# Patient Record
Sex: MALE | Race: WHITE | NOT HISPANIC OR LATINO | Employment: OTHER | URBAN - METROPOLITAN AREA
[De-identification: names, ages, dates, MRNs, and addresses within clinical notes are randomized per-mention and may not be internally consistent; named-entity substitution may affect disease eponyms.]

---

## 2020-02-17 ENCOUNTER — HOSPITAL ENCOUNTER (OUTPATIENT)
Dept: LAB | Facility: HOSPITAL | Age: 65
Discharge: HOME | End: 2020-02-17
Attending: INTERNAL MEDICINE

## 2020-02-17 LAB
25(OH)D3 SERPL-MCNC: 43.9 NG/ML (ref 30–100)
ALBUMIN SERPL-MCNC: 4.4 G/DL (ref 3.5–5)
ALBUMIN/GLOB SERPL: 1.4 {RATIO} (ref 1.4–2.6)
ALP SERPL-CCNC: 69 U/L (ref 56–155)
ALT SERPL-CCNC: 42 U/L (ref 10–40)
ANION GAP SERPL CALC-SCNC: 23 MMOL/L (ref 8–19)
AST SERPL-CCNC: 24 U/L (ref 15–50)
BASOPHILS # BLD AUTO: 0.05 10*3/UL (ref 0–0.2)
BASOPHILS NFR BLD AUTO: 0.8 % (ref 0–3)
BILIRUB SERPL-MCNC: 0.43 MG/DL (ref 0.2–1.3)
BNP SERPL-MCNC: 40 PG/ML (ref 0–900)
BUN SERPL-MCNC: 20 MG/DL (ref 5–25)
BUN/CREAT SERPL: 18 {RATIO} (ref 6–20)
CALCIUM SERPL-MCNC: 9.4 MG/DL (ref 8.7–10.4)
CHLORIDE SERPL-SCNC: 101 MMOL/L (ref 99–111)
CONV ABS IMM GRAN: 0.02 10*3/UL (ref 0–0.2)
CONV CO2: 22 MMOL/L (ref 22–32)
CONV IMMATURE GRAN: 0.3 % (ref 0–1.8)
CONV TOTAL PROTEIN: 7.5 G/DL (ref 6.3–8.2)
CREAT UR-MCNC: 1.11 MG/DL (ref 0.7–1.2)
DEPRECATED RDW RBC AUTO: 45.5 FL (ref 35.1–43.9)
EOSINOPHIL # BLD AUTO: 0.22 10*3/UL (ref 0–0.7)
EOSINOPHIL # BLD AUTO: 3.5 % (ref 0–7)
ERYTHROCYTE [DISTWIDTH] IN BLOOD BY AUTOMATED COUNT: 12.9 % (ref 11.6–14.4)
FOLATE SERPL-MCNC: 15.7 NG/ML (ref 4.8–20)
GFR SERPLBLD BASED ON 1.73 SQ M-ARVRAT: >60 ML/MIN/{1.73_M2}
GLOBULIN UR ELPH-MCNC: 3.1 G/DL (ref 2–3.5)
GLUCOSE SERPL-MCNC: 102 MG/DL (ref 70–99)
HCT VFR BLD AUTO: 48.3 % (ref 42–52)
HGB BLD-MCNC: 15.8 G/DL (ref 14–18)
LYMPHOCYTES # BLD AUTO: 1.33 10*3/UL (ref 1–5)
LYMPHOCYTES NFR BLD AUTO: 21.1 % (ref 20–45)
MCH RBC QN AUTO: 31.4 PG (ref 27–31)
MCHC RBC AUTO-ENTMCNC: 32.7 G/DL (ref 33–37)
MCV RBC AUTO: 96 FL (ref 80–96)
MONOCYTES # BLD AUTO: 0.64 10*3/UL (ref 0.2–1.2)
MONOCYTES NFR BLD AUTO: 10.1 % (ref 3–10)
NEUTROPHILS # BLD AUTO: 4.05 10*3/UL (ref 2–8)
NEUTROPHILS NFR BLD AUTO: 64.2 % (ref 30–85)
NRBC CBCN: 0 % (ref 0–0.7)
OSMOLALITY SERPL CALC.SUM OF ELEC: 297 MOSM/KG (ref 273–304)
PLATELET # BLD AUTO: 159 10*3/UL (ref 130–400)
PMV BLD AUTO: 10.7 FL (ref 9.4–12.4)
POTASSIUM SERPL-SCNC: 4.2 MMOL/L (ref 3.5–5.3)
PSA SERPL-MCNC: 0.78 NG/ML (ref 0–4)
RBC # BLD AUTO: 5.03 10*6/UL (ref 4.7–6.1)
SODIUM SERPL-SCNC: 142 MMOL/L (ref 135–147)
TSH SERPL-ACNC: 1.85 M[IU]/L (ref 0.27–4.2)
VIT B12 SERPL-MCNC: >2000 PG/ML (ref 211–911)
WBC # BLD AUTO: 6.31 10*3/UL (ref 4.8–10.8)

## 2020-02-21 ENCOUNTER — HOSPITAL ENCOUNTER (OUTPATIENT)
Dept: CARDIOLOGY | Facility: HOSPITAL | Age: 65
Discharge: HOME | End: 2020-02-21
Attending: INTERNAL MEDICINE

## 2020-11-02 ENCOUNTER — HOSPITAL ENCOUNTER (OUTPATIENT)
Dept: LAB | Facility: HOSPITAL | Age: 65
Discharge: HOME | End: 2020-11-02
Attending: INTERNAL MEDICINE

## 2020-11-02 LAB
ALBUMIN SERPL-MCNC: 4.3 G/DL (ref 3.5–5)
ALBUMIN/GLOB SERPL: 1.5 {RATIO} (ref 1.4–2.6)
ALP SERPL-CCNC: 75 U/L (ref 56–155)
ALT SERPL-CCNC: 36 U/L (ref 10–40)
ANION GAP SERPL CALC-SCNC: 17 MMOL/L (ref 8–19)
AST SERPL-CCNC: 21 U/L (ref 15–50)
BASOPHILS # BLD AUTO: 0.03 10*3/UL (ref 0–0.2)
BASOPHILS NFR BLD AUTO: 0.7 % (ref 0–3)
BILIRUB SERPL-MCNC: 0.51 MG/DL (ref 0.2–1.3)
BUN SERPL-MCNC: 21 MG/DL (ref 5–25)
BUN/CREAT SERPL: 19 {RATIO} (ref 6–20)
CALCIUM SERPL-MCNC: 9.3 MG/DL (ref 8.7–10.4)
CHLORIDE SERPL-SCNC: 103 MMOL/L (ref 99–111)
CHOLEST SERPL-MCNC: 159 MG/DL (ref 107–200)
CHOLEST/HDLC SERPL: 2.9 {RATIO} (ref 3–6)
CONV ABS IMM GRAN: 0.01 10*3/UL (ref 0–0.2)
CONV CO2: 25 MMOL/L (ref 22–32)
CONV IMMATURE GRAN: 0.2 % (ref 0–1.8)
CONV TOTAL PROTEIN: 7.2 G/DL (ref 6.3–8.2)
CREAT UR-MCNC: 1.1 MG/DL (ref 0.7–1.2)
DEPRECATED RDW RBC AUTO: 46.4 FL (ref 35.1–43.9)
EOSINOPHIL # BLD AUTO: 0.19 10*3/UL (ref 0–0.7)
EOSINOPHIL # BLD AUTO: 4.3 % (ref 0–7)
ERYTHROCYTE [DISTWIDTH] IN BLOOD BY AUTOMATED COUNT: 13 % (ref 11.6–14.4)
GFR SERPLBLD BASED ON 1.73 SQ M-ARVRAT: >60 ML/MIN/{1.73_M2}
GLOBULIN UR ELPH-MCNC: 2.9 G/DL (ref 2–3.5)
GLUCOSE SERPL-MCNC: 97 MG/DL (ref 70–99)
HCT VFR BLD AUTO: 49.6 % (ref 42–52)
HDLC SERPL-MCNC: 55 MG/DL (ref 40–60)
HGB BLD-MCNC: 15.8 G/DL (ref 14–18)
IRON SERPL-MCNC: 95 UG/DL (ref 70–180)
LDLC SERPL CALC-MCNC: 91 MG/DL (ref 70–100)
LYMPHOCYTES # BLD AUTO: 0.94 10*3/UL (ref 1–5)
LYMPHOCYTES NFR BLD AUTO: 21.3 % (ref 20–45)
MCH RBC QN AUTO: 30.6 PG (ref 27–31)
MCHC RBC AUTO-ENTMCNC: 31.9 G/DL (ref 33–37)
MCV RBC AUTO: 95.9 FL (ref 80–96)
MONOCYTES # BLD AUTO: 0.42 10*3/UL (ref 0.2–1.2)
MONOCYTES NFR BLD AUTO: 9.5 % (ref 3–10)
NEUTROPHILS # BLD AUTO: 2.83 10*3/UL (ref 2–8)
NEUTROPHILS NFR BLD AUTO: 64 % (ref 30–85)
NRBC CBCN: 0 % (ref 0–0.7)
OSMOLALITY SERPL CALC.SUM OF ELEC: 293 MOSM/KG (ref 273–304)
PLATELET # BLD AUTO: 150 10*3/UL (ref 130–400)
PMV BLD AUTO: 10.3 FL (ref 9.4–12.4)
POTASSIUM SERPL-SCNC: 4.7 MMOL/L (ref 3.5–5.3)
RBC # BLD AUTO: 5.17 10*6/UL (ref 4.7–6.1)
SODIUM SERPL-SCNC: 140 MMOL/L (ref 135–147)
TRIGL SERPL-MCNC: 66 MG/DL (ref 40–150)
VLDLC SERPL-MCNC: 13 MG/DL (ref 5–37)
WBC # BLD AUTO: 4.42 10*3/UL (ref 4.8–10.8)

## 2020-11-03 LAB
CONV HEPATITIS B SURFACE AG W CONFIRMATION RE: NEGATIVE
HCV AB SER DONR QL: <0.1 S/CO RATIO (ref 0–0.9)
SMOOTH MUSCLE F-ACTIN AB IGG: 10 UNITS (ref 0–19)

## 2021-03-31 ENCOUNTER — HOSPITAL ENCOUNTER (OUTPATIENT)
Dept: SLEEP MEDICINE | Facility: HOSPITAL | Age: 66
Discharge: HOME | End: 2021-03-31
Attending: INTERNAL MEDICINE

## 2021-03-31 ENCOUNTER — OUTSIDE FACILITY SERVICE (OUTPATIENT)
Dept: SLEEP MEDICINE | Facility: HOSPITAL | Age: 66
End: 2021-03-31

## 2021-03-31 PROCEDURE — 99204 OFFICE O/P NEW MOD 45 MIN: CPT | Performed by: INTERNAL MEDICINE

## 2021-04-20 ENCOUNTER — HOSPITAL ENCOUNTER (OUTPATIENT)
Dept: PREADMISSION TESTING | Facility: HOSPITAL | Age: 66
Discharge: HOME | End: 2021-04-20
Attending: INTERNAL MEDICINE

## 2021-04-20 LAB — SARS-COV-2 RNA SPEC QL NAA+PROBE: NOT DETECTED

## 2021-04-27 ENCOUNTER — HOSPITAL ENCOUNTER (OUTPATIENT)
Dept: SLEEP MEDICINE | Facility: HOSPITAL | Age: 66
Discharge: HOME | End: 2021-04-27
Attending: INTERNAL MEDICINE

## 2021-04-28 ENCOUNTER — OUTSIDE FACILITY SERVICE (OUTPATIENT)
Dept: SLEEP MEDICINE | Facility: HOSPITAL | Age: 66
End: 2021-04-28

## 2021-04-28 PROCEDURE — 95810 POLYSOM 6/> YRS 4/> PARAM: CPT | Performed by: INTERNAL MEDICINE

## 2021-05-26 ENCOUNTER — HOSPITAL ENCOUNTER (OUTPATIENT)
Dept: LAB | Facility: HOSPITAL | Age: 66
Discharge: HOME | End: 2021-05-26
Attending: INTERNAL MEDICINE

## 2021-05-26 LAB
ALBUMIN SERPL-MCNC: 4.3 G/DL (ref 3.5–5)
ALBUMIN/GLOB SERPL: 1.3 {RATIO} (ref 1.4–2.6)
ALP SERPL-CCNC: 69 U/L (ref 56–155)
ALT SERPL-CCNC: 41 U/L (ref 10–40)
AMPHETAMINES UR QL SCN: NEGATIVE
ANION GAP SERPL CALC-SCNC: 16 MMOL/L (ref 8–19)
AST SERPL-CCNC: 25 U/L (ref 15–50)
BARBITURATES UR QL SCN: NEGATIVE
BASOPHILS # BLD AUTO: 0.04 10*3/UL (ref 0–0.2)
BASOPHILS NFR BLD AUTO: 0.8 % (ref 0–3)
BENZODIAZ UR QL SCN: POSITIVE
BILIRUB SERPL-MCNC: 0.56 MG/DL (ref 0.2–1.3)
BUN SERPL-MCNC: 20 MG/DL (ref 5–25)
BUN/CREAT SERPL: 17 {RATIO} (ref 6–20)
CALCIUM SERPL-MCNC: 9.5 MG/DL (ref 8.7–10.4)
CHLORIDE SERPL-SCNC: 102 MMOL/L (ref 99–111)
CHOLEST SERPL-MCNC: 176 MG/DL (ref 107–200)
CHOLEST/HDLC SERPL: 3.5 {RATIO} (ref 3–6)
CONV ABS IMM GRAN: 0.01 10*3/UL (ref 0–0.2)
CONV CO2: 25 MMOL/L (ref 22–32)
CONV COCAINE, UR: NEGATIVE
CONV IMMATURE GRAN: 0.2 % (ref 0–1.8)
CONV TOTAL PROTEIN: 7.7 G/DL (ref 6.3–8.2)
CREAT UR-MCNC: 1.2 MG/DL (ref 0.7–1.2)
DEPRECATED RDW RBC AUTO: 45.1 FL (ref 35.1–43.9)
EOSINOPHIL # BLD AUTO: 0.28 10*3/UL (ref 0–0.7)
EOSINOPHIL # BLD AUTO: 5.8 % (ref 0–7)
ERYTHROCYTE [DISTWIDTH] IN BLOOD BY AUTOMATED COUNT: 12.8 % (ref 11.6–14.4)
EST. AVERAGE GLUCOSE BLD GHB EST-MCNC: 111 MG/DL
GFR SERPLBLD BASED ON 1.73 SQ M-ARVRAT: >60 ML/MIN/{1.73_M2}
GLOBULIN UR ELPH-MCNC: 3.4 G/DL (ref 2–3.5)
GLUCOSE SERPL-MCNC: 96 MG/DL (ref 70–99)
HBA1C MFR BLD: 5.5 % (ref 3.5–5.7)
HCT VFR BLD AUTO: 47.6 % (ref 42–52)
HDLC SERPL-MCNC: 51 MG/DL (ref 40–60)
HGB BLD-MCNC: 15.5 G/DL (ref 14–18)
LDLC SERPL CALC-MCNC: 106 MG/DL (ref 70–100)
LYMPHOCYTES # BLD AUTO: 1.07 10*3/UL (ref 1–5)
LYMPHOCYTES NFR BLD AUTO: 22.1 % (ref 20–45)
MCH RBC QN AUTO: 31 PG (ref 27–31)
MCHC RBC AUTO-ENTMCNC: 32.6 G/DL (ref 33–37)
MCV RBC AUTO: 95.2 FL (ref 80–96)
METHADONE UR QL SCN: NEGATIVE
MONOCYTES # BLD AUTO: 0.52 10*3/UL (ref 0.2–1.2)
MONOCYTES NFR BLD AUTO: 10.7 % (ref 3–10)
NEUTROPHILS # BLD AUTO: 2.93 10*3/UL (ref 2–8)
NEUTROPHILS NFR BLD AUTO: 60.4 % (ref 30–85)
NRBC CBCN: 0 % (ref 0–0.7)
OPIATES TESTED UR SCN: NEGATIVE
OSMOLALITY SERPL CALC.SUM OF ELEC: 290 MOSM/KG (ref 273–304)
OXYCODONE UR QL SCN: NEGATIVE
PCP UR QL: NEGATIVE
PLATELET # BLD AUTO: 141 10*3/UL (ref 130–400)
PMV BLD AUTO: 10.2 FL (ref 9.4–12.4)
POTASSIUM SERPL-SCNC: 4.3 MMOL/L (ref 3.5–5.3)
PSA SERPL-MCNC: 1.38 NG/ML (ref 0–4)
RBC # BLD AUTO: 5 10*6/UL (ref 4.7–6.1)
SODIUM SERPL-SCNC: 139 MMOL/L (ref 135–147)
THC SERPLBLD CFM-MCNC: NEGATIVE NG/ML
TRIGL SERPL-MCNC: 94 MG/DL (ref 40–150)
VLDLC SERPL-MCNC: 19 MG/DL (ref 5–37)
WBC # BLD AUTO: 4.85 10*3/UL (ref 4.8–10.8)

## 2021-05-27 ENCOUNTER — HOSPITAL ENCOUNTER (OUTPATIENT)
Dept: SLEEP MEDICINE | Facility: HOSPITAL | Age: 66
Discharge: HOME | End: 2021-05-27
Attending: INTERNAL MEDICINE

## 2021-06-02 ENCOUNTER — OUTSIDE FACILITY SERVICE (OUTPATIENT)
Dept: SLEEP MEDICINE | Facility: HOSPITAL | Age: 66
End: 2021-06-02

## 2021-06-02 PROCEDURE — 95811 POLYSOM 6/>YRS CPAP 4/> PARM: CPT | Performed by: INTERNAL MEDICINE

## 2021-06-30 ENCOUNTER — OFFICE VISIT (OUTPATIENT)
Dept: OTOLARYNGOLOGY | Facility: CLINIC | Age: 66
End: 2021-06-30

## 2021-06-30 VITALS — HEIGHT: 70 IN | BODY MASS INDEX: 36.76 KG/M2 | WEIGHT: 256.8 LBS | TEMPERATURE: 98.2 F

## 2021-06-30 DIAGNOSIS — R13.10 DYSPHAGIA, UNSPECIFIED TYPE: ICD-10-CM

## 2021-06-30 DIAGNOSIS — K21.9 GASTROESOPHAGEAL REFLUX DISEASE, UNSPECIFIED WHETHER ESOPHAGITIS PRESENT: ICD-10-CM

## 2021-06-30 DIAGNOSIS — R49.0 HOARSENESS: Primary | ICD-10-CM

## 2021-06-30 DIAGNOSIS — R09.81 NASAL CONGESTION: ICD-10-CM

## 2021-06-30 DIAGNOSIS — H61.21 IMPACTED CERUMEN OF RIGHT EAR: ICD-10-CM

## 2021-06-30 PROCEDURE — 31575 DIAGNOSTIC LARYNGOSCOPY: CPT | Performed by: NURSE PRACTITIONER

## 2021-06-30 PROCEDURE — 99204 OFFICE O/P NEW MOD 45 MIN: CPT | Performed by: NURSE PRACTITIONER

## 2021-06-30 PROCEDURE — 69210 REMOVE IMPACTED EAR WAX UNI: CPT | Performed by: NURSE PRACTITIONER

## 2021-06-30 RX ORDER — TEMAZEPAM 30 MG/1
40 CAPSULE ORAL NIGHTLY
COMMUNITY
End: 2021-12-27

## 2021-06-30 RX ORDER — GABAPENTIN 100 MG/1
200 CAPSULE ORAL NIGHTLY
COMMUNITY
End: 2021-11-22

## 2021-06-30 RX ORDER — OMEPRAZOLE 20 MG/1
20 CAPSULE, DELAYED RELEASE ORAL DAILY
COMMUNITY
End: 2021-11-22

## 2021-06-30 RX ORDER — LANOLIN ALCOHOL/MO/W.PET/CERES
2500 CREAM (GRAM) TOPICAL DAILY
COMMUNITY
End: 2022-02-21 | Stop reason: SDUPTHER

## 2021-06-30 RX ORDER — CITALOPRAM 40 MG/1
40 TABLET ORAL DAILY
COMMUNITY
End: 2022-05-24

## 2021-06-30 RX ORDER — CELECOXIB 200 MG/1
200 CAPSULE ORAL DAILY
COMMUNITY
End: 2022-02-21 | Stop reason: SDUPTHER

## 2021-06-30 NOTE — PROGRESS NOTES
Patient Name: Hector Coronel   Visit Date: 06/30/2021   Patient ID: [id number]  Provider: AMANDA Monroy    Sex: male  Location: [facility]   YOB: 1955  Location Address: [address] [place] [address], [address] [place] [address],?[place]?[id number]    Primary Care Provider Jagdish Jimenez MD  Location Phone: [phone]    Referring Provider: Jagdish Jimenez, *        Chief Complaint  Hoarse and Cough    Subjective   Hector Coronel is a 66 y.o. male who presents to [facility] today as a consult from Jagdish Jimenez, * for evaluation of dysphagia and globus sensation.  The patient states for the past year he has had intermittent issues with feeling like there is something stuck in his throat.  He occasionally will have difficulty swallowing foods as well.  He states that his mouth is often dry and he feels like his symptoms are worse after he eats dinner.  He states a longstanding history with GERD and ulcers.  He takes omeprazole 20 mg every morning and has done this for many years.  He states he has had chronic issues with dry mouth and a feeling of dryness in his nose.  He does have obstructive sleep apnea and uses a CPAP machine nightly.  He also uses Flonase each morning.  He is a former smoker, quitting around 30 years ago.  He denies any weight loss.  He does have occasional hoarseness that is intermittent.      Current Outpatient Medications on File Prior to Visit   Medication Sig   • celecoxib (CeleBREX) 200 MG capsule Take 200 mg by mouth Daily.   • citalopram (CeleXA) 40 MG tablet Take 40 mg by mouth Daily.   • gabapentin (NEURONTIN) 100 MG capsule Take 200 mg by mouth Every Night.   • omeprazole (priLOSEC) 20 MG capsule Take 20 mg by mouth Daily.   • temazepam (RESTORIL) 30 MG capsule Take 40 mg by mouth Every Night.   • vitamin B-12 (CYANOCOBALAMIN) 1000 MCG tablet Take 2,500 mcg by mouth Daily.     No current facility-administered  "medications on file prior to visit.        Social History     Tobacco Use   • Smoking status: Former Smoker     Packs/day: 2.50     Types: Cigarettes   • Smokeless tobacco: Never Used   • Tobacco comment: quit 30 years ago   Vaping Use   • Vaping Use: Never used   Substance Use Topics   • Alcohol use: Not on file   • Drug use: Not on file       Objective     Vital Signs:   Temp 98.2 °F (36.8 °C) (Temporal)   Ht 176.5 cm (69.5\")   Wt 116 kg (256 lb 12.8 oz)   BMI 37.38 kg/m²       Physical Exam  Constitutional:       General: He is not in acute distress.     Appearance: Normal appearance. He is not ill-appearing.   HENT:      Head: Normocephalic and atraumatic.      Jaw: There is normal jaw occlusion. No tenderness or pain on movement.      Salivary Glands: Right salivary gland is not diffusely enlarged or tender. Left salivary gland is not diffusely enlarged or tender.      Right Ear: Tympanic membrane, ear canal and external ear normal. There is impacted cerumen.      Left Ear: Tympanic membrane, ear canal and external ear normal.      Nose: Nose normal. No septal deviation.      Right Sinus: No maxillary sinus tenderness or frontal sinus tenderness.      Left Sinus: No maxillary sinus tenderness or frontal sinus tenderness.      Mouth/Throat:      Lips: Pink. No lesions.      Mouth: Mucous membranes are moist. No oral lesions.      Dentition: Dental caries present.      Tongue: No lesions.      Palate: No mass and lesions.      Pharynx: Oropharynx is clear. Uvula midline. Posterior oropharyngeal erythema present.      Tonsils: No tonsillar exudate.      Comments: Multiple dental caries and missing teeth  Eyes:      Extraocular Movements: Extraocular movements intact.      Conjunctiva/sclera: Conjunctivae normal.      Pupils: Pupils are equal, round, and reactive to light.   Neck:      Thyroid: No thyroid mass, thyromegaly or thyroid tenderness.      Trachea: Trachea normal.   Pulmonary:      Effort: Pulmonary " effort is normal. No respiratory distress.   Musculoskeletal:         General: Normal range of motion.      Cervical back: Normal range of motion and neck supple. No tenderness.   Lymphadenopathy:      Cervical: No cervical adenopathy.   Skin:     General: Skin is warm and dry.   Neurological:      General: No focal deficit present.      Mental Status: He is alert and oriented to person, place, and time.      Cranial Nerves: No cranial nerve deficit.      Motor: No weakness.      Gait: Gait normal.   Psychiatric:         Mood and Affect: Mood normal.         Behavior: Behavior normal.         Thought Content: Thought content normal.         Judgment: Judgment normal.         Laryngoscopy    Date/Time: 6/30/2021 2:20 PM  Performed by: Sully Ye APRN  Authorized by: Sully Ye APRN   Local anesthesia used: yes    Anesthesia:  Local anesthesia used: yes  Local Anesthetic: lidocaine spray    Sedation:  Patient sedated: no    Patient tolerance: patient tolerated the procedure well with no immediate complications  Comments: I performed a flexible laryngoscopy.  Patient's leftt naris was sprayed with topical anesthetic and decongestant spray.  After ample time was given for the medication to take effect I passed the flexible laryngoscope through the left naris.  The nose is free of any lesions, polyps or purulence.  The base of tongue, vallecula, epiglottis, arytenoid cartilages and piriform sinuses are all normal in appearance.    Bilateral vocal folds abducted and abducted normally.  There is some erythema noted to the right vocal fold.  The subglottis was widely patent.  I see no lesions or masses.  The patient tolerated the procedure well.               Ear Cerumen Removal    Date/Time: 6/30/2021 2:25 PM  Performed by: Sully Ye APRN  Authorized by: Sully Ye APRN     Anesthesia:  Local Anesthetic: none  Location details: right ear  Patient tolerance: patient tolerated the procedure well with no  immediate complications  Comments: Right-sided cerumen impaction removed on the microscope using alligator forceps.  Procedure type: instrumentation   Sedation:  Patient sedated: no             Result Review :              Assessment and Plan    Diagnoses and all orders for this visit:    1. Hoarseness (Primary)    2. Dysphagia, unspecified type    3. Impacted cerumen of right ear    4. Gastroesophageal reflux disease, unspecified whether esophagitis present    5. Nasal congestion    Other orders  -     Laryngoscopy  -     Ear Cerumen Removal    I will have the patient start using his fluticasone sprays twice daily and start using a nasal sinus rinse each night before using his CPAP machine.  Additionally I am going to have him try to take his omeprazole before his dinner meal.  We have discussed reflux precautions.  I will plan to see him back if no improvement in his symptoms.       Follow Up   Return if symptoms worsen or fail to improve.  Patient was given instructions and counseling regarding his condition or for health maintenance advice. Please see specific information pulled into the AVS if appropriate.      AMANDA Monroy

## 2021-06-30 NOTE — PATIENT INSTRUCTIONS
Neilmed Saline Nasal Rinse  [email or web address]        Step 1  Please wash your hands. Fill the clean bottle with the designated volume of either distilled, micro-filtered (through 0.2 micron filter), commercially bottled or previously boiled and cooled down water. Always rinse your nasal passages with NeilMed® Sinus Rinse™ packets only. Our packets contain a mixture of USP grade sodium chloride and sodium bicarbonate. These ingredients are of the purest quality available to make the dry powder mixture. Rinsing your nasal passages with only plain water without our mixture will result in a severe burning sensation as the plain water is not physiologic for your nasal lining, even if it is appropriate for drinking. Additionally, for your safety, do not use tap or faucet water for dissolving the mixture unless it has been previously boiled for five minutes or more as boiling sterilizes the water. Other choices are distilled, micro-filtered (through 0.2 micron), commercially bottled or, as mentioned earlier, previously boiled water at lukewarm or body temperature. You can store boiled water in a clean container for seven days or more if refrigerated. Do not use non-chlorinated or non-ultra (0.2 micron) filtered well water unless it is boiled and then cooled to lukewarm or body temperature.  *You may warm the water in a microwave in increments of 5 to 10 seconds to avoid overheating the water, damaging the device or scalding your nasal passage.   Step 2  Cut the Sinus Rinse™ mixture packet at the corner and pour its contents into the bottle. Tighten the cap and tube on the bottle securely. Place one finger over the tip of the cap and shake the bottle gently to dissolve the mixture.   Step 3  Standing in front of a sink, bend forward to your comfort level and tilt your head down. Keeping your mouth open, without holding your breath, place the cap snugly against your nasal passage. SQUEEZE  BOTTLE GENTLY until the solution starts draining from the OPPOSITE nasal passage. Some may drain from your mouth. For a proper rinse, keep squeezing the bottle GENTLY until at least 1/4 to 1/2 (60 mL to 120 mL or 2 to 4 fl oz) of the bottle is used. Do not swallow the solution.   Step 4  Blow your nose very gently, without pinching nose completely to avoid pressure on eardrums. If tolerable, sniff in gently any residual solution remaining in the nasal passage once or twice, because this may clean out the posterior nasopharyngeal area, which is the area at the back of your nasal passage. At times, some solution will reach the back of your throat, so please spit it out. To help drain any residual solution, blow your nose gently while tilting your head forward and to the opposite side of the nasal passage you just rinsed.  Step 5  Now repeat steps 3 and 4 for your other nasal passage. Most users fi nd that rinsing twice a day is beneficial, similar to brushing your teeth. Many doctors recommend rinsing 3-4 times daily or for special circumstances, even rinsing up to 6 times a day is safe. Please follow your physician’s advice.

## 2021-07-14 ENCOUNTER — TRANSCRIBE ORDERS (OUTPATIENT)
Dept: LAB | Facility: HOSPITAL | Age: 66
End: 2021-07-14

## 2021-07-14 ENCOUNTER — LAB (OUTPATIENT)
Dept: LAB | Facility: HOSPITAL | Age: 66
End: 2021-07-14

## 2021-07-14 DIAGNOSIS — Z79.899 ENCOUNTER FOR LONG-TERM (CURRENT) USE OF OTHER MEDICATIONS: Primary | ICD-10-CM

## 2021-07-14 DIAGNOSIS — Z79.899 ENCOUNTER FOR LONG-TERM (CURRENT) USE OF OTHER MEDICATIONS: ICD-10-CM

## 2021-07-14 LAB
AMPHET+METHAMPHET UR QL: NEGATIVE
BARBITURATES UR QL SCN: NEGATIVE
BENZODIAZ UR QL SCN: POSITIVE
CANNABINOIDS SERPL QL: NEGATIVE
COCAINE UR QL: NEGATIVE
METHADONE UR QL SCN: NEGATIVE
OPIATES UR QL: NEGATIVE
OXYCODONE UR QL SCN: NEGATIVE

## 2021-07-14 PROCEDURE — 80307 DRUG TEST PRSMV CHEM ANLYZR: CPT

## 2021-07-16 RX ORDER — DOXAZOSIN MESYLATE 4 MG/1
TABLET ORAL
Qty: 90 TABLET | Refills: 0 | Status: SHIPPED | OUTPATIENT
Start: 2021-07-16 | End: 2021-10-11

## 2021-07-21 ENCOUNTER — OFFICE VISIT (OUTPATIENT)
Dept: SLEEP MEDICINE | Facility: HOSPITAL | Age: 66
End: 2021-07-21

## 2021-07-21 VITALS
OXYGEN SATURATION: 96 % | BODY MASS INDEX: 36.22 KG/M2 | HEIGHT: 70 IN | DIASTOLIC BLOOD PRESSURE: 77 MMHG | WEIGHT: 253 LBS | HEART RATE: 76 BPM | SYSTOLIC BLOOD PRESSURE: 149 MMHG

## 2021-07-21 DIAGNOSIS — E66.9 CLASS 2 OBESITY: ICD-10-CM

## 2021-07-21 DIAGNOSIS — G47.33 OSA TREATED WITH BIPAP: Primary | ICD-10-CM

## 2021-07-21 PROBLEM — E66.812 CLASS 2 OBESITY: Status: ACTIVE | Noted: 2021-07-21

## 2021-07-21 PROCEDURE — 99213 OFFICE O/P EST LOW 20 MIN: CPT | Performed by: INTERNAL MEDICINE

## 2021-07-21 PROCEDURE — G0463 HOSPITAL OUTPT CLINIC VISIT: HCPCS

## 2021-07-21 NOTE — PROGRESS NOTES
"  [place] [address], [facility]  [place]: [address] [place] [address]  Phone: [phone]  Fax; [phone]      SLEEP CLINIC FOLLOW UP PROGRESS NOTE.    Hector Coronel  1955  66 y.o.  male      PCP: Jagdish Jimenez MD      Date of visit: 7/21/2021    Chief Complaint   Patient presents with   • Sleep Apnea   • Obesity       HPI:  This is a 66 y.o. years old patient who has a history of obstructive sleep apnea is here for  the initial compliance follow-up.  Sleep apnea is severe in severity with a AHI of 98/hr. Patient is using positive airway pressure therapy with auto BiPAP and the symptoms of snoring, non-restorative sleep and daytime excessive sleepiness have improved significantly on the therapy. Normally goes to bed at 10 PM and wakes up at 6 AM.  The patient wakes up 2 time(s) during the night and has no problem going back to sleep.  Feels refreshed after waking up.  Patient also denies headaches and nasal congestion.       Mediactions and allergies are reviewed by me and documented in the encounter.     SOCIAL ( habits pertaining to sleep medicine)  History of smoking:No   History of alcohol use: 3 per week  Caffeine use: 2     REVIEW OF SYSTEMS:   [place] Sleepiness Scale :Total score: 7   Nsaal congestion:Yes   Dry mouth/nose:Yes   Post nasal drip; No   Acid reflux/Heartburn:No   Abd bloating:Yes   Morining headache:No   Anxiety:Yes   Depression:Yes    PHYSICAL EXAMINATION:  CONSTITUTIONAL:  Vitals:    07/21/21 1300   BP: 149/77   Pulse: 76   SpO2: 96%   Weight: 115 kg (253 lb)   Height: 177.8 cm (70\")    Body mass index is 36.3 kg/m².   NOSE: nasal passages are clear, no nasal polyps, septum in the midline.  THROAT: throat is clear, oral airway Mallampati class 3  RESP SYSTEM: Breath sounds are normal, no wheezes or crackles  CARDIOVASULAR: Heart rate is regular without murmur. No edema      Data reviewed:  The Smart card downloaded on 7/21/2021 has been reviewed independently " by me for compliance and discussed the data with the patient.   Compliance; 97%  More than 4 hr use, 90%  Average use of the device 7 hours per night  Residual AHI: 9 /hr (goal < 5.0 /hr)  Mask type: Full face  DME: [facility]      ASSESSMENT AND PLAN:  · Obstructive sleep apnea ( G 47.33).  The symptoms of sleep apnea have improved with the device and the treatment.  Patient's compliance with the device is excellent for treatment of sleep apnea.  I have independently reviewed the smart card down load and discussed with the patient the download data and encouarged the patient to continue to use the device.The residual AHI is acceptable. The device is benefiting the patient and the device is medically necessary.  Without proper control of sleep apnea and good compliance there is a increased risk for hypertension, diabetes mellitus and nonrestorative sleep with hypersomnia which can increase risk for motor vehicle accidents.  Untreated sleep apnea is also a risk factor for development of atrial fibrillation, pulmonary hypertension and stroke. The patient is also instructed to get the supplies from the DME [employer] [facility] [employer] and and change them on a regular basis.  A prescription for supplies has been sent to the DME [facility].  I have also discussed the good sleep hygiene habits and adequate amount of sleep needed for good health.  · Obesity, class 2 with BMI is Body mass index is 36.3 kg/m².. I have discuss the relationship between the weight and sleep apnea. The benefit of weight loss in reducing severity of sleep apnea was discussed. Discussed diet and exercise with the patient to archive ideal BMI.  · Return in about 1 year (around 7/21/2022) for Annual visit with smartcard download. . Patient's questions were answered.        Miguel Ángel Medrano MD  Sleep Medicine.  Medical Director, [facility]  7/21/2021 ,

## 2021-08-23 RX ORDER — BUPROPION HYDROCHLORIDE 150 MG/1
TABLET ORAL
Qty: 90 TABLET | Refills: 1 | Status: SHIPPED | OUTPATIENT
Start: 2021-08-23 | End: 2022-02-21

## 2021-09-22 ENCOUNTER — OFFICE VISIT (OUTPATIENT)
Dept: ORTHOPEDIC SURGERY | Facility: CLINIC | Age: 66
End: 2021-09-22

## 2021-09-22 VITALS — BODY MASS INDEX: 37.77 KG/M2 | WEIGHT: 255 LBS | OXYGEN SATURATION: 96 % | HEIGHT: 69 IN | HEART RATE: 77 BPM

## 2021-09-22 DIAGNOSIS — M25.562 LEFT KNEE PAIN, UNSPECIFIED CHRONICITY: ICD-10-CM

## 2021-09-22 DIAGNOSIS — M25.561 PAIN IN BOTH KNEES, UNSPECIFIED CHRONICITY: ICD-10-CM

## 2021-09-22 DIAGNOSIS — M17.0 BILATERAL PRIMARY OSTEOARTHRITIS OF KNEE: Primary | ICD-10-CM

## 2021-09-22 DIAGNOSIS — M25.562 PAIN IN BOTH KNEES, UNSPECIFIED CHRONICITY: ICD-10-CM

## 2021-09-22 PROCEDURE — 99203 OFFICE O/P NEW LOW 30 MIN: CPT | Performed by: ORTHOPAEDIC SURGERY

## 2021-09-22 PROCEDURE — 20610 DRAIN/INJ JOINT/BURSA W/O US: CPT | Performed by: ORTHOPAEDIC SURGERY

## 2021-09-22 RX ORDER — LIDOCAINE HYDROCHLORIDE 10 MG/ML
9 INJECTION, SOLUTION INFILTRATION; PERINEURAL
Status: COMPLETED | OUTPATIENT
Start: 2021-09-22 | End: 2021-09-22

## 2021-09-22 RX ORDER — METHYLPREDNISOLONE ACETATE 80 MG/ML
80 INJECTION, SUSPENSION INTRA-ARTICULAR; INTRALESIONAL; INTRAMUSCULAR; SOFT TISSUE
Status: COMPLETED | OUTPATIENT
Start: 2021-09-22 | End: 2021-09-22

## 2021-09-22 RX ADMIN — LIDOCAINE HYDROCHLORIDE 9 ML: 10 INJECTION, SOLUTION INFILTRATION; PERINEURAL at 11:06

## 2021-09-22 RX ADMIN — METHYLPREDNISOLONE ACETATE 80 MG: 80 INJECTION, SUSPENSION INTRA-ARTICULAR; INTRALESIONAL; INTRAMUSCULAR; SOFT TISSUE at 11:06

## 2021-09-22 NOTE — PROGRESS NOTES
"Chief Complaint  Initial Evaluation of the Left Knee     Subjective      Hector Coronel presents to [facility] for an evaluation of left knee. Patient has bilateral knee pain but his left is worse than the right. He states his right knee isn't bothering him at this time. He states left knee consistently bothers him. He denies any injury or trauma to bilateral knees. He hasn't had any formal treatment for bilateral knees.     No Known Allergies     Social History     Socioeconomic History   • Marital status:      Spouse name: Not on file   • Number of children: Not on file   • Years of education: Not on file   • Highest education level: Not on file   Tobacco Use   • Smoking status: Former Smoker     Packs/day: 2.50     Types: Cigarettes   • Smokeless tobacco: Never Used   • Tobacco comment: quit 30 years ago   Vaping Use   • Vaping Use: Never used        Review of Systems     Objective   Vital Signs:   Pulse 77   Ht 175.3 cm (69\")   Wt 116 kg (255 lb)   SpO2 96%   BMI 37.66 kg/m²       Physical Exam  Constitutional:       Appearance: Normal appearance. Patient is well-developed and normal weight.   HENT:      Head: Normocephalic.      Right Ear: Hearing and external ear normal.      Left Ear: Hearing and external ear normal.      Nose: Nose normal.   Eyes:      Conjunctiva/sclera: Conjunctivae normal.   Cardiovascular:      Rate and Rhythm: Normal rate.   Pulmonary:      Effort: Pulmonary effort is normal.      Breath sounds: No wheezing or rales.   Abdominal:      Palpations: Abdomen is soft.      Tenderness: There is no abdominal tenderness.   Musculoskeletal:      Cervical back: Normal range of motion.   Skin:     Findings: No rash.   Neurological:      Mental Status: Patient  is alert and oriented to person, place, and time.   Psychiatric:         Mood and Affect: Mood and affect normal.         Judgment: Judgment normal.       Ortho Exam      BILATERAL KNEES: Full " extension. Flexion to 120 degrees. Skin intact. Stable to varus/valgus stress. Negative Lachman. Good strength to hamstrings, quadriceps, dorsiflexors and plantar flexors. Sensation grossly intact. Neurovascular intact. Tender medial and lateral joint line. Full weight bearing. Non-antalgic gait. Dorsal Pedal Pulse 2+, posterior tibialis pulse 2+.     Large Joint Arthrocentesis: R knee  Date/Time: 9/22/2021 11:06 AM  Consent given by: patient  Site marked: site marked  Timeout: Immediately prior to procedure a time out was called to verify the correct patient, procedure, equipment, support staff and site/side marked as required   Supporting Documentation  Indications: pain   Procedure Details  Location: knee - R knee  Preparation: Patient was prepped and draped in the usual sterile fashion  Needle size: 22 G  Medications administered: 9 mL lidocaine 1 %; 80 mg methylPREDNISolone acetate 80 MG/ML  Patient tolerance: patient tolerated the procedure well with no immediate complications    Large Joint Arthrocentesis: L knee  Date/Time: 9/22/2021 11:06 AM  Consent given by: patient  Site marked: site marked  Timeout: Immediately prior to procedure a time out was called to verify the correct patient, procedure, equipment, support staff and site/side marked as required   Supporting Documentation  Indications: pain   Procedure Details  Location: knee - L knee  Preparation: Patient was prepped and draped in the usual sterile fashion  Needle size: 22 G  Medications administered: 9 mL lidocaine 1 %; 80 mg methylPREDNISolone acetate 80 MG/ML  Patient tolerance: patient tolerated the procedure well with no immediate complications              Imaging Results (Most Recent)     Procedure Component Value Units Date/Time    XR Knee 3 View Bilateral [[id number]] Resulted: 09/22/21 1019     Updated: 09/22/21 1024           Result Review :       X-Ray Report:  Bilateral knee(s) X-Ray  Indication: Evaluation of bilateral knee pain   AP,  Lateral and Standing view(s)  Findings: Advancing changes of bilateral knees, no fracture or dislocation.   Prior studies available for comparison: no       Assessment and Plan     DX: Bilateral knee osteoarthritis     Patient given bilateral knee steroid injections and tolerated this well. He is a candidate for a total knee replacement. He wishes to continue conservative management.     Call or return if worsening symptoms.    Follow Up     PRN.       Patient was given instructions and counseling regarding his condition or for health maintenance advice. Please see specific information pulled into the AVS if appropriate.     Scribed for Jose Talavera MD by Yudy Blair.  09/22/21   10:37 EDT      I have personally performed the services described in this document as scribed by the above individual and it is both accurate and complete. Jose Talavera MD 09/22/21

## 2021-10-11 RX ORDER — DOXAZOSIN MESYLATE 4 MG/1
TABLET ORAL
Qty: 90 TABLET | Refills: 0 | Status: SHIPPED | OUTPATIENT
Start: 2021-10-11 | End: 2022-01-24

## 2021-11-11 ENCOUNTER — LAB (OUTPATIENT)
Dept: LAB | Facility: HOSPITAL | Age: 66
End: 2021-11-11

## 2021-11-11 ENCOUNTER — OFFICE VISIT (OUTPATIENT)
Dept: INTERNAL MEDICINE | Facility: CLINIC | Age: 66
End: 2021-11-11

## 2021-11-11 ENCOUNTER — TRANSCRIBE ORDERS (OUTPATIENT)
Dept: INTERNAL MEDICINE | Facility: CLINIC | Age: 66
End: 2021-11-11

## 2021-11-11 VITALS
HEART RATE: 76 BPM | TEMPERATURE: 98 F | BODY MASS INDEX: 37.59 KG/M2 | HEIGHT: 69 IN | OXYGEN SATURATION: 92 % | WEIGHT: 253.8 LBS | SYSTOLIC BLOOD PRESSURE: 142 MMHG | DIASTOLIC BLOOD PRESSURE: 81 MMHG

## 2021-11-11 DIAGNOSIS — R73.01 IMPAIRED FASTING GLUCOSE: ICD-10-CM

## 2021-11-11 DIAGNOSIS — G60.9 PERIPHERAL NEUROPATHY, IDIOPATHIC: ICD-10-CM

## 2021-11-11 DIAGNOSIS — Z12.5 SCREENING PSA (PROSTATE SPECIFIC ANTIGEN): ICD-10-CM

## 2021-11-11 DIAGNOSIS — R74.8 ELEVATED LIVER ENZYMES: ICD-10-CM

## 2021-11-11 DIAGNOSIS — M25.50 PAIN IN JOINT, MULTIPLE SITES: ICD-10-CM

## 2021-11-11 DIAGNOSIS — E78.00 HYPERCHOLESTEROLEMIA: Primary | ICD-10-CM

## 2021-11-11 DIAGNOSIS — G47.33 OSA TREATED WITH BIPAP: ICD-10-CM

## 2021-11-11 DIAGNOSIS — N40.0 BENIGN PROSTATIC HYPERPLASIA, UNSPECIFIED WHETHER LOWER URINARY TRACT SYMPTOMS PRESENT: ICD-10-CM

## 2021-11-11 DIAGNOSIS — E66.9 CLASS 2 OBESITY: ICD-10-CM

## 2021-11-11 DIAGNOSIS — F33.1 MAJOR DEPRESSIVE DISORDER, RECURRENT, MODERATE (HCC): ICD-10-CM

## 2021-11-11 DIAGNOSIS — E78.00 HYPERCHOLESTEROLEMIA: ICD-10-CM

## 2021-11-11 DIAGNOSIS — M17.0 PRIMARY OSTEOARTHRITIS OF BOTH KNEES: ICD-10-CM

## 2021-11-11 DIAGNOSIS — H61.23 CERUMEN DEBRIS ON TYMPANIC MEMBRANE OF BOTH EARS: Primary | ICD-10-CM

## 2021-11-11 LAB
ALBUMIN SERPL-MCNC: 4.7 G/DL (ref 3.5–5.2)
ALBUMIN/GLOB SERPL: 1.6 G/DL
ALP SERPL-CCNC: 71 U/L (ref 39–117)
ALT SERPL W P-5'-P-CCNC: 92 U/L (ref 1–41)
ANION GAP SERPL CALCULATED.3IONS-SCNC: 7.7 MMOL/L (ref 5–15)
AST SERPL-CCNC: 37 U/L (ref 1–40)
BASOPHILS # BLD AUTO: 0.04 10*3/MM3 (ref 0–0.2)
BASOPHILS NFR BLD AUTO: 0.8 % (ref 0–1.5)
BILIRUB SERPL-MCNC: 0.5 MG/DL (ref 0–1.2)
BUN SERPL-MCNC: 17 MG/DL (ref 8–23)
BUN/CREAT SERPL: 15 (ref 7–25)
CALCIUM SPEC-SCNC: 9.6 MG/DL (ref 8.6–10.5)
CHLORIDE SERPL-SCNC: 101 MMOL/L (ref 98–107)
CO2 SERPL-SCNC: 29.3 MMOL/L (ref 22–29)
CREAT SERPL-MCNC: 1.13 MG/DL (ref 0.76–1.27)
DEPRECATED RDW RBC AUTO: 44 FL (ref 37–54)
EOSINOPHIL # BLD AUTO: 0.27 10*3/MM3 (ref 0–0.4)
EOSINOPHIL NFR BLD AUTO: 5.5 % (ref 0.3–6.2)
ERYTHROCYTE [DISTWIDTH] IN BLOOD BY AUTOMATED COUNT: 12.6 % (ref 12.3–15.4)
ERYTHROCYTE [SEDIMENTATION RATE] IN BLOOD: 13 MM/HR (ref 0–20)
GFR SERPL CREATININE-BSD FRML MDRD: 65 ML/MIN/1.73
GLOBULIN UR ELPH-MCNC: 2.9 GM/DL
GLUCOSE SERPL-MCNC: 91 MG/DL (ref 65–99)
HCT VFR BLD AUTO: 50.2 % (ref 37.5–51)
HGB BLD-MCNC: 16.5 G/DL (ref 13–17.7)
IMM GRANULOCYTES # BLD AUTO: 0.01 10*3/MM3 (ref 0–0.05)
IMM GRANULOCYTES NFR BLD AUTO: 0.2 % (ref 0–0.5)
LYMPHOCYTES # BLD AUTO: 1.04 10*3/MM3 (ref 0.7–3.1)
LYMPHOCYTES NFR BLD AUTO: 21.2 % (ref 19.6–45.3)
MCH RBC QN AUTO: 31.3 PG (ref 26.6–33)
MCHC RBC AUTO-ENTMCNC: 32.9 G/DL (ref 31.5–35.7)
MCV RBC AUTO: 95.1 FL (ref 79–97)
MONOCYTES # BLD AUTO: 0.4 10*3/MM3 (ref 0.1–0.9)
MONOCYTES NFR BLD AUTO: 8.2 % (ref 5–12)
NEUTROPHILS NFR BLD AUTO: 3.14 10*3/MM3 (ref 1.7–7)
NEUTROPHILS NFR BLD AUTO: 64.1 % (ref 42.7–76)
NRBC BLD AUTO-RTO: 0 /100 WBC (ref 0–0.2)
PLATELET # BLD AUTO: 141 10*3/MM3 (ref 140–450)
PMV BLD AUTO: 10.9 FL (ref 6–12)
POTASSIUM SERPL-SCNC: 4.4 MMOL/L (ref 3.5–5.2)
PROT SERPL-MCNC: 7.6 G/DL (ref 6–8.5)
RBC # BLD AUTO: 5.28 10*6/MM3 (ref 4.14–5.8)
SODIUM SERPL-SCNC: 138 MMOL/L (ref 136–145)
WBC # BLD AUTO: 4.9 10*3/MM3 (ref 3.4–10.8)

## 2021-11-11 PROCEDURE — 85025 COMPLETE CBC W/AUTO DIFF WBC: CPT

## 2021-11-11 PROCEDURE — 80053 COMPREHEN METABOLIC PANEL: CPT

## 2021-11-11 PROCEDURE — 36415 COLL VENOUS BLD VENIPUNCTURE: CPT

## 2021-11-11 PROCEDURE — 85652 RBC SED RATE AUTOMATED: CPT

## 2021-11-11 PROCEDURE — 99214 OFFICE O/P EST MOD 30 MIN: CPT | Performed by: INTERNAL MEDICINE

## 2021-11-11 NOTE — PROGRESS NOTES
"Chief Complaint/ HPI:   No chief complaint on file. Patient is here to follow-up with obstructive sleep apnea, panic attacks anxiety and depression in the past, pain in his legs and arthritis issues, says he got injections in the both knees improved some but severe arthritis continues he is got a new CPAP machine,      Objective   Vital Signs  Vitals:    11/11/21 1530   BP: 142/81   Pulse: 76   Temp: 98 °F (36.7 °C)   SpO2: 92%   Weight: 115 kg (253 lb 12.8 oz)   Height: 175.3 cm (69.02\")      Body mass index is 37.46 kg/m².  Review of Systems   Musculoskeletal: Positive for arthralgias and myalgias.   Psychiatric/Behavioral: The patient is nervous/anxious.       Physical Exam  Constitutional:       General: He is not in acute distress.     Appearance: Normal appearance.   HENT:      Head: Normocephalic.      Mouth/Throat:      Mouth: Mucous membranes are moist.   Eyes:      Conjunctiva/sclera: Conjunctivae normal.      Pupils: Pupils are equal, round, and reactive to light.   Cardiovascular:      Rate and Rhythm: Normal rate and regular rhythm.      Pulses: Normal pulses.      Heart sounds: Normal heart sounds.   Pulmonary:      Effort: Pulmonary effort is normal.      Breath sounds: Normal breath sounds.   Abdominal:      General: Bowel sounds are normal. There is distension (OBESE , SOFT, partially reducible left lower mid ventral hernia,).      Palpations: Abdomen is soft.   Musculoskeletal:         General: No swelling. Normal range of motion.      Cervical back: Neck supple.      Right lower leg: Edema present.      Left lower leg: Edema present.   Skin:     General: Skin is warm and dry.      Coloration: Skin is not jaundiced.   Neurological:      General: No focal deficit present.      Mental Status: He is alert and oriented to person, place, and time. Mental status is at baseline.   Psychiatric:         Mood and Affect: Mood normal.         Behavior: Behavior normal.         Thought Content: Thought content " normal.         Judgment: Judgment normal.     Arthritic changes of both hands fingers,  1-2+ PT pulses bilateral, trace pretibial edema bilateral,  Result Review :   Lab Results   Component Value Date    BNP 40 02/17/2020     CMP    CMP 5/26/21   Glucose 96   BUN 20   Creatinine 1.20   Sodium 139   Potassium 4.3   Chloride 102   Calcium 9.5   Albumin 4.3   Total Bilirubin 0.56   Alkaline Phosphatase 69   AST (SGOT) 25   ALT (SGPT) 41 (A)   (A) Abnormal value            CBC w/diff    CBC w/Diff 5/26/21   WBC 4.85   RBC 5.00   Hemoglobin 15.5   Hematocrit 47.6   MCV 95.2   MCH 31.0   MCHC 32.6 (A)   RDW 12.8   Platelets 141   Neutrophil Rel % 60.4   Lymphocyte Rel % 22.1   Monocyte Rel % 10.7 (A)   Eosinophil Rel % 5.8   Basophil Rel % 0.8   (A) Abnormal value             Lipid Panel    Lipid Panel 5/26/21   Total Cholesterol 176   Triglycerides 94   HDL Cholesterol 51   VLDL Cholesterol 19   LDL Cholesterol  106 (A)   (A) Abnormal value       Comments are available for some flowsheets but are not being displayed.            Lab Results   Component Value Date    TSH 1.850 02/17/2020      No results found for: FREET4   A1C Last 3 Results    HGBA1C Last 3 Results 5/26/21   Hemoglobin A1C 5.5      Comments are available for some flowsheets but are not being displayed.            PSA    PSA 5/26/21   PSA 1.38                          ICD-10-CM ICD-9-CM   1. Cerumen debris on tympanic membrane of both ears  H61.23 380.4   2. Class 2 obesity  E66.9 278.00   3. NOBLE treated with BiPAP  G47.33 327.23   4. Primary osteoarthritis of both knees  M17.0 715.16   5. Major depressive disorder, recurrent, moderate (HCC)  F33.1 296.32   6. Peripheral neuropathy, idiopathic  G60.9 356.9   7. Elevated liver enzymes  R74.8 790.5   8. Benign prostatic hyperplasia, unspecified whether lower urinary tract symptoms present  N40.0 600.00       Assessment and Plan    Diagnoses and all orders for this visit:    1. Cerumen debris on tympanic  membrane of both ears (Primary)    2. Class 2 obesity    3. ONBLE treated with BiPAP    4. Primary osteoarthritis of both knees    5. Major depressive disorder, recurrent, moderate (HCC)    6. Peripheral neuropathy, idiopathic    7. Elevated liver enzymes    8. Benign prostatic hyperplasia, unspecified whether lower urinary tract symptoms present    Shortness of breath hoarseness, did see Dr. MCCABE AND [facility] FRANCISCO [facility]IG[facility]?  -- ENT, did direct laryngoscopy July 2021, recommended Flonase nasal spray,    Neuropathic pain lower legs with arthritis,, continues on gabapentin 100 mg 3 times daily    Osteoarthritis of the knees multiple joints, recent cortisone injections November 2021 by Dr. Jauregui, probably needs total knee replacements at some point in the future    Anxiety depression history of panic attacks improved, with correcting sleep apnea and sleep issues, continues temazepam 30 mg nightly, and citalopram 10 mg daily,, also continues on bupropion  daily,    Obstructive sleep apnea, recent news CPAP machine October 2021 improved,    History of elevated ALT, previous work-up with hepatitis work-up panels etc. all - November 2020    Previous cholecystectomy    Heart murmur faint, echo 2016 mild mitral regurgitation normal ejection fraction echo February 2020 no changes    Previous TIA in 2003    Rosacea    BPH continues on doxazosin 4 mg nightly    Last colonoscopy / EGD---April 2014      Follow Up   Return in about 6 months (around 5/11/2022).  Patient was given instructions and counseling regarding his condition or for health maintenance advice. Please see specific information pulled into the AVS if appropriate.

## 2021-11-20 DIAGNOSIS — G62.9 PERIPHERAL POLYNEUROPATHY: Primary | ICD-10-CM

## 2021-11-22 RX ORDER — OMEPRAZOLE 20 MG/1
CAPSULE, DELAYED RELEASE ORAL
Qty: 90 CAPSULE | Refills: 1 | Status: SHIPPED | OUTPATIENT
Start: 2021-11-22 | End: 2022-02-21 | Stop reason: SDUPTHER

## 2021-11-22 RX ORDER — GABAPENTIN 100 MG/1
CAPSULE ORAL
Qty: 90 CAPSULE | Refills: 1 | Status: SHIPPED | OUTPATIENT
Start: 2021-11-22 | End: 2022-01-24

## 2021-12-20 RX ORDER — CITALOPRAM 10 MG/1
TABLET ORAL
Qty: 30 TABLET | Refills: 5 | Status: SHIPPED | OUTPATIENT
Start: 2021-12-20 | End: 2022-09-02 | Stop reason: DRUGHIGH

## 2021-12-26 DIAGNOSIS — F51.01 PRIMARY INSOMNIA: Primary | ICD-10-CM

## 2021-12-27 ENCOUNTER — OFFICE VISIT (OUTPATIENT)
Dept: ORTHOPEDIC SURGERY | Facility: CLINIC | Age: 66
End: 2021-12-27

## 2021-12-27 VITALS — BODY MASS INDEX: 37.03 KG/M2 | OXYGEN SATURATION: 95 % | HEIGHT: 69 IN | WEIGHT: 250 LBS | HEART RATE: 88 BPM

## 2021-12-27 DIAGNOSIS — M17.0 BILATERAL PRIMARY OSTEOARTHRITIS OF KNEE: Primary | ICD-10-CM

## 2021-12-27 PROCEDURE — 20610 DRAIN/INJ JOINT/BURSA W/O US: CPT | Performed by: ORTHOPAEDIC SURGERY

## 2021-12-27 RX ORDER — TEMAZEPAM 30 MG/1
CAPSULE ORAL
Qty: 30 CAPSULE | Refills: 5 | Status: SHIPPED | OUTPATIENT
Start: 2021-12-27 | End: 2022-07-29

## 2021-12-27 RX ADMIN — BUPIVACAINE HYDROCHLORIDE 5 ML: 2.5 INJECTION, SOLUTION INFILTRATION; PERINEURAL at 10:59

## 2021-12-27 RX ADMIN — TRIAMCINOLONE ACETONIDE 40 MG: 40 INJECTION, SUSPENSION INTRA-ARTICULAR; INTRAMUSCULAR at 10:59

## 2021-12-27 RX ADMIN — TRIAMCINOLONE ACETONIDE 40 MG: 40 INJECTION, SUSPENSION INTRA-ARTICULAR; INTRAMUSCULAR at 10:58

## 2021-12-27 RX ADMIN — BUPIVACAINE HYDROCHLORIDE 5 ML: 2.5 INJECTION, SOLUTION INFILTRATION; PERINEURAL at 10:58

## 2021-12-27 NOTE — PROGRESS NOTES
"Chief Complaint  Pain of the Right Knee and Pain of the Left Knee     Subjective      Hector Coronel presents to [facility] for an evaluation of bilateral knees. Patient has a history of bilateral knee osteoarthritis that is being treated conservatively at this time. Patient states that his left knee is worse than the right today. He hasn’t had any injury to his knees recently. He reports increasing pain in bilateral knees for the last 1-2 weeks.     No Known Allergies     Social History     Socioeconomic History   • Marital status:    Tobacco Use   • Smoking status: Former Smoker     Packs/day: 2.50     Types: Cigarettes   • Smokeless tobacco: Never Used   • Tobacco comment: quit 30 years ago   Vaping Use   • Vaping Use: Never used        Review of Systems     Objective   Vital Signs:   Pulse 88   Ht 175.3 cm (69\")   Wt 113 kg (250 lb)   SpO2 95%   BMI 36.92 kg/m²       Physical Exam  Constitutional:       Appearance: Normal appearance. Patient is well-developed and normal weight.   HENT:      Head: Normocephalic.      Right Ear: Hearing and external ear normal.      Left Ear: Hearing and external ear normal.      Nose: Nose normal.   Eyes:      Conjunctiva/sclera: Conjunctivae normal.   Cardiovascular:      Rate and Rhythm: Normal rate.   Pulmonary:      Effort: Pulmonary effort is normal.      Breath sounds: No wheezing or rales.   Abdominal:      Palpations: Abdomen is soft.      Tenderness: There is no abdominal tenderness.   Musculoskeletal:      Cervical back: Normal range of motion.   Skin:     Findings: No rash.   Neurological:      Mental Status: Patient  is alert and oriented to person, place, and time.   Psychiatric:         Mood and Affect: Mood and affect normal.         Judgment: Judgment normal.       Ortho Exam      BILATERAL KNEES; Sensation grossly intact. Neurovascular intact. Tender medial and lateral joint line. Full weight bearing. Non-antalgic gait. " Dorsal Pedal Pulse 2+, posterior tibialis pulse 2+. Full extension. Flexion to 120 degrees. Skin intact. Stable to varus/valgus stress. Negative Lachman. Good strength to hamstrings, quadriceps, dorsiflexors and plantar flexors.       Large Joint Arthrocentesis: R knee  Date/Time: 12/27/2021 10:58 AM  Consent given by: patient  Site marked: site marked  Timeout: Immediately prior to procedure a time out was called to verify the correct patient, procedure, equipment, support staff and site/side marked as required   Supporting Documentation  Indications: pain   Procedure Details  Location: knee - R knee  Preparation: Patient was prepped and draped in the usual sterile fashion  Needle size: 22 G  Medications administered: 5 mL bupivacaine 0.25 %; 40 mg triamcinolone acetonide 40 MG/ML  Patient tolerance: patient tolerated the procedure well with no immediate complications    Large Joint Arthrocentesis: L knee  Date/Time: 12/27/2021 10:59 AM  Consent given by: patient  Site marked: site marked  Timeout: Immediately prior to procedure a time out was called to verify the correct patient, procedure, equipment, support staff and site/side marked as required   Supporting Documentation  Indications: pain   Procedure Details  Location: knee - L knee  Preparation: Patient was prepped and draped in the usual sterile fashion  Needle size: 22 G  Medications administered: 5 mL bupivacaine 0.25 %; 40 mg triamcinolone acetonide 40 MG/ML  Patient tolerance: patient tolerated the procedure well with no immediate complications              Imaging Results (Most Recent)     None           Result Review :         No results found.           Assessment and Plan     DX: Bilateral knee osteoarthritis     Discussed treatment plans with the patient. Patient given bilateral knee steroid injections, he tolerated this well.     Call or return if worsening symptoms.    Follow Up     PRN.       Patient was given instructions and counseling  regarding his condition or for health maintenance advice. Please see specific information pulled into the AVS if appropriate.     Scribed for Jose Talavera MD by Yudy Blair.  12/27/21   10:53 EST    I have personally performed the services described in this document as scribed by the above individual and it is both accurate and complete. Jose Talavera MD 12/29/21

## 2021-12-29 RX ORDER — BUPIVACAINE HYDROCHLORIDE 2.5 MG/ML
5 INJECTION, SOLUTION INFILTRATION; PERINEURAL
Status: COMPLETED | OUTPATIENT
Start: 2021-12-27 | End: 2021-12-27

## 2021-12-29 RX ORDER — TRIAMCINOLONE ACETONIDE 40 MG/ML
40 INJECTION, SUSPENSION INTRA-ARTICULAR; INTRAMUSCULAR
Status: COMPLETED | OUTPATIENT
Start: 2021-12-27 | End: 2021-12-27

## 2022-01-24 DIAGNOSIS — G62.9 PERIPHERAL POLYNEUROPATHY: ICD-10-CM

## 2022-01-24 RX ORDER — GABAPENTIN 100 MG/1
CAPSULE ORAL
Qty: 90 CAPSULE | Refills: 1 | Status: SHIPPED | OUTPATIENT
Start: 2022-01-24 | End: 2022-02-21 | Stop reason: SDUPTHER

## 2022-01-24 RX ORDER — DOXAZOSIN MESYLATE 4 MG/1
TABLET ORAL
Qty: 90 TABLET | Refills: 0 | Status: SHIPPED | OUTPATIENT
Start: 2022-01-24 | End: 2022-02-21 | Stop reason: SDUPTHER

## 2022-02-15 DIAGNOSIS — F41.9 ANXIETY: Primary | ICD-10-CM

## 2022-02-15 RX ORDER — ALPRAZOLAM 1 MG/1
TABLET ORAL
Qty: 60 TABLET | Refills: 3 | Status: SHIPPED | OUTPATIENT
Start: 2022-02-15 | End: 2022-05-24

## 2022-02-21 DIAGNOSIS — F41.9 ANXIETY: ICD-10-CM

## 2022-02-21 DIAGNOSIS — F51.01 PRIMARY INSOMNIA: ICD-10-CM

## 2022-02-21 DIAGNOSIS — G62.9 PERIPHERAL POLYNEUROPATHY: ICD-10-CM

## 2022-02-21 RX ORDER — ALPRAZOLAM 1 MG/1
1 TABLET ORAL 2 TIMES DAILY PRN
Qty: 60 TABLET | Refills: 3 | Status: CANCELLED | OUTPATIENT
Start: 2022-02-21

## 2022-02-21 RX ORDER — OMEPRAZOLE 20 MG/1
20 CAPSULE, DELAYED RELEASE ORAL DAILY
Qty: 90 CAPSULE | Refills: 3 | Status: SHIPPED | OUTPATIENT
Start: 2022-02-21

## 2022-02-21 RX ORDER — DOXAZOSIN MESYLATE 4 MG/1
4 TABLET ORAL DAILY
Qty: 90 TABLET | Refills: 3 | Status: SHIPPED | OUTPATIENT
Start: 2022-02-21 | End: 2022-10-21

## 2022-02-21 RX ORDER — GABAPENTIN 100 MG/1
100 CAPSULE ORAL 3 TIMES DAILY
Qty: 270 CAPSULE | Refills: 3 | Status: SHIPPED | OUTPATIENT
Start: 2022-02-21 | End: 2022-04-11

## 2022-02-21 RX ORDER — CELECOXIB 200 MG/1
200 CAPSULE ORAL DAILY
Qty: 90 CAPSULE | Refills: 3 | Status: SHIPPED | OUTPATIENT
Start: 2022-02-21 | End: 2022-05-24

## 2022-02-21 RX ORDER — BUPROPION HYDROCHLORIDE 150 MG/1
TABLET ORAL
Qty: 90 TABLET | Refills: 3 | Status: SHIPPED | OUTPATIENT
Start: 2022-02-21 | End: 2022-02-21 | Stop reason: SDUPTHER

## 2022-02-21 RX ORDER — LANOLIN ALCOHOL/MO/W.PET/CERES
2500 CREAM (GRAM) TOPICAL DAILY
Qty: 225 TABLET | Refills: 3 | Status: SHIPPED | OUTPATIENT
Start: 2022-02-21

## 2022-02-21 RX ORDER — BUPROPION HYDROCHLORIDE 150 MG/1
150 TABLET ORAL DAILY
Qty: 90 TABLET | Refills: 3 | Status: SHIPPED | OUTPATIENT
Start: 2022-02-21

## 2022-02-21 RX ORDER — TEMAZEPAM 30 MG/1
30 CAPSULE ORAL
Qty: 30 CAPSULE | Refills: 5 | Status: CANCELLED | OUTPATIENT
Start: 2022-02-21

## 2022-02-21 NOTE — TELEPHONE ENCOUNTER
Caller: Hector Coronel    Relationship: Self    Best call back number: [phone]    Requested Prescriptions:   Requested Prescriptions     Pending Prescriptions Disp Refills   • vitamin B-12 (CYANOCOBALAMIN) 1000 MCG tablet       Sig: Take 2.5 tablets by mouth Daily.   • temazepam (RESTORIL) 30 MG capsule 30 capsule 5     Sig: Take 1 capsule by mouth every night at bedtime.   • omeprazole (priLOSEC) 20 MG capsule 90 capsule 1     Sig: Take 1 capsule by mouth Daily.   • gabapentin (NEURONTIN) 100 MG capsule 90 capsule 1     Sig: Take 1 capsule by mouth 3 (Three) Times a Day.   • doxazosin (CARDURA) 4 MG tablet 90 tablet 0     Sig: Take 1 tablet by mouth Daily.   • diclofenac (VOLTAREN) 50 MG EC tablet 60 tablet 5     Sig: Take 1 tablet by mouth 2 (Two) Times a Day.   • celecoxib (CeleBREX) 200 MG capsule       Sig: Take 1 capsule by mouth Daily.   • buPROPion XL (WELLBUTRIN XL) 150 MG 24 hr tablet 90 tablet 3     Sig: Take 1 tablet by mouth Daily.   • ALPRAZolam (XANAX) 1 MG tablet 60 tablet 3     Sig: Take 1 tablet by mouth 2 (Two) Times a Day As Needed.    citalopram (CeleXA) 40 MG tablet    Pharmacy where request should be sent: [facility] - [place], [place] - [address] AT PORTAL TO REGISTERED [facility] - [phone]  - [phone] FX     Additional details provided by patient: PATIENT STATED HE IS SWITCHING PRESCRIPTIONS OVER TO [facility] AND WOULD LIKE A CALLBACK WHEN ALL PRESCRIPTIONS HAVE BEEN SWITCHED OVER

## 2022-03-28 ENCOUNTER — PREP FOR SURGERY (OUTPATIENT)
Dept: OTHER | Facility: HOSPITAL | Age: 67
End: 2022-03-28

## 2022-03-28 ENCOUNTER — OFFICE VISIT (OUTPATIENT)
Dept: ORTHOPEDIC SURGERY | Facility: CLINIC | Age: 67
End: 2022-03-28

## 2022-03-28 VITALS — BODY MASS INDEX: 37.03 KG/M2 | HEIGHT: 69 IN | WEIGHT: 250 LBS

## 2022-03-28 DIAGNOSIS — M25.551 RIGHT HIP PAIN: ICD-10-CM

## 2022-03-28 DIAGNOSIS — M16.11 PRIMARY OSTEOARTHRITIS OF RIGHT HIP: Primary | ICD-10-CM

## 2022-03-28 DIAGNOSIS — M70.61 TROCHANTERIC BURSITIS OF RIGHT HIP: ICD-10-CM

## 2022-03-28 DIAGNOSIS — M17.0 BILATERAL PRIMARY OSTEOARTHRITIS OF KNEE: ICD-10-CM

## 2022-03-28 PROCEDURE — 99214 OFFICE O/P EST MOD 30 MIN: CPT | Performed by: ORTHOPAEDIC SURGERY

## 2022-03-28 RX ORDER — CEFAZOLIN SODIUM 2 G/100ML
2 INJECTION, SOLUTION INTRAVENOUS ONCE
Status: CANCELLED | OUTPATIENT
Start: 2022-03-28 | End: 2022-03-28

## 2022-03-28 RX ORDER — POVIDONE-IODINE 10 MG/ML
SOLUTION TOPICAL ONCE
Status: CANCELLED | OUTPATIENT
Start: 2022-03-28 | End: 2022-03-28

## 2022-03-28 RX ORDER — TRANEXAMIC ACID 10 MG/ML
1000 INJECTION, SOLUTION INTRAVENOUS ONCE
Status: CANCELLED | OUTPATIENT
Start: 2022-03-28 | End: 2022-03-28

## 2022-03-28 RX ORDER — CEFAZOLIN SODIUM IN 0.9 % NACL 3 G/100 ML
3 INTRAVENOUS SOLUTION, PIGGYBACK (ML) INTRAVENOUS ONCE
Status: CANCELLED | OUTPATIENT
Start: 2022-03-28 | End: 2022-03-28

## 2022-03-28 NOTE — PROGRESS NOTES
"Chief Complaint  Follow-up of the Right Knee, Follow-up of the Left Knee, and Initial Evaluation of the Right Hip     Subjective      Hector Coronel presents to [facility] for an evaluation of the right hip and follow-up of bilateral knees. Patient has bilateral knee osteoarthritis that he has been treating conservatively. He was last seen on 12/27/21, he had bilateral knee steroid injections. He states injection has given him some relief.     Patient states lateral hip pain. He denies any groin pain today. He states increasing pain in the last month. No formal treatment for the hip has been given.     No Known Allergies     Social History     Socioeconomic History   • Marital status:    Tobacco Use   • Smoking status: Former Smoker     Packs/day: 2.50     Types: Cigarettes   • Smokeless tobacco: Never Used   • Tobacco comment: quit 30 years ago   Vaping Use   • Vaping Use: Never used        Review of Systems     Objective   Vital Signs:   Ht 175.3 cm (69\")   Wt 113 kg (250 lb)   BMI 36.92 kg/m²       Physical Exam  Constitutional:       Appearance: Normal appearance. Patient is well-developed and normal weight.   HENT:      Head: Normocephalic.      Right Ear: Hearing and external ear normal.      Left Ear: Hearing and external ear normal.      Nose: Nose normal.   Eyes:      Conjunctiva/sclera: Conjunctivae normal.   Cardiovascular:      Rate and Rhythm: Normal rate.   Pulmonary:      Effort: Pulmonary effort is normal.      Breath sounds: No wheezing or rales.   Abdominal:      Palpations: Abdomen is soft.      Tenderness: There is no abdominal tenderness.   Musculoskeletal:      Cervical back: Normal range of motion.   Skin:     Findings: No rash.   Neurological:      Mental Status: Patient  is alert and oriented to person, place, and time.   Psychiatric:         Mood and Affect: Mood and affect normal.         Judgment: Judgment normal.       Ortho Exam      BILATERAL " KNEES: Tender medial and lateral joint lines. Calf supple, non-tender, no signs of DVT. Dorsal Pedal Pulse 2+, posterior tibialis pulse 2+. Good strength to hamstrings, quadriceps, dorsiflexors and plantar flexors. Stable to varus/valgus stress. Stable anterior and posterior drawer.     RIGHT HIP: Good tone of hip flexors, hip extensors, hip adductor, hip abductors. Sensation grossly intact. Neurovascular intact.  Radial pulse 2+, ulnar pulse 2+. Tender greater trochanter.Groin pain with hip motion. Internal rotation to 5 degrees. 120 degrees of flexion.       Procedures        Imaging Results (Most Recent)     Procedure Component Value Units Date/Time    XR Hip With or Without Pelvis 2 - 3 View Right [[id number]] Resulted: 03/28/22 1003     Updated: 03/28/22 1008           Result Review :     X-Ray Report:  Right hip(s) X-Ray  Indication: Evaluation of right hip pain   AP and Lateral view(s)  Findings: There are degenerative changes of the right hip. No fracture or dislocation. Cyst of the femoral head.   Prior studies available for comparison: no     Assessment and Plan     DX: Bilateral knee osteoarthritis   Right hip osteoarthritis  Right hip trochanteric bursitis     Discussed treatment plans and diagnosis with the patient. Patient is a candidate for a hip and knee replacement. Patient inquires about cardiology clearance. He states that he doesn't have a cardiologist but he recently had a heart murmur. He also uses a sleep pap machines. Patient wishes to proceed with a right total hip replacement.       Patient will save knee injections for when he has a flare up.      Discussed surgery., Risks/benefits discussed with patient including, but not limited to: infection, bleeding, neurovascular damage, malunion, nonunion, aesthetic deformity, need for further surgery, and death., Discussed with patient the implant type being used during surgery and patient understands and desires to proceed. and Surgery pamphlet  given.    Follow Up     Post-operatively.       Patient was given instructions and counseling regarding his condition or for health maintenance advice. Please see specific information pulled into the AVS if appropriate.     Scribed for Jose Talavera MD by Yudy Blair.  03/28/22   10:14 EDT    I have personally performed the services described in this document as scribed by the above individual and it is both accurate and complete. Jose Talavera MD 03/28/22

## 2022-04-10 DIAGNOSIS — G62.9 PERIPHERAL POLYNEUROPATHY: ICD-10-CM

## 2022-04-11 RX ORDER — FLUTICASONE PROPIONATE 50 MCG
SPRAY, SUSPENSION (ML) NASAL
Qty: 48 ML | Refills: 1 | Status: SHIPPED | OUTPATIENT
Start: 2022-04-11 | End: 2022-10-31

## 2022-04-12 RX ORDER — GABAPENTIN 100 MG/1
CAPSULE ORAL
Qty: 90 CAPSULE | Refills: 1 | Status: SHIPPED | OUTPATIENT
Start: 2022-04-12 | End: 2022-06-20

## 2022-05-12 ENCOUNTER — OFFICE VISIT (OUTPATIENT)
Dept: INTERNAL MEDICINE | Facility: CLINIC | Age: 67
End: 2022-05-12

## 2022-05-12 ENCOUNTER — TELEPHONE (OUTPATIENT)
Dept: INTERNAL MEDICINE | Facility: CLINIC | Age: 67
End: 2022-05-12

## 2022-05-12 VITALS
OXYGEN SATURATION: 95 % | HEART RATE: 91 BPM | HEIGHT: 69 IN | BODY MASS INDEX: 37.33 KG/M2 | DIASTOLIC BLOOD PRESSURE: 81 MMHG | TEMPERATURE: 97.3 F | SYSTOLIC BLOOD PRESSURE: 135 MMHG | WEIGHT: 252 LBS

## 2022-05-12 DIAGNOSIS — G60.9 PERIPHERAL NEUROPATHY, IDIOPATHIC: ICD-10-CM

## 2022-05-12 DIAGNOSIS — Z12.5 SCREENING PSA (PROSTATE SPECIFIC ANTIGEN): Primary | ICD-10-CM

## 2022-05-12 DIAGNOSIS — G47.33 OSA TREATED WITH BIPAP: ICD-10-CM

## 2022-05-12 DIAGNOSIS — M17.0 PRIMARY OSTEOARTHRITIS OF BOTH KNEES: ICD-10-CM

## 2022-05-12 DIAGNOSIS — E66.9 CLASS 2 OBESITY: ICD-10-CM

## 2022-05-12 DIAGNOSIS — G62.9 PERIPHERAL POLYNEUROPATHY: ICD-10-CM

## 2022-05-12 DIAGNOSIS — R74.8 ELEVATED LIVER ENZYMES: ICD-10-CM

## 2022-05-12 DIAGNOSIS — F33.1 MAJOR DEPRESSIVE DISORDER, RECURRENT, MODERATE: ICD-10-CM

## 2022-05-12 DIAGNOSIS — F51.01 PRIMARY INSOMNIA: ICD-10-CM

## 2022-05-12 DIAGNOSIS — F41.9 ANXIETY: ICD-10-CM

## 2022-05-12 DIAGNOSIS — M16.11 PRIMARY OSTEOARTHRITIS OF RIGHT HIP: ICD-10-CM

## 2022-05-12 DIAGNOSIS — N40.0 BENIGN PROSTATIC HYPERPLASIA, UNSPECIFIED WHETHER LOWER URINARY TRACT SYMPTOMS PRESENT: ICD-10-CM

## 2022-05-12 PROCEDURE — 99214 OFFICE O/P EST MOD 30 MIN: CPT | Performed by: INTERNAL MEDICINE

## 2022-05-12 NOTE — PROGRESS NOTES
"Chief Complaint/ HPI: Here to follow-up with upcoming hip surgery, right hip surgery Dr. Jauregui May 31, 2022, still short of breath, still coughing lots of mucus, some wheezing, swelling in his left lower leg more than his right on a chronic basis, still using CPAP with severe's obstructive sleep apnea,    Needs medical evaluation prior to upcoming surgery,    Occasional breakouts of sweat diaphoresis, blood sugar has been checked at 1 point and was found to be below 90,    , Gets short of breath still wheezing some on and off,  Objective   Vital Signs  Vitals:    05/12/22 1438   BP: 135/81   Pulse: 91   Temp: 97.3 °F (36.3 °C)   SpO2: 95%   Weight: 114 kg (252 lb)   Height: 175.3 cm (69.02\")      Body mass index is 37.2 kg/m².  Review of Systems   Constitutional: Negative.    HENT: Negative.    Eyes: Negative.    Respiratory: Negative.    Cardiovascular: Negative.    Gastrointestinal: Negative.    Endocrine: Negative.    Genitourinary: Negative.    Musculoskeletal: Positive for arthralgias, gait problem and joint swelling.   Allergic/Immunologic: Negative.    Hematological: Negative.    Psychiatric/Behavioral: Negative.  Positive for depressed mood.      Physical Exam  Constitutional:       General: He is not in acute distress.     Appearance: Normal appearance. He is obese.   HENT:      Head: Normocephalic.      Mouth/Throat:      Mouth: Mucous membranes are moist.   Eyes:      Conjunctiva/sclera: Conjunctivae normal.      Pupils: Pupils are equal, round, and reactive to light.   Cardiovascular:      Rate and Rhythm: Normal rate and regular rhythm.      Pulses: Normal pulses.      Heart sounds: Normal heart sounds.   Pulmonary:      Effort: Pulmonary effort is normal.      Breath sounds: Normal breath sounds.   Abdominal:      General: Bowel sounds are normal.      Palpations: Abdomen is soft.   Musculoskeletal:         General: Deformity present. No swelling. Normal range of motion.      Cervical back: Neck supple. "      Left lower leg: Edema present.   Skin:     General: Skin is warm and dry.      Coloration: Skin is not jaundiced.   Neurological:      General: No focal deficit present.      Mental Status: He is alert and oriented to person, place, and time. Mental status is at baseline.   Psychiatric:         Mood and Affect: Mood normal.         Behavior: Behavior normal.         Thought Content: Thought content normal.         Judgment: Judgment normal.        Result Review :   Lab Results   Component Value Date    BNP 40 02/17/2020     CMP    CMP 5/26/21 11/11/21   Glucose 96 91   BUN 20 17   Creatinine 1.20 1.13   eGFR Non African Am  65   Sodium 139 138   Potassium 4.3 4.4   Chloride 102 101   Calcium 9.5 9.6   Albumin 4.3 4.70   Total Bilirubin 0.56 0.5   Alkaline Phosphatase 69 71   AST (SGOT) 25 37   ALT (SGPT) 41 (A) 92 (A)   (A) Abnormal value            CBC w/diff    CBC w/Diff 5/26/21 11/11/21   WBC 4.85 4.90   RBC 5.00 5.28   Hemoglobin 15.5 16.5   Hematocrit 47.6 50.2   MCV 95.2 95.1   MCH 31.0 31.3   MCHC 32.6 (A) 32.9   RDW 12.8 12.6   Platelets 141 141   Neutrophil Rel % 60.4 64.1   Immature Granulocyte Rel %  0.2   Lymphocyte Rel % 22.1 21.2   Monocyte Rel % 10.7 (A) 8.2   Eosinophil Rel % 5.8 5.5   Basophil Rel % 0.8 0.8   (A) Abnormal value             Lipid Panel    Lipid Panel 5/26/21   Total Cholesterol 176   Triglycerides 94   HDL Cholesterol 51   VLDL Cholesterol 19   LDL Cholesterol  106 (A)   (A) Abnormal value       Comments are available for some flowsheets but are not being displayed.            Lab Results   Component Value Date    TSH 1.850 02/17/2020      No results found for: FREET4   A1C Last 3 Results    HGBA1C Last 3 Results 5/26/21   Hemoglobin A1C 5.5      Comments are available for some flowsheets but are not being displayed.            PSA    PSA 5/26/21   PSA 1.38                          Visit Diagnoses:    ICD-10-CM ICD-9-CM   1. Screening PSA (prostate specific antigen)  Z12.5  V76.44   2. Primary osteoarthritis of right hip  M16.11 715.15   3. Benign prostatic hyperplasia, unspecified whether lower urinary tract symptoms present  N40.0 600.00   4. Elevated liver enzymes  R74.8 790.5   5. Primary osteoarthritis of both knees  M17.0 715.16   6. Class 2 obesity  E66.9 278.00   7. Peripheral neuropathy, idiopathic  G60.9 356.9   8. NOBLE treated with BiPAP  G47.33 327.23   9. Peripheral polyneuropathy  G62.9 356.9   10. Anxiety  F41.9 300.00   11. Major depressive disorder, recurrent, moderate (HCC)  F33.1 296.32   12. Primary insomnia  F51.01 307.42       Assessment and Plan   Diagnoses and all orders for this visit:    1. Screening PSA (prostate specific antigen) (Primary)  -     Comprehensive Metabolic Panel; Future  -     CBC & Differential; Future  -     Lipid Panel; Future  -     Magnesium; Future  -     PSA Screen; Future  -     CBC & Differential; Future  -     Comprehensive Metabolic Panel; Future  -     CT Chest Without Contrast Diagnostic; Future  -     Ambulatory Referral to Cardiology    2. Primary osteoarthritis of right hip  -     Comprehensive Metabolic Panel; Future  -     CBC & Differential; Future  -     Lipid Panel; Future  -     Magnesium; Future  -     PSA Screen; Future  -     CBC & Differential; Future  -     Comprehensive Metabolic Panel; Future  -     CT Chest Without Contrast Diagnostic; Future  -     Ambulatory Referral to Cardiology    3. Benign prostatic hyperplasia, unspecified whether lower urinary tract symptoms present  -     Comprehensive Metabolic Panel; Future  -     CBC & Differential; Future  -     Lipid Panel; Future  -     Magnesium; Future  -     PSA Screen; Future  -     CBC & Differential; Future  -     Comprehensive Metabolic Panel; Future  -     CT Chest Without Contrast Diagnostic; Future  -     Ambulatory Referral to Cardiology    4. Elevated liver enzymes  -     Comprehensive Metabolic Panel; Future  -     CBC & Differential; Future  -     Lipid  Panel; Future  -     Magnesium; Future  -     PSA Screen; Future  -     CBC & Differential; Future  -     Comprehensive Metabolic Panel; Future  -     CT Chest Without Contrast Diagnostic; Future  -     Ambulatory Referral to Cardiology    5. Primary osteoarthritis of both knees  -     Comprehensive Metabolic Panel; Future  -     CBC & Differential; Future  -     Lipid Panel; Future  -     Magnesium; Future  -     PSA Screen; Future  -     CBC & Differential; Future  -     Comprehensive Metabolic Panel; Future  -     CT Chest Without Contrast Diagnostic; Future  -     Ambulatory Referral to Cardiology    6. Class 2 obesity  -     Comprehensive Metabolic Panel; Future  -     CBC & Differential; Future  -     Lipid Panel; Future  -     Magnesium; Future  -     PSA Screen; Future  -     CBC & Differential; Future  -     Comprehensive Metabolic Panel; Future  -     CT Chest Without Contrast Diagnostic; Future  -     Ambulatory Referral to Cardiology    7. Peripheral neuropathy, idiopathic  -     Comprehensive Metabolic Panel; Future  -     CBC & Differential; Future  -     Lipid Panel; Future  -     Magnesium; Future  -     PSA Screen; Future  -     CBC & Differential; Future  -     Comprehensive Metabolic Panel; Future  -     CT Chest Without Contrast Diagnostic; Future  -     Ambulatory Referral to Cardiology    8. NOBLE treated with BiPAP  -     Comprehensive Metabolic Panel; Future  -     CBC & Differential; Future  -     Lipid Panel; Future  -     Magnesium; Future  -     PSA Screen; Future  -     CBC & Differential; Future  -     Comprehensive Metabolic Panel; Future  -     CT Chest Without Contrast Diagnostic; Future  -     Ambulatory Referral to Cardiology    9. Peripheral polyneuropathy  -     Comprehensive Metabolic Panel; Future  -     CBC & Differential; Future  -     Lipid Panel; Future  -     Magnesium; Future  -     PSA Screen; Future  -     CBC & Differential; Future  -     Comprehensive Metabolic Panel;  Future  -     CT Chest Without Contrast Diagnostic; Future  -     Ambulatory Referral to Cardiology    10. Anxiety  -     Comprehensive Metabolic Panel; Future  -     CBC & Differential; Future  -     Lipid Panel; Future  -     Magnesium; Future  -     PSA Screen; Future  -     CBC & Differential; Future  -     Comprehensive Metabolic Panel; Future  -     CT Chest Without Contrast Diagnostic; Future  -     Ambulatory Referral to Cardiology    11. Major depressive disorder, recurrent, moderate (HCC)  -     Comprehensive Metabolic Panel; Future  -     CBC & Differential; Future  -     Lipid Panel; Future  -     Magnesium; Future  -     PSA Screen; Future  -     CBC & Differential; Future  -     Comprehensive Metabolic Panel; Future  -     CT Chest Without Contrast Diagnostic; Future  -     Ambulatory Referral to Cardiology    12. Primary insomnia  -     Comprehensive Metabolic Panel; Future  -     CBC & Differential; Future  -     Lipid Panel; Future  -     Magnesium; Future  -     PSA Screen; Future  -     CBC & Differential; Future  -     Comprehensive Metabolic Panel; Future  -     CT Chest Without Contrast Diagnostic; Future  -     Ambulatory Referral to Cardiology      Shortness of breath hoarseness, did see Dr. MCCABE -- ENT, did direct laryngoscopy July 2021, recommended Flonase nasal spray,--- we will do PFTs consider referral to pulmonology for second opinion consider empiric Symbicort inhalers, most likely restrictive lung disease, consider CT scan chest without contrast, May 2022,    Patient with multiple risk factors for cardiac, lung issues consider cardiac evaluation prior to surgery given his current clinical state, discussed May 2022,    Neuropathic pain lower legs with arthritis,, continues on gabapentin 100 mg 3 times daily    Osteoarthritis of the knees multiple joints, recent cortisone injections November 2021 by Dr. Jauregui, continues on diclofenac, for upcoming right hip replacement May 31, 2022 by  Dr. Jauregui patient is stable for surgery but  may need respiratory evaluation,    Anxiety depression history of panic attacks improved, with correcting sleep apnea and sleep issues, continues temazepam 30 mg nightly, and citalopram 10 mg daily,, also continues on bupropion  daily,    Obstructive sleep apnea, recent news CPAP machine October 2021 improved,    History of elevated ALT, previous work-up with hepatitis work-up panels etc. all - November 2020    Previous cholecystectomy    Heart murmur faint, echo 2016 mild mitral regurgitation normal ejection fraction echo February 2020 no changes    Previous TIA in 2003    Rosacea    BPH continues on doxazosin 4 mg nightly    Last colonoscopy / EGD---April 2014              Follow Up   Return in about 4 months (around 9/12/2022).  Patient was given instructions and counseling regarding his condition or for health maintenance advice. Please see specific information pulled into the AVS if appropriate.

## 2022-05-12 NOTE — TELEPHONE ENCOUNTER
Hub staff attempted to follow warm transfer process and was unsuccessful     Caller: SHAILA HOANG    Relationship to patient: Emergency Contact    Best call back number: [phone]    Patient is needing: THE PATIENT WOULD LIKE A CALL BACK FROM THE REFERRAL SPECIALIST TO DISCUSS CT SCAN PCP ORDERED

## 2022-05-16 DIAGNOSIS — Z96.641 AFTERCARE FOLLOWING RIGHT HIP JOINT REPLACEMENT SURGERY: Primary | ICD-10-CM

## 2022-05-16 DIAGNOSIS — Z47.1 AFTERCARE FOLLOWING RIGHT HIP JOINT REPLACEMENT SURGERY: Primary | ICD-10-CM

## 2022-05-16 DIAGNOSIS — M16.11 PRIMARY OSTEOARTHRITIS OF RIGHT HIP: Primary | ICD-10-CM

## 2022-05-23 ENCOUNTER — APPOINTMENT (OUTPATIENT)
Dept: CT IMAGING | Facility: HOSPITAL | Age: 67
End: 2022-05-23

## 2022-05-24 ENCOUNTER — TELEPHONE (OUTPATIENT)
Dept: INTERNAL MEDICINE | Facility: CLINIC | Age: 67
End: 2022-05-24

## 2022-05-24 ENCOUNTER — OFFICE VISIT (OUTPATIENT)
Dept: CARDIOLOGY | Facility: CLINIC | Age: 67
End: 2022-05-24

## 2022-05-24 ENCOUNTER — APPOINTMENT (OUTPATIENT)
Dept: CT IMAGING | Facility: HOSPITAL | Age: 67
End: 2022-05-24

## 2022-05-24 ENCOUNTER — PRE-ADMISSION TESTING (OUTPATIENT)
Dept: PREADMISSION TESTING | Facility: HOSPITAL | Age: 67
End: 2022-05-24

## 2022-05-24 VITALS
OXYGEN SATURATION: 97 % | WEIGHT: 256 LBS | HEART RATE: 81 BPM | DIASTOLIC BLOOD PRESSURE: 64 MMHG | SYSTOLIC BLOOD PRESSURE: 126 MMHG | HEIGHT: 69 IN | BODY MASS INDEX: 37.92 KG/M2

## 2022-05-24 DIAGNOSIS — R06.09 EXERTIONAL DYSPNEA: Primary | ICD-10-CM

## 2022-05-24 DIAGNOSIS — R06.02 SHORTNESS OF BREATH: ICD-10-CM

## 2022-05-24 DIAGNOSIS — E66.01 SEVERE OBESITY (BMI 35.0-39.9) WITH COMORBIDITY: ICD-10-CM

## 2022-05-24 DIAGNOSIS — R05.9 COUGH: Primary | ICD-10-CM

## 2022-05-24 DIAGNOSIS — G47.33 OSA TREATED WITH BIPAP: ICD-10-CM

## 2022-05-24 DIAGNOSIS — M16.11 PRIMARY OSTEOARTHRITIS OF RIGHT HIP: ICD-10-CM

## 2022-05-24 LAB
ALBUMIN SERPL-MCNC: 4.4 G/DL (ref 3.5–5.2)
ALBUMIN/GLOB SERPL: 1.5 G/DL
ALP SERPL-CCNC: 89 U/L (ref 39–117)
ALT SERPL W P-5'-P-CCNC: 65 U/L (ref 1–41)
ANION GAP SERPL CALCULATED.3IONS-SCNC: 10.1 MMOL/L (ref 5–15)
AST SERPL-CCNC: 28 U/L (ref 1–40)
BASOPHILS # BLD AUTO: 0.04 10*3/MM3 (ref 0–0.2)
BASOPHILS NFR BLD AUTO: 0.8 % (ref 0–1.5)
BILIRUB SERPL-MCNC: 0.6 MG/DL (ref 0–1.2)
BUN SERPL-MCNC: 16 MG/DL (ref 8–23)
BUN/CREAT SERPL: 14.7 (ref 7–25)
CALCIUM SPEC-SCNC: 9.5 MG/DL (ref 8.6–10.5)
CHLORIDE SERPL-SCNC: 102 MMOL/L (ref 98–107)
CO2 SERPL-SCNC: 25.9 MMOL/L (ref 22–29)
CREAT SERPL-MCNC: 1.09 MG/DL (ref 0.76–1.27)
DEPRECATED RDW RBC AUTO: 45.5 FL (ref 37–54)
EGFRCR SERPLBLD CKD-EPI 2021: 73.5 ML/MIN/1.73
EOSINOPHIL # BLD AUTO: 0.34 10*3/MM3 (ref 0–0.4)
EOSINOPHIL NFR BLD AUTO: 6.7 % (ref 0.3–6.2)
ERYTHROCYTE [DISTWIDTH] IN BLOOD BY AUTOMATED COUNT: 12.9 % (ref 12.3–15.4)
GLOBULIN UR ELPH-MCNC: 2.9 GM/DL
GLUCOSE SERPL-MCNC: 105 MG/DL (ref 65–99)
HBA1C MFR BLD: 5.5 % (ref 4.8–5.6)
HCT VFR BLD AUTO: 47.1 % (ref 37.5–51)
HGB BLD-MCNC: 15.3 G/DL (ref 13–17.7)
IMM GRANULOCYTES # BLD AUTO: 0.01 10*3/MM3 (ref 0–0.05)
IMM GRANULOCYTES NFR BLD AUTO: 0.2 % (ref 0–0.5)
INR PPP: 1.02 (ref 0.86–1.15)
LYMPHOCYTES # BLD AUTO: 0.98 10*3/MM3 (ref 0.7–3.1)
LYMPHOCYTES NFR BLD AUTO: 19.4 % (ref 19.6–45.3)
MCH RBC QN AUTO: 30.8 PG (ref 26.6–33)
MCHC RBC AUTO-ENTMCNC: 32.5 G/DL (ref 31.5–35.7)
MCV RBC AUTO: 95 FL (ref 79–97)
MONOCYTES # BLD AUTO: 0.43 10*3/MM3 (ref 0.1–0.9)
MONOCYTES NFR BLD AUTO: 8.5 % (ref 5–12)
NEUTROPHILS NFR BLD AUTO: 3.26 10*3/MM3 (ref 1.7–7)
NEUTROPHILS NFR BLD AUTO: 64.4 % (ref 42.7–76)
NRBC BLD AUTO-RTO: 0 /100 WBC (ref 0–0.2)
PLATELET # BLD AUTO: 140 10*3/MM3 (ref 140–450)
PMV BLD AUTO: 10 FL (ref 6–12)
POTASSIUM SERPL-SCNC: 4.5 MMOL/L (ref 3.5–5.2)
PROT SERPL-MCNC: 7.3 G/DL (ref 6–8.5)
PROTHROMBIN TIME: 13.6 SECONDS (ref 11.8–14.9)
RBC # BLD AUTO: 4.96 10*6/MM3 (ref 4.14–5.8)
SODIUM SERPL-SCNC: 138 MMOL/L (ref 136–145)
WBC NRBC COR # BLD: 5.06 10*3/MM3 (ref 3.4–10.8)

## 2022-05-24 PROCEDURE — 85025 COMPLETE CBC W/AUTO DIFF WBC: CPT

## 2022-05-24 PROCEDURE — 85610 PROTHROMBIN TIME: CPT

## 2022-05-24 PROCEDURE — 80053 COMPREHEN METABOLIC PANEL: CPT

## 2022-05-24 PROCEDURE — 83036 HEMOGLOBIN GLYCOSYLATED A1C: CPT

## 2022-05-24 PROCEDURE — 36415 COLL VENOUS BLD VENIPUNCTURE: CPT

## 2022-05-24 PROCEDURE — 99203 OFFICE O/P NEW LOW 30 MIN: CPT | Performed by: INTERNAL MEDICINE

## 2022-05-24 PROCEDURE — 93005 ELECTROCARDIOGRAM TRACING: CPT

## 2022-05-24 RX ORDER — SODIUM PHOSPHATE,MONO-DIBASIC 19G-7G/118
1 ENEMA (ML) RECTAL DAILY
COMMUNITY

## 2022-05-24 ASSESSMENT — HOOS JR
HOOS JR SCORE: 39.902
HOOS JR SCORE: 16

## 2022-05-24 NOTE — TELEPHONE ENCOUNTER
I am going to send an order for a chest x-ray to be done today, call scheduling and tell him that he is having a chest x-ray today and keep him on the schedule for tomorrow for the CT scan, tell patient to get the chest x-ray ASAP today, and we will see what insurance is going to  hopefully tomorrow on the CAT scan

## 2022-05-24 NOTE — PROGRESS NOTES
Chief Complaint  Medical Clearance (For hip replacement.), Leg Swelling, Shortness of Breath, and Establish Care    Subjective            Hector Coronel presents to [facility]  History of Present Illness  Mr. Coronel is a new patient who was referred by his PCP (Dr. Jf Jimenez) for preoperative cardiovascular evaluation prior to planned hip replacement surgery next week.  Mr. Coronel reports he has been having worsening shortness of breath with very mild physical exertion for several months and states he is now having limiting dyspnea with even minimal activity such as walking 50 feet.  No episodes of chest pain/pressure, palpitations, orthopnea, PND, lightheadedness, or syncope reported.  He is significantly overweight with a BMI of 37.8 today and does admit to very limited exercise in recent years.  His PMH is significant for obstructive sleep apnea, on chronic BiPAP therapy.  He also reports a history of borderline type II diabetes mellitus and a family history of premature coronary artery disease.  He previously smoked for several years, but quit smoking 25 years ago.  No history of hyperlipidemia reported.  He has not had any cardiac ischemic evaluation for at least 10-15 years.  Dr. Jimenez has ordered a CT scan of the chest at the hospital for workup of his shortness of breath, but it has not been performed yet.  His BP was normal at 126/64 in the office today.  The patient's recent ECG from late March 2022 showed sinus rhythm with first-degree AV block, early R/S transition in the precordial leads, and borderline low voltage in the extremity leads.       Past Medical History:   Diagnosis Date   • Arthritis    • Cough     CHRONIC COUGH REPORTS HAS GOTTEN WORSE THIS PAST YEAR   • Diabetes mellitus (HCC)     BORDERLINE   • History of transfusion     AS A CHILD STATES HE DOESNT THINK ANY ISSUE WITH IT   • Hoarseness    • Murmur, cardiac     SOA WITH MINIMAL EXERTION AND HAVING SWELLING  IN LOWER EXT TO SEE CARDIOLOGIST 5/24/22 AT 1330. DENIES CP.   • Osteoarthritis     OG HIPS, BILATERL KNEES, L SHOULDER   • PONV (postoperative nausea and vomiting)    • Sleep apnea     USES BIPAP   • TIA (transient ischemic attack)     NO RESIDUAL       Past Surgical History:   • CHOLECYSTECTOMY   • HERNIA REPAIR   • INNER EAR SURGERY    BOTH SIDES       Social History     Tobacco Use   • Smoking status: Former Smoker     Packs/day: 2.50     Types: Cigarettes   • Smokeless tobacco: Never Used   • Tobacco comment: quit 30 years ago   Vaping Use   • Vaping Use: Never used   Substance Use Topics   • Alcohol use: Yes     Comment: 3 SHOTS OF B[place] EACH TIME  ABOUT 3-4 TIMES A WEEK   • Drug use: Never       Family History   Problem Relation Age of Onset   • Diabetes Father    • Stroke Paternal Uncle    • Heart disease Paternal Uncle         Current Outpatient Medications on File Prior to Visit   Medication Sig   • buPROPion XL (WELLBUTRIN XL) 150 MG 24 hr tablet Take 1 tablet by mouth Daily.   • Cholecalciferol (VITAMIN D3 PO) Take 1 capsule by mouth Daily.   • diclofenac (VOLTAREN) 50 MG EC tablet Take 1 tablet by mouth 2 (Two) Times a Day.   • doxazosin (CARDURA) 4 MG tablet Take 1 tablet by mouth Daily.   • fluticasone (FLONASE) 50 MCG/ACT nasal spray SPRAY TWO SPRAYS IN EACH NOSTRIL ONCE DAILY   • gabapentin (NEURONTIN) 100 MG capsule TAKE ONE CAPSULE BY MOUTH THREE TIMES A DAY   • glucosamine-chondroitin 500-400 MG capsule capsule Take 1 capsule by mouth Daily.   • omeprazole (priLOSEC) 20 MG capsule Take 1 capsule by mouth Daily.   • temazepam (RESTORIL) 30 MG capsule TAKE ONE CAPSULE BY MOUTH EVERY NIGHT AT BEDTIME   • Turmeric (QC TUMERIC COMPLEX PO) Take 1 capsule by mouth Daily.   • vitamin B-12 (CYANOCOBALAMIN) 1000 MCG tablet Take 2.5 tablets by mouth Daily.   • citalopram (CeleXA) 10 MG tablet TAKE ONE TABLET BY MOUTH EVERY NIGHT AT BEDTIME (Patient taking differently: Take 40 mg by mouth Daily.)   •  "[DISCONTINUED] ALPRAZolam (XANAX) 1 MG tablet TAKE ONE TABLET BY MOUTH TWICE A DAY AS NEEDED   • [DISCONTINUED] celecoxib (CeleBREX) 200 MG capsule Take 1 capsule by mouth Daily.   • [DISCONTINUED] citalopram (CeleXA) 40 MG tablet Take 40 mg by mouth Daily.     No current facility-administered medications on file prior to visit.       Allergies   Allergen Reactions   • Metal Rash     BELT HAZEL BREAK HIM OUT        Review of Systems   Constitutional: Positive for fatigue. Negative for chills, fever and unexpected weight gain.   HENT: Negative for hearing loss, nosebleeds and sore throat.    Eyes: Negative for pain and visual disturbance.   Respiratory: Positive for shortness of breath. Negative for cough and wheezing.    Cardiovascular: Negative for chest pain, palpitations and leg swelling.   Gastrointestinal: Negative for abdominal pain, blood in stool and vomiting.   Endocrine: Negative for cold intolerance and heat intolerance.   Genitourinary: Negative for dysuria and hematuria.   Musculoskeletal: Positive for arthralgias. Negative for joint swelling and myalgias.   Skin: Negative for color change, pallor and rash.   Neurological: Negative for syncope, weakness, light-headedness and headache.   Hematological: Negative for adenopathy. Does not bruise/bleed easily.        Objective     /64 (BP Location: Right arm, Patient Position: Sitting)   Pulse 81   Ht 175.3 cm (69\")   Wt 116 kg (256 lb)   SpO2 97%   BMI 37.80 kg/m²       Physical Exam  Constitutional:       General: He is not in acute distress.     Appearance: Normal appearance.   HENT:      Head: Atraumatic.      Mouth/Throat:      Mouth: Mucous membranes are moist.      Pharynx: Oropharynx is clear. No oropharyngeal exudate.   Eyes:      General: No scleral icterus.     Conjunctiva/sclera: Conjunctivae normal.   Neck:      Vascular: No carotid bruit or JVD.   Cardiovascular:      Rate and Rhythm: Normal rate and regular rhythm.  No " extrasystoles are present.     Chest Wall: PMI is not displaced.      Pulses:           Radial pulses are 2+ on the right side and 2+ on the left side.      Heart sounds: S1 normal and S2 normal. No murmur heard.    No friction rub. No gallop. No S3 sounds.      Comments: Trace bilateral lower extremity edema.  Pulmonary:      Effort: Pulmonary effort is normal. No tachypnea or respiratory distress.      Breath sounds: No decreased breath sounds, wheezing, rhonchi or rales.   Chest:      Chest wall: No tenderness.   Abdominal:      General: Bowel sounds are normal. There is no distension.      Palpations: Abdomen is soft. There is no mass.      Tenderness: There is no abdominal tenderness.      Comments: Obese.    Musculoskeletal:         General: No swelling, tenderness or deformity.      Cervical back: Neck supple. No tenderness.   Skin:     General: Skin is warm and dry.      Coloration: Skin is not jaundiced.      Findings: No erythema or rash.      Nails: There is no clubbing.   Neurological:      General: No focal deficit present.      Mental Status: He is alert and oriented to person, place, and time.      Motor: No weakness.   Psychiatric:         Mood and Affect: Mood normal.         Behavior: Behavior normal.       Result Review :     The following data was reviewed by: Eduardo Buck MD on 05/24/2022:    CMP    CMP 11/11/21 5/24/22   Glucose 91 105 (A)   BUN 17 16   Creatinine 1.13 1.09   eGFR Non African Am 65    Sodium 138 138   Potassium 4.4 4.5   Chloride 101 102   Calcium 9.6 9.5   Albumin 4.70 4.40   Total Bilirubin 0.5 0.6   Alkaline Phosphatase 71 89   AST (SGOT) 37 28   ALT (SGPT) 92 (A) 65 (A)   (A) Abnormal value            CBC    CBC 11/11/21 5/24/22 5/31/22   WBC 4.90 5.06 5.52   RBC 5.28 4.96 4.87   Hemoglobin 16.5 15.3 15.4   Hematocrit 50.2 47.1 46.8   MCV 95.1 95.0 96.1   MCH 31.3 30.8 31.6   MCHC 32.9 32.5 32.9   RDW 12.6 12.9 13.2   Platelets 141 140 142                Assessment  and Plan      Diagnoses and all orders for this visit:    1. Exertional dyspnea (Primary)  -     Adult Transthoracic Echo Complete w/ Color, Spectral and Contrast if necessary per protocol; Future  -     Full Pulmonary Function Test With Bronchodilator; Future  -     Stress Test With Myocardial Perfusion (2 Day); Future    2. NOBLE treated with BiPAP  -     Adult Transthoracic Echo Complete w/ Color, Spectral and Contrast if necessary per protocol; Future    3. Severe obesity (BMI 35.0-39.9) with comorbidity (HCC)    -Progressive exertional dyspnea:  Will proceed with a Lexiscan SPECT study (he cannot exercise on a treadmill due to severe dyspnea) prior to his planned hip replacement surgery, given his numerous cardiovascular risk factors and progressive shortness of breath with even minimal activity over the last few months.  We will also check an echocardiogram and PFTs for further work-up of his worsening exertional dyspnea.    -NOBLE:  Strict compliance with BiPAP therapy was recommended, both at night and with all naps.     -Obesity:  BMI = 37.7 today.  A 50 lb. weight loss was recommended over 9-12 months, and we reviewed some dietary and exercise strategies to assist with achieving this goal.        Follow Up     No follow-ups on file.    Patient was given instructions and counseling regarding his condition or for health maintenance advice. Please see specific information pulled into the AVS if appropriate.     Hector Coronel  reports that he has quit smoking. His smoking use included cigarettes. He smoked 2.50 packs per day. He has never used smokeless tobacco.. I have educated him on the risk of diseases from using tobacco products such as cancer, COPD and heart disease.  Continued tobacco cessation was strongly advised.

## 2022-05-24 NOTE — TELEPHONE ENCOUNTER
Denisse from scheduling hub called and said the pt is scheduled for a ct of the chest tomorrow however they are needing a previous chest xray results which he has not had done. So they are needing a peer to peer done or for the ct to be cancelled and have the pt do a chest xray instead.   Denisse: [phone]

## 2022-05-24 NOTE — TELEPHONE ENCOUNTER
Okay to order pulmonary function testing but his CAT scan will have to be rescheduled because they will not be able to do this before the CT scan tomorrow

## 2022-05-25 ENCOUNTER — TELEPHONE (OUTPATIENT)
Dept: ORTHOPEDIC SURGERY | Facility: CLINIC | Age: 67
End: 2022-05-25

## 2022-05-25 ENCOUNTER — HOSPITAL ENCOUNTER (OUTPATIENT)
Dept: NUCLEAR MEDICINE | Facility: HOSPITAL | Age: 67
Discharge: HOME | End: 2022-05-25

## 2022-05-25 ENCOUNTER — TELEPHONE (OUTPATIENT)
Dept: CARDIOLOGY | Facility: CLINIC | Age: 67
End: 2022-05-25

## 2022-05-25 ENCOUNTER — HOSPITAL ENCOUNTER (OUTPATIENT)
Dept: GENERAL RADIOLOGY | Facility: HOSPITAL | Age: 67
Discharge: HOME | End: 2022-05-25
Admitting: INTERNAL MEDICINE

## 2022-05-25 ENCOUNTER — APPOINTMENT (OUTPATIENT)
Dept: CT IMAGING | Facility: HOSPITAL | Age: 67
End: 2022-05-25

## 2022-05-25 DIAGNOSIS — R06.02 SHORTNESS OF BREATH: ICD-10-CM

## 2022-05-25 DIAGNOSIS — R05.9 COUGH: ICD-10-CM

## 2022-05-25 DIAGNOSIS — R06.09 EXERTIONAL DYSPNEA: ICD-10-CM

## 2022-05-25 DIAGNOSIS — S80.811A ABRASION OF SKIN OF RIGHT LOWER LEG: Primary | ICD-10-CM

## 2022-05-25 PROCEDURE — 78452 HT MUSCLE IMAGE SPECT MULT: CPT

## 2022-05-25 PROCEDURE — A9502 TC99M TETROFOSMIN: HCPCS | Performed by: INTERNAL MEDICINE

## 2022-05-25 PROCEDURE — 78452 HT MUSCLE IMAGE SPECT MULT: CPT | Performed by: INTERNAL MEDICINE

## 2022-05-25 PROCEDURE — 25010000002 REGADENOSON 0.4 MG/5ML SOLUTION: Performed by: INTERNAL MEDICINE

## 2022-05-25 PROCEDURE — 93018 CV STRESS TEST I&R ONLY: CPT | Performed by: INTERNAL MEDICINE

## 2022-05-25 PROCEDURE — 0 TECHNETIUM TETROFOSMIN KIT: Performed by: INTERNAL MEDICINE

## 2022-05-25 PROCEDURE — 71046 X-RAY EXAM CHEST 2 VIEWS: CPT

## 2022-05-25 PROCEDURE — 93017 CV STRESS TEST TRACING ONLY: CPT

## 2022-05-25 RX ORDER — CEPHALEXIN 500 MG/1
500 CAPSULE ORAL EVERY 6 HOURS
Qty: 40 CAPSULE | Refills: 0 | Status: SHIPPED | OUTPATIENT
Start: 2022-05-25 | End: 2022-05-26 | Stop reason: SDUPTHER

## 2022-05-25 RX ADMIN — REGADENOSON 0.4 MG: 0.08 INJECTION, SOLUTION INTRAVENOUS at 12:07

## 2022-05-25 RX ADMIN — TETROFOSMIN 1 DOSE: 1.38 INJECTION, POWDER, LYOPHILIZED, FOR SOLUTION INTRAVENOUS at 12:07

## 2022-05-25 NOTE — TELEPHONE ENCOUNTER
RECEIVED A CALL AND PICTURES OF PATIENTS RIGHT LEG FROM THE PATIENT'S WIFE.  DR BEEN REVIEWED AND ADVISED TO SEND IN KEFLEX 500MG  1 PO QID UNTIL SURGERY DATE.  PATIENT'S WIFE ADVISED TO KEEP AREAS CLEAN AND DRY.  VOICED UNDERSTANDING

## 2022-05-25 NOTE — TELEPHONE ENCOUNTER
Procedure: (R) Total Hip Replacement    Med Directive: ASA    PMH: Exertional Dyspnea; NOBLE    Last Seen: 05/25/2022    SPECT scheduled 05/25/2022 & 05/26/2022

## 2022-05-26 ENCOUNTER — TELEPHONE (OUTPATIENT)
Dept: INTERNAL MEDICINE | Facility: CLINIC | Age: 67
End: 2022-05-26

## 2022-05-26 ENCOUNTER — HOSPITAL ENCOUNTER (OUTPATIENT)
Dept: NUCLEAR MEDICINE | Facility: HOSPITAL | Age: 67
Discharge: HOME | End: 2022-05-26

## 2022-05-26 DIAGNOSIS — S80.811A ABRASION OF SKIN OF RIGHT LOWER LEG: Primary | ICD-10-CM

## 2022-05-26 LAB
BH CV IMMEDIATE POST TECH DATA BLOOD PRESSURE: NORMAL MMHG
BH CV IMMEDIATE POST TECH DATA HEART RATE: 81 BPM
BH CV IMMEDIATE POST TECH DATA OXYGEN SATS: 98 %
BH CV REST NUCLEAR ISOTOPE DOSE: 31.7 MCI
BH CV SIX MINUTE RECOVERY TECH DATA BLOOD PRESSURE: NORMAL
BH CV SIX MINUTE RECOVERY TECH DATA HEART RATE: 72 BPM
BH CV SIX MINUTE RECOVERY TECH DATA OXYGEN SATURATION: 97 %
BH CV STRESS BP STAGE 1: NORMAL
BH CV STRESS COMMENTS STAGE 1: NORMAL
BH CV STRESS DOSE REGADENOSON STAGE 1: 0.4
BH CV STRESS DURATION MIN STAGE 1: 0
BH CV STRESS DURATION SEC STAGE 1: 10
BH CV STRESS HR STAGE 1: 87
BH CV STRESS NUCLEAR ISOTOPE DOSE: 34.2 MCI
BH CV STRESS O2 STAGE 1: 98
BH CV STRESS PROTOCOL 1: NORMAL
BH CV STRESS RECOVERY BP: NORMAL MMHG
BH CV STRESS RECOVERY HR: 72 BPM
BH CV STRESS RECOVERY O2: 97 %
BH CV STRESS STAGE 1: 1
BH CV THREE MINUTE POST TECH DATA BLOOD PRESSURE: NORMAL MMHG
BH CV THREE MINUTE POST TECH DATA HEART RATE: 73 BPM
BH CV THREE MINUTE POST TECH DATA OXYGEN SATURATION: 97 %
LV EF NUC BP: 55 %
MAXIMAL PREDICTED HEART RATE: 151 BPM
PERCENT MAX PREDICTED HR: 57.62 %
STRESS BASELINE BP: NORMAL MMHG
STRESS BASELINE HR: 66 BPM
STRESS O2 SAT REST: 95 %
STRESS PERCENT HR: 68 %
STRESS POST O2 SAT PEAK: 98 %
STRESS POST PEAK BP: NORMAL MMHG
STRESS POST PEAK HR: 87 BPM
STRESS TARGET HR: 128 BPM

## 2022-05-26 PROCEDURE — 0 TECHNETIUM TETROFOSMIN KIT: Performed by: INTERNAL MEDICINE

## 2022-05-26 PROCEDURE — A9502 TC99M TETROFOSMIN: HCPCS | Performed by: INTERNAL MEDICINE

## 2022-05-26 RX ORDER — CEPHALEXIN 500 MG/1
500 CAPSULE ORAL EVERY 6 HOURS
Qty: 40 CAPSULE | Refills: 0 | Status: SHIPPED | OUTPATIENT
Start: 2022-05-26 | End: 2022-07-29

## 2022-05-26 RX ADMIN — TETROFOSMIN 1 DOSE: 1.38 INJECTION, POWDER, LYOPHILIZED, FOR SOLUTION INTRAVENOUS at 11:13

## 2022-05-26 NOTE — TELEPHONE ENCOUNTER
Patient had a nuclear stress test done today and it showed he had a blockage. He is scheduled for a cardiac cath on Tuesday. She would like to know if he should have this done here or in [place]? Please advise.

## 2022-05-26 NOTE — TELEPHONE ENCOUNTER
Please call the wife or patient and let them know I would feel comfortable having it done here by one of our interventional list they are very good and I think they do a good job but it is up to the patient and the family and their comfort levels

## 2022-05-26 NOTE — TELEPHONE ENCOUNTER
S/W patient and made aware of results and Dr. Buck's recommendations. Explained heart cath procedure and advised Dr. Buck will discuss in more detail the morning of the procedure. Instructed to be NPO after midnight and someone to drive him home.     Please enter orders for 5/31/2022. Patient is COVID vaccinated.

## 2022-05-26 NOTE — TELEPHONE ENCOUNTER
Mr. Coronel' SPECT study was concerning for an area of anterior wall ischemia.  Accordingly, I cannot clear him for hip replacement surgery without further evaluation.  I know he is scheduled for hip replacement surgery next week, so I am willing to proceed with a [facility] on Tuesday, May 31st to obtain definitive coronary evaluation and hopefully get him cleared in time for his scheduled surgical procedure.  Just let me know if he wishes to proceed with a cath and, if so, what day he prefers to do it.

## 2022-05-27 ENCOUNTER — PREP FOR SURGERY (OUTPATIENT)
Dept: OTHER | Facility: HOSPITAL | Age: 67
End: 2022-05-27

## 2022-05-27 DIAGNOSIS — R94.39 ABNORMAL NUCLEAR STRESS TEST: Primary | ICD-10-CM

## 2022-05-27 RX ORDER — SODIUM CHLORIDE 0.9 % (FLUSH) 0.9 %
10 SYRINGE (ML) INJECTION EVERY 12 HOURS SCHEDULED
Status: CANCELLED | OUTPATIENT
Start: 2022-05-27

## 2022-05-27 RX ORDER — SODIUM CHLORIDE 0.9 % (FLUSH) 0.9 %
10 SYRINGE (ML) INJECTION AS NEEDED
Status: CANCELLED | OUTPATIENT
Start: 2022-05-27

## 2022-05-31 ENCOUNTER — HOSPITAL ENCOUNTER (OUTPATIENT)
Facility: HOSPITAL | Age: 67
Discharge: HOME | End: 2022-05-31
Attending: INTERNAL MEDICINE | Admitting: INTERNAL MEDICINE

## 2022-05-31 VITALS
HEIGHT: 69 IN | OXYGEN SATURATION: 97 % | TEMPERATURE: 98.5 F | WEIGHT: 255 LBS | HEART RATE: 75 BPM | RESPIRATION RATE: 16 BRPM | BODY MASS INDEX: 37.77 KG/M2 | DIASTOLIC BLOOD PRESSURE: 79 MMHG | SYSTOLIC BLOOD PRESSURE: 111 MMHG

## 2022-05-31 DIAGNOSIS — R94.39 ABNORMAL NUCLEAR STRESS TEST: ICD-10-CM

## 2022-05-31 PROBLEM — Z95.5 S/P CORONARY ARTERY STENT PLACEMENT: Status: ACTIVE | Noted: 2022-05-31

## 2022-05-31 LAB
ACT BLD: 294 SECONDS (ref 89–137)
ACT BLD: 318 SECONDS (ref 89–137)
ANION GAP SERPL CALCULATED.3IONS-SCNC: 9.1 MMOL/L (ref 5–15)
BASOPHILS # BLD AUTO: 0.06 10*3/MM3 (ref 0–0.2)
BASOPHILS NFR BLD AUTO: 1.1 % (ref 0–1.5)
BUN SERPL-MCNC: 22 MG/DL (ref 8–23)
BUN/CREAT SERPL: 18.2 (ref 7–25)
CALCIUM SPEC-SCNC: 9.8 MG/DL (ref 8.6–10.5)
CHLORIDE SERPL-SCNC: 101 MMOL/L (ref 98–107)
CO2 SERPL-SCNC: 27.9 MMOL/L (ref 22–29)
CREAT SERPL-MCNC: 1.21 MG/DL (ref 0.76–1.27)
DEPRECATED RDW RBC AUTO: 46.7 FL (ref 37–54)
EGFRCR SERPLBLD CKD-EPI 2021: 64.8 ML/MIN/1.73
EOSINOPHIL # BLD AUTO: 0.27 10*3/MM3 (ref 0–0.4)
EOSINOPHIL NFR BLD AUTO: 4.9 % (ref 0.3–6.2)
ERYTHROCYTE [DISTWIDTH] IN BLOOD BY AUTOMATED COUNT: 13.2 % (ref 12.3–15.4)
GLUCOSE SERPL-MCNC: 109 MG/DL (ref 65–99)
HCT VFR BLD AUTO: 46.8 % (ref 37.5–51)
HGB BLD-MCNC: 15.4 G/DL (ref 13–17.7)
IMM GRANULOCYTES # BLD AUTO: 0.02 10*3/MM3 (ref 0–0.05)
IMM GRANULOCYTES NFR BLD AUTO: 0.4 % (ref 0–0.5)
INR PPP: 1.02 (ref 0.86–1.15)
LYMPHOCYTES # BLD AUTO: 1.13 10*3/MM3 (ref 0.7–3.1)
LYMPHOCYTES NFR BLD AUTO: 20.5 % (ref 19.6–45.3)
MCH RBC QN AUTO: 31.6 PG (ref 26.6–33)
MCHC RBC AUTO-ENTMCNC: 32.9 G/DL (ref 31.5–35.7)
MCV RBC AUTO: 96.1 FL (ref 79–97)
MONOCYTES # BLD AUTO: 0.49 10*3/MM3 (ref 0.1–0.9)
MONOCYTES NFR BLD AUTO: 8.9 % (ref 5–12)
NEUTROPHILS NFR BLD AUTO: 3.55 10*3/MM3 (ref 1.7–7)
NEUTROPHILS NFR BLD AUTO: 64.2 % (ref 42.7–76)
NRBC BLD AUTO-RTO: 0 /100 WBC (ref 0–0.2)
PLATELET # BLD AUTO: 142 10*3/MM3 (ref 140–450)
PMV BLD AUTO: 10 FL (ref 6–12)
POTASSIUM SERPL-SCNC: 4.3 MMOL/L (ref 3.5–5.2)
PROTHROMBIN TIME: 13.5 SECONDS (ref 11.8–14.9)
QT INTERVAL: 359 MS
QT INTERVAL: 365 MS
QT INTERVAL: 373 MS
RBC # BLD AUTO: 4.87 10*6/MM3 (ref 4.14–5.8)
SODIUM SERPL-SCNC: 138 MMOL/L (ref 136–145)
WBC NRBC COR # BLD: 5.52 10*3/MM3 (ref 3.4–10.8)

## 2022-05-31 PROCEDURE — C1769 GUIDE WIRE: HCPCS | Performed by: INTERNAL MEDICINE

## 2022-05-31 PROCEDURE — C1725 CATH, TRANSLUMIN NON-LASER: HCPCS | Performed by: INTERNAL MEDICINE

## 2022-05-31 PROCEDURE — 93458 L HRT ARTERY/VENTRICLE ANGIO: CPT | Performed by: INTERNAL MEDICINE

## 2022-05-31 PROCEDURE — C1874 STENT, COATED/COV W/DEL SYS: HCPCS | Performed by: INTERNAL MEDICINE

## 2022-05-31 PROCEDURE — 25010000002 HEPARIN (PORCINE) PER 1000 UNITS: Performed by: INTERNAL MEDICINE

## 2022-05-31 PROCEDURE — 0 MEPERIDINE PER 100 MG: Performed by: INTERNAL MEDICINE

## 2022-05-31 PROCEDURE — 25010000002 MIDAZOLAM PER 1 MG: Performed by: INTERNAL MEDICINE

## 2022-05-31 PROCEDURE — 99153 MOD SED SAME PHYS/QHP EA: CPT | Performed by: INTERNAL MEDICINE

## 2022-05-31 PROCEDURE — 25010000002 LORAZEPAM PER 2 MG: Performed by: INTERNAL MEDICINE

## 2022-05-31 PROCEDURE — 92928 PRQ TCAT PLMT NTRAC ST 1 LES: CPT | Performed by: INTERNAL MEDICINE

## 2022-05-31 PROCEDURE — A9270 NON-COVERED ITEM OR SERVICE: HCPCS | Performed by: INTERNAL MEDICINE

## 2022-05-31 PROCEDURE — 80048 BASIC METABOLIC PNL TOTAL CA: CPT | Performed by: INTERNAL MEDICINE

## 2022-05-31 PROCEDURE — 99152 MOD SED SAME PHYS/QHP 5/>YRS: CPT | Performed by: INTERNAL MEDICINE

## 2022-05-31 PROCEDURE — C1894 INTRO/SHEATH, NON-LASER: HCPCS | Performed by: INTERNAL MEDICINE

## 2022-05-31 PROCEDURE — 85347 COAGULATION TIME ACTIVATED: CPT

## 2022-05-31 PROCEDURE — 93005 ELECTROCARDIOGRAM TRACING: CPT | Performed by: INTERNAL MEDICINE

## 2022-05-31 PROCEDURE — 85610 PROTHROMBIN TIME: CPT | Performed by: INTERNAL MEDICINE

## 2022-05-31 PROCEDURE — 25010000002 DIPHENHYDRAMINE PER 50 MG: Performed by: INTERNAL MEDICINE

## 2022-05-31 PROCEDURE — 25010000002 FUROSEMIDE PER 20 MG: Performed by: INTERNAL MEDICINE

## 2022-05-31 PROCEDURE — 63710000001 TICAGRELOR 90 MG TABLET: Performed by: INTERNAL MEDICINE

## 2022-05-31 PROCEDURE — 25010000002 FENTANYL CITRATE (PF) 100 MCG/2ML SOLUTION: Performed by: INTERNAL MEDICINE

## 2022-05-31 PROCEDURE — 85025 COMPLETE CBC W/AUTO DIFF WBC: CPT | Performed by: INTERNAL MEDICINE

## 2022-05-31 PROCEDURE — C1887 CATHETER, GUIDING: HCPCS | Performed by: INTERNAL MEDICINE

## 2022-05-31 PROCEDURE — C9600 PERC DRUG-EL COR STENT SING: HCPCS | Performed by: INTERNAL MEDICINE

## 2022-05-31 PROCEDURE — 0 IOPAMIDOL PER 1 ML: Performed by: INTERNAL MEDICINE

## 2022-05-31 DEVICE — XIENCE SKYPOINT™ EVEROLIMUS ELUTING CORONARY STENT SYSTEM 2.25 MM X 12 MM / RAPID-EXCHANGE
Type: IMPLANTABLE DEVICE | Status: FUNCTIONAL
Brand: XIENCE SKYPOINT™

## 2022-05-31 RX ORDER — MORPHINE SULFATE 2 MG/ML
1 INJECTION, SOLUTION INTRAMUSCULAR; INTRAVENOUS EVERY 4 HOURS PRN
Status: DISCONTINUED | OUTPATIENT
Start: 2022-05-31 | End: 2022-05-31 | Stop reason: HOSPADM

## 2022-05-31 RX ORDER — ONDANSETRON 4 MG/1
4 TABLET, FILM COATED ORAL EVERY 6 HOURS PRN
Status: DISCONTINUED | OUTPATIENT
Start: 2022-05-31 | End: 2022-05-31 | Stop reason: HOSPADM

## 2022-05-31 RX ORDER — HEPARIN SODIUM 1000 [USP'U]/ML
INJECTION, SOLUTION INTRAVENOUS; SUBCUTANEOUS AS NEEDED
Status: DISCONTINUED | OUTPATIENT
Start: 2022-05-31 | End: 2022-05-31 | Stop reason: HOSPADM

## 2022-05-31 RX ORDER — ACETAMINOPHEN 325 MG/1
650 TABLET ORAL EVERY 4 HOURS PRN
Status: DISCONTINUED | OUTPATIENT
Start: 2022-05-31 | End: 2022-05-31 | Stop reason: HOSPADM

## 2022-05-31 RX ORDER — VERAPAMIL HYDROCHLORIDE 2.5 MG/ML
INJECTION, SOLUTION INTRAVENOUS AS NEEDED
Status: DISCONTINUED | OUTPATIENT
Start: 2022-05-31 | End: 2022-05-31 | Stop reason: HOSPADM

## 2022-05-31 RX ORDER — LIDOCAINE HYDROCHLORIDE 20 MG/ML
INJECTION, SOLUTION INFILTRATION; PERINEURAL AS NEEDED
Status: DISCONTINUED | OUTPATIENT
Start: 2022-05-31 | End: 2022-05-31 | Stop reason: HOSPADM

## 2022-05-31 RX ORDER — NALOXONE HCL 0.4 MG/ML
0.4 VIAL (ML) INJECTION
Status: DISCONTINUED | OUTPATIENT
Start: 2022-05-31 | End: 2022-05-31 | Stop reason: HOSPADM

## 2022-05-31 RX ORDER — ASPIRIN 81 MG/1
81 TABLET ORAL DAILY
Status: DISCONTINUED | OUTPATIENT
Start: 2022-06-01 | End: 2022-05-31 | Stop reason: HOSPADM

## 2022-05-31 RX ORDER — ATORVASTATIN CALCIUM 40 MG/1
40 TABLET, FILM COATED ORAL DAILY
Qty: 90 TABLET | Refills: 3 | Status: SHIPPED | OUTPATIENT
Start: 2022-05-31

## 2022-05-31 RX ORDER — FUROSEMIDE 10 MG/ML
INJECTION INTRAMUSCULAR; INTRAVENOUS AS NEEDED
Status: DISCONTINUED | OUTPATIENT
Start: 2022-05-31 | End: 2022-05-31 | Stop reason: HOSPADM

## 2022-05-31 RX ORDER — FENTANYL CITRATE 50 UG/ML
INJECTION, SOLUTION INTRAMUSCULAR; INTRAVENOUS AS NEEDED
Status: DISCONTINUED | OUTPATIENT
Start: 2022-05-31 | End: 2022-05-31 | Stop reason: HOSPADM

## 2022-05-31 RX ORDER — SODIUM CHLORIDE 9 MG/ML
100 INJECTION, SOLUTION INTRAVENOUS CONTINUOUS
Status: ACTIVE | OUTPATIENT
Start: 2022-05-31 | End: 2022-05-31

## 2022-05-31 RX ORDER — SODIUM CHLORIDE 0.9 % (FLUSH) 0.9 %
10 SYRINGE (ML) INJECTION EVERY 12 HOURS SCHEDULED
Status: DISCONTINUED | OUTPATIENT
Start: 2022-05-31 | End: 2022-05-31 | Stop reason: HOSPADM

## 2022-05-31 RX ORDER — ASPIRIN 81 MG/1
81 TABLET ORAL DAILY
Qty: 90 TABLET | Refills: 3 | Status: SHIPPED | OUTPATIENT
Start: 2022-05-31 | End: 2022-11-22 | Stop reason: HOSPADM

## 2022-05-31 RX ORDER — SODIUM CHLORIDE 9 MG/ML
INJECTION, SOLUTION INTRAVENOUS CONTINUOUS PRN
Status: COMPLETED | OUTPATIENT
Start: 2022-05-31 | End: 2022-05-31

## 2022-05-31 RX ORDER — MEPERIDINE HYDROCHLORIDE 25 MG/ML
INJECTION INTRAMUSCULAR; INTRAVENOUS; SUBCUTANEOUS AS NEEDED
Status: DISCONTINUED | OUTPATIENT
Start: 2022-05-31 | End: 2022-05-31 | Stop reason: HOSPADM

## 2022-05-31 RX ORDER — LORAZEPAM 2 MG/ML
INJECTION INTRAMUSCULAR AS NEEDED
Status: DISCONTINUED | OUTPATIENT
Start: 2022-05-31 | End: 2022-05-31 | Stop reason: HOSPADM

## 2022-05-31 RX ORDER — ONDANSETRON 2 MG/ML
4 INJECTION INTRAMUSCULAR; INTRAVENOUS EVERY 6 HOURS PRN
Status: DISCONTINUED | OUTPATIENT
Start: 2022-05-31 | End: 2022-05-31 | Stop reason: HOSPADM

## 2022-05-31 RX ORDER — DIPHENHYDRAMINE HYDROCHLORIDE 50 MG/ML
INJECTION INTRAMUSCULAR; INTRAVENOUS AS NEEDED
Status: DISCONTINUED | OUTPATIENT
Start: 2022-05-31 | End: 2022-05-31 | Stop reason: HOSPADM

## 2022-05-31 RX ORDER — MIDAZOLAM HYDROCHLORIDE 1 MG/ML
INJECTION INTRAMUSCULAR; INTRAVENOUS AS NEEDED
Status: DISCONTINUED | OUTPATIENT
Start: 2022-05-31 | End: 2022-05-31 | Stop reason: HOSPADM

## 2022-05-31 RX ORDER — SODIUM CHLORIDE 0.9 % (FLUSH) 0.9 %
10 SYRINGE (ML) INJECTION AS NEEDED
Status: DISCONTINUED | OUTPATIENT
Start: 2022-05-31 | End: 2022-05-31 | Stop reason: HOSPADM

## 2022-06-01 ENCOUNTER — TELEPHONE (OUTPATIENT)
Dept: INTERNAL MEDICINE | Facility: CLINIC | Age: 67
End: 2022-06-01

## 2022-06-01 ENCOUNTER — TELEPHONE (OUTPATIENT)
Dept: CARDIOLOGY | Facility: CLINIC | Age: 67
End: 2022-06-01

## 2022-06-01 NOTE — TELEPHONE ENCOUNTER
Received VM from patient's wife.    Returned call. Patient's wife stated that patient was instructed to stop Voltaren and wanted to know what other medication he could try for arthritis. Advised to contact PCP to discuss other treatments for arthritis.

## 2022-06-01 NOTE — TELEPHONE ENCOUNTER
Caller: SHAILA HOANG    Relationship to patient: Emergency Contact    Best call back number: [phone] OKAY TO LEAVE A VOICEMAIL    Patient is needing: PATIENT'S WIFE CALLED IN AND SAID THAT PATIENT HAD A STENT PLACED YESTERDAY AND DR. VIGIL TOOK PATIENT OFF VOLTERAN AND PUT HIM ON BRILINTA. PATIENT WAS ADVISED TO CONTACT DR. TORRES TO SEE WHAT ELSE HE COULD TAKE FOR ARTHRITIS BESIDES VOLTERAN. PLEASE CALL AND ADVISE.      EBONY SERRANO[address], [place] - [address] [facility] [address] [address] - [phone]  - [phone]   [phone]

## 2022-06-01 NOTE — TELEPHONE ENCOUNTER
Let patient, wife know the only thing safe to take at this moment is extra strength or arthritis strength Tylenol 3 times per day, we can discuss chronic pain management if necessary but he would need to come in for an appointment in the next couple weeks to discuss that

## 2022-06-07 ENCOUNTER — OFFICE VISIT (OUTPATIENT)
Dept: CARDIOLOGY | Facility: CLINIC | Age: 67
End: 2022-06-07

## 2022-06-07 VITALS
HEIGHT: 69 IN | BODY MASS INDEX: 37.18 KG/M2 | SYSTOLIC BLOOD PRESSURE: 131 MMHG | WEIGHT: 251 LBS | DIASTOLIC BLOOD PRESSURE: 82 MMHG | HEART RATE: 73 BPM

## 2022-06-07 DIAGNOSIS — E78.5 HYPERLIPIDEMIA LDL GOAL <70: ICD-10-CM

## 2022-06-07 DIAGNOSIS — I25.10 CORONARY ARTERY DISEASE INVOLVING NATIVE CORONARY ARTERY OF NATIVE HEART WITHOUT ANGINA PECTORIS: Primary | ICD-10-CM

## 2022-06-07 DIAGNOSIS — Z95.5 S/P CORONARY ARTERY STENT PLACEMENT: ICD-10-CM

## 2022-06-07 DIAGNOSIS — E66.01 SEVERE OBESITY (BMI 35.0-39.9) WITH COMORBIDITY: ICD-10-CM

## 2022-06-07 DIAGNOSIS — I51.9 DIASTOLIC DYSFUNCTION, LEFT VENTRICLE: ICD-10-CM

## 2022-06-07 PROCEDURE — 99214 OFFICE O/P EST MOD 30 MIN: CPT | Performed by: INTERNAL MEDICINE

## 2022-06-17 ENCOUNTER — HOSPITAL ENCOUNTER (OUTPATIENT)
Dept: CARDIOLOGY | Facility: HOSPITAL | Age: 67
Discharge: HOME | End: 2022-06-17
Admitting: INTERNAL MEDICINE

## 2022-06-17 DIAGNOSIS — G47.33 OSA TREATED WITH BIPAP: ICD-10-CM

## 2022-06-17 DIAGNOSIS — R06.09 EXERTIONAL DYSPNEA: ICD-10-CM

## 2022-06-17 PROCEDURE — 93306 TTE W/DOPPLER COMPLETE: CPT

## 2022-06-17 PROCEDURE — 93306 TTE W/DOPPLER COMPLETE: CPT | Performed by: INTERNAL MEDICINE

## 2022-06-20 DIAGNOSIS — G62.9 PERIPHERAL POLYNEUROPATHY: ICD-10-CM

## 2022-06-20 RX ORDER — GABAPENTIN 100 MG/1
CAPSULE ORAL
Qty: 90 CAPSULE | Refills: 0 | Status: SHIPPED | OUTPATIENT
Start: 2022-06-20 | End: 2022-08-01 | Stop reason: SDUPTHER

## 2022-06-24 LAB
BH CV ECHO MEAS - AO ROOT DIAM: 2.6 CM
BH CV ECHO MEAS - EF(MOD-BP): 65 %
BH CV ECHO MEAS - IVSD: 1.2 CM
BH CV ECHO MEAS - LA DIMENSION: 3.9 CM
BH CV ECHO MEAS - LAT PEAK E' VEL: 10.3 CM/SEC
BH CV ECHO MEAS - LVIDD: 4.8 CM
BH CV ECHO MEAS - LVIDS: 2.9 CM
BH CV ECHO MEAS - LVPWD: 1.3 CM
BH CV ECHO MEAS - MED PEAK E' VEL: 7.5 CM/SEC
BH CV ECHO MEAS - MV A MAX VEL: 91.7 CM/SEC
BH CV ECHO MEAS - MV DEC TIME: 185 MSEC
BH CV ECHO MEAS - MV E MAX VEL: 80.6 CM/SEC
BH CV ECHO MEAS - MV E/A: 0.9
BH CV ECHO MEAS - RVDD: 2.9 CM
BH CV ECHO MEASUREMENTS AVERAGE E/E' RATIO: 9.06
IVRT: 63 MSEC
LEFT ATRIUM VOLUME INDEX: 24.5 ML/M2
MAXIMAL PREDICTED HEART RATE: 151 BPM
STRESS TARGET HR: 128 BPM

## 2022-06-27 ENCOUNTER — TRANSCRIBE ORDERS (OUTPATIENT)
Dept: ADMINISTRATIVE | Facility: HOSPITAL | Age: 67
End: 2022-06-27

## 2022-06-27 DIAGNOSIS — R13.12 OROPHARYNGEAL DYSPHAGIA: Primary | ICD-10-CM

## 2022-07-29 ENCOUNTER — OFFICE VISIT (OUTPATIENT)
Dept: UROLOGY | Facility: CLINIC | Age: 67
End: 2022-07-29

## 2022-07-29 VITALS
DIASTOLIC BLOOD PRESSURE: 82 MMHG | HEIGHT: 69 IN | SYSTOLIC BLOOD PRESSURE: 153 MMHG | BODY MASS INDEX: 36.88 KG/M2 | HEART RATE: 79 BPM | WEIGHT: 249 LBS | TEMPERATURE: 98.7 F

## 2022-07-29 DIAGNOSIS — N48.6 PEYRONIE'S DISEASE: ICD-10-CM

## 2022-07-29 DIAGNOSIS — N40.0 BENIGN PROSTATIC HYPERPLASIA, UNSPECIFIED WHETHER LOWER URINARY TRACT SYMPTOMS PRESENT: Primary | ICD-10-CM

## 2022-07-29 LAB
BILIRUB BLD-MCNC: NEGATIVE MG/DL
CLARITY, POC: CLEAR
COLOR UR: YELLOW
EXPIRATION DATE: ABNORMAL
GLUCOSE UR STRIP-MCNC: NEGATIVE MG/DL
KETONES UR QL: NEGATIVE
LEUKOCYTE EST, POC: NEGATIVE
Lab: ABNORMAL
NITRITE UR-MCNC: NEGATIVE MG/ML
PH UR: 7.5 [PH] (ref 5–8)
PROT UR STRIP-MCNC: NEGATIVE MG/DL
RBC # UR STRIP: NEGATIVE /UL
SP GR UR: 1.02 (ref 1–1.03)
UROBILINOGEN UR QL: ABNORMAL

## 2022-07-29 PROCEDURE — 81003 URINALYSIS AUTO W/O SCOPE: CPT | Performed by: UROLOGY

## 2022-07-29 PROCEDURE — 99203 OFFICE O/P NEW LOW 30 MIN: CPT | Performed by: UROLOGY

## 2022-07-29 RX ORDER — PREDNISONE 50 MG/1
TABLET ORAL
COMMUNITY
Start: 2022-07-25 | End: 2022-09-02

## 2022-07-29 RX ORDER — ZOLPIDEM TARTRATE 10 MG/1
10 TABLET ORAL NIGHTLY PRN
COMMUNITY
Start: 2022-07-10

## 2022-07-29 RX ORDER — FINASTERIDE 5 MG/1
5 TABLET, FILM COATED ORAL DAILY
COMMUNITY
Start: 2022-07-21

## 2022-07-29 NOTE — PROGRESS NOTES
"Chief Complaint  Benign Prostatic Hypertrophy and Difficulty Urinating    Patient is on doxazosin and finasteride    Subjective          Hector Coronel presents to [facility]  History of Present Illness    Patient has been having a lot of difficulty with urination with small stream and urgency incontinence.  He has nocturia only 1 time.  No dysuria or gross hematuria.  No history of prostate cancer in the family.  Patient has been on doxazosin for long time but recently has been started on finasteride.  Patient also has having problem with erection and has Peyronie's disease.  Overall AUA score is 19/35.  Patient is on Brilinta    Objective No acute distress  Vital Signs:   /82   Pulse 79   Temp 98.7 °F (37.1 °C)   Ht 175.3 cm (69\")   Wt 113 kg (249 lb)   BMI 36.77 kg/m²     Allergies   Allergen Reactions   • Metal Rash     BELT HAZEL BREAK HIM OUT       Past medical history:  has a past medical history of Arthritis, Cough, Diabetes mellitus (HCC), History of transfusion, Hoarseness, Murmur, cardiac, Osteoarthritis, PONV (postoperative nausea and vomiting), Sleep apnea, and TIA (transient ischemic attack).   Past surgical history:  has a past surgical history that includes Hernia repair; Cholecystectomy; Inner ear surgery; Cardiac catheterization (Right, 05/31/2022); and Vasectomy.  Personal history: family history includes Diabetes in his father; Heart disease in his paternal uncle; Stroke in his paternal uncle.  Social history:  reports that he has quit smoking. His smoking use included cigarettes. He smoked 2.50 packs per day. He has never used smokeless tobacco. He reports current alcohol use. He reports that he does not use drugs.    Review of Systems    Please see past medical and surgical history rest of the system is negative    Physical Exam  Constitutional:       General: He is not in acute distress.     Appearance: Normal appearance. He is obese. He is not " ill-appearing or toxic-appearing.   HENT:      Head: Normocephalic and atraumatic.      Ears:      Comments: No loss of hearing  Cardiovascular:      Rate and Rhythm: Normal rate and regular rhythm.      Pulses: Normal pulses.      Heart sounds: Normal heart sounds. No murmur heard.  Pulmonary:      Effort: Respiratory distress present.      Breath sounds: No wheezing, rhonchi or rales.   Abdominal:      Palpations: Abdomen is soft. There is no mass.      Tenderness: There is no abdominal tenderness. There is no right CVA tenderness or left CVA tenderness.   Genitourinary:     Comments: Circumcised penis which is normal except has plaque in the penis.    Right and left scrotum is normal.    Right and left testicle and epididymis is normal.    VINNIE.  Prostate gland is almost 45 g and benign  Musculoskeletal:         General: Normal range of motion.      Cervical back: Normal range of motion and neck supple. No rigidity or tenderness.      Right lower leg: Edema present.      Left lower leg: Edema present.      Comments: Pain in the left hip   Lymphadenopathy:      Cervical: No cervical adenopathy.   Skin:     General: Skin is warm.      Coloration: Skin is not jaundiced.   Neurological:      General: No focal deficit present.      Mental Status: He is alert and oriented to person, place, and time.      Motor: No weakness.      Gait: Gait normal.   Psychiatric:         Mood and Affect: Mood normal.         Behavior: Behavior normal.         Thought Content: Thought content normal.         Judgment: Judgment normal.        Result Review :                 Assessment and Plan    Diagnoses and all orders for this visit:    1. Benign prostatic hyperplasia, unspecified whether lower urinary tract symptoms present (Primary)  -     POC Urinalysis Dipstick, Automated    2. Peyronie's disease      Patient has a lot of urgency, urgency incontinence and obstructive uropathy.  We will do cystoscopy and see what is going on with the  prostate and urinary bladder  Brief Urine Lab Results  (Last result in the past 365 days)      Color   Clarity   Blood   Leuk Est   Nitrite   Protein   CREAT   Urine HCG        07/29/22 0943 Yellow   Clear   Negative   Negative   Negative   Negative                  Follow Up   No follow-ups on file.  Patient was given instructions and counseling regarding his condition or for health maintenance advice. Please see specific information pulled into the AVS if appropriate.     Christa Damian MD

## 2022-08-01 ENCOUNTER — HOSPITAL ENCOUNTER (OUTPATIENT)
Dept: GENERAL RADIOLOGY | Facility: HOSPITAL | Age: 67
Discharge: HOME | End: 2022-08-01

## 2022-08-01 DIAGNOSIS — R13.12 OROPHARYNGEAL DYSPHAGIA: ICD-10-CM

## 2022-08-01 DIAGNOSIS — G62.9 PERIPHERAL POLYNEUROPATHY: ICD-10-CM

## 2022-08-01 PROCEDURE — 74230 X-RAY XM SWLNG FUNCJ C+: CPT

## 2022-08-01 PROCEDURE — 63710000001 BARIUM SULFATE 98 % RECONSTITUTED SUSPENSION: Performed by: INTERNAL MEDICINE

## 2022-08-01 PROCEDURE — A9270 NON-COVERED ITEM OR SERVICE: HCPCS | Performed by: INTERNAL MEDICINE

## 2022-08-01 PROCEDURE — 63710000001 BARIUM SULFATE 40 % SUSPENSION: Performed by: INTERNAL MEDICINE

## 2022-08-01 PROCEDURE — 63710000001 BARIUM SULFATE 40 % RECONSTITUTED SUSPENSION: Performed by: INTERNAL MEDICINE

## 2022-08-01 PROCEDURE — 63710000001 BARIUM SULFATE 60 % CREAM: Performed by: INTERNAL MEDICINE

## 2022-08-01 PROCEDURE — 92611 MOTION FLUOROSCOPY/SWALLOW: CPT

## 2022-08-01 RX ORDER — GABAPENTIN 100 MG/1
100 CAPSULE ORAL 3 TIMES DAILY
Qty: 90 CAPSULE | Refills: 0 | Status: SHIPPED | OUTPATIENT
Start: 2022-08-01 | End: 2022-08-30

## 2022-08-01 RX ADMIN — BARIUM SULFATE 1 TEASPOON(S): 0.6 CREAM ORAL at 09:42

## 2022-08-01 RX ADMIN — BARIUM SULFATE 135 ML: 980 POWDER, FOR SUSPENSION ORAL at 09:42

## 2022-08-01 RX ADMIN — BARIUM SULFATE 55 ML: 0.81 POWDER, FOR SUSPENSION ORAL at 09:42

## 2022-08-01 RX ADMIN — BARIUM SULFATE 50 ML: 400 SUSPENSION ORAL at 09:42

## 2022-08-01 NOTE — MBS/VFSS/FEES
Outpatient - Speech Language Pathology   Swallow Modified Barium Swallow Study  Tanna     Patient Name: Hector Coronel  : 1955  MRN: [id number]  Today's Date: 2022               Admit Date: 2022    Visit Dx:     ICD-10-CM ICD-9-CM   1. Oropharyngeal dysphagia  R13.12 787.22     Patient Active Problem List   Diagnosis   • NOBLE treated with BiPAP   • Class 2 obesity   • Primary osteoarthritis of both knees   • Major depressive disorder, recurrent, moderate (HCC)   • Peripheral neuropathy, idiopathic   • Elevated liver enzymes   • Benign prostatic hyperplasia   • Cerumen debris on tympanic membrane of both ears   • Screening PSA (prostate specific antigen)   • Primary osteoarthritis of right hip   • Primary insomnia   • Anxiety   • Peripheral polyneuropathy   • Cough   • Shortness of breath   • S/P coronary artery stent placement     Past Medical History:   Diagnosis Date   • Arthritis    • Cough     CHRONIC COUGH REPORTS HAS GOTTEN WORSE THIS PAST YEAR   • Diabetes mellitus (HCC)     BORDERLINE   • History of transfusion     AS A CHILD STATES HE DOESNT THINK ANY ISSUE WITH IT   • Hoarseness    • Murmur, cardiac     SOA WITH MINIMAL EXERTION AND HAVING SWELLING IN LOWER EXT TO SEE CARDIOLOGIST 22 AT 1330. DENIES CP.   • Osteoarthritis     OG HIPS, BILATERL KNEES, L SHOULDER   • PONV (postoperative nausea and vomiting)    • Sleep apnea     USES BIPAP   • TIA (transient ischemic attack)     NO RESIDUAL     Past Surgical History:   Procedure Laterality Date   • CARDIAC CATHETERIZATION Right 2022    Procedure: Left Heart Cath;  Surgeon: Eduardo Buck MD;  Location: [facility] INVASIVE LOCATION;  Service: Cardiovascular;  Laterality: Right;   • CHOLECYSTECTOMY     • HERNIA REPAIR     • INNER EAR SURGERY      BOTH SIDES   • VASECTOMY             MODIFIED BARIUM SWALLOW STUDY: SPEECH PATHOLOGY REPORT        DATE OF SERVICE: 2022    PERTINENT INFORMATION:  Mr. Coronel is a 67year old  male with diagnosis of dysphagia.    He was referred for an MBSS by Dr. Bowman to rule out aspiration as well as to determine appropriate treatment plan for this patient.      PROCEDURE:    Mr. Coronel was alert and cooperative.  The patient was viewed in lateral plane.  The following Ba consistencies were administered: Thin liquids, nectar thick liquids, barium mixed with applesauce, barium paste, barium mixed with cracker. The following compensatory swallowing strategies were performed: Bolus modification, cyclic ingestion.      RESULTS:    1. Nectar liquid by spoon with spillage to the pharynx, swallow completed.  Residual coating of the vallecula.  2. Nectar liquid by cup with spillage to the pharynx, swallow completed.  Decreased deflection of the epiglottis is noted with epiglottis contacting the posterior pharyngeal wall.  Minimal residual at the vallecula.  3. Thin liquid by cup with spillage to the pharynx, swallow completed.  Patient produced double swallow to clear minimal pharyngeal residue, again decreased flexion of the epiglottis is noted.  4. Purée with spillover base of tongue, swallow completed.  Lingual residue noted with swallow to clear.  5. Pudding with swallow completed.  Oral residue noted.  6. Solid results with chewing followed by swallow completed.  Residue noted on lingual areas and vallecula.  7. Thin liquid via straw with spillage to the pharynx, swallow completed with laryngeal penetration.  Note during esophagram patient was noted with significant aspiration event.  At that time patient was noted to swallow large bolus of liquid.      IMPRESSIONS:    Mr. Coronel demonstrated oropharyngeal dysphagia characterized by swallow delay, decreased deflection of the epiglottis with epiglottis at times contacting the posterior pharyngeal wall.  Irregular posterior pharyngeal wall with osteophyte like structures noted.  Laryngeal penetration was noted with straw drink.  Aspiration event was noted  during esophagram with large bolus of liquid.     FUNCTIONAL DEFICIT: Patient scored level 5 of 7 on Functional Communication Measures for swallowing indicating a 20-39% limitation in function for current status, goal status, and discharge status.      RECOMMENDATIONS:   1.  Diet with regular soft solids, thin liquid.  No straw.  2.  Positioning fully upright for all p.o. intake and 30 minutes following.  3.  Alternate small bites and small sips of solids and liquids at a slow rate, frequent dry swallow.  Single small sips of liquid.  No straw.  May wish to take medications whole in thicker substance such as applesauce or ice cream.  4.  Recommend speech pathology consult to address dysphagia.      Yes, patient/responsible party agrees with the plan of care and has been informed of all alternatives, risks and benefits.    Thank you for this referral.                                                                                 EDUCATION  The patient has been educated in the following areas:   Dysphagia (Swallowing Impairment) Modified Diet Instruction.              Time Calculation:    Time Calculation- SLP     Row Name 08/01/22 1429             Time Calculation- SLP    SLP Received On 08/01/22  -TB              Untimed Charges    SLP Eval/Re-eval  ST Motion Fluoro Eval Swallow - 76400  -TB      09178-JF Motion Fluoro Eval Swallow Minutes 90  -TB              Total Minutes    Untimed Charges Total Minutes 90  -TB       Total Minutes 90  -TB            User Key  (r) = Recorded By, (t) = Taken By, (c) = Cosigned By    Initials Name Provider Type    TB eNvin Nagy SLP Speech and Language Pathologist                Therapy Charges for Today     Code Description Service Date Service Provider Modifiers Qty    [id number]  ST MOTION FLUORO EVAL SWALLOW 6 8/1/2022 Nevin Nagy SLP GN 1               CECILIA Randolph  8/1/2022

## 2022-08-02 ENCOUNTER — PROCEDURE VISIT (OUTPATIENT)
Dept: UROLOGY | Facility: CLINIC | Age: 67
End: 2022-08-02

## 2022-08-02 DIAGNOSIS — N13.8 BPH WITH OBSTRUCTION/LOWER URINARY TRACT SYMPTOMS: Primary | ICD-10-CM

## 2022-08-02 DIAGNOSIS — N40.1 BPH WITH OBSTRUCTION/LOWER URINARY TRACT SYMPTOMS: Primary | ICD-10-CM

## 2022-08-02 PROCEDURE — 51741 ELECTRO-UROFLOWMETRY FIRST: CPT | Performed by: UROLOGY

## 2022-08-02 PROCEDURE — 51798 US URINE CAPACITY MEASURE: CPT | Performed by: UROLOGY

## 2022-08-02 PROCEDURE — 52000 CYSTOURETHROSCOPY: CPT | Performed by: UROLOGY

## 2022-08-02 NOTE — PROGRESS NOTES
Cystoscopy    Date/Time: 8/2/2022 11:10 AM  Performed by: Christa Damian MD  Authorized by: Christa Damian MD   Preparation: Patient was prepped and draped in the usual sterile fashion.  Local anesthesia used: yes    Anesthesia:  Local anesthesia used: yes  Local Anesthetic: topical anesthetic  Anesthetic total: 12 mL    Sedation:  Patient sedated: no        Indication.  BPH with obstructive uropathy with a lot of urgency.    Patient was placed in lithotomy position.  Thorough scrubbing of lower abdomen and external genitalia was performed with Hibiclens.  18 Olympus flexible cystoscope was inserted into the urethra, which was normal.  Prostate gland is large and obstructive.  Patient has large lateral lobes and no median lobe.  Bladder is trabeculated.  Both ureteral orifices are normal.  Bladder was looked at carefully and I do not see any bladder tumors.  There was no evidence of vesicocolic fistula.  Flexible cystoscope was removed and patient tolerated the procedure very well.    Uroflow.    Q-Aldo.  7.4 mL/s    Average flow.  3.9 mL/s    Voided volume.  331 mL    Residual urine with bladder scan.  3.5 MHz transducer was applied at the suprapubic area and the urine the urinary bladder was calculated which was 60 mL    Patient had a stent insertion in the coronaries about 6 weeks ago and is on Brilinta.  His main problem is urgency and obstruction.  We will try him on Gemtesa 75 mg daily for a month and patient was started on finasteride about 2 or 3 weeks ago and I am hoping is going to improve in about a month's time.  I have given him information about UroLift and if it does not improve we might do UroLift to improve his voiding which will help his urgency at the same time.

## 2022-08-30 DIAGNOSIS — G62.9 PERIPHERAL POLYNEUROPATHY: ICD-10-CM

## 2022-08-30 RX ORDER — GABAPENTIN 100 MG/1
CAPSULE ORAL
Qty: 90 CAPSULE | Refills: 5 | Status: SHIPPED | OUTPATIENT
Start: 2022-08-30

## 2022-09-02 ENCOUNTER — OFFICE VISIT (OUTPATIENT)
Dept: UROLOGY | Facility: CLINIC | Age: 67
End: 2022-09-02

## 2022-09-02 VITALS
HEART RATE: 82 BPM | SYSTOLIC BLOOD PRESSURE: 131 MMHG | BODY MASS INDEX: 36.88 KG/M2 | HEIGHT: 69 IN | WEIGHT: 249 LBS | TEMPERATURE: 97.8 F | DIASTOLIC BLOOD PRESSURE: 59 MMHG

## 2022-09-02 DIAGNOSIS — E66.9 CLASS 2 OBESITY: ICD-10-CM

## 2022-09-02 DIAGNOSIS — G60.9 PERIPHERAL NEUROPATHY, IDIOPATHIC: ICD-10-CM

## 2022-09-02 DIAGNOSIS — N40.0 BENIGN PROSTATIC HYPERPLASIA, UNSPECIFIED WHETHER LOWER URINARY TRACT SYMPTOMS PRESENT: ICD-10-CM

## 2022-09-02 DIAGNOSIS — M16.11 PRIMARY OSTEOARTHRITIS OF RIGHT HIP: ICD-10-CM

## 2022-09-02 DIAGNOSIS — F41.9 ANXIETY: ICD-10-CM

## 2022-09-02 DIAGNOSIS — G62.9 PERIPHERAL POLYNEUROPATHY: ICD-10-CM

## 2022-09-02 DIAGNOSIS — M17.0 PRIMARY OSTEOARTHRITIS OF BOTH KNEES: ICD-10-CM

## 2022-09-02 DIAGNOSIS — N40.1 BPH WITH OBSTRUCTION/LOWER URINARY TRACT SYMPTOMS: ICD-10-CM

## 2022-09-02 DIAGNOSIS — G47.33 OSA TREATED WITH BIPAP: ICD-10-CM

## 2022-09-02 DIAGNOSIS — F33.1 MAJOR DEPRESSIVE DISORDER, RECURRENT, MODERATE: ICD-10-CM

## 2022-09-02 DIAGNOSIS — F51.01 PRIMARY INSOMNIA: ICD-10-CM

## 2022-09-02 DIAGNOSIS — N13.8 BPH WITH OBSTRUCTION/LOWER URINARY TRACT SYMPTOMS: ICD-10-CM

## 2022-09-02 DIAGNOSIS — R39.15 URINARY URGENCY: Primary | ICD-10-CM

## 2022-09-02 DIAGNOSIS — R74.8 ELEVATED LIVER ENZYMES: ICD-10-CM

## 2022-09-02 DIAGNOSIS — Z12.5 SCREENING PSA (PROSTATE SPECIFIC ANTIGEN): ICD-10-CM

## 2022-09-02 LAB
BILIRUB BLD-MCNC: NEGATIVE MG/DL
CLARITY, POC: CLEAR
COLOR UR: YELLOW
GLUCOSE UR STRIP-MCNC: NEGATIVE MG/DL
KETONES UR QL: NEGATIVE
LEUKOCYTE EST, POC: NEGATIVE
NITRITE UR-MCNC: NEGATIVE MG/ML
PH UR: 7 [PH] (ref 5–8)
PROT UR STRIP-MCNC: NEGATIVE MG/DL
PSA SERPL-MCNC: 0.5 NG/ML (ref 0–4)
RBC # UR STRIP: NEGATIVE /UL
SP GR UR: 1.02 (ref 1–1.03)
UROBILINOGEN UR QL: NORMAL

## 2022-09-02 PROCEDURE — G0103 PSA SCREENING: HCPCS | Performed by: INTERNAL MEDICINE

## 2022-09-02 PROCEDURE — 99213 OFFICE O/P EST LOW 20 MIN: CPT | Performed by: UROLOGY

## 2022-09-02 PROCEDURE — 81002 URINALYSIS NONAUTO W/O SCOPE: CPT | Performed by: UROLOGY

## 2022-09-02 RX ORDER — VIBEGRON 75 MG/1
75 TABLET, FILM COATED ORAL DAILY
Qty: 90 TABLET | Refills: 3 | Status: SHIPPED | OUTPATIENT
Start: 2022-09-02 | End: 2022-11-17

## 2022-09-02 RX ORDER — CITALOPRAM 40 MG/1
TABLET ORAL
COMMUNITY
Start: 2022-08-18

## 2022-09-02 NOTE — PROGRESS NOTES
"Chief Complaint  Benign Prostatic Hypertrophy    Urinary urgency    Subjective  Patient doing much better        Hector Coronel presents to [facility] UROLOG[facility]  History of Present Illness    Since the patient is started on Gemtesa is markedly improved.  He has nocturia twice and stream is less than 50% weak.  Urgency has tremendously improved.  Overall AUA score is 6/35.  Has no dysuria or gross hematuria    Objective No acute distress  Vital Signs:   /59   Pulse 82   Temp 97.8 °F (36.6 °C)   Ht 175.3 cm (69\")   Wt 113 kg (249 lb)   BMI 36.77 kg/m²     Allergies   Allergen Reactions   • Metal Rash     BELT HAZEL BREAK HIM OUT       Past medical history:  has a past medical history of Arthritis, Cough, Diabetes mellitus (HCC), History of transfusion, Hoarseness, Murmur, cardiac, Osteoarthritis, PONV (postoperative nausea and vomiting), Sleep apnea, and TIA (transient ischemic attack).   Past surgical history:  has a past surgical history that includes Hernia repair; Cholecystectomy; Inner ear surgery; Cardiac catheterization (Right, 05/31/2022); and Vasectomy.  Personal history: family history includes Diabetes in his father; Heart disease in his paternal uncle; Stroke in his paternal uncle.  Social history:  reports that he has quit smoking. His smoking use included cigarettes. He smoked 2.50 packs per day. He has never used smokeless tobacco. He reports current alcohol use. He reports that he does not use drugs.    Review of Systems    Please see past medical and surgical history rest of the system is negative    Physical Exam  Constitutional:       General: He is not in acute distress.     Appearance: Normal appearance. He is obese. He is not ill-appearing or toxic-appearing.   HENT:      Head: Normocephalic and atraumatic.      Ears:      Comments: No hearing loss  Pulmonary:      Effort: Pulmonary effort is normal.   Abdominal:      Palpations: Abdomen is soft.      Tenderness: There " is no abdominal tenderness. There is no right CVA tenderness or left CVA tenderness.      Comments: Patient is obese and cannot feel for masses   Musculoskeletal:         General: Normal range of motion.   Skin:     General: Skin is warm.      Coloration: Skin is not jaundiced.   Neurological:      General: No focal deficit present.      Mental Status: He is alert and oriented to person, place, and time. Mental status is at baseline.      Motor: No weakness.      Gait: Gait normal.   Psychiatric:         Mood and Affect: Mood normal.         Behavior: Behavior normal.         Thought Content: Thought content normal.         Judgment: Judgment normal.        Result Review :                 Assessment and Plan    Diagnoses and all orders for this visit:    1. Urinary urgency (Primary)  -     Vibegron (Gemtesa) 75 MG tablet; Take 1 tablet by mouth Daily for 90 days.  Dispense: 90 tablet; Refill: 3    2. BPH with obstruction/lower urinary tract symptoms  -     POC Urinalysis Dipstick  -     Vibegron (Gemtesa) 75 MG tablet; Take 1 tablet by mouth Daily for 90 days.  Dispense: 90 tablet; Refill: 3      Patient is markedly improved with Gemtesa 75 mg daily at bedtime.  We will continue Gemtesa and given prescription and I hope that insurance can pay for that.  We will recheck him in 4 months.  Discussed side effects of Gemtesa.  Patient is on finasteride and Cardura for BPH.  Brief Urine Lab Results  (Last result in the past 365 days)      Color   Clarity   Blood   Leuk Est   Nitrite   Protein   CREAT   Urine HCG        09/02/22 1111 Yellow   Clear   Negative   Negative   Negative   Negative                  Follow Up   No follow-ups on file.  Patient was given instructions and counseling regarding his condition or for health maintenance advice. Please see specific information pulled into the AVS if appropriate.     Christa Damian MD

## 2022-09-27 ENCOUNTER — HOSPITAL ENCOUNTER (OUTPATIENT)
Dept: RESPIRATORY THERAPY | Facility: HOSPITAL | Age: 67
Discharge: HOME | End: 2022-09-27
Admitting: INTERNAL MEDICINE

## 2022-09-27 DIAGNOSIS — R05.9 COUGH: ICD-10-CM

## 2022-09-27 DIAGNOSIS — R06.09 EXERTIONAL DYSPNEA: ICD-10-CM

## 2022-09-27 DIAGNOSIS — R06.02 SHORTNESS OF BREATH: ICD-10-CM

## 2022-09-27 PROCEDURE — 94726 PLETHYSMOGRAPHY LUNG VOLUMES: CPT

## 2022-09-27 PROCEDURE — 94729 DIFFUSING CAPACITY: CPT

## 2022-09-27 PROCEDURE — 94060 EVALUATION OF WHEEZING: CPT

## 2022-09-27 PROCEDURE — 94060 EVALUATION OF WHEEZING: CPT | Performed by: INTERNAL MEDICINE

## 2022-09-27 PROCEDURE — 94726 PLETHYSMOGRAPHY LUNG VOLUMES: CPT | Performed by: INTERNAL MEDICINE

## 2022-09-27 PROCEDURE — 94729 DIFFUSING CAPACITY: CPT | Performed by: INTERNAL MEDICINE

## 2022-09-27 RX ORDER — ALBUTEROL SULFATE 2.5 MG/3ML
2.5 SOLUTION RESPIRATORY (INHALATION) ONCE
Status: COMPLETED | OUTPATIENT
Start: 2022-09-27 | End: 2022-09-27

## 2022-09-27 RX ADMIN — ALBUTEROL SULFATE 2.5 MG: 2.5 SOLUTION RESPIRATORY (INHALATION) at 13:54

## 2022-10-21 RX ORDER — DOXAZOSIN MESYLATE 4 MG/1
TABLET ORAL
Qty: 90 TABLET | Refills: 3 | Status: SHIPPED | OUTPATIENT
Start: 2022-10-21

## 2022-10-31 RX ORDER — FLUTICASONE PROPIONATE 50 MCG
SPRAY, SUSPENSION (ML) NASAL
Qty: 48 ML | Refills: 1 | Status: SHIPPED | OUTPATIENT
Start: 2022-10-31

## 2022-11-14 ENCOUNTER — OFFICE VISIT (OUTPATIENT)
Dept: CARDIOLOGY | Facility: CLINIC | Age: 67
End: 2022-11-14

## 2022-11-14 VITALS
DIASTOLIC BLOOD PRESSURE: 73 MMHG | HEIGHT: 69 IN | HEART RATE: 75 BPM | WEIGHT: 248.2 LBS | BODY MASS INDEX: 36.76 KG/M2 | SYSTOLIC BLOOD PRESSURE: 139 MMHG

## 2022-11-14 DIAGNOSIS — I25.10 CORONARY ARTERY DISEASE INVOLVING NATIVE CORONARY ARTERY OF NATIVE HEART WITHOUT ANGINA PECTORIS: Primary | ICD-10-CM

## 2022-11-14 DIAGNOSIS — Z95.5 S/P CORONARY ARTERY STENT PLACEMENT: ICD-10-CM

## 2022-11-14 DIAGNOSIS — E78.5 HYPERLIPIDEMIA LDL GOAL <70: ICD-10-CM

## 2022-11-14 PROCEDURE — 99214 OFFICE O/P EST MOD 30 MIN: CPT | Performed by: FAMILY MEDICINE

## 2022-11-14 PROCEDURE — 93000 ELECTROCARDIOGRAM COMPLETE: CPT | Performed by: FAMILY MEDICINE

## 2022-11-16 ENCOUNTER — OFFICE VISIT (OUTPATIENT)
Dept: ORTHOPEDIC SURGERY | Facility: CLINIC | Age: 67
End: 2022-11-16

## 2022-11-16 ENCOUNTER — TELEPHONE (OUTPATIENT)
Dept: CARDIOLOGY | Facility: CLINIC | Age: 67
End: 2022-11-16

## 2022-11-16 ENCOUNTER — PREP FOR SURGERY (OUTPATIENT)
Dept: OTHER | Facility: HOSPITAL | Age: 67
End: 2022-11-16

## 2022-11-16 VITALS — HEIGHT: 68 IN | HEART RATE: 76 BPM | WEIGHT: 248 LBS | BODY MASS INDEX: 37.59 KG/M2 | OXYGEN SATURATION: 97 %

## 2022-11-16 DIAGNOSIS — M25.551 RIGHT HIP PAIN: Primary | ICD-10-CM

## 2022-11-16 DIAGNOSIS — Z96.641 AFTERCARE FOLLOWING RIGHT HIP JOINT REPLACEMENT SURGERY: Primary | ICD-10-CM

## 2022-11-16 DIAGNOSIS — M16.11 PRIMARY OSTEOARTHRITIS OF RIGHT HIP: ICD-10-CM

## 2022-11-16 DIAGNOSIS — Z47.1 AFTERCARE FOLLOWING RIGHT HIP JOINT REPLACEMENT SURGERY: Primary | ICD-10-CM

## 2022-11-16 PROCEDURE — 99214 OFFICE O/P EST MOD 30 MIN: CPT | Performed by: ORTHOPAEDIC SURGERY

## 2022-11-16 NOTE — PROGRESS NOTES
"Chief Complaint  Follow-up of the Right Hip     Subjective      Hector Coronel presents to [facility] for follow up of the right hip. The patient had a stent in 6 months ago and is on Brilinta and the Dr Buck will take him off for the right hip surgery.  The patient is cleared by the heart doctor and is here now to schedule his right total hip arthroplasty.     Allergies   Allergen Reactions   • Metal Rash     BELT HAZEL BREAK HIM OUT         Social History     Socioeconomic History   • Marital status:    Tobacco Use   • Smoking status: Former     Packs/day: 2.50     Types: Cigarettes   • Smokeless tobacco: Never   • Tobacco comments:     quit 30 years ago   Vaping Use   • Vaping Use: Never used   Substance and Sexual Activity   • Alcohol use: Yes     Comment: 3 SHOTS OF BOURBON EACH TIME  ABOUT 3-4 TIMES A WEEK   • Drug use: Never   • Sexual activity: Defer        Review of Systems     Objective   Vital Signs:   Pulse 76   Ht 172.7 cm (68\")   Wt 112 kg (248 lb)   SpO2 97%   BMI 37.71 kg/m²       Physical Exam  Constitutional:       Appearance: Normal appearance. Patient is well-developed and normal weight.   HENT:      Head: Normocephalic.      Right Ear: Hearing and external ear normal.      Left Ear: Hearing and external ear normal.      Nose: Nose normal.   Eyes:      Conjunctiva/sclera: Conjunctivae normal.   Cardiovascular:      Rate and Rhythm: Normal rate.   Pulmonary:      Effort: Pulmonary effort is normal.      Breath sounds: No wheezing or rales.   Abdominal:      Palpations: Abdomen is soft.      Tenderness: There is no abdominal tenderness.   Musculoskeletal:      Cervical back: Normal range of motion.   Skin:     Findings: No rash.   Neurological:      Mental Status: Patient  is alert and oriented to person, place, and time.   Psychiatric:         Mood and Affect: Mood and affect normal.         Judgment: Judgment normal.       Ortho Exam      RIGHT HIP  30 " ER, minimal ER. Good tone of hip flexors, hip extensors, and hip abductors. Good strength to hamstrings, quadriceps, dorsiflexors, and plantar flexors. Dorsal pedal pulse 2+. Posterior tibialis pulse is 2+. Calf supple.       Procedures        Imaging Results (Most Recent)     Procedure Component Value Units Date/Time    XR Hip With or Without Pelvis 2 - 3 View Right [[id number]] Resulted: 11/16/22 0926     Updated: 11/16/22 0927           Result Review :     X-Ray Report:  Right hip(s) X-Ray  Indication: Evaluation of the right hip.   AP/Lateral view(s)  Findings: Severe arthritis. There are degenerative changes of the right hip. No fracture or dislocation. Cyst of the femoral head.   Prior studies available for comparison: No             Assessment and Plan     Diagnoses and all orders for this visit:    1. Right hip pain (Primary)  -     XR Hip With or Without Pelvis 2 - 3 View Right    2. Primary osteoarthritis of right hip        Discussed the treatment plan with the patient. The patient is ready to schedule total hip arthroplasty. The patient understands the risks and benefits of the surgery.     Educated on risk of smoking. Discussed options for smoking cessation.  Discussed surgery., Risks/benefits discussed with patient including, but not limited to: infection, bleeding, neurovascular damage, malunion, nonunion, aesthetic deformity, need for further surgery, and death., Discussed with patient the implant type being used during surgery and patient understands and desires to proceed. and Surgery pamphlet given.    Follow Up     Postoperatively       Patient was given instructions and counseling regarding his condition or for health maintenance advice. Please see specific information pulled into the AVS if appropriate.     Scribed for Jose Talavera MD by Alma Oneal MA.  11/16/22   09:28 EST    I have personally performed the services described in this document as scribed by the above individual and it  is both accurate and complete. Jose Talavera MD 11/16/22

## 2022-11-16 NOTE — TELEPHONE ENCOUNTER
Procedure: Right Total Hip (11/22/22)    Medication Directive: Brilinta and Aspirin for 3 days    PMH: CAD s/p PCI 5/2022, HLD    Last Seen: 11/14/22    ECHO: 06/24/22    Normal left ventricular systolic function with an estimated ejection fraction of 60-65%.  No regional wall motion abnormalities were observed.  Left ventricular diastolic function was consistent with impaired relaxation (grade I diastolic dysfunction).  Mild concentric left ventricular hypertrophy.  No hemodynamically significant valvular pathology.  Right ventricular systolic pressure could not be accurately estimated due to an insufficient TR velocity profile.    LHC: 5/31/22  1. Successful PCI to the first diagonal branch of the LAD using a Xience Skypoint 2.25/12 mm drug-eluting stent, post-dilated using a NC Trek 2.5/12 mm balloon up to 16 marlene.  Pre-procedure 90-95% stenosis, post-procedure 0% residual stenosis.  JAYLEEN-3 flow was present pre- and post-procedure.  Type B2 lesion.  No evidence of complications.  2. Normal left ventricular systolic function with an estimated ejection fraction of 60-65% and normal left ventricular wall motion throughout.  3. Severely elevated LVEDP of 44 mmHg.  Mild pullback gradient across the aortic valve, suggestive of mild aortic stenosis

## 2022-11-16 NOTE — TELEPHONE ENCOUNTER
He may proceed with surgical procedure.  He will be at moderate risk for perioperative cardiac events.  He may hold the aspirin 5 to 7 days prior to the procedure and the Brilinta for 3 days prior.

## 2022-11-17 ENCOUNTER — PRE-ADMISSION TESTING (OUTPATIENT)
Dept: PREADMISSION TESTING | Facility: HOSPITAL | Age: 67
End: 2022-11-17
Payer: MEDICARE

## 2022-11-17 VITALS
RESPIRATION RATE: 16 BRPM | SYSTOLIC BLOOD PRESSURE: 132 MMHG | HEART RATE: 74 BPM | TEMPERATURE: 98.5 F | DIASTOLIC BLOOD PRESSURE: 76 MMHG | WEIGHT: 248.68 LBS | OXYGEN SATURATION: 97 % | BODY MASS INDEX: 37.69 KG/M2 | HEIGHT: 68 IN

## 2022-11-17 DIAGNOSIS — Z01.818 PREOP TESTING: Primary | ICD-10-CM

## 2022-11-17 DIAGNOSIS — F41.9 ANXIETY: ICD-10-CM

## 2022-11-17 DIAGNOSIS — N40.0 BENIGN PROSTATIC HYPERPLASIA, UNSPECIFIED WHETHER LOWER URINARY TRACT SYMPTOMS PRESENT: ICD-10-CM

## 2022-11-17 DIAGNOSIS — M17.0 BILATERAL PRIMARY OSTEOARTHRITIS OF KNEE: ICD-10-CM

## 2022-11-17 DIAGNOSIS — Z12.5 SCREENING PSA (PROSTATE SPECIFIC ANTIGEN): ICD-10-CM

## 2022-11-17 DIAGNOSIS — M17.0 PRIMARY OSTEOARTHRITIS OF BOTH KNEES: ICD-10-CM

## 2022-11-17 DIAGNOSIS — G47.33 OSA TREATED WITH BIPAP: ICD-10-CM

## 2022-11-17 DIAGNOSIS — M17.0 BILATERAL PRIMARY OSTEOARTHRITIS OF KNEE: Primary | ICD-10-CM

## 2022-11-17 DIAGNOSIS — E66.9 CLASS 2 OBESITY: ICD-10-CM

## 2022-11-17 DIAGNOSIS — F33.1 MAJOR DEPRESSIVE DISORDER, RECURRENT, MODERATE: ICD-10-CM

## 2022-11-17 DIAGNOSIS — E78.5 HYPERLIPIDEMIA LDL GOAL <70: ICD-10-CM

## 2022-11-17 DIAGNOSIS — G62.9 PERIPHERAL POLYNEUROPATHY: ICD-10-CM

## 2022-11-17 DIAGNOSIS — G60.9 PERIPHERAL NEUROPATHY, IDIOPATHIC: ICD-10-CM

## 2022-11-17 DIAGNOSIS — R74.8 ELEVATED LIVER ENZYMES: ICD-10-CM

## 2022-11-17 DIAGNOSIS — M16.11 PRIMARY OSTEOARTHRITIS OF RIGHT HIP: ICD-10-CM

## 2022-11-17 DIAGNOSIS — F51.01 PRIMARY INSOMNIA: ICD-10-CM

## 2022-11-17 LAB
ALBUMIN SERPL-MCNC: 4.5 G/DL (ref 3.5–5.2)
ALBUMIN/GLOB SERPL: 1.3 G/DL
ALP SERPL-CCNC: 94 U/L (ref 39–117)
ALT SERPL W P-5'-P-CCNC: 35 U/L (ref 1–41)
ANION GAP SERPL CALCULATED.3IONS-SCNC: 7 MMOL/L (ref 5–15)
AST SERPL-CCNC: 22 U/L (ref 1–40)
BASOPHILS # BLD AUTO: 0.04 10*3/MM3 (ref 0–0.2)
BASOPHILS NFR BLD AUTO: 0.7 % (ref 0–1.5)
BILIRUB SERPL-MCNC: 0.7 MG/DL (ref 0–1.2)
BUN SERPL-MCNC: 13 MG/DL (ref 8–23)
BUN/CREAT SERPL: 12.3 (ref 7–25)
CALCIUM SPEC-SCNC: 10.1 MG/DL (ref 8.6–10.5)
CHLORIDE SERPL-SCNC: 101 MMOL/L (ref 98–107)
CHOLEST SERPL-MCNC: 120 MG/DL (ref 0–200)
CO2 SERPL-SCNC: 29 MMOL/L (ref 22–29)
CREAT SERPL-MCNC: 1.06 MG/DL (ref 0.76–1.27)
DEPRECATED RDW RBC AUTO: 47.7 FL (ref 37–54)
EGFRCR SERPLBLD CKD-EPI 2021: 76 ML/MIN/1.73
EOSINOPHIL # BLD AUTO: 0.19 10*3/MM3 (ref 0–0.4)
EOSINOPHIL NFR BLD AUTO: 3.3 % (ref 0.3–6.2)
ERYTHROCYTE [DISTWIDTH] IN BLOOD BY AUTOMATED COUNT: 13.6 % (ref 12.3–15.4)
GLOBULIN UR ELPH-MCNC: 3.4 GM/DL
GLUCOSE SERPL-MCNC: 104 MG/DL (ref 65–99)
HBA1C MFR BLD: 5.6 % (ref 4.8–5.6)
HCT VFR BLD AUTO: 48.2 % (ref 37.5–51)
HDLC SERPL-MCNC: 61 MG/DL (ref 40–60)
HGB BLD-MCNC: 15.7 G/DL (ref 13–17.7)
IMM GRANULOCYTES # BLD AUTO: 0.01 10*3/MM3 (ref 0–0.05)
IMM GRANULOCYTES NFR BLD AUTO: 0.2 % (ref 0–0.5)
INR PPP: 1.03 (ref 0.86–1.15)
LDLC SERPL CALC-MCNC: 41 MG/DL (ref 0–100)
LDLC/HDLC SERPL: 0.66 {RATIO}
LYMPHOCYTES # BLD AUTO: 0.86 10*3/MM3 (ref 0.7–3.1)
LYMPHOCYTES NFR BLD AUTO: 15 % (ref 19.6–45.3)
MCH RBC QN AUTO: 31 PG (ref 26.6–33)
MCHC RBC AUTO-ENTMCNC: 32.6 G/DL (ref 31.5–35.7)
MCV RBC AUTO: 95.1 FL (ref 79–97)
MONOCYTES # BLD AUTO: 0.42 10*3/MM3 (ref 0.1–0.9)
MONOCYTES NFR BLD AUTO: 7.3 % (ref 5–12)
NEUTROPHILS NFR BLD AUTO: 4.2 10*3/MM3 (ref 1.7–7)
NEUTROPHILS NFR BLD AUTO: 73.5 % (ref 42.7–76)
NRBC BLD AUTO-RTO: 0 /100 WBC (ref 0–0.2)
PLATELET # BLD AUTO: 149 10*3/MM3 (ref 140–450)
PMV BLD AUTO: 9.6 FL (ref 6–12)
POTASSIUM SERPL-SCNC: 5 MMOL/L (ref 3.5–5.2)
PROT SERPL-MCNC: 7.9 G/DL (ref 6–8.5)
PROTHROMBIN TIME: 13.6 SECONDS (ref 11.8–14.9)
QT INTERVAL: 358 MS
RBC # BLD AUTO: 5.07 10*6/MM3 (ref 4.14–5.8)
SODIUM SERPL-SCNC: 137 MMOL/L (ref 136–145)
TRIGL SERPL-MCNC: 95 MG/DL (ref 0–150)
VLDLC SERPL-MCNC: 18 MG/DL (ref 5–40)
WBC NRBC COR # BLD: 5.72 10*3/MM3 (ref 3.4–10.8)

## 2022-11-17 PROCEDURE — 80061 LIPID PANEL: CPT

## 2022-11-17 PROCEDURE — 80053 COMPREHEN METABOLIC PANEL: CPT

## 2022-11-17 PROCEDURE — 36415 COLL VENOUS BLD VENIPUNCTURE: CPT

## 2022-11-17 PROCEDURE — 93005 ELECTROCARDIOGRAM TRACING: CPT

## 2022-11-17 PROCEDURE — 85610 PROTHROMBIN TIME: CPT

## 2022-11-17 PROCEDURE — 85025 COMPLETE CBC W/AUTO DIFF WBC: CPT

## 2022-11-17 PROCEDURE — 83036 HEMOGLOBIN GLYCOSYLATED A1C: CPT | Performed by: ANESTHESIOLOGY

## 2022-11-17 RX ORDER — VIT A/VIT C/VIT E/ZINC/COPPER 4296-226
1 CAPSULE ORAL DAILY
COMMUNITY

## 2022-11-17 RX ORDER — TRAMADOL HYDROCHLORIDE 50 MG/1
50 TABLET ORAL EVERY 6 HOURS PRN
COMMUNITY

## 2022-11-17 NOTE — SIGNIFICANT NOTE
PT WOULD LIKE TO USE  PT IN [place], PT WIFE'S  CAN TRANSPORT HIM TO AND FROM REHAB.     PT WILL NEED DME ASSISTANCE FOR WALKER, CANE    PT HAS A 3-1 IN AT HOME    PT HAD TOTAL JOINT CLASS 5-24-22  [facility] DONE 11-17-22    GOAL: WALKING LONGER DISTANCES

## 2022-11-17 NOTE — DISCHARGE INSTRUCTIONS
IMPORTANT INSTRUCTIONS - PRE-ADMISSION TESTING  DO NOT EAT OR CHEW anything after midnight the night before your procedure.    You may have CLEAR liquids up to _3_____ hours prior to ARRIVAL time.   Take the following medications the morning of your procedure with JUST A SIP OF WATER:          WELLBUTRIN, CELEXA, CARDURA, TRAMADOL, PRILOSEC, FLONASE, GABAPENTIN, FINASTERIDE    DO NOT BRING your medications to the hospital with you, UNLESS something has changed since your PRE-Admission Testing appointment.  Hold all vitamins, supplements, and NSAIDS (Non- steroidal anti-inflammatory meds) for one week prior to surgery (you MAY take Tylenol or Acetaminophen). STOP 11-16-22  If you are diabetic, check your blood sugar the morning of your procedure. If it is less than 70 or if you are feeling symptomatic, call the following number for further instructions: [phone]  Use your inhalers/nebulizers as usual, the morning of your procedure. BRING YOUR INHALERS with you.   Bring your CPAP or BIPAP to hospital, ONLY IF YOU WILL BE SPENDING THE NIGHT.   Make sure you have a ride home and have someone who will stay with you the day of your procedure after you go home.  If you have any questions, please call your Pre-Admission Testing Nurse, __AURORA GAO at [phone]   Per anesthesia request, do not smoke for 24 hours before your procedure or as instructed by your surgeon.       SURGERY 11-22-22 ELEVATOR A, THIRD FLOOR  WILL CALL ARRIVAL TIME THE DAY PRIOR TO SURGERY AFTER 1 PM   USE SURGICAL SOAP X 3  DRINK 20 OZ GATORADE 3 HR PRIOR TO SURGERY (NO RED)   PER DR VIGIL STOP ASPIRIN 5-7 DAYS PRIOR TO SURGERY (PT ALREADY STOPPED)   STOP BRINLINTA 3 DAYS PRIOR TO SURGERY LAST DOSE TO BE 11-18-22    PREOPERATIVE (BEFORE SURGERY)              BATHING INSTRUCTIONS  Instructions:    You will need to shower 3 separate times utilizing the soap provided; at the times indicated   below:     11-20-22 Sunday PM  11-21-22 Monday  AM   11-21-22 Monday PM      Wash your hair and face with normal shampoo and soap, rinse it well before using the surgical soap.      In the shower, wet the skin completely with water from your neck to your feet. Apply the cleanser to your   body ONLY FROM THE NECK TO YOUR FEET.     Do NOT USE THE CLEANSER ON YOUR FACE, HEAD, OR GENITAL (PRIVATE) AREAS.   Keep it out of your eyes, ears, and mouth because of the risk of injury to those areas.      Scrub with a clean washcloth for each bath utilizing the soap provided from the top of your body to the   bottom starting at the neck area.      Pay close attention to your armpits, groin area, and the site of surgery.      Wash your body gently for 5 minutes. Stand outside the stream or turn off the water while scrubbing your   body. Do NOT wash with your regular soap after the surgical cleanser is used.      RINSE THE CLEANSER OFF COMPLETELY with plenty of water. Rinse the area again thoroughly.      Dry off with a clean towel. The surgical soap can cause dryness; however do NOT APPLY LOTION,   CREAM, POWDER, and/or DEODORANT AFTER SHOWERING.     Be sure to where clean clothes after showering.      Ensure CLEAN BED LINENS AFTER FIRST wash with the surgical soap. - CHANGES ON MON NOV 20TH     NO PETS ALLOWED IN THE BED with you after utilizing the surgical soap.     Clear Liquid Diet        Find out when you need to start a clear liquid diet.   Think of “clear liquids” as anything you could read a newspaper through. This includes things like water, broth, sports drinks, or tea WITHOUT any kind of milk or cream.           Once you are told to start a clear liquid diet, only drink these things until 3 hours before arrival to the hospital or when the hospital says to stop. Total volume limitation: 8 oz.       Clear liquids you CAN drink:   Water   Clear broth: beef, chicken, vegetable, or bone broth with nothing in it   Gatorade   Lemonade or Kenny-aid   Soda   Tea, coffee  (NO cream or honey)   Jell-O (without fruit)   Popsicles (without fruit or cream)   Italian ices   Juice without pulp: apple, white, grape   You may use salt, pepper, and sugar  NO RED     Do NOT drink:   Milk or cream   Soy milk, almond milk, coconut milk, or other non-dairy drinks and   creamers   Milkshakes or smoothies   Tomato juice   Orange juice   Grapefruit juice   Cream soups or any other than broth         Clear Liquid Diet:  Do NOT eat any solid food.  Do NOT eat or suck on mints or candy.  Do NOT chew gum.  Do NOT drink thick liquids like milk or juice with pulp in it.  Do NOT add milk, cream, or anything like soy milk or almond milk to coffee or tea.

## 2022-11-18 RX ORDER — TRANEXAMIC ACID 10 MG/ML
2000 INJECTION, SOLUTION INTRAVENOUS ONCE
Status: CANCELLED | OUTPATIENT
Start: 2022-11-18 | End: 2022-11-18

## 2022-11-22 ENCOUNTER — APPOINTMENT (OUTPATIENT)
Dept: GENERAL RADIOLOGY | Facility: HOSPITAL | Age: 67
End: 2022-11-22

## 2022-11-22 ENCOUNTER — ANESTHESIA EVENT (OUTPATIENT)
Dept: PERIOP | Facility: HOSPITAL | Age: 67
End: 2022-11-22

## 2022-11-22 ENCOUNTER — HOSPITAL ENCOUNTER (OUTPATIENT)
Facility: HOSPITAL | Age: 67
Discharge: HOME | End: 2022-11-22
Attending: ORTHOPAEDIC SURGERY | Admitting: ORTHOPAEDIC SURGERY

## 2022-11-22 ENCOUNTER — ANESTHESIA (OUTPATIENT)
Dept: PERIOP | Facility: HOSPITAL | Age: 67
End: 2022-11-22

## 2022-11-22 VITALS
HEART RATE: 92 BPM | TEMPERATURE: 98.2 F | DIASTOLIC BLOOD PRESSURE: 63 MMHG | BODY MASS INDEX: 37.19 KG/M2 | SYSTOLIC BLOOD PRESSURE: 130 MMHG | RESPIRATION RATE: 18 BRPM | HEIGHT: 68 IN | WEIGHT: 245.37 LBS | OXYGEN SATURATION: 95 %

## 2022-11-22 DIAGNOSIS — R26.2 DIFFICULTY IN WALKING: Primary | ICD-10-CM

## 2022-11-22 DIAGNOSIS — Z78.9 DECREASED ACTIVITIES OF DAILY LIVING (ADL): ICD-10-CM

## 2022-11-22 DIAGNOSIS — M16.11 PRIMARY OSTEOARTHRITIS OF RIGHT HIP: ICD-10-CM

## 2022-11-22 LAB
GLUCOSE BLDC GLUCOMTR-MCNC: 102 MG/DL (ref 70–99)
GLUCOSE BLDC GLUCOMTR-MCNC: 94 MG/DL (ref 70–99)
HCT VFR BLD AUTO: 43.6 % (ref 37.5–51)
HGB BLD-MCNC: 14 G/DL (ref 13–17.7)

## 2022-11-22 PROCEDURE — 82962 GLUCOSE BLOOD TEST: CPT

## 2022-11-22 PROCEDURE — 25010000002 ONDANSETRON PER 1 MG

## 2022-11-22 PROCEDURE — 25010000002 CEFAZOLIN IN DEXTROSE 2-4 GM/100ML-% SOLUTION: Performed by: ORTHOPAEDIC SURGERY

## 2022-11-22 PROCEDURE — A9270 NON-COVERED ITEM OR SERVICE: HCPCS | Performed by: ANESTHESIOLOGY

## 2022-11-22 PROCEDURE — 25010000002 MIDAZOLAM PER 1 MG: Performed by: ANESTHESIOLOGY

## 2022-11-22 PROCEDURE — 76000 FLUOROSCOPY <1 HR PHYS/QHP: CPT

## 2022-11-22 PROCEDURE — 27130 TOTAL HIP ARTHROPLASTY: CPT | Performed by: ORTHOPAEDIC SURGERY

## 2022-11-22 PROCEDURE — 97165 OT EVAL LOW COMPLEX 30 MIN: CPT

## 2022-11-22 PROCEDURE — 63710000001 FERROUS SULFATE 325 (65 FE) MG TABLET: Performed by: ORTHOPAEDIC SURGERY

## 2022-11-22 PROCEDURE — 73502 X-RAY EXAM HIP UNI 2-3 VIEWS: CPT

## 2022-11-22 PROCEDURE — A9270 NON-COVERED ITEM OR SERVICE: HCPCS | Performed by: ORTHOPAEDIC SURGERY

## 2022-11-22 PROCEDURE — 97161 PT EVAL LOW COMPLEX 20 MIN: CPT

## 2022-11-22 PROCEDURE — 25010000002 PROPOFOL 10 MG/ML EMULSION

## 2022-11-22 PROCEDURE — 25010000002 ROPIVACAINE PER 1 MG: Performed by: ORTHOPAEDIC SURGERY

## 2022-11-22 PROCEDURE — 25010000002 MORPHINE PER 10 MG: Performed by: ORTHOPAEDIC SURGERY

## 2022-11-22 PROCEDURE — 63710000001 FAMOTIDINE 20 MG TABLET: Performed by: ORTHOPAEDIC SURGERY

## 2022-11-22 PROCEDURE — 25010000002 KETOROLAC TROMETHAMINE PER 15 MG: Performed by: ORTHOPAEDIC SURGERY

## 2022-11-22 PROCEDURE — 63710000001 ACETAMINOPHEN 500 MG TABLET: Performed by: ORTHOPAEDIC SURGERY

## 2022-11-22 PROCEDURE — 63710000001 CELECOXIB 100 MG CAPSULE: Performed by: ANESTHESIOLOGY

## 2022-11-22 PROCEDURE — 25010000002 EPINEPHRINE 1 MG/ML SOLUTION: Performed by: ORTHOPAEDIC SURGERY

## 2022-11-22 PROCEDURE — 63710000001 ACETAMINOPHEN 500 MG TABLET: Performed by: ANESTHESIOLOGY

## 2022-11-22 PROCEDURE — S0260 H&P FOR SURGERY: HCPCS | Performed by: ORTHOPAEDIC SURGERY

## 2022-11-22 PROCEDURE — 94761 N-INVAS EAR/PLS OXIMETRY MLT: CPT

## 2022-11-22 PROCEDURE — 97535 SELF CARE MNGMENT TRAINING: CPT

## 2022-11-22 PROCEDURE — 85014 HEMATOCRIT: CPT | Performed by: ORTHOPAEDIC SURGERY

## 2022-11-22 PROCEDURE — C1776 JOINT DEVICE (IMPLANTABLE): HCPCS | Performed by: ORTHOPAEDIC SURGERY

## 2022-11-22 PROCEDURE — 94799 UNLISTED PULMONARY SVC/PX: CPT

## 2022-11-22 PROCEDURE — 85018 HEMOGLOBIN: CPT | Performed by: ORTHOPAEDIC SURGERY

## 2022-11-22 PROCEDURE — 63710000001 SCOPOLAMINE 1 MG/3DAYS PATCH 72 HOUR: Performed by: ANESTHESIOLOGY

## 2022-11-22 PROCEDURE — 25010000002 FENTANYL CITRATE (PF) 50 MCG/ML SOLUTION: Performed by: ANESTHESIOLOGY

## 2022-11-22 DEVICE — LINER ACET G7 NTRL E1 SZE 36MM: Type: IMPLANTABLE DEVICE | Site: HIP | Status: FUNCTIONAL

## 2022-11-22 DEVICE — TOTAL HIP PRIMARY: Type: IMPLANTABLE DEVICE | Site: HIP | Status: FUNCTIONAL

## 2022-11-22 DEVICE — STEM FEM/HIP AVENIRCOMPLETE COLAR STFF HA SZ3: Type: IMPLANTABLE DEVICE | Site: HIP | Status: FUNCTIONAL

## 2022-11-22 DEVICE — BIOLOX® DELTA, CERAMIC FEMORAL HEAD, S, Ø 36/-3.5, TAPER 12/14
Type: IMPLANTABLE DEVICE | Site: HIP | Status: FUNCTIONAL
Brand: BIOLOX® DELTA

## 2022-11-22 DEVICE — SHLL ACET G7 PPS LTD/HL TI SZE 52MM: Type: IMPLANTABLE DEVICE | Site: HIP | Status: FUNCTIONAL

## 2022-11-22 RX ORDER — ONDANSETRON 2 MG/ML
INJECTION INTRAMUSCULAR; INTRAVENOUS AS NEEDED
Status: DISCONTINUED | OUTPATIENT
Start: 2022-11-22 | End: 2022-11-22 | Stop reason: SURG

## 2022-11-22 RX ORDER — EPHEDRINE SULFATE 50 MG/ML
INJECTION, SOLUTION INTRAVENOUS AS NEEDED
Status: DISCONTINUED | OUTPATIENT
Start: 2022-11-22 | End: 2022-11-22 | Stop reason: SURG

## 2022-11-22 RX ORDER — SCOLOPAMINE TRANSDERMAL SYSTEM 1 MG/1
1 PATCH, EXTENDED RELEASE TRANSDERMAL
Status: DISCONTINUED | OUTPATIENT
Start: 2022-11-22 | End: 2022-11-22 | Stop reason: HOSPADM

## 2022-11-22 RX ORDER — PROMETHAZINE HYDROCHLORIDE 12.5 MG/1
12.5 TABLET ORAL EVERY 6 HOURS PRN
Status: DISCONTINUED | OUTPATIENT
Start: 2022-11-22 | End: 2022-11-22 | Stop reason: HOSPADM

## 2022-11-22 RX ORDER — OXYCODONE HYDROCHLORIDE AND ACETAMINOPHEN 5; 325 MG/1; MG/1
1 TABLET ORAL EVERY 4 HOURS PRN
Status: DISCONTINUED | OUTPATIENT
Start: 2022-11-22 | End: 2022-11-22 | Stop reason: HOSPADM

## 2022-11-22 RX ORDER — SODIUM CHLORIDE, SODIUM LACTATE, POTASSIUM CHLORIDE, CALCIUM CHLORIDE 600; 310; 30; 20 MG/100ML; MG/100ML; MG/100ML; MG/100ML
9 INJECTION, SOLUTION INTRAVENOUS CONTINUOUS PRN
Status: DISCONTINUED | OUTPATIENT
Start: 2022-11-22 | End: 2022-11-22 | Stop reason: HOSPADM

## 2022-11-22 RX ORDER — ONDANSETRON 2 MG/ML
4 INJECTION INTRAMUSCULAR; INTRAVENOUS ONCE AS NEEDED
Status: DISCONTINUED | OUTPATIENT
Start: 2022-11-22 | End: 2022-11-22 | Stop reason: HOSPADM

## 2022-11-22 RX ORDER — OXYCODONE HYDROCHLORIDE AND ACETAMINOPHEN 5; 325 MG/1; MG/1
2 TABLET ORAL EVERY 4 HOURS PRN
Status: DISCONTINUED | OUTPATIENT
Start: 2022-11-22 | End: 2022-11-22 | Stop reason: HOSPADM

## 2022-11-22 RX ORDER — ASPIRIN 325 MG
325 TABLET, DELAYED RELEASE (ENTERIC COATED) ORAL DAILY
Qty: 21 TABLET | Refills: 0 | Status: SHIPPED | OUTPATIENT
Start: 2022-11-22

## 2022-11-22 RX ORDER — ACETAMINOPHEN 500 MG
1000 TABLET ORAL EVERY 8 HOURS
Status: DISCONTINUED | OUTPATIENT
Start: 2022-11-22 | End: 2022-11-22 | Stop reason: HOSPADM

## 2022-11-22 RX ORDER — SODIUM CHLORIDE, SODIUM LACTATE, POTASSIUM CHLORIDE, CALCIUM CHLORIDE 600; 310; 30; 20 MG/100ML; MG/100ML; MG/100ML; MG/100ML
100 INJECTION, SOLUTION INTRAVENOUS CONTINUOUS
Status: DISCONTINUED | OUTPATIENT
Start: 2022-11-22 | End: 2022-11-22 | Stop reason: HOSPADM

## 2022-11-22 RX ORDER — MIDAZOLAM HYDROCHLORIDE 1 MG/ML
2 INJECTION INTRAMUSCULAR; INTRAVENOUS ONCE
Status: COMPLETED | OUTPATIENT
Start: 2022-11-22 | End: 2022-11-22

## 2022-11-22 RX ORDER — FERROUS SULFATE 325(65) MG
325 TABLET ORAL
Status: DISCONTINUED | OUTPATIENT
Start: 2022-11-22 | End: 2022-11-22 | Stop reason: HOSPADM

## 2022-11-22 RX ORDER — FAMOTIDINE 20 MG/1
40 TABLET, FILM COATED ORAL DAILY
Status: DISCONTINUED | OUTPATIENT
Start: 2022-11-22 | End: 2022-11-22 | Stop reason: HOSPADM

## 2022-11-22 RX ORDER — OXYCODONE HYDROCHLORIDE 5 MG/1
5 TABLET ORAL
Status: DISCONTINUED | OUTPATIENT
Start: 2022-11-22 | End: 2022-11-22 | Stop reason: HOSPADM

## 2022-11-22 RX ORDER — CEFAZOLIN SODIUM IN 0.9 % NACL 3 G/100 ML
3 INTRAVENOUS SOLUTION, PIGGYBACK (ML) INTRAVENOUS ONCE
Status: DISCONTINUED | OUTPATIENT
Start: 2022-11-22 | End: 2022-11-22 | Stop reason: DRUGHIGH

## 2022-11-22 RX ORDER — MEPERIDINE HYDROCHLORIDE 25 MG/ML
12.5 INJECTION INTRAMUSCULAR; INTRAVENOUS; SUBCUTANEOUS
Status: DISCONTINUED | OUTPATIENT
Start: 2022-11-22 | End: 2022-11-22 | Stop reason: HOSPADM

## 2022-11-22 RX ORDER — POVIDONE-IODINE 10 MG/ML
SOLUTION TOPICAL ONCE
Status: COMPLETED | OUTPATIENT
Start: 2022-11-22 | End: 2022-11-22

## 2022-11-22 RX ORDER — FENTANYL CITRATE 50 UG/ML
INJECTION, SOLUTION INTRAMUSCULAR; INTRAVENOUS
Status: COMPLETED | OUTPATIENT
Start: 2022-11-22 | End: 2022-11-22

## 2022-11-22 RX ORDER — ONDANSETRON 2 MG/ML
4 INJECTION INTRAMUSCULAR; INTRAVENOUS EVERY 6 HOURS PRN
Status: DISCONTINUED | OUTPATIENT
Start: 2022-11-22 | End: 2022-11-22 | Stop reason: HOSPADM

## 2022-11-22 RX ORDER — CELECOXIB 100 MG/1
200 CAPSULE ORAL ONCE
Status: COMPLETED | OUTPATIENT
Start: 2022-11-22 | End: 2022-11-22

## 2022-11-22 RX ORDER — KETOROLAC TROMETHAMINE 30 MG/ML
15 INJECTION, SOLUTION INTRAMUSCULAR; INTRAVENOUS EVERY 6 HOURS
Status: DISCONTINUED | OUTPATIENT
Start: 2022-11-22 | End: 2022-11-22 | Stop reason: HOSPADM

## 2022-11-22 RX ORDER — ACETAMINOPHEN 325 MG/1
325 TABLET ORAL EVERY 4 HOURS PRN
Status: DISCONTINUED | OUTPATIENT
Start: 2022-11-22 | End: 2022-11-22 | Stop reason: HOSPADM

## 2022-11-22 RX ORDER — FENTANYL CITRATE 50 UG/ML
INJECTION, SOLUTION INTRAMUSCULAR; INTRAVENOUS AS NEEDED
Status: DISCONTINUED | OUTPATIENT
Start: 2022-11-22 | End: 2022-11-22 | Stop reason: SURG

## 2022-11-22 RX ORDER — TRANEXAMIC ACID 10 MG/ML
2000 INJECTION, SOLUTION INTRAVENOUS ONCE
Status: DISCONTINUED | OUTPATIENT
Start: 2022-11-22 | End: 2022-11-22

## 2022-11-22 RX ORDER — ONDANSETRON 4 MG/1
4 TABLET, FILM COATED ORAL EVERY 6 HOURS PRN
Status: DISCONTINUED | OUTPATIENT
Start: 2022-11-22 | End: 2022-11-22 | Stop reason: HOSPADM

## 2022-11-22 RX ORDER — PROMETHAZINE HYDROCHLORIDE 12.5 MG/1
25 TABLET ORAL ONCE AS NEEDED
Status: DISCONTINUED | OUTPATIENT
Start: 2022-11-22 | End: 2022-11-22 | Stop reason: HOSPADM

## 2022-11-22 RX ORDER — GLYCOPYRROLATE 0.2 MG/ML
0.2 INJECTION INTRAMUSCULAR; INTRAVENOUS
Status: COMPLETED | OUTPATIENT
Start: 2022-11-22 | End: 2022-11-22

## 2022-11-22 RX ORDER — CEFAZOLIN SODIUM 2 G/100ML
2 INJECTION, SOLUTION INTRAVENOUS EVERY 8 HOURS
Status: DISCONTINUED | OUTPATIENT
Start: 2022-11-22 | End: 2022-11-22 | Stop reason: HOSPADM

## 2022-11-22 RX ORDER — KETAMINE HCL IN NACL, ISO-OSM 100MG/10ML
SYRINGE (ML) INJECTION AS NEEDED
Status: DISCONTINUED | OUTPATIENT
Start: 2022-11-22 | End: 2022-11-22 | Stop reason: SURG

## 2022-11-22 RX ORDER — CEFAZOLIN SODIUM 2 G/100ML
2 INJECTION, SOLUTION INTRAVENOUS ONCE
Status: COMPLETED | OUTPATIENT
Start: 2022-11-22 | End: 2022-11-22

## 2022-11-22 RX ORDER — LIDOCAINE HYDROCHLORIDE 20 MG/ML
INJECTION, SOLUTION EPIDURAL; INFILTRATION; INTRACAUDAL; PERINEURAL AS NEEDED
Status: DISCONTINUED | OUTPATIENT
Start: 2022-11-22 | End: 2022-11-22 | Stop reason: SURG

## 2022-11-22 RX ORDER — PROMETHAZINE HYDROCHLORIDE 12.5 MG/1
12.5 SUPPOSITORY RECTAL EVERY 6 HOURS PRN
Status: DISCONTINUED | OUTPATIENT
Start: 2022-11-22 | End: 2022-11-22 | Stop reason: HOSPADM

## 2022-11-22 RX ORDER — BUPIVACAINE HYDROCHLORIDE 7.5 MG/ML
INJECTION, SOLUTION EPIDURAL; RETROBULBAR
Status: COMPLETED | OUTPATIENT
Start: 2022-11-22 | End: 2022-11-22

## 2022-11-22 RX ORDER — AMOXICILLIN 250 MG
2 CAPSULE ORAL 2 TIMES DAILY PRN
Status: DISCONTINUED | OUTPATIENT
Start: 2022-11-22 | End: 2022-11-22 | Stop reason: HOSPADM

## 2022-11-22 RX ORDER — ACETAMINOPHEN 500 MG
1000 TABLET ORAL ONCE
Status: COMPLETED | OUTPATIENT
Start: 2022-11-22 | End: 2022-11-22

## 2022-11-22 RX ORDER — DEXMEDETOMIDINE HYDROCHLORIDE 100 UG/ML
INJECTION, SOLUTION INTRAVENOUS AS NEEDED
Status: DISCONTINUED | OUTPATIENT
Start: 2022-11-22 | End: 2022-11-22 | Stop reason: SURG

## 2022-11-22 RX ORDER — MAGNESIUM HYDROXIDE 1200 MG/15ML
LIQUID ORAL AS NEEDED
Status: DISCONTINUED | OUTPATIENT
Start: 2022-11-22 | End: 2022-11-22 | Stop reason: HOSPADM

## 2022-11-22 RX ORDER — HYDROCODONE BITARTRATE AND ACETAMINOPHEN 7.5; 325 MG/1; MG/1
1 TABLET ORAL EVERY 4 HOURS PRN
Qty: 45 TABLET | Refills: 0 | Status: SHIPPED | OUTPATIENT
Start: 2022-11-22

## 2022-11-22 RX ORDER — PROMETHAZINE HYDROCHLORIDE 25 MG/1
25 SUPPOSITORY RECTAL ONCE AS NEEDED
Status: DISCONTINUED | OUTPATIENT
Start: 2022-11-22 | End: 2022-11-22 | Stop reason: HOSPADM

## 2022-11-22 RX ORDER — PROPOFOL 10 MG/ML
VIAL (ML) INTRAVENOUS AS NEEDED
Status: DISCONTINUED | OUTPATIENT
Start: 2022-11-22 | End: 2022-11-22 | Stop reason: SURG

## 2022-11-22 RX ADMIN — KETOROLAC TROMETHAMINE 15 MG: 30 INJECTION, SOLUTION INTRAMUSCULAR; INTRAVENOUS at 13:51

## 2022-11-22 RX ADMIN — CELECOXIB 200 MG: 100 CAPSULE ORAL at 08:53

## 2022-11-22 RX ADMIN — CEFAZOLIN SODIUM 2 G: 2 INJECTION, SOLUTION INTRAVENOUS at 09:51

## 2022-11-22 RX ADMIN — FAMOTIDINE 40 MG: 20 TABLET ORAL at 13:52

## 2022-11-22 RX ADMIN — FENTANYL CITRATE 25 MCG: 50 INJECTION, SOLUTION INTRAMUSCULAR; INTRAVENOUS at 10:33

## 2022-11-22 RX ADMIN — ACETAMINOPHEN 1000 MG: 500 TABLET ORAL at 13:51

## 2022-11-22 RX ADMIN — EPHEDRINE SULFATE 10 MG: 50 INJECTION INTRAVENOUS at 11:06

## 2022-11-22 RX ADMIN — EPHEDRINE SULFATE 10 MG: 50 INJECTION INTRAVENOUS at 10:55

## 2022-11-22 RX ADMIN — DEXMEDETOMIDINE HYDROCHLORIDE 10 MCG: 100 INJECTION, SOLUTION, CONCENTRATE INTRAVENOUS at 09:47

## 2022-11-22 RX ADMIN — FERROUS SULFATE TAB 325 MG (65 MG ELEMENTAL FE) 325 MG: 325 (65 FE) TAB at 13:52

## 2022-11-22 RX ADMIN — PROPOFOL 40 MG: 10 INJECTION, EMULSION INTRAVENOUS at 09:47

## 2022-11-22 RX ADMIN — BUPIVACAINE HYDROCHLORIDE 1.6 ML: 7.5 INJECTION, SOLUTION EPIDURAL; RETROBULBAR at 09:38

## 2022-11-22 RX ADMIN — FENTANYL CITRATE 30 MCG: 50 INJECTION, SOLUTION INTRAMUSCULAR; INTRAVENOUS at 10:20

## 2022-11-22 RX ADMIN — SODIUM CHLORIDE, POTASSIUM CHLORIDE, SODIUM LACTATE AND CALCIUM CHLORIDE 9 ML/HR: 600; 310; 30; 20 INJECTION, SOLUTION INTRAVENOUS at 08:58

## 2022-11-22 RX ADMIN — POVIDONE-IODINE 1 EACH: 10 SOLUTION TOPICAL at 08:58

## 2022-11-22 RX ADMIN — CEFAZOLIN SODIUM 2 G: 2 INJECTION, SOLUTION INTRAVENOUS at 17:01

## 2022-11-22 RX ADMIN — MIDAZOLAM HYDROCHLORIDE 2 MG: 1 INJECTION, SOLUTION INTRAMUSCULAR; INTRAVENOUS at 09:18

## 2022-11-22 RX ADMIN — DEXMEDETOMIDINE HYDROCHLORIDE 10 MCG: 100 INJECTION, SOLUTION, CONCENTRATE INTRAVENOUS at 09:39

## 2022-11-22 RX ADMIN — GLYCOPYRROLATE 0.2 MG: 0.2 INJECTION INTRAMUSCULAR; INTRAVENOUS at 09:17

## 2022-11-22 RX ADMIN — Medication 30 MG: at 09:47

## 2022-11-22 RX ADMIN — SCOPALAMINE 1 PATCH: 1 PATCH, EXTENDED RELEASE TRANSDERMAL at 08:54

## 2022-11-22 RX ADMIN — LIDOCAINE HYDROCHLORIDE 20 MG: 20 INJECTION, SOLUTION EPIDURAL; INFILTRATION; INTRACAUDAL; PERINEURAL at 09:38

## 2022-11-22 RX ADMIN — ACETAMINOPHEN 1000 MG: 500 TABLET ORAL at 08:53

## 2022-11-22 RX ADMIN — EPHEDRINE SULFATE 10 MG: 50 INJECTION INTRAVENOUS at 10:53

## 2022-11-22 RX ADMIN — ONDANSETRON 4 MG: 2 INJECTION INTRAMUSCULAR; INTRAVENOUS at 10:55

## 2022-11-22 RX ADMIN — Medication 20 MG: at 10:30

## 2022-11-22 RX ADMIN — SODIUM CHLORIDE, POTASSIUM CHLORIDE, SODIUM LACTATE AND CALCIUM CHLORIDE 100 ML/HR: 600; 310; 30; 20 INJECTION, SOLUTION INTRAVENOUS at 13:52

## 2022-11-22 RX ADMIN — PROPOFOL 75 MCG/KG/MIN: 10 INJECTION, EMULSION INTRAVENOUS at 09:47

## 2022-11-22 RX ADMIN — FENTANYL CITRATE 20 MCG: 50 INJECTION, SOLUTION INTRAMUSCULAR; INTRAVENOUS at 09:38

## 2022-11-22 RX ADMIN — LIDOCAINE HYDROCHLORIDE 40 MG: 20 INJECTION, SOLUTION EPIDURAL; INFILTRATION; INTRACAUDAL; PERINEURAL at 09:47

## 2022-11-22 RX ADMIN — DEXMEDETOMIDINE HYDROCHLORIDE 10 MCG: 100 INJECTION, SOLUTION, CONCENTRATE INTRAVENOUS at 10:26

## 2022-11-22 NOTE — THERAPY EVALUATION
Patient Name: Hector Coronel  : 1955    MRN: [id number]                              Today's Date: 2022       Admit Date: 2022    Visit Dx:     ICD-10-CM ICD-9-CM   1. Difficulty in walking  R26.2 719.7   2. Primary osteoarthritis of right hip  M16.11 715.15   3. Decreased activities of daily living (ADL)  Z78.9 V49.89     Patient Active Problem List   Diagnosis   • NOBLE treated with BiPAP   • Class 2 obesity   • Primary osteoarthritis of both knees   • Major depressive disorder, recurrent, moderate (HCC)   • Peripheral neuropathy, idiopathic   • Elevated liver enzymes   • Benign prostatic hyperplasia   • Cerumen debris on tympanic membrane of both ears   • Screening PSA (prostate specific antigen)   • Primary osteoarthritis of right hip   • Primary insomnia   • Anxiety   • Peripheral polyneuropathy   • Cough   • Shortness of breath   • S/P coronary artery stent placement     Past Medical History:   Diagnosis Date   • Anxiety    • Arthritis    • Coronary artery disease 2022    BLOCKAGE- 1 STENT PLACED 2022- SEE'S DR BUCK, DENIED CP/SOB   • Cough     CHRONIC COUGH REPORTS HAS GOTTEN WORSE THIS PAST YEAR   • Depression    • Diabetes mellitus (HCC)     BORDERLINE- DOESN'T CHECK BG AT HOME   • Edema     OG LOWER EXT   • History of transfusion     AS A CHILD STATES HE DOESNT THINK ANY ISSUE WITH IT   • Hoarseness    • Murmur, cardiac     ASYMPTOMATIC- SOA WITH MINIMAL EXERTION AND HAVING SWELLING IN LOWER EXTERMITIES.SEE'S DR BUCK DENIES CP.   • Osteoarthritis     OG HIPS, BILATERL KNEES, L SHOULDER   • PONV (postoperative nausea and vomiting)    • Right hip pain    • Sleep apnea     USES BIPAP   • TIA (transient ischemic attack)     YEARS AGO- NO RESIDUAL   • Umbilical hernia      Past Surgical History:   Procedure Laterality Date   • CARDIAC CATHETERIZATION Right 2022    Procedure: Left Heart Cath;  Surgeon: Eduardo Buck MD;  Location: [facility] CATH INVASIVE LOCATION;   Service: Cardiovascular;  Laterality: Right;   • CARPAL TUNNEL RELEASE Bilateral    • CHOLECYSTECTOMY     • HERNIA REPAIR      UMBILICAL   • INNER EAR SURGERY      BOTH SIDES   • THROAT SURGERY      BIOPSY   • VASECTOMY      1979      General Information     Row Name 11/22/22 1530 11/22/22 1517       OT Time and Intention    Document Type therapy note (daily note)  -ES evaluation  -ES    Mode of Treatment individual therapy;occupational therapy  -ES individual therapy;occupational therapy  -ES    Row Name 11/22/22 1517          General Information    Patient Profile Reviewed yes  -ES     Prior Level of Function independent:;ADL's;all household mobility;community mobility  Patient independent with basic and instrumental ADLs at baseline. No device for functional mobility. Walk in shower, standing shower completion. Has shower stool if needed. Standard height commode. Groom in stance. No home O2 use. No falls x3 months.  -ES     Existing Precautions/Restrictions fall;weight bearing  -ES     Barriers to Rehab none identified  -ES     Row Name 11/22/22 1517          Occupational Profile    Reason for Services/Referral (Occupational Profile) Patient is 67 yr old male admitted [facility]/22/2022 after failed conservative management of right hip osteoarthritis. Patient is same day right total hip arthroplasty, WBAT RLE w/ anterior precautions. OT evaluation and treatment ordered d/t recent decline in ADLs/transfer ability and discharge planning recommendations. No previous OT services for current condition.  -ES     Row Name 11/22/22 1517          Living Environment    People in Home spouse  -ES     Row Name 11/22/22 1517          Home Main Entrance    Number of Stairs, Main Entrance two  -ES     Stair Railings, Main Entrance none  -ES     Row Name 11/22/22 1517          Stairs Within Home, Primary    Stairs, Within Home, Primary 13 stairs to second story bedroom. Patient reports he has moved bedroom to  main level for first few weeks post surgery  -ES     Number of Stairs, Within Home, Primary other (see comments)  13  -ES     Row Name 11/22/22 1530 11/22/22 1517       Cognition    Orientation Status (Cognition) --  patient and family recetive to all education and training provided. All questions addressed  -ES oriented x 3  patient pleasant and cooperative. Family present.  -ES    Row Name 11/22/22 1517          Safety Issues, Functional Mobility    Impairments Affecting Function (Mobility) balance;pain;range of motion (ROM);strength  -ES           User Key  (r) = Recorded By, (t) = Taken By, (c) = Cosigned By    Initials Name Provider Type    ES Lorenza King OT Occupational Therapist                 Mobility/ADL's     Row Name 11/22/22 1522          Bed Mobility    Comment, (Bed Mobility) patient met seated in recliner at therapy arrival to room  -ES     Row Name 11/22/22 1531 11/22/22 1522       Transfers    Transfers -- sit-stand transfer;stand-sit transfer;toilet transfer  -ES    Comment, (Transfers) patient provided education and training on hand placement, body mechanics, and rolling walker management to increase patient safety with transfers to decrease fall risk  -ES --    Row Name 11/22/22 1531 11/22/22 1522       Sit-Stand Transfer    Sit-Stand [place] (Transfers) -- contact guard;1 person assist;verbal cues  -ES    Assistive Device (Sit-Stand Transfers) -- walker, front-wheeled  -ES    Comment, (Sit-Stand Transfer) STS x8  -ES --    Row Name 11/22/22 1531 11/22/22 1522       Stand-Sit Transfer    Stand-Sit [place] (Transfers) -- verbal cues;contact guard;1 person assist  -ES    Assistive Device (Stand-Sit Transfers) -- walker, front-wheeled  -ES    Comment, (Stand-Sit Transfer) patient requires increased cueing for hand placement with descents, patient attempts x2 to sit without hand placementn chair not behind patient. Max safety cueing and education provided to decrease patient fall risk  at time of discharge  -ES --    Row Name 11/22/22 1522          Toilet Transfer    Type (Toilet Transfer) sit-stand;stand-sit  -ES     [place] Level (Toilet Transfer) contact guard;verbal cues;1 person assist  -ES     Assistive Device (Toilet Transfer) commode, 3-in-1;walker, front-wheeled  -ES     Row Name 11/22/22 1522          Functional Mobility    Functional Mobility- Ind. Level contact guard assist;1 person;verbal cues required  -ES     Functional Mobility- Device walker, front-wheeled  -ES     Row Name 11/22/22 1522          Activities of Daily Living    BADL Assessment/Intervention bathing;upper body dressing;lower body dressing;grooming;feeding;toileting  -ES     Row Name 11/22/22 1522          Mobility    Extremity Weight-bearing Status right lower extremity  -ES     Right Lower Extremity (Weight-bearing Status) weight-bearing as tolerated (WBAT)  -ES     Row Name 11/22/22 1522          Bathing Assessment/Intervention    [place] Level (Bathing) bathing skills;minimum assist (75% patient effort)  -ES     Row Name 11/22/22 1531 11/22/22 1522       Upper Body Dressing Assessment/Training    [place] Level (Upper Body Dressing) upper body dressing skills;don;pull-over garment;set up  -ES upper body dressing skills;set up  -ES    Row Name 11/22/22 1531 11/22/22 1522       Lower Body Dressing Assessment/Training    [place] Level (Lower Body Dressing) lower body dressing skills;don;pants/bottoms;socks;minimum assist (75% patient effort)  -ES lower body dressing skills;minimum assist (75% patient effort)  -ES    Comment, (Lower Body Dressing) patient provided education and training on adaptive lower body dressing strategies to increase patient independence at time of discharge. Return demonstration provided for ensured understanding.  -ES --    Row Name 11/22/22 1522          Grooming Assessment/Training    [place] Level (Grooming) grooming skills;set up  -ES     [facility] Name 11/22/22 1522           Self-Feeding Assessment/Training    [place] Level (Feeding) feeding skills;set up  -ES     Row Name 11/22/22 1522          Toileting Assessment/Training    [place] Level (Toileting) toileting skills;contact guard assist  -ES           User Key  (r) = Recorded By, (t) = Taken By, (c) = Cosigned By    Initials Name Provider Type    ES Lorenza King OT Occupational Therapist               Obj/Interventions     Row Name 11/22/22 1525          Sensory Assessment (Somatosensory)    Sensory Assessment (Somatosensory) sensation intact  -ES     Row Name 11/22/22 1525          Vision Assessment/Intervention    Visual Impairment/Limitations WFL  -ES     Row Name 11/22/22 1525          Range of Motion Comprehensive    General Range of Motion no range of motion deficits identified;bilateral upper extremity ROM WNL  -ES     Row Name 11/22/22 1525          Strength Comprehensive (MMT)    General Manual Muscle Testing (MMT) Assessment no strength deficits identified  -ES     Row Name 11/22/22 1525          Motor Skills    Motor Skills functional endurance;coordination  -ES     Coordination WFL;upper extremity  -ES     Functional Endurance fair  -ES     Row Name 11/22/22 1534 11/22/22 1525       Balance    Balance Assessment -- sitting dynamic balance;standing dynamic balance  -ES    Dynamic Sitting Balance -- standby assist  -ES    Position, Sitting Balance -- unsupported;sitting in chair  -ES    Dynamic Standing Balance -- contact guard;1-person assist  -ES    Position/Device Used, Standing Balance -- supported;walker, front-wheeled  -ES    Balance Interventions sitting;standing;sit to stand;supported;dynamic;dynamic reaching;occupation based/functional task;weight shifting activity  -ES --    Comment, Balance patient provided education and training on completing ADLs in supported stance or seated position to ensure patient safety and decrease fall risk at time of discharge  -ES --          User Key  (r) =  Recorded By, (t) = Taken By, (c) = Cosigned By    Initials Name Provider Type    ES Lorenza King, OT Occupational Therapist               Goals/Plan     Row Name 11/22/22 1527          Transfer Goal 1 (OT)    Activity/Assistive Device (Transfer Goal 1, OT) transfers, all;walker, rolling  -ES     [place] Level/Cues Needed (Transfer Goal 1, OT) modified independence  -ES     Time Frame (Transfer Goal 1, OT) long term goal (LTG);10 days  -ES     Row Name 11/22/22 1527          Bathing Goal 1 (OT)    Activity/Device (Bathing Goal 1, OT) bathing skills, all  -ES     [place] Level/Cues Needed (Bathing Goal 1, OT) modified independence  -ES     Time Frame (Bathing Goal 1, OT) long term goal (LTG);10 days  -ES     Row Name 11/22/22 1527          Dressing Goal 1 (OT)    Activity/Device (Dressing Goal 1, OT) dressing skills, all  -ES     [place]/Cues Needed (Dressing Goal 1, OT) modified independence  -ES     Time Frame (Dressing Goal 1, OT) long term goal (LTG);10 days  -ES     Row Name 11/22/22 1527          Toileting Goal 1 (OT)    Activity/Device (Toileting Goal 1, OT) toileting skills, all  -ES     [place] Level/Cues Needed (Toileting Goal 1, OT) modified independence  -ES     Time Frame (Toileting Goal 1, OT) long term goal (LTG);10 days  -ES     Row Name 11/22/22 1527          Grooming Goal 1 (OT)    Activity/Device (Grooming Goal 1, OT) grooming skills, all  -ES     [place] (Grooming Goal 1, OT) independent  -ES     Time Frame (Grooming Goal 1, OT) long term goal (LTG);10 days  -ES     Row Name 11/22/22 1527          Problem Specific Goal 1 (OT)    Problem Specific Goal 1 (OT) Patient will demonstrate fair plus activity tolerance in preperation for independent ADL routine completion at time of discharge  -ES     Time Frame (Problem Specific Goal 1, OT) long term goal (LTG);10 days  -ES     Row Name 11/22/22 1527          Therapy Assessment/Plan (OT)    Planned Therapy Interventions (OT)  activity tolerance training;BADL retraining;functional balance retraining;occupation/activity based interventions;patient/caregiver education/training;strengthening exercise;transfer/mobility retraining  -ES           User Key  (r) = Recorded By, (t) = Taken By, (c) = Cosigned By    Initials Name Provider Type    Lorenza Mistry OT Occupational Therapist               Clinical Impression     Row Name 11/22/22 1535 11/22/22 1525       Plan of Care Review    Plan of Care Reviewed With -- patient;family  -ES    Progress -- no change  initial evaluation encounter  -ES    Outcome Evaluation Patient provided education and training on use of adaptive strategies to increase patient safety and independence with B and IADL task engagement, transfer training to maxamize patient safety with all functional transfers, and home modification for patient success and independence at time of discharge. Patient receptive to all education and training provided.  -ES Patient has experienced decline in function from baseline status, presenting w/ deficits related to functional balance, safety awareness, transfers and mobility that impede patient independence with activities of daily living.  Patient would benefit from skilled Occupational Therapy intervention to maxamize patient safety, and promote return to baseline independence. Recommend outpatient therapy services at time of discharge.  -ES    Row Name 11/22/22 1535 11/22/22 1525       Therapy Assessment/Plan (OT)    Patient/Family Therapy Goal Statement (OT) walk with less pain  -ES --    Rehab Potential (OT) -- good, to achieve stated therapy goals  -ES    Criteria for Skilled Therapeutic Interventions Met (OT) -- yes;meets criteria;skilled treatment is necessary  -ES    Therapy Frequency (OT) -- 5 times/wk  -ES    Row Name 11/22/22 1525          Therapy Plan Review/Discharge Plan (OT)    Equipment Needs Upon Discharge (OT) walker, rolling  -ES     Anticipated Discharge  Disposition (OT) home with outpatient therapy services  -ES     Row Name 11/22/22 1525          Vital Signs    O2 Delivery Pre Treatment nasal cannula  -ES     O2 Delivery Intra Treatment nasal cannula  -ES     O2 Delivery Post Treatment nasal cannula  -ES     Row Name 11/22/22 1525          Positioning and Restraints    Pre-Treatment Position sitting in chair/recliner  -ES     Post Treatment Position chair  -ES           User Key  (r) = Recorded By, (t) = Taken By, (c) = Cosigned By    Initials Name Provider Type    Lorenza Mistry OT Occupational Therapist               Outcome Measures     Row Name 11/22/22 1528          How much help from another is currently needed...    Putting on and taking off regular lower body clothing? 3  -ES     Bathing (including washing, rinsing, and drying) 3  -ES     Toileting (which includes using toilet bed pan or urinal) 3  -ES     Putting on and taking off regular upper body clothing 4  -ES     Taking care of personal grooming (such as brushing teeth) 4  -ES     Eating meals 4  -ES     AM-PAC 6 Clicks Score (OT) 21  -ES     Row Name 11/22/22 1500 11/22/22 1300       How much help from another person do you currently need...    Turning from your back to your side while in flat bed without using bedrails? 3  -ROCHELLE 3  -DB    Moving from lying on back to sitting on the side of a flat bed without bedrails? 3  -ROCHELLE 3  -DB    Moving to and from a bed to a chair (including a wheelchair)? 4  -ROCHELLE 2  -DB    Standing up from a chair using your arms (e.g., wheelchair, bedside chair)? 4  -ROCHELLE 2  -DB    Climbing 3-5 steps with a railing? 3  -ROCHELLE 2  -DB    To walk in hospital room? 3  -ROCHELLE 2  -DB    AM-PAC 6 Clicks Score (PT) 20  -ROCHELLE 14  -DB    Highest level of mobility 6 --> Walked 10 steps or more  -ROCHELLE 4 --> Transferred to chair/commode  -DB    Row Name 11/22/22 1528 11/22/22 1500       Functional Assessment    Outcome Measure Options AM-PAC 6 Clicks Daily Activity (OT);Optimal Instrument  -ES  AM-PAC 6 Clicks Basic Mobility (PT)  -ROCHELLE    Row Name 11/22/22 1528          Optimal Instrument    Optimal Instrument Optimal - 3  -ES     Bending/Stooping 2  -ES     Standing 2  -ES     Reaching 1  -ES     From the list, choose the 3 activities you would most like to be able to do without any difficulty Bending/stooping;Standing;Reaching  -ES     Total Score Optimal - 3 5  -ES           User Key  (r) = Recorded By, (t) = Taken By, (c) = Cosigned By    Initials Name Provider Type    ROCHELLEGaston Casey, PT Physical Therapist    ES Lorenza King, OT Occupational Therapist    Amy Figueroa, RN Registered Nurse                Occupational Therapy Education     Title: PT OT SLP Therapies (Done)     Topic: Occupational Therapy (Done)     Point: ADL training (Done)     Description:   Instruct learner(s) on proper safety adaptation and remediation techniques during self care or transfers.   Instruct in proper use of assistive devices.              Learning Progress Summary           Patient Acceptance, E,TB, VU by ES at 11/22/2022 1529   Family Acceptance, E,TB, VU by ES at 11/22/2022 1529                   Point: Home exercise program (Done)     Description:   Instruct learner(s) on appropriate technique for monitoring, assisting and/or progressing therapeutic exercises/activities.              Learning Progress Summary           Patient Acceptance, E,TB, VU by ES at 11/22/2022 1529   Family Acceptance, E,TB, VU by ES at 11/22/2022 1529                   Point: Precautions (Done)     Description:   Instruct learner(s) on prescribed precautions during self-care and functional transfers.              Learning Progress Summary           Patient Acceptance, E,TB, VU by ES at 11/22/2022 1529   Family Acceptance, E,TB, VU by ES at 11/22/2022 1529                   Point: Body mechanics (Done)     Description:   Instruct learner(s) on proper positioning and spine alignment during self-care, functional mobility activities  and/or exercises.              Learning Progress Summary           Patient Acceptance, E,TB, VU by LESTER at 11/22/2022 1529   Family Acceptance, E,TB, VU by LESTER at 11/22/2022 1529                               User Key     Initials Effective Dates Name Provider Type Discipline     09/13/21 -  Lorenza King OT Occupational Therapist OT              OT Recommendation and Plan  Planned Therapy Interventions (OT): activity tolerance training, BADL retraining, functional balance retraining, occupation/activity based interventions, patient/caregiver education/training, strengthening exercise, transfer/mobility retraining  Therapy Frequency (OT): 5 times/wk  Plan of Care Review  Plan of Care Reviewed With: patient, family  Progress: no change (initial evaluation encounter)  Outcome Evaluation: Patient provided education and training on use of adaptive strategies to increase patient safety and independence with B and IADL task engagement, transfer training to maxamize patient safety with all functional transfers, and home modification for patient success and independence at time of discharge. Patient receptive to all education and training provided.     Time Calculation:    Time Calculation- OT     Row Name 11/22/22 1530             Time Calculation- OT    OT Received On 11/22/22  -ES      OT Goal Re-Cert Due Date 12/01/22  -ES         Timed Charges    79542 - OT Self Care/Mgmt Minutes 18  -ES         Untimed Charges    OT Eval/Re-eval Minutes 20  -ES         Total Minutes    Timed Charges Total Minutes 18  -ES      Untimed Charges Total Minutes 20  -ES       Total Minutes 38  -ES            User Key  (r) = Recorded By, (t) = Taken By, (c) = Cosigned By    Initials Name Provider Type     Lorenza King OT Occupational Therapist              Therapy Charges for Today     Code Description Service Date Service Provider Modifiers Qty    [id number]  OT SELF CARE/MGMT/TRAIN EA 15 MIN 11/22/2022 Lorenza King OT GO 1     [id number]  OT EVAL LOW COMPLEXITY 2 11/22/2022 Lorenza King OT GO 1               Lorenza King OT  11/22/2022

## 2022-11-22 NOTE — ANESTHESIA PROCEDURE NOTES
Spinal Block      Patient reassessed immediately prior to procedure    Patient location during procedure: OR  Indication:at surgeon's request and post-op pain management  Performed By  Anesthesiologist: Eduardo Adams MD CRNA/CAA: Heidy Joe CRNA  Preanesthetic Checklist  Completed: patient identified, IV checked, risks and benefits discussed, surgical consent, monitors and equipment checked, pre-op evaluation and timeout performed  Spinal Block Prep:  Patient Position:sitting  Sterile Tech:cap, gloves, mask and sterile barriers  Prep:Chloraprep  Patient Monitoring:blood pressure monitoring, continuous pulse oximetry and EKG    Spinal Block Procedure  Approach:midline  Guidance:landmark technique and palpation technique  Location:L3-L4  Needle Type:Pencan  Needle Gauge:24 G  Placement of Spinal needle event:cerebrospinal fluid aspirated  Paresthesia: no  Fluid Appearance:clear  Medications: bupivacaine PF (MARCAINE) injection 0.75% - Intrathecal   1.6 mL - 11/22/2022 9:38:00 AM  fentaNYL (SUBLIMAZE) injection - Intrathecal   20 mcg - 11/22/2022 9:38:00 AM   Post Assessment  Patient Tolerance:patient tolerated the procedure well with no apparent complications  Complications no  Additional Notes  Attempt x1 by CRNA at L4-5 - significant resistance met. Attempt #2 at L3-4 by Dr. Adams with resistance met as well. Skin infiltration with Lidocaine 1%. Introducer inserted, followed by spinal needle. + CSF return. Intrathecal marcaine injected. Spinal needle/introducer removed. Site clean/dry/intact.

## 2022-11-22 NOTE — THERAPY EVALUATION
Acute Care - Physical Therapy Initial Evaluation   Tanna     Patient Name: Hector Coronel  : 1955  MRN: [id number]  Today's Date: 2022     Admit date: 2022     Referring Physician: Jose Talavera MD     Surgery Date:2022   Procedure(s) (LRB):  RIGHT TOTAL HIP ARTHROPLASTY ANTERIOR (Right)            Visit Dx:     ICD-10-CM ICD-9-CM   1. Difficulty in walking  R26.2 719.7   2. Primary osteoarthritis of right hip  M16.11 715.15     Patient Active Problem List   Diagnosis   • NOBLE treated with BiPAP   • Class 2 obesity   • Primary osteoarthritis of both knees   • Major depressive disorder, recurrent, moderate (HCC)   • Peripheral neuropathy, idiopathic   • Elevated liver enzymes   • Benign prostatic hyperplasia   • Cerumen debris on tympanic membrane of both ears   • Screening PSA (prostate specific antigen)   • Primary osteoarthritis of right hip   • Primary insomnia   • Anxiety   • Peripheral polyneuropathy   • Cough   • Shortness of breath   • S/P coronary artery stent placement     Past Medical History:   Diagnosis Date   • Anxiety    • Arthritis    • Coronary artery disease 2022    BLOCKAGE- 1 STENT PLACED 2022- SEE'S DR VIGIL, DENIED CP/SOB   • Cough     CHRONIC COUGH REPORTS HAS GOTTEN WORSE THIS PAST YEAR   • Depression    • Diabetes mellitus (HCC)     BORDERLINE- DOESN'T CHECK BG AT HOME   • Edema     OG LOWER EXT   • History of transfusion     AS A CHILD STATES HE DOESNT THINK ANY ISSUE WITH IT   • Hoarseness    • Murmur, cardiac     ASYMPTOMATIC- SOA WITH MINIMAL EXERTION AND HAVING SWELLING IN LOWER EXTERMITIES.SEE'S DR VIGIL DENIES CP.   • Osteoarthritis     OG HIPS, BILATERL KNEES, L SHOULDER   • PONV (postoperative nausea and vomiting)    • Right hip pain    • Sleep apnea     USES BIPAP   • TIA (transient ischemic attack)     YEARS AGO- NO RESIDUAL   • Umbilical hernia      Past Surgical History:   Procedure Laterality Date   • CARDIAC CATHETERIZATION Right  05/31/2022    Procedure: Left Heart Cath;  Surgeon: Eduardo Buck MD;  Location: [facility] CATH INVASIVE LOCATION;  Service: Cardiovascular;  Laterality: Right;   • CARPAL TUNNEL RELEASE Bilateral    • CHOLECYSTECTOMY     • HERNIA REPAIR      UMBILICAL   • INNER EAR SURGERY      BOTH SIDES   • THROAT SURGERY      BIOPSY   • VASECTOMY      1979     PT Assessment (last 12 hours)     PT Evaluation and Treatment     Row Name 11/22/22 1418          Physical Therapy Time and Intention    Subjective Information no complaints  -ROCHELLE     Document Type evaluation  -ROCHELLE     Mode of Treatment individual therapy;physical therapy  -ROCHELLE     Patient Effort good  -ROCHELLE     Row Name 11/22/22 1418          General Information    Patient Observations alert;cooperative;agree to therapy  -ROCHELLE     Prior Level of Function independent:;all household mobility;community mobility  -ROCHELLE     Existing Precautions/Restrictions fall;weight bearing  -ROCHELLE     Barriers to Rehab none identified  -ROCHELLE     Row Name 11/22/22 1418          Living Environment    Current Living Arrangements home  -ROCHELLE     People in Home spouse  -ROCHELLE     Row Name 11/22/22 1418          Home Use of Assistive/Adaptive Equipment    Equipment Currently Used at Home cane, straight;shower chair;commode  -ROCHELLE     Row Name 11/22/22 1418          Range of Motion (ROM)    Range of Motion ROM is WFL  -ROCHELLE     Row Name 11/22/22 1418          Strength (Manual Muscle Testing)    Strength (Manual Muscle Testing) right lower extremity  4-/5  -ROCHELLE     Row Name 11/22/22 1418          Mobility    Extremity Weight-bearing Status right lower extremity  -ROCHELLE     Right Lower Extremity (Weight-bearing Status) weight-bearing as tolerated (WBAT)  -ROCHELLE     Row Name 11/22/22 1418          Bed Mobility    Bed Mobility bed mobility (all) activities;supine-sit  -ROCHELLE     All Activities, [place] (Bed Mobility) standby assist  -ROCHELLE     Supine-Sit [place] (Bed Mobility) standby assist  -ROCHELLE     Row Name 11/22/22 1418           Transfers    Transfers bed-chair transfer;sit-stand transfer  -ROCHELLE     Row Name 11/22/22 1418          Bed-Chair Transfer    Bed-Chair [place] (Transfers) standby assist  -ROCHELLE     Assistive Device (Bed-Chair Transfers) walker, front-wheeled  -ROCHELLE     Row Name 11/22/22 1418          Sit-Stand Transfer    Sit-Stand [place] (Transfers) standby assist  -ROCHELLE     Assistive Device (Sit-Stand Transfers) walker, front-wheeled  -ROCHELLE     Row Name 11/22/22 1418          Gait/Stairs (Locomotion)    Gait/Stairs Locomotion gait/ambulation assistive device  -ROCHELLE     [place] Level (Gait) standby assist  -ROCHELLE     Assistive Device (Gait) walker, front-wheeled  -ROCHELLE     Distance in Feet (Gait) 200  -ROCHELLE     Pattern (Gait) step-through  -ROCHELLE     Row Name 11/22/22 1418          Safety Issues, Functional Mobility    Impairments Affecting Function (Mobility) balance;pain;range of motion (ROM);strength  -ROCHELLE     Row Name 11/22/22 1418          Balance    Balance Assessment standing dynamic balance  -ROCHELLE     Dynamic Standing Balance standby assist  -ROCHELLE     Position/Device Used, Standing Balance walker, front-wheeled  -ROCHELLE     Row Name             Wound 11/22/22 1010 Right anterior hip Incision    Wound - Properties Group Placement Date: 11/22/22  -SC Placement Time: 1010  -SC Present on Hospital Admission: N  -SC Side: Right  -SC Orientation: anterior  -SC Location: hip  -SC Primary Wound Type: Incision  -SC    Retired Wound - Properties Group Placement Date: 11/22/22  -SC Placement Time: 1010  -SC Present on Hospital Admission: N  -SC Side: Right  -SC Orientation: anterior  -SC Location: hip  -SC Primary Wound Type: Incision  -SC    Retired Wound - Properties Group Date first assessed: 11/22/22  -SC Time first assessed: 1010  -SC Present on Hospital Admission: N  -SC Side: Right  -SC Location: hip  -SC Primary Wound Type: Incision  -SC    Row Name 11/22/22 1418          Plan of Care Review    Plan of Care Reviewed With patient   -ROCHELLE     Outcome Evaluation Patient did not demonstrate any safety deficits during initial evaluation.  He is safe to return home with the assistance of his wife as he is discharged from the hospital today.  Recommend patient follow-up with outpatient physical therapy services to address his decreased hip strength and range of motion  -ROCHELLE     Row Name 11/22/22 1418          Therapy Assessment/Plan (PT)    Patient/Family Therapy Goals Statement (PT) Patient wants to walk with less pain  -ROCHELLE     Criteria for Skilled Interventions Met (PT) skilled treatment is necessary  -ROCHELLE     Therapy Frequency (PT) evaluation only  -ROCHELLE     Row Name 11/22/22 1418          PT Evaluation Complexity    History, PT Evaluation Complexity no personal factors and/or comorbidities  -ROCHELLE     Examination of Body Systems (PT Eval Complexity) total of 4 or more elements  -ROCHELLE     Clinical Presentation (PT Evaluation Complexity) stable  -ROCHELLE     Clinical Decision Making (PT Evaluation Complexity) low complexity  -ROCHELLE     Overall Complexity (PT Evaluation Complexity) low complexity  -ROCHELLE     Row Name 11/22/22 1418          Therapy Plan Review/Discharge Plan (PT)    Therapy Plan Review (PT) evaluation/treatment results reviewed;participants voiced agreement with care plan;participants included;patient  -ROCHELLE           User Key  (r) = Recorded By, (t) = Taken By, (c) = Cosigned By    Initials Name Provider Type    Nava Cr, RN Registered Nurse    Gaston Zambrano, PT Physical Therapist                Physical Therapy Education     Title: PT OT SLP Therapies (Done)     Topic: Physical Therapy (Done)     Point: Mobility training (Done)     Learning Progress Summary           Patient Acceptance, E,TB, VU by ROCHELLE at 11/22/2022 1504                   Point: Precautions (Done)     Learning Progress Summary           Patient Acceptance, E,TB, VU by ROCHELLE at 11/22/2022 1504                               User Key     Initials Effective Dates Name  Provider Type Discipline    ROCHELLE 06/03/21 -  Gaston Quevedo PT Physical Therapist PT              PT Recommendation and Plan  Anticipated Discharge Disposition (PT): home with outpatient therapy services  Therapy Frequency (PT): evaluation only  Plan of Care Reviewed With: patient  Outcome Evaluation: Patient did not demonstrate any safety deficits during initial evaluation.  He is safe to return home with the assistance of his wife as he is discharged from the hospital today.  Recommend patient follow-up with outpatient physical therapy services to address his decreased hip strength and range of motion   Outcome Measures     Row Name 11/22/22 1500             How much help from another person do you currently need...    Turning from your back to your side while in flat bed without using bedrails? 3  -ROCHELLE      Moving from lying on back to sitting on the side of a flat bed without bedrails? 3  -ROCHELLE      Moving to and from a bed to a chair (including a wheelchair)? 4  -ROCHELLE      Standing up from a chair using your arms (e.g., wheelchair, bedside chair)? 4  -ROCHELLE      Climbing 3-5 steps with a railing? 3  -ROCHELLE      To walk in hospital room? 3  -ROCHELLE      AM-PAC 6 Clicks Score (PT) 20  -ROCHELLE         Functional Assessment    Outcome Measure Options AM-PAC 6 Clicks Basic Mobility (PT)  -ROCHELLE            User Key  (r) = Recorded By, (t) = Taken By, (c) = Cosigned By    Initials Name Provider Type    Gaston Zambrano PT Physical Therapist                 Time Calculation:    PT Charges     Row Name 11/22/22 1455             Time Calculation    PT Received On 11/22/22  -ROCHELLE         Untimed Charges    PT Eval/Re-eval Minutes 31  -ROCHELLE         Total Minutes    Untimed Charges Total Minutes 31  -ROCHELLE       Total Minutes 31  -ROCHELLE            User Key  (r) = Recorded By, (t) = Taken By, (c) = Cosigned By    Initials Name Provider Type    Gaston Zambrano PT Physical Therapist              Therapy Charges for Today     Code Description  Service Date Service Provider Modifiers Qty    [id number] HC PT EVAL LOW COMPLEXITY 3 11/22/2022 Gaston Quevedo, PT GP 1          PT G-Codes  Outcome Measure Options: AM-PAC 6 Clicks Basic Mobility (PT)  AM-PAC 6 Clicks Score (PT): 20    Gaston Quevedo, PT  11/22/2022

## 2022-11-22 NOTE — ANESTHESIA POSTPROCEDURE EVALUATION
Patient: Hector Coronel    Procedure Summary     Date: 11/22/22 Room / Location: [facility] OR 03 / [facility] MAIN OR    Anesthesia Start: 0923 Anesthesia Stop: 1116    Procedure: RIGHT TOTAL HIP ARTHROPLASTY ANTERIOR (Right: Hip) Diagnosis:       Primary osteoarthritis of right hip      (Primary osteoarthritis of right hip [M16.11])    Surgeons: Jose Talavera MD Provider: Eduardo Adams MD    Anesthesia Type: ERAS Protocol, spinal ASA Status: 3          Anesthesia Type: ERAS Protocol, spinal    Vitals  Vitals Value Taken Time   BP 97/55 11/22/22 1131   Temp 36.1 °C (97 °F) 11/22/22 1110   Pulse 73 11/22/22 1132   Resp 14 11/22/22 1130   SpO2 95 % 11/22/22 1132   Vitals shown include unvalidated device data.        Post Anesthesia Care and Evaluation    Patient location during evaluation: bedside  Patient participation: complete - patient participated  Level of consciousness: awake  Pain score: 1  Pain management: adequate    Airway patency: patent  Anesthetic complications: No anesthetic complications  PONV Status: none  Cardiovascular status: acceptable and stable  Respiratory status: acceptable  Hydration status: acceptable    Comments: An Anesthesiologist personally participated in the most demanding procedures (including induction and emergence if applicable) in the anesthesia plan, monitored the course of anesthesia administration at frequent intervals and remained physically present and available for immediate diagnosis and treatment of emergencies.

## 2022-11-22 NOTE — PLAN OF CARE
Goal Outcome Evaluation:  Plan of Care Reviewed With: patient, family        Progress: no change (initial evaluation encounter)  Outcome Evaluation: Patient has experienced decline in function from baseline status, presenting w/ deficits related to functional balance, safety awareness, transfers and mobility that impede patient independence with activities of daily living.  Patient would benefit from skilled Occupational Therapy intervention to maxamize patient safety, and promote return to baseline independence. Recommend outpatient therapy services at time of discharge.

## 2022-11-22 NOTE — ANESTHESIA PREPROCEDURE EVALUATION
Anesthesia Evaluation     Patient summary reviewed and Nursing notes reviewed   no history of anesthetic complications:  NPO Solid Status: > 8 hours  NPO Liquid Status: > 2 hours           Airway   Mallampati: III  TM distance: >3 FB  Neck ROM: full  Possible difficult intubation and Large neck circumference  Dental          Pulmonary - normal exam    breath sounds clear to auscultation  (+) a smoker Former, shortness of breath, sleep apnea on CPAP,   Cardiovascular - normal exam  Exercise tolerance: good (4-7 METS)    ECG reviewed  PT is on anticoagulation therapy  Rhythm: regular  Rate: normal    (+) valvular problems/murmurs, CAD, cardiac stents within the past 12 months     ROS comment: Sinus rhythm  Prolonged [place] interval    Neuro/Psych  (+) TIA, numbness, psychiatric history Anxiety,    GI/Hepatic/Renal/Endo    (+) obesity,   diabetes mellitus (prediabetic) type 2,     Musculoskeletal     Abdominal    Substance History - negative use     OB/GYN negative ob/gyn ROS         Other   arthritis,      ROS/Med Hx Other: <4METS, INCREASING SOA, NOBLE, TIAs, ETOH. + STRESS 5/26/22, CATH/PCI 1 STENT 5/31/22, ECHO 6/7/22 EF 60-65%, CARDS CLEARANCE. TJR.  KT     Stopped brillinta 3 days ago                  Anesthesia Plan    ASA 3     ERAS Protocol and spinal     (Patient understands anesthesia not responsible for dental damage.  Regional anesthesia options discussed; patient Accepts regional block.)  intravenous induction     Anesthetic plan, risks, benefits, and alternatives have been provided, discussed and informed consent has been obtained with: patient.  Pre-procedure education provided  Use of blood products discussed with patient  Consented to blood products.   Plan discussed with CRNA.        CODE STATUS:

## 2022-11-22 NOTE — PLAN OF CARE
Goal Outcome Evaluation:  Plan of Care Reviewed With: patient           Outcome Evaluation: Patient did not demonstrate any safety deficits during initial evaluation.  He is safe to return home with the assistance of his wife as he is discharged from the hospital today.  Recommend patient follow-up with outpatient physical therapy services to address his decreased hip strength and range of motion

## 2022-11-22 NOTE — DISCHARGE INSTR - APPOINTMENTS
Aquacell dressing changed on Post op day 3,   11/25  Clean incision with alcohol pads and apply new aquacell bandage. Leave on until 11/28  Then start daily dressing changes until follow up ortho appointment. Take off old banadage, clean site with alcohol pads and apply ABD pad, tape down.      Follow up with Dr Bowman on Tuesday December 6th at 3pm.

## 2022-11-22 NOTE — CONSULTS
Discharge Planning Assessment   Tanna     Patient Name: Hector Coronel  MRN: [id number]  Today's Date: 11/22/2022    Admit Date: 11/22/2022    Plan: RN CM met with patient, wife, and daughter. Role discussed, facesheet reviewed. Patient reports he is IADL and will be going home today with outpatient therapy  PT [facility] 11/28/22 at 1:00pm. Patient requesting rolling walker, [employer] to provide.   Discharge Needs Assessment     Row Name 11/22/22 1400       Living Environment    People in Home spouse    Current Living Arrangements home    Primary Care Provided by self    Provides Primary Care For no one    Family Caregiver if Needed spouse    Family Caregiver Names wife and daughter    Quality of Family Relationships helpful;involved;supportive       Resource/Environmental Concerns    Resource/Environmental Concerns none    Transportation Concerns none       Transition Planning    Patient/Family Anticipates Transition to home with family    Patient/Family Anticipated Services at Transition durable medical equipment;rehabilitation services    Transportation Anticipated family or friend will provide       Discharge Needs Assessment    Readmission Within the Last 30 Days no previous admission in last 30 days    Equipment Currently Used at Home cane, straight;shower chair;commode    Concerns to be Addressed discharge planning    Anticipated Changes Related to Illness none    Equipment Needed After Discharge walker, rolling    Outpatient/Agency/Support Group Needs outpatient therapy    Discharge Facility/Level of Care Needs outpatient therapy               Discharge Plan     Row Name 11/22/22 1406       Plan    Plan RN CM met with patient, wife, and daughter. Role discussed, facesheet reviewed. Patient reports he is IADL and will be going home today with outpatient therapy  PT [facility] 11/28/22 at 1:00pm. Patient requesting rolling walker, [employer] to provide.    Final Discharge Disposition Code 01 - home or self-care               Continued Care and Services - Admitted Since 11/22/2022     Durable Medical Equipment     Service Provider Request Status Selected Services Address Phone Fax Patient Preferred    [facility] Pending - Request Sent N/A [address] [facility] [address] [address] [phone] --              Expected Discharge Date and Time     Expected Discharge Date Expected Discharge Time    Nov 22, 2022          Demographic Summary     Row Name 11/22/22 1352       General Information    Admission Type inpatient    Arrived From PACU/recovery room    Reason for Consult discharge planning    Preferred Language English       Contact Information    Permission Granted to Share Info With family/designee               Functional Status     Row Name 11/22/22 1356       Functional Status    Usual Activity Tolerance good    Current Activity Tolerance good       Assessment of Health Literacy    How often do you have someone help you read hospital materials? Occasionally    How often do you have problems learning about your medical condition because of difficulty understanding written information? Never    How often do you have a problem understanding what is told to you about your medical condition? Never    How confident are you filling out medical forms by yourself? Quite a bit    Health Literacy Good       Functional Status, IADL    Medications independent    Meal Preparation independent    Housekeeping independent    Laundry independent    Shopping independent       Mental Status    General Appearance WDL WDL       Mental Status Summary    Recent Changes in Mental Status/Cognitive Functioning no changes               Psychosocial    No documentation.                Abuse/Neglect    No documentation.                Legal    No documentation.                Substance Abuse    No documentation.                Patient Forms    No documentation.                   Shanell Varela RN

## 2022-11-22 NOTE — H&P
"h and p      Chief Complaint  Follow-up of the Right Hip        Subjective          Hector Coronel presents to [facility] for follow up of the right hip. The patient had a stent in 6 months ago and is on Brilinta and the Dr Buck will take him off for the right hip surgery.  The patient is cleared by the heart doctor and is here now to schedule his right total hip arthroplasty.            Allergies   Allergen Reactions   • Metal Rash       BELT HAZEL BREAK HIM OUT          Social History            Socioeconomic History   • Marital status:    Tobacco Use   • Smoking status: Former       Packs/day: 2.50       Types: Cigarettes   • Smokeless tobacco: Never   • Tobacco comments:       quit 30 years ago   Vaping Use   • Vaping Use: Never used   Substance and Sexual Activity   • Alcohol use: Yes       Comment: 3 SHOTS OF BOURBON EACH TIME  ABOUT 3-4 TIMES A WEEK   • Drug use: Never   • Sexual activity: Defer         Review of Systems            Objective      Vital Signs:   Pulse 76   Ht 172.7 cm (68\")   Wt 112 kg (248 lb)   SpO2 97%   BMI 37.71 kg/m²        Physical Exam  Constitutional:       Appearance: Normal appearance. Patient is well-developed and normal weight.   HENT:      Head: Normocephalic.      Right Ear: Hearing and external ear normal.      Left Ear: Hearing and external ear normal.      Nose: Nose normal.   Eyes:      Conjunctiva/sclera: Conjunctivae normal.   Cardiovascular:      Rate and Rhythm: Normal rate.   Pulmonary:      Effort: Pulmonary effort is normal.      Breath sounds: No wheezing or rales.   Abdominal:      Palpations: Abdomen is soft.      Tenderness: There is no abdominal tenderness.   Musculoskeletal:      Cervical back: Normal range of motion.   Skin:     Findings: No rash.   Neurological:      Mental Status: Patient  is alert and oriented to person, place, and time.   Psychiatric:         Mood and Affect: Mood and affect normal.         " Judgment: Judgment normal.         Ortho Exam       RIGHT HIP  30 ER, minimal ER. Good tone of hip flexors, hip extensors, and hip abductors. Good strength to hamstrings, quadriceps, dorsiflexors, and plantar flexors. Dorsal pedal pulse 2+. Posterior tibialis pulse is 2+. Calf supple.         Procedures                    Imaging Results (Most Recent)      Procedure Component Value Units Date/Time     XR Hip With or Without Pelvis 2 - 3 View Right [[id number]] Resulted: 11/16/22 0926       Updated: 11/16/22 0927                   Result Review    :      X-Ray Report:  Right hip(s) X-Ray  Indication: Evaluation of the right hip.   AP/Lateral view(s)  Findings: Severe arthritis. There are degenerative changes of the right hip. No fracture or dislocation. Cyst of the femoral head.   Prior studies available for comparison: No                  Assessment      Assessment and Plan      Diagnoses and all orders for this visit:     1. Right hip pain (Primary)  -     XR Hip With or Without Pelvis 2 - 3 View Right     2. Primary osteoarthritis of right hip           Discussed the treatment plan with the patient. The patient is ready to schedule total hip arthroplasty. The patient understands the risks and benefits of the surgery.      Educated on risk of smoking. Discussed options for smoking cessation.  Discussed surgery., Risks/benefits discussed with patient including, but not limited to: infection, bleeding, neurovascular damage, malunion, nonunion, aesthetic deformity, need for further surgery, and death., Discussed with patient the implant type being used during surgery and patient understands and desires to proceed. and Surgery pamphlet given.     Follow Up      Postoperatively         Jose Talavera MD  11/22/22     35 y/o F with c/o head trauma after trip and fall in her friends bathroom 3 days ago while drinking.  Pt states she fell backwards and hit the back of her head.  pt denies LOC.  Today while at work in front of a computer for a few hours, pt experienced dizziness.  pt called her pcp who recommended her to go to the ED.  Pt denies any other complaints.  Dizziness has now resolved.

## 2022-11-23 NOTE — OP NOTE
TOTAL HIP ARTHROPLASTY ANTERIOR  Procedure Report    Patient Name:  Hector Coronel  YOB: 1955    Date of Surgery:  11/22/2022       Pre-op Diagnosis:   Primary osteoarthritis of right hip [M16.11]       Post-Op Diagnosis Codes:     * Primary osteoarthritis of right hip [M16.11]    Procedure/CPT® Codes:      Procedure(s):  RIGHT TOTAL HIP ARTHROPLASTY ANTERIOR    Staff:  Surgeon(s):  Jose Talavera MD    Assistant: Lisa Galvez RN; Brad Cr    Surgical Approach: Hip Direct Anterior (Garcias-Oz[place])        Anesthesia: Monitored Anesthesia Care with Regional    Estimated Blood Loss: 200 mL    Implants:    Implant Name Type Inv. Item Serial No.  Lot No. LRB No. Used Action   SHLL ACET G7 PPS LTD/HL TI MAYITO 52MM - [id number] Implant SHLL ACET G7 PPS LTD/HL TI MAYITO 52MM  [employer] [id number] Right 1 Implanted   LINER ACET G7 NTRL E1 MAYITO 36MM - [id number] Implant LINER ACET G7 NTRL E1 MAYITO 36MM  [employer] [id number] Right 1 Implanted   STEM FEM/HIP AVENIRCOMPLETE COLAR STFF LAY SZ3 - [id number] Implant STEM FEM/HIP AVENIRCOMPLETE COLAR STFF LAY SZ3  [employer] [id number] Right 1 Implanted   HD FEM/HIP BIOLOX/DELTA CERAM [id number] MIN3.5MM - [id number] Implant HD FEM/HIP BIOLOX/DELTA CERAM [id number] MIN3.5MM  [employer] [id number] Right 1 Implanted       Specimen:          None      Complications: None    Description of Procedure: See H&P for risks and benefits. The patient was taken to the operating room and placed supine on the Jeremiah table after general endotracheal anesthesia established. The patient was placed in appropriate position for an anterior approach to the hip. The right hip and lower extremity were prepped and draped in standard fashion using alcohol and ChloraPrep.  Standard 4 inch incision was made starting lateral to the anterior and inferior to the anterior superior spine over the tensor musculature. Dissection was carried down through the skin and a Bovie was used to  dissect down to the fascial layer which was then incised in line with the incision.  Blunt dissection was taken down to place the extracapsular retractor over the superior neck.  The cobra was used to elevate the rectus off the anterior hip capsule and another cobra was placed extracapsularly over the inferior femoral neck.   The inferior vessels were ligated with an Aquamantys and Bovie cautery, and the fascial layer over the vastus was released.  L-shaped capsulotomy was carried out in standard fashion placing FiberWire and suture in both limbs of the capsule and retractors placed anterior capsularly. The saddle region of the femur was dissected with Bovie as well as medial femoral neck.  The femoral neck cut was made using oscillating saw according to preoperative templating and the head was removed and then acetabular retractors were placed, labrum was removed along with ligamentum teres, and under C-arm view acetabulum was sequentially reamed  to accommodate the appropriate size acetabular shell which was inserted under C-arm guidance. Polyethylene liner was placed and pigtail retractor was used in proximal femur and the leg was brought in extension external rotation and adduction. Proximal femur was lateralized using a chili pepper and rat tail grasp and the canal was sequentially broached to accommodate the appropriate size femoral trial. The trial was undertaken and found to be satisfactory, stable posteriorly and anteriorly and appropriate leg lengths was assessed.  The trials were removed and after copious irrigation and drying the canal the real femoral stem was placed in the same version as the trial. The trials were again undertaken. C-arm shots were taken to both sides measuring offset as well as length and appropriate head was chosen and implanted. The hip was relocated and stable posterior anterior appear to have equal leg lengths.  The wound was copiously irrigated and capsular layer was closed with  previously placed suture.  More copious irrigation followed and the fascial layer was closed with 0 Vicryl. Deep fat was closed with 0 Vicryl. I injected the anterior hip capsule, the tensor fascia, and the subcutaneous fat with a combination of ropivacaine, morphine, and Toradol mixture.  The subcutaneous was closed with 2-0 Vicryl and skin was closed with staples.  The incision was washed and dried and sterile dressing applied. The patient tolerated the procedure well. The patient was extubated and taken to recovery room.      Jose Talavera MD     Date: 11/23/2022  Time: 07:22 EST

## 2022-11-28 ENCOUNTER — TREATMENT (OUTPATIENT)
Dept: PHYSICAL THERAPY | Facility: CLINIC | Age: 67
End: 2022-11-28

## 2022-11-28 DIAGNOSIS — Z96.641 STATUS POST TOTAL HIP REPLACEMENT, RIGHT: Primary | ICD-10-CM

## 2022-11-28 DIAGNOSIS — R29.898 WEAKNESS OF RIGHT HIP: ICD-10-CM

## 2022-11-28 DIAGNOSIS — R26.2 DIFFICULTY WALKING: ICD-10-CM

## 2022-11-28 DIAGNOSIS — M25.551 RIGHT HIP PAIN: ICD-10-CM

## 2022-11-28 DIAGNOSIS — M25.651 HIP STIFFNESS, RIGHT: ICD-10-CM

## 2022-11-28 PROCEDURE — 97140 MANUAL THERAPY 1/> REGIONS: CPT

## 2022-11-28 PROCEDURE — 97110 THERAPEUTIC EXERCISES: CPT

## 2022-11-28 PROCEDURE — 97161 PT EVAL LOW COMPLEX 20 MIN: CPT

## 2022-11-28 NOTE — PROGRESS NOTES
Physical Therapy Initial Evaluation and Plan of Care      [facility]: [address] [place] [address]      Patient: Hector Coronel   : 1955  Diagnosis/ICD-10 Code:  Status post total hip replacement, right [Z96.641]  Referring practitioner: Jose Talavera MD  Date of Initial Visit: 2022  Today's Date: 2022  Patient seen for 1 sessions           Subjective Questionnaire: LEFS:       Subjective   Pt reports to physical therapy s/p R DIANE performed on 2022.   Pt reports they are currently in a 5/10 pain. Pt reports this pain does get worse when they are moving from a sitting position to standing or a standing position to sitting. Pt reports the pain in the R hip can get up to an 8/10 with those activities. Pt reports there are times they can experience 0/10 pain and that is when they are sitting down. Pt states the pain from the act of sitting to standing is almost immediately relieved when they assume the standing or sitting position. Pt reports a majority of their pain is on the anterior aspect of their R LE.     Pt reports they have been sleeping in the recliner and their bed depending on comfort levels.         Pt occupation: retired; woodworking and guitar.     Current Pain: 5/10  Best Pain: 0/10  Worst Pain: 8/10  Quality of pain: burning     Aggravating Factors: performing sit to stand or stand to sit, bending their hip  Easing Factors: ice, prescribed medication (at night)    Past Medical Hx: stent placed in  several years ago, pt reports SOB w/ activity.       Objective          Neurological Testing     Sensation     Hip   Left Hip   Intact: light touch    Right Hip   Intact: light touch    Additional Neurological Details  Sensation is intact equally and bilaterally. This is consistent w/ dermatomes L2-S1.     Passive Range of Motion   Left Hip   Flexion: 90 degrees     Right Hip   Flexion: 80 degrees with pain    Additional Passive Range of Motion  Details  Pt has firm endfeel w/ both PROM measurements. Pt has some increased pain w/ R hip flexion.    Strength/Myotome Testing     Left Hip   Planes of Motion   Flexion: 4+    Right Hip   Planes of Motion   Flexion: 3+    Left Knee   Flexion: 4+  Extension: 5    Right Knee   Flexion: 4+  Extension: 3+    Left Ankle/Foot   Dorsiflexion: 5    Right Ankle/Foot   Dorsiflexion: 5    Ambulation   Weight-Bearing Status   Weight-Bearing Status (Right): weight-bearing as tolerated    Assistive device used: front-wheeled walker    Additional Weight-Bearing Status Details  Pt has a step to pattern. Pt favors their R LE and a limp is present.       See Exercise, Manual, and Modality Logs for complete treatment.       Assessment & Plan     Assessment  Impairments: abnormal coordination, abnormal gait, abnormal muscle firing, abnormal or restricted ROM, activity intolerance, impaired balance, impaired physical strength and pain with function  Functional Limitations: lifting, sleeping, walking, pushing and standing  Assessment details: Pt reports to physical therapy s/p R DIANE performed on 11/22/2022. Pt presents w/ decreased R hip ROM, decreased strength, decreased balance, and decreased tolerance to activities. Pt has significant reported deficits described by LEFS. Educated pt on their HEP and how to perform the exs. Pt given cues for gait training to improve upon their mechanics to further normalize gait. Pt will benefit from skilled physical therapy to address their current impairments in order to improve upon their functional mobility, gait, performance of ADLs, to formally rehabilitate their hip post operatively, and to return to their hobbies such as wood working without pain and with improved function.   Prognosis: good    Goals  Plan Goals: HIP PROBLEMS    1. The patient complains of R hip pain.   LTG 1: 12 weeks:  The patient will report a pain rating of 2/10 or better in order to improve  tolerance to activities of  daily living and improve sleep quality.    STATUS:  New   STG 1a: 6 weeks:  The patient will report a pain rating of 3/10 or better.    STATUS:  New   TREATMENT:  Therapeutic exercises, manual therapy, aquatic therapy, home exercise   instruction, and modalities as needed for pain to include:  electrical stimulation, moist heat, ice,   ultrasound, and diathermy.    2. The patient demonstrates weakness of the R hip.   LTG 2: 12 weeks:  The patient will demonstrate 4+/5 strength for R hip flexion, abduction,  and extension in order to improve hip stability.    STATUS:  New   STG 2a: 6 weeks:  The patient will demonstrate 4/5 strength for R hip flexion, abduction,  and extension.    STATUS:  New   TREATMENT: Therapeutic exercises, manual therapy, aquatic therapy, home exercise instruction,  and modalities as needed for pain to include:  electrical stimulation, moist heat, ice, and ultrasound    3. The patient has gait dysfunction.  LTG 3: 12 weeks:  The patient will ambulate without assistive device, independently, for community distances with minimal limp to the R lower extremity in order to improve mobility and allow patient to perform activities such as ADLs and wood working with greater ease.  STATUS:  New  STG 3a: The patient will be independent in HEP.  STATUS:  New  TREATMENT: Gait training, aquatic therapy, therapeutic exercise, and home exercise instruction.    4. Mobility: Walking/Moving Around Functional Limitation    LTG 4: 12 weeks:  The patient will demonstrate 25% limitation by achieving a score of 60/80 on the Lower Extremity Functional Scale.  STATUS:  New  STG 4a: 6 weeks:  The patient will demonstrate 56.25% limitation by achieving a score of 35/80 on the Lower Extremity Functional Scale.    STATUS:  New  TREATMENT:  Manual therapy, therapeutic exercise, home exercise instruction, and modalities as needed to include: moist heat, electrical stimulation, and ultrasound.          PLAN:  Therapy options:  will receive skilled therapy services  Planned modality interventions: Cryotherapy  Planned therapy interventions:balance/weight-bearing training, ADL retraining, soft tissue mobilization, strengthening, stretching, therapeutic activities, manual therapy, joint mobilization, home exercise program/patient education, gait training, functional ROM exercises, flexibility, body mechanics training, postural training and neuromuscular re-education  Frequency: 2x per week  Duration in weeks: 12  Treatment plan discussed with: patient      Visit Diagnoses:    ICD-10-CM ICD-9-CM   1. Status post total hip replacement, right  Z96.641 V43.64   2. Difficulty walking  R26.2 719.7   3. Right hip pain  M25.551 719.45   4. Weakness of right hip  R29.898 729.89   5. Hip stiffness, right  M25.651 719.55       History # of Personal Factors and/or Comorbidities: LOW (0)  Examination of Body System(s): # of elements: LOW (1-2)  Clinical Presentation: STABLE   Clinical Decision Making: LOW       Timed:         Manual Therapy:    8     mins  43496;     Therapeutic Exercise:    17     mins  19405;     Neuromuscular Dilan:    0    mins  61199;    Therapeutic Activity:     0     mins  23853;     Gait Trainin     mins  64845;     Ultrasound:     0     mins  14040;    Ionto                               0    mins   59905  Self Care                       0     mins   12106  Canalith Repos    0     mins 27404      Un-Timed:  Electrical Stimulation:    0     mins  90788 ( );  Dry Needling     0     mins self-pay  Traction     0     mins 64807  Low Eval     15     Mins  87121  Mod Eval     0     Mins  48616  High Eval                       0     Mins  88628  Re-Eval                           0    mins  18445    Timed Treatment:   25   mins   Total Treatment:     40   mins    PT SIGNATURE: GREG Miranda PTT    Electronically signed 2022    [place] License: PT - [id number]    Supervised by Raffy Chamberlain PT, DPT [place] License PT -  [id number]    Initial Certification  Certification Period: 11/28/2022 thru 2/25/2023  I certify that the therapy services are furnished while this patient is under my care.  The services outlined above are required by this patient, and will be reviewed every 90 days.     PHYSICIAN: Jose Talavera MD  NPI: [id number]      DATE:     Please sign and return via fax to [phone]. Thank you, [facility].

## 2022-11-30 ENCOUNTER — TREATMENT (OUTPATIENT)
Dept: PHYSICAL THERAPY | Facility: CLINIC | Age: 67
End: 2022-11-30

## 2022-11-30 DIAGNOSIS — M25.551 RIGHT HIP PAIN: ICD-10-CM

## 2022-11-30 DIAGNOSIS — R29.898 WEAKNESS OF RIGHT HIP: ICD-10-CM

## 2022-11-30 DIAGNOSIS — Z96.641 STATUS POST TOTAL HIP REPLACEMENT, RIGHT: Primary | ICD-10-CM

## 2022-11-30 DIAGNOSIS — M25.651 HIP STIFFNESS, RIGHT: ICD-10-CM

## 2022-11-30 DIAGNOSIS — R26.2 DIFFICULTY WALKING: ICD-10-CM

## 2022-11-30 PROCEDURE — 97530 THERAPEUTIC ACTIVITIES: CPT | Performed by: PHYSICAL THERAPIST

## 2022-11-30 PROCEDURE — 97110 THERAPEUTIC EXERCISES: CPT | Performed by: PHYSICAL THERAPIST

## 2022-12-05 ENCOUNTER — OFFICE VISIT (OUTPATIENT)
Dept: ORTHOPEDIC SURGERY | Facility: CLINIC | Age: 67
End: 2022-12-05

## 2022-12-05 VITALS — OXYGEN SATURATION: 99 % | HEART RATE: 68 BPM | WEIGHT: 245 LBS | HEIGHT: 68 IN | BODY MASS INDEX: 37.13 KG/M2

## 2022-12-05 DIAGNOSIS — Z47.1 AFTERCARE FOLLOWING RIGHT HIP JOINT REPLACEMENT SURGERY: Primary | ICD-10-CM

## 2022-12-05 DIAGNOSIS — Z96.641 AFTERCARE FOLLOWING RIGHT HIP JOINT REPLACEMENT SURGERY: Primary | ICD-10-CM

## 2022-12-05 PROCEDURE — 99024 POSTOP FOLLOW-UP VISIT: CPT | Performed by: PHYSICIAN ASSISTANT

## 2022-12-05 NOTE — PROGRESS NOTES
"Chief Complaint  Pain and Follow-up of the Right Hip    Subjective      Hector Coronel presents to [facility] for follow-up of right total hip arthroplasty with anterior approach performed by Dr. Talavera on 11/22/2022. He presents today with use of walker. He reports he is doing fairly well. Pain is controlled with use of norco.  He has been icing his hip as needed for pain and swelling.  He is participating in outpatient physical therapy through [facility], attending twice weekly.    Objective   Allergies   Allergen Reactions   • Metal Rash     CHEAP METAL- BELT HAZEL BREAK HIM OUT        Vital Signs:   Pulse 68   Ht 172.7 cm (68\")   Wt 111 kg (245 lb)   SpO2 99%   BMI 37.25 kg/m²       Physical Exam    Constitutional: Awake, alert. Well nourished appearance.    Integumentary: Warm, dry, intact. No obvious rashes.    HENT: Atraumatic, normocephalic.   Respiratory: Non labored respirations .   Cardiovascular: Intact peripheral pulses.    Psychiatric: Normal mood and affect. A&O X3    Ortho Exam  Right hip: Staples removed in office.  Surgical incision visualized and is clean, dry, and intact without any evidence of wound dehiscence, surrounding erythema, warmth, or drainage.  3/5 strength to hip flexors, extensors, abductors, and adductor's.  Full flexion and extension of the knee.  Full plantarflexion and dorsiflexion of the ankle.  Sensation intact to light touch.  Distal neurovascular intact.    Imaging:  Narrative & Impression   X-Ray Report:  Study: X-rays ordered, taken in the office, and reviewed today.   Site: Right hip/pelvis Xray  Indication: DIANE  View: AP/Lateral view(s)  Findings: Intact right total hip arthroplasty. No evidence of hardware malfunction or loosening.   Prior studies available for comparison: yes                  Assessment and Plan   Problem List Items Addressed This Visit    None  Visit Diagnoses     Aftercare following right hip joint " replacement surgery    -  Primary        Follow Up   Return in about 4 weeks (around 1/2/2023).  Educated on risk of smoking. Discussed options for smoking cessation.    Patient Instructions   X-rays taken and reviewed, showing intact hardware.    Staples removed in office and Steri-Strips applied.  Patient educated on incision care.  Please keep incision clean and dry.  Do not soak or submerge in water until incision is fully healed.  Do not apply creams or lotions over the incision.  Please allow Steri-Strips to fall off on their own within 7 to 10 days.    Continue icing as needed to help with pain and swelling.  Ice up to 3 or 4 times daily for no longer than 15 to 20 minutes at a time.    Continue PT to progress ROM, strength, and weightbearing status.    Follow-up in 4 weeks. Repeat x-rays not needed at this visit.  Please call with questions or concerns.      Patient was given instructions and counseling regarding his condition or for health maintenance advice. Please see specific information pulled into the AVS if appropriate.

## 2022-12-05 NOTE — PATIENT INSTRUCTIONS
X-rays taken and reviewed, showing intact hardware.    Staples removed in office and Steri-Strips applied.  Patient educated on incision care.  Please keep incision clean and dry.  Do not soak or submerge in water until incision is fully healed.  Do not apply creams or lotions over the incision.  Please allow Steri-Strips to fall off on their own within 7 to 10 days.    Continue icing as needed to help with pain and swelling.  Ice up to 3 or 4 times daily for no longer than 15 to 20 minutes at a time.    Continue PT to progress ROM, strength, and weightbearing status.    Follow-up in 4 weeks. Repeat x-rays not needed at this visit.  Please call with questions or concerns.

## 2022-12-06 ENCOUNTER — TREATMENT (OUTPATIENT)
Dept: PHYSICAL THERAPY | Facility: CLINIC | Age: 67
End: 2022-12-06

## 2022-12-06 DIAGNOSIS — Z96.641 STATUS POST TOTAL HIP REPLACEMENT, RIGHT: Primary | ICD-10-CM

## 2022-12-06 DIAGNOSIS — M25.651 HIP STIFFNESS, RIGHT: ICD-10-CM

## 2022-12-06 DIAGNOSIS — R29.898 WEAKNESS OF RIGHT HIP: ICD-10-CM

## 2022-12-06 DIAGNOSIS — R26.2 DIFFICULTY WALKING: ICD-10-CM

## 2022-12-06 DIAGNOSIS — M25.551 RIGHT HIP PAIN: ICD-10-CM

## 2022-12-06 PROCEDURE — 97110 THERAPEUTIC EXERCISES: CPT | Performed by: PHYSICAL THERAPIST

## 2022-12-06 PROCEDURE — 97530 THERAPEUTIC ACTIVITIES: CPT | Performed by: PHYSICAL THERAPIST

## 2022-12-06 NOTE — PROGRESS NOTES
Outpatient Physical Therapy  [address] [facility], [place] [address]                 Physical Therapy Daily Treatment Note    Patient: Hector Coronel   : 1955  Diagnosis/ICD-10 Code:  Status post total hip replacement, right [Z96.641]  Referring practitioner: Jose Talavera MD  Date of Initial Visit: Type: THERAPY  Noted: 2022  Today's Date: 2022  Patient seen for 3 sessions             Subjective   Hector Coronel reports: he has been able to move around more; he has even gone out to eat a few times, but because he is doing more he is not doing exercises like he thinks he should    Pain: 4/10 pain, in R hip upon arrival to clinic    Objective     See Exercise, Manual, and Modality Logs for complete treatment.     Assessment/Plan  Patient presented to clinic using rolling walker. Patient progressed to more standing exercises with fatigue at end of treatment in bilateral LE's. Pt would benefit from skilled PT to address Range of Motion  and Strength deficits, pain management and any concerns with ADLs.     Progress per Plan of Care       Timed:  Manual Therapy:    0     mins  41343;  Therapeutic Exercise:    15     mins  40263;     Neuromuscular Dilan:    0    mins  59763;    Therapeutic Activity:     15     mins  19163;     Gait Trainin     mins  74010;    Aquatic Therapy:     0     mins  30747;       Untimed:  Electrical Stimulation:    0     mins  44678 ( );  Mechanical Traction:    0     mins  90862;       Timed Treatment:   30   mins   Total Treatment:     30   mins      Electronically signed:   Jessica Mccollum PTA  Physical Therapist Assistant  [facility] License #: [id number]

## 2022-12-08 ENCOUNTER — TREATMENT (OUTPATIENT)
Dept: PHYSICAL THERAPY | Facility: CLINIC | Age: 67
End: 2022-12-08

## 2022-12-08 DIAGNOSIS — R26.2 DIFFICULTY WALKING: ICD-10-CM

## 2022-12-08 DIAGNOSIS — M25.551 RIGHT HIP PAIN: ICD-10-CM

## 2022-12-08 DIAGNOSIS — R29.898 WEAKNESS OF RIGHT HIP: ICD-10-CM

## 2022-12-08 DIAGNOSIS — M25.651 HIP STIFFNESS, RIGHT: ICD-10-CM

## 2022-12-08 DIAGNOSIS — Z96.641 STATUS POST TOTAL HIP REPLACEMENT, RIGHT: Primary | ICD-10-CM

## 2022-12-08 PROCEDURE — 97530 THERAPEUTIC ACTIVITIES: CPT | Performed by: PHYSICAL THERAPIST

## 2022-12-08 PROCEDURE — 97110 THERAPEUTIC EXERCISES: CPT | Performed by: PHYSICAL THERAPIST

## 2022-12-08 NOTE — PROGRESS NOTES
Outpatient Physical Therapy  [address] [facility], [place] [address]                 Physical Therapy Daily Treatment Note    Patient: Hector Coronel   : 1955  Diagnosis/ICD-10 Code:  Status post total hip replacement, right [Z96.641]  Referring practitioner: Jose Talavera MD  Date of Initial Visit: Type: THERAPY  Noted: 2022  Today's Date: 2022  Patient seen for 4 sessions             Subjective   Hector Coronel reports: patient went up stairs in his home yesterday with his cane and one hand rail and felt like it went well. Patient noted increased swelling upon waking up this morning despite using ice.    Pain:   4/10 pain today in right hip upon arrival  2-3/10 pain with exercise.     Objective     See Exercise, Manual, and Modality Logs for complete treatment.     Assessment/Plan  Patient presented to clinic with rolling walker that is used in the home as well as going out. Patient somewhat limited by shortness of breath and L knee pain with exercise. Pt would benefit from skilled PT to address Range of Motion  and Strength deficits, pain management and any concerns with ADLs.     Progress per Plan of Care         Timed:  Manual Therapy:    0     mins  15637;  Therapeutic Exercise:    15     mins  96402;     Neuromuscular Dilan:    0    mins  32609;    Therapeutic Activity:     10     mins  16045;     Gait Trainin     mins  22150;    Aquatic Therapy:     0     mins  78036;       Untimed:  Electrical Stimulation:    0     mins  83767 ( );  Mechanical Traction:    0     mins  39016;       Timed Treatment:   25   mins   Total Treatment:     25   mins      Electronically signed:   Jessica Mccollum PTA  Physical Therapist Assistant  [facility] License #: [id number]

## 2022-12-13 ENCOUNTER — TREATMENT (OUTPATIENT)
Dept: PHYSICAL THERAPY | Facility: CLINIC | Age: 67
End: 2022-12-13

## 2022-12-13 DIAGNOSIS — Z96.641 STATUS POST TOTAL HIP REPLACEMENT, RIGHT: Primary | ICD-10-CM

## 2022-12-13 DIAGNOSIS — M25.651 HIP STIFFNESS, RIGHT: ICD-10-CM

## 2022-12-13 DIAGNOSIS — M25.551 RIGHT HIP PAIN: ICD-10-CM

## 2022-12-13 DIAGNOSIS — R29.898 WEAKNESS OF RIGHT HIP: ICD-10-CM

## 2022-12-13 DIAGNOSIS — R26.2 DIFFICULTY WALKING: ICD-10-CM

## 2022-12-13 PROCEDURE — 97110 THERAPEUTIC EXERCISES: CPT | Performed by: PHYSICAL THERAPIST

## 2022-12-13 PROCEDURE — 97140 MANUAL THERAPY 1/> REGIONS: CPT | Performed by: PHYSICAL THERAPIST

## 2022-12-13 NOTE — PROGRESS NOTES
Physical Therapy Daily Treatment Note      Patient: Hector Coronel   : 1955  Referring practitioner: Jose Talavera MD  Date of Initial Visit: Type: THERAPY  Noted: 2022  Today's Date: 2022  Patient seen for 5 sessions           Subjective Questionnaire:       Subjective Evaluation    History of Present Illness    Subjective comment: Pt reports 2/10 R hip pain and the leg is feeling better.         Objective   See Exercise, Manual, and Modality Logs for complete treatment.       Assessment & Plan     Assessment    Assessment details: Pt required rest breaks to catch his breath between sets and exercises.  Pt's R hip is tight in flexion and will benefit from continued stretching.  Pt was able to perform 3 SLR I but reported pain and was able to complete two sets of 10 with therapist assistance.  Continue with POC.        Visit Diagnoses:    ICD-10-CM ICD-9-CM   1. Status post total hip replacement, right  Z96.641 V43.64   2. Difficulty walking  R26.2 719.7   3. Right hip pain  M25.551 719.45   4. Weakness of right hip  R29.898 729.89   5. Hip stiffness, right  M25.651 719.55       Progress per Plan of Care and Progress strengthening /stabilization /functional activity           Timed:  Manual Therapy:    6     mins  47478;  Therapeutic Exercise:    22     mins  34581;     Neuromuscular Dilan:        mins  40776;    Therapeutic Activity:          mins  26481;     Gait Training:           mins  97662;     Ultrasound:          mins  86995;    Electrical Stimulation:         mins  38865 ( );  Aquatic Therapy          mins  91700    Untimed:  Electrical Stimulation:         mins  29975 ( );  Mechanical Traction:         mins  23233;     Timed Treatment:   28   mins   Total Treatment:     28   mins    Electronically signed    Tania Valencia PTA  Physical Therapist Assistant    [facility] license: [id number]

## 2022-12-15 ENCOUNTER — TREATMENT (OUTPATIENT)
Dept: PHYSICAL THERAPY | Facility: CLINIC | Age: 67
End: 2022-12-15

## 2022-12-15 DIAGNOSIS — M25.551 RIGHT HIP PAIN: ICD-10-CM

## 2022-12-15 DIAGNOSIS — R26.2 DIFFICULTY WALKING: ICD-10-CM

## 2022-12-15 DIAGNOSIS — M25.651 HIP STIFFNESS, RIGHT: ICD-10-CM

## 2022-12-15 DIAGNOSIS — R29.898 WEAKNESS OF RIGHT HIP: ICD-10-CM

## 2022-12-15 DIAGNOSIS — Z96.641 STATUS POST TOTAL HIP REPLACEMENT, RIGHT: Primary | ICD-10-CM

## 2022-12-15 PROCEDURE — 97530 THERAPEUTIC ACTIVITIES: CPT | Performed by: PHYSICAL THERAPIST

## 2022-12-15 PROCEDURE — 97110 THERAPEUTIC EXERCISES: CPT | Performed by: PHYSICAL THERAPIST

## 2022-12-15 NOTE — PROGRESS NOTES
Outpatient Physical Therapy  [address] [facility], [place] [address]                 Physical Therapy Daily Treatment Note    Patient: Hector Coronel   : 1955  Diagnosis/ICD-10 Code:  Status post total hip replacement, right [Z96.641]  Referring practitioner: Jose Talavera MD  Date of Initial Visit: Type: THERAPY  Noted: 2022  Today's Date: 12/15/2022  Patient seen for 6 sessions             Subjective   Hector Coronel reports: he has no pain today but the hip does feel sore.     Objective     See Exercise, Manual, and Modality Logs for complete treatment.     Assessment/Plan  Patient was limited by pain hip standing hip extensions but was able to preform sitting marches without pain today. Patient presented to clinic using rolling walker, patient stated he has been using his cane at home but struggles with gait pattern. Plan to continue gait training with cane in future visits to build patient confidence with AD.    Progress per Plan of Care       Timed:  Manual Therapy:    0     mins  94661;  Therapeutic Exercise:    18     mins  00371;     Neuromuscular Dilan:    0    mins  33883;    Therapeutic Activity:     12     mins  18154;     Gait Trainin     mins  84363;    Aquatic Therapy:     0     mins  83464;       Untimed:  Electrical Stimulation:    0     mins  81913 ( );  Mechanical Traction:    0     mins  64378;       Timed Treatment:   30   mins   Total Treatment:     30   mins      Electronically signed:   Jessica Mccollum PTA  Physical Therapist Assistant  [facility] License #: [id number]

## 2022-12-17 RX ORDER — TICAGRELOR 90 MG/1
90 TABLET ORAL 2 TIMES DAILY
Qty: 60 TABLET | Refills: 5 | Status: CANCELLED | OUTPATIENT
Start: 2022-12-17

## 2022-12-18 PROBLEM — E78.5 HYPERLIPIDEMIA LDL GOAL <70: Status: ACTIVE | Noted: 2022-12-18

## 2022-12-18 PROBLEM — I25.10 CORONARY ARTERY DISEASE INVOLVING NATIVE CORONARY ARTERY OF NATIVE HEART WITHOUT ANGINA PECTORIS: Status: ACTIVE | Noted: 2022-12-18

## 2022-12-18 NOTE — ASSESSMENT & PLAN NOTE
Stable without any angina.  Continue current medical management with, DAPT-aspirin and Brilinta, in addition to statin therapy.

## 2022-12-18 NOTE — ASSESSMENT & PLAN NOTE
Counseled on protective measures DAPT in correlation to his coronary stent.  He has deferred orthopedic surgeries for 6 months of therapy, and is aware of the risk associated with stopping those medications, however does wish to pursue his surgery.  I did review his particular case with Dr. Buck, and we will provide cardiac recommendations for his clearance to Dr. Jauregui services.

## 2022-12-18 NOTE — ASSESSMENT & PLAN NOTE
Previous , no recent LDL available.  Instructed him to obtain a fasted lipid profile for evaluation of control.  We discussed management of cholesterol is important component of reducing his risk for worsening cardiovascular disease.  Continue current atorvastatin, and adjustments can be made once we have his new lipid profile.

## 2022-12-19 ENCOUNTER — TREATMENT (OUTPATIENT)
Dept: PHYSICAL THERAPY | Facility: CLINIC | Age: 67
End: 2022-12-19

## 2022-12-19 DIAGNOSIS — R26.2 DIFFICULTY WALKING: ICD-10-CM

## 2022-12-19 DIAGNOSIS — M25.551 RIGHT HIP PAIN: ICD-10-CM

## 2022-12-19 DIAGNOSIS — M25.651 HIP STIFFNESS, RIGHT: ICD-10-CM

## 2022-12-19 DIAGNOSIS — Z96.641 STATUS POST TOTAL HIP REPLACEMENT, RIGHT: Primary | ICD-10-CM

## 2022-12-19 DIAGNOSIS — R29.898 WEAKNESS OF RIGHT HIP: ICD-10-CM

## 2022-12-19 PROCEDURE — 97110 THERAPEUTIC EXERCISES: CPT

## 2022-12-19 NOTE — PROGRESS NOTES
Progress Note   [place] [facility]: [address] [place] [address]      Patient: Hector Coronel   : 1955  Diagnosis/ICD-10 Code:  Status post total hip replacement, right [Z96.641]  Referring practitioner: Jose Talavera MD  Date of Initial Visit: Type: THERAPY  Noted: 2022  Today's Date: 2022  Patient seen for 7 sessions      Subjective:   Subjective Questionnaire: LEFS:   Clinical Progress: improved  Home Program Compliance: Yes  Treatment has included: balance/weight-bearing training, ADL retraining, soft tissue mobilization, strengthening, stretching, therapeutic activities, manual therapy, joint mobilization, home exercise program/patient education, gait training, functional ROM exercises, flexibility, body mechanics training, postural training and neuromuscular re-education    Subjective   Pt reported to physical therapy s/p R DIANE performed on 2022. Pt will be 4 weeks post-op tomorrow.   Pt reports they have been more short of breath lately and attribute this to 'not doing as much' since their surgery. Pt states their current pain in their R hip is a 2/10. Pt states they are still struggling with performing a SLR on their R as well as donning their sock and shoe on the R foot. Pt reports they have been working on their HEP at home to improve upon these limitations.       Objective   Assistive device used: single point cane in their L hand. Pt has decreased gait speed and stride length.    Passive Range of Motion   Left Hip   Flexion: 90 degrees      Right Hip   Flexion: 85 degrees (more stiff)    Strength/Myotome Testing      Left Hip   Planes of Motion   Flexion: 4+  ABD: 4  ADD: 4     Right Hip   Planes of Motion   Flexion: 4+  ABD: 4  ADD: 4     Left Knee   Flexion: 5  Extension: 5     Right Knee   Flexion: 5  Extension: 4+     Left Ankle/Foot   Dorsiflexion: 5     Right Ankle/Foot   Dorsiflexion: 5  See Exercise, Manual, and Modality Logs for complete treatment.      Assessment/Plan   Impairments: abnormal coordination, abnormal gait, abnormal muscle firing, abnormal or restricted ROM, activity intolerance, impaired balance, impaired physical strength and pain with function  Functional Limitations: lifting, sleeping, walking, pushing and standing    Pt reported to physical therapy s/p R DIANE performed on 11/22/2022. Today, Pt demonstrates improvement in ROM and strength. Pt also has decreased perceived deficits described by LEFS. Pt continues to have some deficits in ROM and strength which are limiting them from performing all ADLs such as navigating stairs, entering and exiting their vehicle, and donning their socks and shoes. Plan to continue to progress pt to their tolerance as well as w/n the limitations of their surgical protocol. Patient will continue to benefit from skilled physical therapy to further address their deficits in strength and ROM in order to improve upon their functional mobility and to meet their personal goals.       Goals  Plan Goals: HIP PROBLEMS    1. The patient complains of R hip pain.              LTG 1: 12 weeks:  The patient will report a pain rating of 2/10 or better in order to improve  tolerance to activities of daily living and improve sleep quality.                          STATUS:  progressing               STG 1a: 6 weeks:  The patient will report a pain rating of 3/10 or better.                          STATUS:  met              TREATMENT:  Therapeutic exercises, manual therapy, aquatic therapy, home exercise   instruction, and modalities as needed for pain to include:  electrical stimulation, moist heat, ice,   ultrasound, and diathermy.    2. The patient demonstrates weakness of the R hip.              LTG 2: 12 weeks:  The patient will demonstrate 4+/5 strength for R hip flexion, abduction,  and extension in order to improve hip stability.                          STATUS:  progressing               STG 2a: 6 weeks:  The patient will  demonstrate 4/5 strength for R hip flexion, abduction,  and extension.                          STATUS:  met              TREATMENT: Therapeutic exercises, manual therapy, aquatic therapy, home exercise instruction,  and modalities as needed for pain to include:  electrical stimulation, moist heat, ice, and ultrasound    3. The patient has gait dysfunction.  LTG 3: 12 weeks:  The patient will ambulate without assistive device, independently, for community distances with minimal limp to the R lower extremity in order to improve mobility and allow patient to perform activities such as ADLs and wood working with greater ease.  STATUS:  progressing   STG 3a: The patient will be independent in HEP.  STATUS:  progressing   TREATMENT: Gait training, aquatic therapy, therapeutic exercise, and home exercise instruction.    4. Mobility: Walking/Moving Around Functional Limitation                   LTG 4: 12 weeks:  The patient will demonstrate 25% limitation by achieving a score of 60/80 on the Lower Extremity Functional Scale.  STATUS:  progressing   STG 4a: 6 weeks:  The patient will demonstrate 56.25% limitation by achieving a score of 35/80 on the Lower Extremity Functional Scale.    STATUS:  progressing   TREATMENT:  Manual therapy, therapeutic exercise, home exercise instruction, and modalities as needed to include: moist heat, electrical stimulation, and ultrasound.    Progress toward previous goals: Partially Met      Recommendations: Continue as planned  Timeframe: 2 months  Prognosis to achieve goals: good    PT Signature: Pavan Velez PT DPT    Electronically signed 2022    [place] License: PT - [id number]       Supervised by Raffy Chamberlain PT, DPT [place] License PT - [id number]      Timed:  Manual Therapy:    0     mins  25533;  Therapeutic Exercise:    25     mins  48894;     Neuromuscular Dilan:    0    mins  49977;    Therapeutic Activity:     0     mins  32841;     Gait Trainin     mins  27655;     Aquatics                          0      mins  93066    Un-timed:  Mechanical Traction      0     mins  72515  Dry Needling     0     mins self-pay  Electrical Stimulation:    0     mins  60873 ( );    Timed Treatment:   25   mins   Total Treatment:     30   mins

## 2022-12-21 ENCOUNTER — TREATMENT (OUTPATIENT)
Dept: PHYSICAL THERAPY | Facility: CLINIC | Age: 67
End: 2022-12-21

## 2022-12-21 DIAGNOSIS — M25.651 HIP STIFFNESS, RIGHT: ICD-10-CM

## 2022-12-21 DIAGNOSIS — Z96.641 STATUS POST TOTAL HIP REPLACEMENT, RIGHT: Primary | ICD-10-CM

## 2022-12-21 DIAGNOSIS — R29.898 WEAKNESS OF RIGHT HIP: ICD-10-CM

## 2022-12-21 DIAGNOSIS — R26.2 DIFFICULTY WALKING: ICD-10-CM

## 2022-12-21 DIAGNOSIS — M25.551 RIGHT HIP PAIN: ICD-10-CM

## 2022-12-21 PROCEDURE — 97530 THERAPEUTIC ACTIVITIES: CPT

## 2022-12-21 PROCEDURE — 97110 THERAPEUTIC EXERCISES: CPT

## 2022-12-21 NOTE — PROGRESS NOTES
Physical Therapy Daily Treatment Note  [place] [facility]: [address] [facility]   [place] [address]      Patient: Hector Coronel   : 1955  Diagnosis/ICD-10 Code:  Status post total hip replacement, right [Z96.641]  Referring practitioner: Jose Talavera MD  Date of Initial Visit: Type: THERAPY  Noted: 2022  Today's Date: 2022  Patient seen for 8 sessions           Subjective   The patient reported they are having pain in their hip but its 'not bad'. This pain only happens when they are sitting or standing for 'too long'. Pt did try to go down stairs yesterday and said they had to turn slightly.     Objective   See Exercise, Manual, and Modality Logs for complete treatment.     Assessment/Plan  Progressed pt's step ups and started step downs today. Pt has some knee discomfort with the step downs today. Plan to continue to progress pt to their tolerance. Pt still struggles with some SOB and has to take breaks during treatment session. Patient will continue to benefit from skilled physical therapy to further address their deficits in strength and ROM in order to improve upon their functional mobility and to meet their personal goals.          Timed:  Manual Therapy:    0     mins  55458;  Therapeutic Exercise:    13     mins  69973;     Neuromuscular Dilan:   0    mins  36084;    Therapeutic Activity:     15     mins  35652;     Gait Trainin     mins  98333;     Aquatics                         0      mins  61692    Un-timed:  Mechanical Traction      0     mins  34661  Electrical Stimulation:    0     mins  39656 ( );      Timed Treatment:   28   mins   Total Treatment:     28   mins    Pavan Velez PT, DPT    Electronically signed 2022    [place] License: PT - [id number]     Supervised by Raffy Chamberlain PT, DPT [place] License PT - [id number]

## 2022-12-28 ENCOUNTER — TELEPHONE (OUTPATIENT)
Dept: PHYSICAL THERAPY | Facility: CLINIC | Age: 67
End: 2022-12-28

## 2022-12-28 NOTE — TELEPHONE ENCOUNTER
Caller: SHAILA HOANG    Relationship: Emergency Contact         What was the call regarding: SICK HAS A BAD COLD

## 2022-12-30 ENCOUNTER — TREATMENT (OUTPATIENT)
Dept: PHYSICAL THERAPY | Facility: CLINIC | Age: 67
End: 2022-12-30

## 2022-12-30 DIAGNOSIS — Z96.641 STATUS POST TOTAL HIP REPLACEMENT, RIGHT: Primary | ICD-10-CM

## 2022-12-30 DIAGNOSIS — R29.898 WEAKNESS OF RIGHT HIP: ICD-10-CM

## 2022-12-30 DIAGNOSIS — M25.651 HIP STIFFNESS, RIGHT: ICD-10-CM

## 2022-12-30 DIAGNOSIS — R26.2 DIFFICULTY WALKING: ICD-10-CM

## 2022-12-30 DIAGNOSIS — M25.551 RIGHT HIP PAIN: ICD-10-CM

## 2022-12-30 PROCEDURE — 97530 THERAPEUTIC ACTIVITIES: CPT | Performed by: PHYSICAL THERAPIST

## 2022-12-30 PROCEDURE — 97110 THERAPEUTIC EXERCISES: CPT | Performed by: PHYSICAL THERAPIST

## 2022-12-30 NOTE — PROGRESS NOTES
"Outpatient Physical Therapy  [address] [facility], [place] [address]                 Physical Therapy Daily Treatment Note    Patient: Hector Coronel   : 1955  Diagnosis/ICD-10 Code:  Status post total hip replacement, right [Z96.641]  Referring practitioner: Jose Talavera MD  Date of Initial Visit: Type: THERAPY  Noted: 2022  Today's Date: 2022  Patient seen for 9 sessions             Subjective   Hecotr Coronel reports: he is able to transfer into a vehicle now without UE support for his right leg. Patient states he still uses a device to help put on is sock on the right LE and he is able to don and doff his shoe on the right \"bit it isn't easy\"    Pain: 2/10 pain in right hip at time of arrival     Objective     See Exercise, Manual, and Modality Logs for complete treatment.     Assessment/Plan  Patient is limited by shortness of breath and left knee pain and weakness; patient required breaks due to these factors. Patient also fatigued with STS from the half wall today. Pt would benefit from skilled PT to address Range of Motion  and Strength deficits, pain management and any concerns with ADLs.     Progress per Plan of Care       Timed:  Manual Therapy:    0     mins  27589;  Therapeutic Exercise:    23     mins  20751;     Neuromuscular Dilan:    0    mins  77100;    Therapeutic Activity:     15     mins  56266;     Gait Trainin     mins  14835;    Aquatic Therapy:     0     mins  38251;       Untimed:  Electrical Stimulation:    0     mins  85217 ( );  Mechanical Traction:    0     mins  51987;       Timed Treatment:   38   mins   Total Treatment:     38   mins      Electronically signed:   Jessica Mccollum PTA  Physical Therapist Assistant  [facility] License #: [id number]  "

## 2023-01-03 ENCOUNTER — TREATMENT (OUTPATIENT)
Dept: PHYSICAL THERAPY | Facility: CLINIC | Age: 68
End: 2023-01-03
Payer: MEDICARE

## 2023-01-03 ENCOUNTER — TELEPHONE (OUTPATIENT)
Dept: ORTHOPEDIC SURGERY | Facility: CLINIC | Age: 68
End: 2023-01-03

## 2023-01-03 DIAGNOSIS — M25.651 HIP STIFFNESS, RIGHT: ICD-10-CM

## 2023-01-03 DIAGNOSIS — M25.551 RIGHT HIP PAIN: ICD-10-CM

## 2023-01-03 DIAGNOSIS — R26.2 DIFFICULTY WALKING: ICD-10-CM

## 2023-01-03 DIAGNOSIS — R29.898 WEAKNESS OF RIGHT HIP: ICD-10-CM

## 2023-01-03 DIAGNOSIS — Z96.641 STATUS POST TOTAL HIP REPLACEMENT, RIGHT: Primary | ICD-10-CM

## 2023-01-03 PROCEDURE — 97530 THERAPEUTIC ACTIVITIES: CPT | Performed by: PHYSICAL THERAPIST

## 2023-01-03 PROCEDURE — 97110 THERAPEUTIC EXERCISES: CPT | Performed by: PHYSICAL THERAPIST

## 2023-01-03 NOTE — TELEPHONE ENCOUNTER
Caller: SHAILA HOANG    Relationship to patient: SPOUSE (PATIENT HARD OF HEARING)    Best call back number: [phone]    Chief complaint:  PATIENT HAD RIGHT HIP SURGERY ON 11/22/22. HAS ALSO BEEN TREATED AND GOTTEN INJECTIONS IN THE LEFT KNEE IN THE PAST. ASKING TO GET INJECTION IN HIS LEFT KNEE TO HELP HIM BE ABLE TO DO POST OP PHYSICAL THERAPY BETTER.    Type of visit:  INJECTION    Requested date: ASAP

## 2023-01-03 NOTE — PROGRESS NOTES
Outpatient Physical Therapy  [address] [facility], [place] [address]                 Physical Therapy Daily Treatment Note    Patient: Hector Coronel   : 1955  Diagnosis/ICD-10 Code:  Status post total hip replacement, right [Z96.641]  Referring practitioner: Jose Talavera MD  Date of Initial Visit: Type: THERAPY  Noted: 2022  Today's Date: 1/3/2023  Patient seen for 10 sessions             Subjective   Hector Coronel reports: pain in his back for the last three days. Patient explained the pain is worse when standing from a chair and the first few steps but decreases after that. Patient is unsure of the cause.     Pain at time of arrival:   3/10 pain in right hip at rest and 5/10 pain at the best    5/10 pain at the worst in low back, tail bone and hip area    Objective     See Exercise, Manual, and Modality Logs for complete treatment.     Assessment/Plan  Patient was limited by pain in his back and required rest breaks to relive pain. Plan to progress strengthening when patient is not limited by pain.     Progress per Plan of Care       Timed:  Manual Therapy:    0     mins  31957;  Therapeutic Exercise:    22     mins  16745;     Neuromuscular Dilan:    0    mins  36337;    Therapeutic Activity:     8     mins  63324;     Gait Trainin     mins  45907;    Aquatic Therapy:     0     mins  42811;       Untimed:  Electrical Stimulation:    0     mins  43641 ( );  Mechanical Traction:    0     mins  88518;       Timed Treatment:   30   mins   Total Treatment:     30   mins      Electronically signed:   Jessica Mccollum PTA  Physical Therapist Assistant  \[facility]\"" License #: [id number]

## 2023-01-05 ENCOUNTER — TREATMENT (OUTPATIENT)
Dept: PHYSICAL THERAPY | Facility: CLINIC | Age: 68
End: 2023-01-05
Payer: MEDICARE

## 2023-01-05 DIAGNOSIS — R26.2 DIFFICULTY WALKING: ICD-10-CM

## 2023-01-05 DIAGNOSIS — M25.551 RIGHT HIP PAIN: ICD-10-CM

## 2023-01-05 DIAGNOSIS — M25.651 HIP STIFFNESS, RIGHT: ICD-10-CM

## 2023-01-05 DIAGNOSIS — R29.898 WEAKNESS OF RIGHT HIP: ICD-10-CM

## 2023-01-05 DIAGNOSIS — Z96.641 STATUS POST TOTAL HIP REPLACEMENT, RIGHT: Primary | ICD-10-CM

## 2023-01-05 PROCEDURE — 97530 THERAPEUTIC ACTIVITIES: CPT | Performed by: PHYSICAL THERAPIST

## 2023-01-05 PROCEDURE — 97110 THERAPEUTIC EXERCISES: CPT | Performed by: PHYSICAL THERAPIST

## 2023-01-05 NOTE — PROGRESS NOTES
Outpatient Physical Therapy  [address] [facility] [place] [address]                 Physical Therapy Daily Treatment Note    Patient: Hecotr Coronel   : 1955  Diagnosis/ICD-10 Code:  Status post total hip replacement, right [Z96.641]  Referring practitioner: Jose Talavera MD  Date of Initial Visit: Type: THERAPY  Noted: 2022  Today's Date: 2023  Patient seen for 11 sessions             Subjective   Hector Coronel reports: he did a lot of walking yesterday because he was helping his wife with power tools in the garage but his pain is much better today than it was last session.       Pain: 1-2/10 pain in right hip at time of arrival    Objective     See Exercise, Manual, and Modality Logs for complete treatment.     Assessment/Plan  Hector progressing as evident by decreased overall hip pain. Pt tolerated exercises well, no complaints of increased pain or discomfort. Pt would benefit from skilled PT to address Range of Motion  and Strength deficits with surgical protocol, pain management and any concerns with ADLs.     Progress per Plan of Care       Timed:  Manual Therapy:    0     mins  37111;  Therapeutic Exercise:    18     mins  76797;     Neuromuscular Dilan:    0    mins  05610;    Therapeutic Activity:     10     mins  27336;     Gait Trainin     mins  17116;    Aquatic Therapy:     0     mins  63407;       Untimed:  Electrical Stimulation:    0     mins  79948 ( );  Mechanical Traction:    0     mins  38565;       Timed Treatment:   28   mins   Total Treatment:     28   mins      Electronically signed:   Jessica Mccollum PTA  Physical Therapist Assistant  [facility] License #: [id number]

## 2023-01-09 ENCOUNTER — OFFICE VISIT (OUTPATIENT)
Dept: ORTHOPEDIC SURGERY | Facility: CLINIC | Age: 68
End: 2023-01-09
Payer: MEDICARE

## 2023-01-09 VITALS — BODY MASS INDEX: 37.13 KG/M2 | HEIGHT: 68 IN | WEIGHT: 245 LBS

## 2023-01-09 DIAGNOSIS — M17.0 BILATERAL PRIMARY OSTEOARTHRITIS OF KNEE: Primary | ICD-10-CM

## 2023-01-09 PROCEDURE — 20610 DRAIN/INJ JOINT/BURSA W/O US: CPT | Performed by: PHYSICIAN ASSISTANT

## 2023-01-09 RX ORDER — TRIAMCINOLONE ACETONIDE 40 MG/ML
40 INJECTION, SUSPENSION INTRA-ARTICULAR; INTRAMUSCULAR
Status: COMPLETED | OUTPATIENT
Start: 2023-01-09 | End: 2023-01-09

## 2023-01-09 RX ORDER — LIDOCAINE HYDROCHLORIDE 10 MG/ML
5 INJECTION, SOLUTION INFILTRATION; PERINEURAL
Status: COMPLETED | OUTPATIENT
Start: 2023-01-09 | End: 2023-01-09

## 2023-01-09 RX ADMIN — TRIAMCINOLONE ACETONIDE 40 MG: 40 INJECTION, SUSPENSION INTRA-ARTICULAR; INTRAMUSCULAR at 09:41

## 2023-01-09 RX ADMIN — LIDOCAINE HYDROCHLORIDE 5 ML: 10 INJECTION, SOLUTION INFILTRATION; PERINEURAL at 09:41

## 2023-01-09 NOTE — PATIENT INSTRUCTIONS
Bilateral knee steroid injections administered in office today.  Patient advised on 3-month duration between injections.  Can consider viscosupplementation in the future if needed.  Patient would require insurance authorization.    Follow-up in 3 months.  Call with changes or concerns.

## 2023-01-09 NOTE — PROGRESS NOTES
Chief Complaint  Pain and Follow-up of the Right Knee and Pain and Follow-up of the Left Knee    Subjective      Hector Coronel presents to [facility] for follow-up of bilateral knee pain and osteoarthritis, which he has managed conservatively with intermittent injections.  He was last seen in office for this on 3/28/2022.  In the interim, patient underwent right total hip neuroplasty on 11/27/2022 by Dr. Talavera.  Patient reports limited ability to participate with physical therapy because \"my knees are killing me\".  He is requesting repeat bilateral knee steroid injection in office today.    Objective   Allergies   Allergen Reactions   • Metal Rash     CHEAP METAL- BELT HAZEL BREAK HIM OUT        Vital Signs:   Ht 172.7 cm (68\")   Wt 111 kg (245 lb)   BMI 37.25 kg/m²       Physical Exam    Constitutional: Awake, alert. Well nourished appearance.    Integumentary: Warm, dry, intact. No obvious rashes.    HENT: Atraumatic, normocephalic.   Respiratory: Non labored respirations .   Cardiovascular: Intact peripheral pulses.    Psychiatric: Normal mood and affect. A&O X3    Ortho Exam  Bilateral knees: Skin is warm, dry, and intact.  Mild edema.  Tenderness to palpation of medial and lateral joint lines.  Crepitus with ROM.  Patellas are well tracking.  Knees are stable to varus and valgus stress.  -5 degrees of knee extension and knee flexion to 115-120 degrees.  Full plantarflexion and dorsiflexion of the ankles.  Sensation intact to light touch.  Distal neurovascular intact.    Imaging Results (Most Recent)     None           Large Joint Arthrocentesis: R knee  Date/Time: 1/9/2023 9:41 AM  Consent given by: patient  Site marked: site marked  Timeout: Immediately prior to procedure a time out was called to verify the correct patient, procedure, equipment, support staff and site/side marked as required   Supporting Documentation  Indications: pain   Procedure Details  Location: knee - R  knee  Needle gauge: 21g.  Medications administered: 40 mg triamcinolone acetonide 40 MG/ML; 5 mL lidocaine 1 %  Patient tolerance: patient tolerated the procedure well with no immediate complications    Large Joint Arthrocentesis: L knee  Date/Time: 1/9/2023 9:41 AM  Consent given by: patient  Site marked: site marked  Timeout: Immediately prior to procedure a time out was called to verify the correct patient, procedure, equipment, support staff and site/side marked as required   Supporting Documentation  Indications: pain   Procedure Details  Location: knee - L knee  Needle gauge: 21g.  Medications administered: 40 mg triamcinolone acetonide 40 MG/ML; 5 mL lidocaine 1 %  Patient tolerance: patient tolerated the procedure well with no immediate complications              Assessment and Plan   Problem List Items Addressed This Visit    None  Visit Diagnoses     Bilateral primary osteoarthritis of knee    -  Primary          Follow Up   Return in about 3 months (around 4/9/2023).  Educated on risk of smoking. Discussed options for smoking cessation.    Patient Instructions   Bilateral knee steroid injections administered in office today.  Patient advised on 3-month duration between injections.  Can consider viscosupplementation in the future if needed.  Patient would require insurance authorization.    Follow-up in 3 months.  Call with changes or concerns.    Patient was given instructions and counseling regarding his condition or for health maintenance advice. Please see specific information pulled into the AVS if appropriate.

## 2023-03-02 RX ORDER — BUPROPION HYDROCHLORIDE 150 MG/1
TABLET ORAL
Qty: 90 TABLET | Refills: 3 | OUTPATIENT
Start: 2023-03-02

## 2023-03-06 RX ORDER — BUPROPION HYDROCHLORIDE 150 MG/1
TABLET ORAL
Qty: 90 TABLET | Refills: 3 | OUTPATIENT
Start: 2023-03-06

## 2023-04-17 ENCOUNTER — OFFICE VISIT (OUTPATIENT)
Dept: ORTHOPEDIC SURGERY | Facility: CLINIC | Age: 68
End: 2023-04-17
Payer: MEDICARE

## 2023-04-17 ENCOUNTER — PREP FOR SURGERY (OUTPATIENT)
Dept: OTHER | Facility: HOSPITAL | Age: 68
End: 2023-04-17
Payer: MEDICARE

## 2023-04-17 ENCOUNTER — TELEPHONE (OUTPATIENT)
Dept: CARDIOLOGY | Facility: CLINIC | Age: 68
End: 2023-04-17
Payer: MEDICARE

## 2023-04-17 VITALS — WEIGHT: 240 LBS | BODY MASS INDEX: 36.37 KG/M2 | HEIGHT: 68 IN

## 2023-04-17 DIAGNOSIS — Z47.1 AFTERCARE FOLLOWING RIGHT HIP JOINT REPLACEMENT SURGERY: Primary | ICD-10-CM

## 2023-04-17 DIAGNOSIS — M17.12 PRIMARY OSTEOARTHRITIS OF LEFT KNEE: Primary | ICD-10-CM

## 2023-04-17 DIAGNOSIS — M25.562 LEFT KNEE PAIN, UNSPECIFIED CHRONICITY: ICD-10-CM

## 2023-04-17 DIAGNOSIS — M17.12 PRIMARY OSTEOARTHRITIS OF LEFT KNEE: ICD-10-CM

## 2023-04-17 DIAGNOSIS — Z47.1 AFTERCARE FOLLOWING LEFT KNEE JOINT REPLACEMENT SURGERY: Primary | ICD-10-CM

## 2023-04-17 DIAGNOSIS — Z96.652 AFTERCARE FOLLOWING LEFT KNEE JOINT REPLACEMENT SURGERY: Primary | ICD-10-CM

## 2023-04-17 DIAGNOSIS — Z96.641 AFTERCARE FOLLOWING RIGHT HIP JOINT REPLACEMENT SURGERY: Primary | ICD-10-CM

## 2023-04-17 RX ORDER — CEFAZOLIN SODIUM IN 0.9 % NACL 3 G/100 ML
3 INTRAVENOUS SOLUTION, PIGGYBACK (ML) INTRAVENOUS ONCE
OUTPATIENT
Start: 2023-04-17 | End: 2023-04-17

## 2023-04-17 RX ORDER — TRANEXAMIC ACID 10 MG/ML
1000 INJECTION, SOLUTION INTRAVENOUS ONCE
OUTPATIENT
Start: 2023-04-17 | End: 2023-04-17

## 2023-04-17 RX ORDER — CEFAZOLIN SODIUM 2 G/100ML
2 INJECTION, SOLUTION INTRAVENOUS ONCE
OUTPATIENT
Start: 2023-04-17 | End: 2023-04-17

## 2023-04-17 NOTE — PROGRESS NOTES
"Chief Complaint  Follow-up and Pain of the Left Knee and Follow-up and Pain of the Right Hip    Subjective      Hector Coronel presents to [facility] for follow-up of right hip and left knee.  He recently underwent right total hip arthroplasty in 11/27/2022 by Dr. ALEE Talavera.  He presents today for follow-up reporting he feels fully recovered from his right hip.  He has been discharged from physical therapy and feels he has been able to return to activities as normal from a right hip standpoint.  Unfortunately, he is complaining of significant left knee pain and states \"my left knee is killing me\".  He feels ready to proceed with left total knee arthroplasty.    Objective   Allergies   Allergen Reactions   • Metal Rash     CHEAP METAL- BELT HAZEL BREAK HIM OUT        Vital Signs:   Ht 172.7 cm (68\")   Wt 109 kg (240 lb)   BMI 36.49 kg/m²       Physical Exam    Constitutional: Awake, alert. Well nourished appearance.    Integumentary: Warm, dry, intact. No obvious rashes.    HENT: Atraumatic, normocephalic.   Respiratory: Non labored respirations .   Cardiovascular: Intact peripheral pulses.    Psychiatric: Normal mood and affect. A&O X3    Ortho Exam  Right hip: Well-healed surgical scar noted.  Skin is warm, dry, and intact.  No pain with passive internal or external rotation of the hip.  Full flexion and extension of the knee.  Full plantarflexion and dorsiflexion of the ankle.  Sensation intact to light touch.  Distal neurovascular intact.    Left knee: Skin is warm, dry, and intact.  Obvious arthritic changes.  Crepitus with ROM.  Varus alignment noted.  Full knee extension and flexion to 130 degrees.  Full plantarflexion and dorsiflexion of the ankle.  Sensation intact light touch.  Distal neurovascular intact.    Imaging Results (Most Recent)     Procedure Component Value Units Date/Time    XR Knee 3 View Left [[id number]] Resulted: 04/17/23 1241     Updated: 04/17/23 1244    " Narrative:      X-Ray Report:  Study: X-rays ordered, taken in the office, and reviewed today.   Site: Left knee Xray  Indication: Pain  View: AP/Lateral, Standing and Sunrise view(s)  Findings: Advanced tricompartmental osteoarthritis of the right knee with   significant patellar bone spurring present. Bone-on-bone findings in the   medial compartment.   Prior studies available for comparison: yes              Narrative & Impression   X-Ray Report:  Study: X-rays ordered, taken in the office, and reviewed today.   Site: Right hip Xray  Indication: DIANE  View: AP/Lateral view(s)  Findings: Intact right total hip arthroplasty . No evidence of hardware malfunction.   Prior studies available for comparison: yes       This result has not been signed. Information might be incomplete.              Assessment and Plan   Problem List Items Addressed This Visit        Musculoskeletal and Injuries    Primary osteoarthritis of left knee    Overview     Added automatically from request for surgery [id number]        Other Visit Diagnoses     Aftercare following right hip joint replacement surgery    -  Primary    Relevant Orders    XR Hip With or Without Pelvis 2 - 3 View Right (Completed)    Left knee pain, unspecified chronicity        Relevant Orders    XR Knee 3 View Left (Completed)        Follow Up   Return for Recheck.  Patient is a former smoker.  Encouraged continued tobacco cessation.  Did not discuss options for smoking cessation.    Patient Instructions   Patient is doing very well from a R hip standpoint. He is c/o severe left knee pain interfering with ADLs and overall quality of life.  X-rays revealing bone-on-bone osteoarthritis and he is ready to proceed with L TKA. Discussed with Dr. Talavera, orders placed.     The patient does report a history of coronary artery disease status post stent placement.  Reports he is on chronic antiplatelet therapy with Brilinta.  He believes he was supposed to discontinue this several  months ago.  He he has been instructed to follow-up with cardiology in order to obtain cardiology clearance and instructions regarding Brilinta.    Discussed surgery. Risks/benefits discussed with patient including, but not limited to: infection, bleeding, neurovascular damage, malunion, nonunion, aesthetic deformity, need for further surgery, and death. Discussed with patient the implant type being used during surgery and patient understands and desires to proceed. Surgery pamphlet provided to patient. Call or return if worsening symptoms.       Patient was given instructions and counseling regarding his condition or for health maintenance advice. Please see specific information pulled into the AVS if appropriate.

## 2023-04-17 NOTE — TELEPHONE ENCOUNTER
Caller: SHAILA HOANG    Relationship to patient: Emergency Contact    Best call back number: [phone]    Type of visit: FOLLOW UP/ CARDIAC CLEARANCE    Requested date: ASAP    Additional notes: PT IS NEEDING CARDIAC CLEARANCE WITH IN THE NEXT MONTH FOR KNEE REPLACEMENT SURGERY. PLEASE ADVISE WHEN WE CAN CAN GET AN APPT. NEXT APPT ISNT UNTIL July WITH DR FALK (PREVIOUS DR VIGIL PT) AND NEEDING TO GET IN ASAP

## 2023-04-17 NOTE — PATIENT INSTRUCTIONS
Patient is doing very well from a R hip standpoint. He is c/o severe left knee pain interfering with ADLs and overall quality of life.  X-rays revealing bone-on-bone osteoarthritis and he is ready to proceed with L TKA. Discussed with Dr. Talavera, orders placed.     The patient does report a history of coronary artery disease status post stent placement.  Reports he is on chronic antiplatelet therapy with Brilinta.  He believes he was supposed to discontinue this several months ago.  He he has been instructed to follow-up with cardiology in order to obtain cardiology clearance and instructions regarding Brilinta.    Discussed surgery. Risks/benefits discussed with patient including, but not limited to: infection, bleeding, neurovascular damage, malunion, nonunion, aesthetic deformity, need for further surgery, and death. Discussed with patient the implant type being used during surgery and patient understands and desires to proceed. Surgery pamphlet provided to patient. Call or return if worsening symptoms.

## 2023-04-19 ENCOUNTER — PRE-ADMISSION TESTING (OUTPATIENT)
Dept: PREADMISSION TESTING | Facility: HOSPITAL | Age: 68
End: 2023-04-19
Payer: MEDICARE

## 2023-04-19 VITALS
DIASTOLIC BLOOD PRESSURE: 82 MMHG | SYSTOLIC BLOOD PRESSURE: 132 MMHG | BODY MASS INDEX: 36.02 KG/M2 | HEIGHT: 68 IN | RESPIRATION RATE: 16 BRPM | HEART RATE: 71 BPM | WEIGHT: 237.66 LBS | TEMPERATURE: 99.2 F | OXYGEN SATURATION: 97 %

## 2023-04-19 DIAGNOSIS — M17.12 PRIMARY OSTEOARTHRITIS OF LEFT KNEE: ICD-10-CM

## 2023-04-19 LAB
ALBUMIN SERPL-MCNC: 4.2 G/DL (ref 3.5–5.2)
ALBUMIN/GLOB SERPL: 1.3 G/DL
ALP SERPL-CCNC: 127 U/L (ref 39–117)
ALT SERPL W P-5'-P-CCNC: 31 U/L (ref 1–41)
ANION GAP SERPL CALCULATED.3IONS-SCNC: 12 MMOL/L (ref 5–15)
AST SERPL-CCNC: 22 U/L (ref 1–40)
BASOPHILS # BLD AUTO: 0.04 10*3/MM3 (ref 0–0.2)
BASOPHILS NFR BLD AUTO: 0.7 % (ref 0–1.5)
BILIRUB SERPL-MCNC: 0.5 MG/DL (ref 0–1.2)
BUN SERPL-MCNC: 15 MG/DL (ref 8–23)
BUN/CREAT SERPL: 15 (ref 7–25)
CALCIUM SPEC-SCNC: 9.7 MG/DL (ref 8.6–10.5)
CHLORIDE SERPL-SCNC: 104 MMOL/L (ref 98–107)
CO2 SERPL-SCNC: 24 MMOL/L (ref 22–29)
CREAT SERPL-MCNC: 1 MG/DL (ref 0.76–1.27)
DEPRECATED RDW RBC AUTO: 49.8 FL (ref 37–54)
EGFRCR SERPLBLD CKD-EPI 2021: 81 ML/MIN/1.73
EOSINOPHIL # BLD AUTO: 0.16 10*3/MM3 (ref 0–0.4)
EOSINOPHIL NFR BLD AUTO: 2.9 % (ref 0.3–6.2)
ERYTHROCYTE [DISTWIDTH] IN BLOOD BY AUTOMATED COUNT: 15.3 % (ref 12.3–15.4)
GLOBULIN UR ELPH-MCNC: 3.2 GM/DL
GLUCOSE SERPL-MCNC: 90 MG/DL (ref 65–99)
HBA1C MFR BLD: 5.9 % (ref 4.8–5.6)
HCT VFR BLD AUTO: 45.9 % (ref 37.5–51)
HGB BLD-MCNC: 15.3 G/DL (ref 13–17.7)
IMM GRANULOCYTES # BLD AUTO: 0.01 10*3/MM3 (ref 0–0.05)
IMM GRANULOCYTES NFR BLD AUTO: 0.2 % (ref 0–0.5)
INR PPP: 1.12 (ref 0.86–1.15)
LYMPHOCYTES # BLD AUTO: 0.91 10*3/MM3 (ref 0.7–3.1)
LYMPHOCYTES NFR BLD AUTO: 16.5 % (ref 19.6–45.3)
MCH RBC QN AUTO: 29.6 PG (ref 26.6–33)
MCHC RBC AUTO-ENTMCNC: 33.3 G/DL (ref 31.5–35.7)
MCV RBC AUTO: 88.8 FL (ref 79–97)
MONOCYTES # BLD AUTO: 0.45 10*3/MM3 (ref 0.1–0.9)
MONOCYTES NFR BLD AUTO: 8.1 % (ref 5–12)
NEUTROPHILS NFR BLD AUTO: 3.96 10*3/MM3 (ref 1.7–7)
NEUTROPHILS NFR BLD AUTO: 71.6 % (ref 42.7–76)
NRBC BLD AUTO-RTO: 0 /100 WBC (ref 0–0.2)
PLATELET # BLD AUTO: 181 10*3/MM3 (ref 140–450)
PMV BLD AUTO: 9.4 FL (ref 6–12)
POTASSIUM SERPL-SCNC: 4.2 MMOL/L (ref 3.5–5.2)
PROT SERPL-MCNC: 7.4 G/DL (ref 6–8.5)
PROTHROMBIN TIME: 14.5 SECONDS (ref 11.8–14.9)
RBC # BLD AUTO: 5.17 10*6/MM3 (ref 4.14–5.8)
SODIUM SERPL-SCNC: 140 MMOL/L (ref 136–145)
WBC NRBC COR # BLD: 5.53 10*3/MM3 (ref 3.4–10.8)

## 2023-04-19 PROCEDURE — 36415 COLL VENOUS BLD VENIPUNCTURE: CPT

## 2023-04-19 PROCEDURE — 80053 COMPREHEN METABOLIC PANEL: CPT

## 2023-04-19 PROCEDURE — 85025 COMPLETE CBC W/AUTO DIFF WBC: CPT

## 2023-04-19 PROCEDURE — 83036 HEMOGLOBIN GLYCOSYLATED A1C: CPT

## 2023-04-19 PROCEDURE — 85610 PROTHROMBIN TIME: CPT

## 2023-04-19 NOTE — SIGNIFICANT NOTE
Patient wishes to discharge home with out patient therapy at [facility]. Wife to assist.

## 2023-04-19 NOTE — DISCHARGE INSTRUCTIONS
IMPORTANT INSTRUCTIONS - PRE-ADMISSION TESTING  DO NOT EAT OR CHEW anything after midnight the night before your procedure.    You may have CLEAR liquids up to 3 hours prior to ARRIVAL time.   Take the following medications the morning of your procedure with JUST A SIP OF WATER:  Bupropion, Omeprazole, Gabapentin, Tramadol, Finasteride    DO NOT BRING your medications to the hospital with you, UNLESS something has changed since your PRE-Admission Testing appointment.  Hold all vitamins, supplements, and NSAIDS (Non- steroidal anti-inflammatory meds) for one week prior to surgery (you MAY take Tylenol or Acetaminophen).  Bring your CPAP or BIPAP to hospital, ONLY IF YOU WILL BE SPENDING THE NIGHT.   Make sure you have a ride home and have someone who will stay with you the day of your procedure after you go home.  If you have any questions, please call your Pre-Admission Testing Nurse, Nava at [phone].   Per anesthesia request, do not smoke for 24 hours before your procedure or as instructed by your surgeon.     You will receive a call the day before surgery with an arrival time.    Clear Liquid Diet        Find out when you need to start a clear liquid diet.   Think of “clear liquids” as anything you could read a newspaper through. This includes things like water, broth, sports drinks, or tea WITHOUT any kind of milk or cream.           Once you are told to start a clear liquid diet, only drink these things until 3 hours before arrival to the hospital or when the hospital says to stop. Total volume limitation: 8 oz.       Clear liquids you CAN drink:   Water   Clear broth: beef, chicken, vegetable, or bone broth with nothing in it   Gatorade   Lemonade or Kenny-aid   Soda   Tea, coffee (NO cream or honey)   Jell-O (without fruit)   Popsicles (without fruit or cream)   Italian ices   Juice without pulp: apple, white, grape   You may use salt, pepper, and sugar    Do NOT drink:   Milk or cream   Soy milk,  almond milk, coconut milk, or other non-dairy drinks and   creamers   Milkshakes or smoothies   Tomato juice   Orange juice   Grapefruit juice   Cream soups or any other than broth         Clear Liquid Diet:  Do NOT eat any solid food.  Do NOT eat or suck on mints or candy.  Do NOT chew gum.  Do NOT drink thick liquids like milk or juice with pulp in it.  Do NOT add milk, cream, or anything like soy milk or almond milk to coffee or tea.      PREOPERATIVE (BEFORE SURGERY)              BATHING INSTRUCTIONS  Instructions:    You will need to shower 3 separate times utilizing the soap provided; at the times indicated   below:     5-3-23 PM   5-4-23 AM   5-4-23 PM      Wash your hair and face with normal shampoo and soap, rinse it well before using the surgical soap.      In the shower, wet the skin completely with water from your neck to your feet. Apply the cleanser to your   body ONLY FROM THE NECK TO YOUR FEET.     Do NOT USE THE CLEANSER ON YOUR FACE, HEAD, OR GENITAL (PRIVATE) AREAS.   Keep it out of your eyes, ears, and mouth because of the risk of injury to those areas.      Scrub with a clean washcloth for each bath utilizing the soap provided from the top of your body to the   bottom starting at the neck area.      Pay close attention to your armpits, groin area, and the site of surgery.      Wash your body gently for 5 minutes. Stand outside the stream or turn off the water while scrubbing your   body. Do NOT wash with your regular soap after the surgical cleanser is used.      RINSE THE CLEANSER OFF COMPLETELY with plenty of water. Rinse the area again thoroughly.      Dry off with a clean towel. The surgical soap can cause dryness; however do NOT APPLY LOTION,   CREAM, POWDER, and/or DEODORANT AFTER SHOWERING.     Be sure to where clean clothes after showering.      Ensure CLEAN BED LINENS AFTER FIRST wash with the surgical soap.      NO PETS ALLOWED IN THE BED with you after utilizing the surgical soap.

## 2023-04-20 ENCOUNTER — ANESTHESIA EVENT (OUTPATIENT)
Dept: PERIOP | Facility: HOSPITAL | Age: 68
End: 2023-04-20
Payer: MEDICARE

## 2023-04-21 ENCOUNTER — TELEPHONE (OUTPATIENT)
Dept: CARDIOLOGY | Facility: CLINIC | Age: 68
End: 2023-04-21
Payer: MEDICARE

## 2023-04-21 NOTE — TELEPHONE ENCOUNTER
Procedure: Left Total Knee Replacement (Deferred due to PCI)    Medication Directive: Aspirin, Brilinta    PMH: CAD s/p PCI, HLD    Last Seen: 11/14/22    [facility]    1. Successful PCI to the first diagonal branch of the LAD using a Xience Skypoint 2.25/12 mm drug-eluting stent, post-dilated using a NC Trek 2.5/12 mm balloon up to 16 marlene.  Pre-procedure 90-95% stenosis, post-procedure 0% residual stenosis.  JAYLEEN-3 flow was present pre- and post-procedure.  Type B2 lesion.  No evidence of complications.  2. Normal left ventricular systolic function with an estimated ejection fraction of 60-65% and normal left ventricular wall motion throughout.  3. Severely elevated LVEDP of 44 mmHg.  Mild pullback gradient across the aortic valve, suggestive of mild aortic stenosis.

## 2023-04-24 NOTE — TELEPHONE ENCOUNTER
Mr. Coronel may proceed with upcoming knee surgery at moderate risk for perioperative cardiac events, and may hold aspirin for up to 7 days prior to procedure, and Brilinta for 5 days prior to procedure.

## 2023-04-25 ENCOUNTER — TELEPHONE (OUTPATIENT)
Dept: ORTHOPEDIC SURGERY | Facility: CLINIC | Age: 68
End: 2023-04-25
Payer: MEDICARE

## 2023-04-25 NOTE — TELEPHONE ENCOUNTER
PATIENT WAS CALLED AND INFORMED PER AMANDA URBINA THAT HE COULD HOLD ASPIRIN FOR 7 DAYS AND BRILINTA FOR 5 DAYS.  PATIENT VOICES UNDERSTANDING.

## 2023-05-05 ENCOUNTER — HOSPITAL ENCOUNTER (OUTPATIENT)
Facility: HOSPITAL | Age: 68
Discharge: HOME | End: 2023-05-06
Attending: ORTHOPAEDIC SURGERY | Admitting: ORTHOPAEDIC SURGERY
Payer: MEDICARE

## 2023-05-05 ENCOUNTER — ANESTHESIA (OUTPATIENT)
Dept: PERIOP | Facility: HOSPITAL | Age: 68
End: 2023-05-05
Payer: MEDICARE

## 2023-05-05 ENCOUNTER — APPOINTMENT (OUTPATIENT)
Dept: GENERAL RADIOLOGY | Facility: HOSPITAL | Age: 68
End: 2023-05-05
Payer: MEDICARE

## 2023-05-05 DIAGNOSIS — R26.2 DIFFICULTY IN WALKING: Primary | ICD-10-CM

## 2023-05-05 DIAGNOSIS — M17.12 PRIMARY OSTEOARTHRITIS OF LEFT KNEE: ICD-10-CM

## 2023-05-05 DIAGNOSIS — Z78.9 DECREASED ACTIVITIES OF DAILY LIVING (ADL): ICD-10-CM

## 2023-05-05 LAB — GLUCOSE BLDC GLUCOMTR-MCNC: 133 MG/DL (ref 70–99)

## 2023-05-05 PROCEDURE — 25010000002 CEFAZOLIN IN DEXTROSE 2-4 GM/100ML-% SOLUTION: Performed by: ORTHOPAEDIC SURGERY

## 2023-05-05 PROCEDURE — 25010000002 MIDAZOLAM PER 1MG: Performed by: ANESTHESIOLOGY

## 2023-05-05 PROCEDURE — 25010000002 DEXAMETHASONE PER 1 MG: Performed by: NURSE ANESTHETIST, CERTIFIED REGISTERED

## 2023-05-05 PROCEDURE — 25010000002 SUGAMMADEX 200 MG/2ML SOLUTION: Performed by: NURSE ANESTHETIST, CERTIFIED REGISTERED

## 2023-05-05 PROCEDURE — 20985 CPTR-ASST DIR MS PX: CPT | Performed by: ORTHOPAEDIC SURGERY

## 2023-05-05 PROCEDURE — 97161 PT EVAL LOW COMPLEX 20 MIN: CPT

## 2023-05-05 PROCEDURE — A9270 NON-COVERED ITEM OR SERVICE: HCPCS | Performed by: FAMILY MEDICINE

## 2023-05-05 PROCEDURE — 25010000002 EPINEPHRINE 1 MG/ML SOLUTION: Performed by: ORTHOPAEDIC SURGERY

## 2023-05-05 PROCEDURE — 94761 N-INVAS EAR/PLS OXIMETRY MLT: CPT

## 2023-05-05 PROCEDURE — A9270 NON-COVERED ITEM OR SERVICE: HCPCS | Performed by: NURSE ANESTHETIST, CERTIFIED REGISTERED

## 2023-05-05 PROCEDURE — 25010000002 KETOROLAC TROMETHAMINE PER 15 MG: Performed by: ORTHOPAEDIC SURGERY

## 2023-05-05 PROCEDURE — 82948 REAGENT STRIP/BLOOD GLUCOSE: CPT

## 2023-05-05 PROCEDURE — 25010000002 ONDANSETRON PER 1 MG: Performed by: NURSE ANESTHETIST, CERTIFIED REGISTERED

## 2023-05-05 PROCEDURE — 25010000002 FENTANYL CITRATE (PF) 50 MCG/ML SOLUTION: Performed by: NURSE ANESTHETIST, CERTIFIED REGISTERED

## 2023-05-05 PROCEDURE — 25010000002 PROPOFOL 10 MG/ML EMULSION: Performed by: NURSE ANESTHETIST, CERTIFIED REGISTERED

## 2023-05-05 PROCEDURE — C1713 ANCHOR/SCREW BN/BN,TIS/BN: HCPCS | Performed by: ORTHOPAEDIC SURGERY

## 2023-05-05 PROCEDURE — 63710000001 OXYCODONE 5 MG TABLET: Performed by: NURSE ANESTHETIST, CERTIFIED REGISTERED

## 2023-05-05 PROCEDURE — 27447 TOTAL KNEE ARTHROPLASTY: CPT | Performed by: ORTHOPAEDIC SURGERY

## 2023-05-05 PROCEDURE — 94799 UNLISTED PULMONARY SVC/PX: CPT

## 2023-05-05 PROCEDURE — S0260 H&P FOR SURGERY: HCPCS | Performed by: ORTHOPAEDIC SURGERY

## 2023-05-05 PROCEDURE — 63710000001 HYDROCODONE-ACETAMINOPHEN 7.5-325 MG TABLET: Performed by: ORTHOPAEDIC SURGERY

## 2023-05-05 PROCEDURE — 25010000002 CEFAZOLIN IN DEXTROSE 2-4 GM/100ML-% SOLUTION: Performed by: PHYSICIAN ASSISTANT

## 2023-05-05 PROCEDURE — 99213 OFFICE O/P EST LOW 20 MIN: CPT | Performed by: PHYSICIAN ASSISTANT

## 2023-05-05 PROCEDURE — 73560 X-RAY EXAM OF KNEE 1 OR 2: CPT

## 2023-05-05 PROCEDURE — 63710000001 LORAZEPAM 0.5 MG TABLET: Performed by: FAMILY MEDICINE

## 2023-05-05 PROCEDURE — 25010000002 MORPHINE PER 10 MG: Performed by: ORTHOPAEDIC SURGERY

## 2023-05-05 PROCEDURE — C1776 JOINT DEVICE (IMPLANTABLE): HCPCS | Performed by: ORTHOPAEDIC SURGERY

## 2023-05-05 PROCEDURE — A9270 NON-COVERED ITEM OR SERVICE: HCPCS | Performed by: ORTHOPAEDIC SURGERY

## 2023-05-05 PROCEDURE — 25010000002 HYDROMORPHONE 1 MG/ML SOLUTION: Performed by: NURSE ANESTHETIST, CERTIFIED REGISTERED

## 2023-05-05 PROCEDURE — 25010000002 ONDANSETRON PER 1 MG: Performed by: ORTHOPAEDIC SURGERY

## 2023-05-05 PROCEDURE — 25010000002 ROPIVACAINE PER 1 MG: Performed by: ORTHOPAEDIC SURGERY

## 2023-05-05 DEVICE — CAP TOTL KN CMT PRIMARY W/ROSA: Type: IMPLANTABLE DEVICE | Site: KNEE | Status: FUNCTIONAL

## 2023-05-05 DEVICE — STEM TIB/KN PERSONA CMT 5D SZF LT: Type: IMPLANTABLE DEVICE | Site: KNEE | Status: FUNCTIONAL

## 2023-05-05 DEVICE — IMPLANTABLE DEVICE: Type: IMPLANTABLE DEVICE | Site: KNEE | Status: FUNCTIONAL

## 2023-05-05 DEVICE — CMT BONE PALACOS R HI/VISC 1X40: Type: IMPLANTABLE DEVICE | Site: KNEE | Status: FUNCTIONAL

## 2023-05-05 DEVICE — ART/SRF KN PERSONA/VE PS EF 8TO11 10MM LT: Type: IMPLANTABLE DEVICE | Site: KNEE | Status: FUNCTIONAL

## 2023-05-05 RX ORDER — PROMETHAZINE HYDROCHLORIDE 12.5 MG/1
25 TABLET ORAL ONCE AS NEEDED
Status: DISCONTINUED | OUTPATIENT
Start: 2023-05-05 | End: 2023-05-05 | Stop reason: HOSPADM

## 2023-05-05 RX ORDER — PROPOFOL 10 MG/ML
VIAL (ML) INTRAVENOUS AS NEEDED
Status: DISCONTINUED | OUTPATIENT
Start: 2023-05-05 | End: 2023-05-05 | Stop reason: SURG

## 2023-05-05 RX ORDER — AMOXICILLIN 250 MG
1 CAPSULE ORAL DAILY
COMMUNITY

## 2023-05-05 RX ORDER — SODIUM CHLORIDE, SODIUM LACTATE, POTASSIUM CHLORIDE, CALCIUM CHLORIDE 600; 310; 30; 20 MG/100ML; MG/100ML; MG/100ML; MG/100ML
9 INJECTION, SOLUTION INTRAVENOUS CONTINUOUS PRN
Status: DISCONTINUED | OUTPATIENT
Start: 2023-05-05 | End: 2023-05-05 | Stop reason: HOSPADM

## 2023-05-05 RX ORDER — ROCURONIUM BROMIDE 10 MG/ML
INJECTION, SOLUTION INTRAVENOUS AS NEEDED
Status: DISCONTINUED | OUTPATIENT
Start: 2023-05-05 | End: 2023-05-05 | Stop reason: SURG

## 2023-05-05 RX ORDER — OXYCODONE HYDROCHLORIDE 5 MG/1
5 TABLET ORAL
Status: COMPLETED | OUTPATIENT
Start: 2023-05-05 | End: 2023-05-05

## 2023-05-05 RX ORDER — LORAZEPAM 0.5 MG/1
0.5 TABLET ORAL EVERY 8 HOURS PRN
Status: DISCONTINUED | OUTPATIENT
Start: 2023-05-05 | End: 2023-05-06 | Stop reason: HOSPADM

## 2023-05-05 RX ORDER — LIDOCAINE HYDROCHLORIDE 20 MG/ML
INJECTION, SOLUTION EPIDURAL; INFILTRATION; INTRACAUDAL; PERINEURAL AS NEEDED
Status: DISCONTINUED | OUTPATIENT
Start: 2023-05-05 | End: 2023-05-05 | Stop reason: SURG

## 2023-05-05 RX ORDER — AMOXICILLIN 250 MG
2 CAPSULE ORAL 2 TIMES DAILY PRN
Status: DISCONTINUED | OUTPATIENT
Start: 2023-05-05 | End: 2023-05-06 | Stop reason: HOSPADM

## 2023-05-05 RX ORDER — PROMETHAZINE HYDROCHLORIDE 25 MG/1
25 SUPPOSITORY RECTAL ONCE AS NEEDED
Status: DISCONTINUED | OUTPATIENT
Start: 2023-05-05 | End: 2023-05-05 | Stop reason: HOSPADM

## 2023-05-05 RX ORDER — KETAMINE HCL IN NACL, ISO-OSM 100MG/10ML
SYRINGE (ML) INJECTION AS NEEDED
Status: DISCONTINUED | OUTPATIENT
Start: 2023-05-05 | End: 2023-05-05 | Stop reason: SURG

## 2023-05-05 RX ORDER — MAGNESIUM HYDROXIDE 1200 MG/15ML
LIQUID ORAL AS NEEDED
Status: DISCONTINUED | OUTPATIENT
Start: 2023-05-05 | End: 2023-05-05 | Stop reason: HOSPADM

## 2023-05-05 RX ORDER — SODIUM CHLORIDE, SODIUM LACTATE, POTASSIUM CHLORIDE, CALCIUM CHLORIDE 600; 310; 30; 20 MG/100ML; MG/100ML; MG/100ML; MG/100ML
80 INJECTION, SOLUTION INTRAVENOUS CONTINUOUS
Status: DISCONTINUED | OUTPATIENT
Start: 2023-05-05 | End: 2023-05-06 | Stop reason: HOSPADM

## 2023-05-05 RX ORDER — ACETAMINOPHEN 500 MG
1000 TABLET ORAL ONCE
Status: COMPLETED | OUTPATIENT
Start: 2023-05-05 | End: 2023-05-05

## 2023-05-05 RX ORDER — CELECOXIB 100 MG/1
200 CAPSULE ORAL ONCE
Status: COMPLETED | OUTPATIENT
Start: 2023-05-05 | End: 2023-05-05

## 2023-05-05 RX ORDER — ONDANSETRON 2 MG/ML
4 INJECTION INTRAMUSCULAR; INTRAVENOUS EVERY 6 HOURS PRN
Status: DISCONTINUED | OUTPATIENT
Start: 2023-05-05 | End: 2023-05-06 | Stop reason: HOSPADM

## 2023-05-05 RX ORDER — MIDAZOLAM HYDROCHLORIDE 2 MG/2ML
2 INJECTION, SOLUTION INTRAMUSCULAR; INTRAVENOUS ONCE
Status: COMPLETED | OUTPATIENT
Start: 2023-05-05 | End: 2023-05-05

## 2023-05-05 RX ORDER — ONDANSETRON 4 MG/1
4 TABLET, FILM COATED ORAL EVERY 6 HOURS PRN
Status: DISCONTINUED | OUTPATIENT
Start: 2023-05-05 | End: 2023-05-06 | Stop reason: HOSPADM

## 2023-05-05 RX ORDER — ACETAMINOPHEN 500 MG
1000 TABLET ORAL EVERY 6 HOURS
Status: DISCONTINUED | OUTPATIENT
Start: 2023-05-05 | End: 2023-05-06 | Stop reason: HOSPADM

## 2023-05-05 RX ORDER — ONDANSETRON 2 MG/ML
4 INJECTION INTRAMUSCULAR; INTRAVENOUS ONCE AS NEEDED
Status: DISCONTINUED | OUTPATIENT
Start: 2023-05-05 | End: 2023-05-05 | Stop reason: HOSPADM

## 2023-05-05 RX ORDER — CEFAZOLIN SODIUM 2 G/100ML
2 INJECTION, SOLUTION INTRAVENOUS ONCE
Status: COMPLETED | OUTPATIENT
Start: 2023-05-05 | End: 2023-05-05

## 2023-05-05 RX ORDER — HYDROCODONE BITARTRATE AND ACETAMINOPHEN 7.5; 325 MG/1; MG/1
1 TABLET ORAL EVERY 4 HOURS PRN
Qty: 45 TABLET | Refills: 0 | Status: SHIPPED | OUTPATIENT
Start: 2023-05-05

## 2023-05-05 RX ORDER — DEXAMETHASONE SODIUM PHOSPHATE 4 MG/ML
INJECTION, SOLUTION INTRA-ARTICULAR; INTRALESIONAL; INTRAMUSCULAR; INTRAVENOUS; SOFT TISSUE AS NEEDED
Status: DISCONTINUED | OUTPATIENT
Start: 2023-05-05 | End: 2023-05-05 | Stop reason: SURG

## 2023-05-05 RX ORDER — CEFAZOLIN SODIUM 2 G/100ML
2 INJECTION, SOLUTION INTRAVENOUS EVERY 8 HOURS
Status: COMPLETED | OUTPATIENT
Start: 2023-05-05 | End: 2023-05-05

## 2023-05-05 RX ORDER — MEPERIDINE HYDROCHLORIDE 25 MG/ML
12.5 INJECTION INTRAMUSCULAR; INTRAVENOUS; SUBCUTANEOUS
Status: DISCONTINUED | OUTPATIENT
Start: 2023-05-05 | End: 2023-05-05 | Stop reason: HOSPADM

## 2023-05-05 RX ORDER — HYDROCODONE BITARTRATE AND ACETAMINOPHEN 7.5; 325 MG/1; MG/1
2 TABLET ORAL EVERY 4 HOURS PRN
Status: DISCONTINUED | OUTPATIENT
Start: 2023-05-05 | End: 2023-05-06 | Stop reason: HOSPADM

## 2023-05-05 RX ORDER — BUPIVACAINE HYDROCHLORIDE AND EPINEPHRINE 5; 5 MG/ML; UG/ML
INJECTION, SOLUTION EPIDURAL; INTRACAUDAL; PERINEURAL
Status: COMPLETED | OUTPATIENT
Start: 2023-05-05 | End: 2023-05-05

## 2023-05-05 RX ORDER — NALOXONE HCL 0.4 MG/ML
0.4 VIAL (ML) INJECTION
Status: DISCONTINUED | OUTPATIENT
Start: 2023-05-05 | End: 2023-05-06 | Stop reason: HOSPADM

## 2023-05-05 RX ORDER — GABAPENTIN 300 MG/1
1 CAPSULE ORAL DAILY
COMMUNITY
Start: 2023-04-24

## 2023-05-05 RX ORDER — TRANEXAMIC ACID 10 MG/ML
1000 INJECTION, SOLUTION INTRAVENOUS ONCE
Status: DISCONTINUED | OUTPATIENT
Start: 2023-05-05 | End: 2023-05-05

## 2023-05-05 RX ORDER — FENTANYL CITRATE 50 UG/ML
INJECTION, SOLUTION INTRAMUSCULAR; INTRAVENOUS AS NEEDED
Status: DISCONTINUED | OUTPATIENT
Start: 2023-05-05 | End: 2023-05-05 | Stop reason: SURG

## 2023-05-05 RX ORDER — ASPIRIN 81 MG/1
81 TABLET ORAL DAILY
COMMUNITY

## 2023-05-05 RX ORDER — GLYCOPYRROLATE 0.2 MG/ML
INJECTION INTRAMUSCULAR; INTRAVENOUS AS NEEDED
Status: DISCONTINUED | OUTPATIENT
Start: 2023-05-05 | End: 2023-05-05 | Stop reason: SURG

## 2023-05-05 RX ORDER — KETOROLAC TROMETHAMINE 30 MG/ML
15 INJECTION, SOLUTION INTRAMUSCULAR; INTRAVENOUS EVERY 6 HOURS
Status: COMPLETED | OUTPATIENT
Start: 2023-05-05 | End: 2023-05-06

## 2023-05-05 RX ORDER — BISACODYL 5 MG/1
10 TABLET, DELAYED RELEASE ORAL DAILY PRN
Status: DISCONTINUED | OUTPATIENT
Start: 2023-05-05 | End: 2023-05-06 | Stop reason: HOSPADM

## 2023-05-05 RX ORDER — CEFAZOLIN SODIUM IN 0.9 % NACL 3 G/100 ML
3 INTRAVENOUS SOLUTION, PIGGYBACK (ML) INTRAVENOUS ONCE
Status: DISCONTINUED | OUTPATIENT
Start: 2023-05-05 | End: 2023-05-05

## 2023-05-05 RX ORDER — ASPIRIN 325 MG
325 TABLET, DELAYED RELEASE (ENTERIC COATED) ORAL DAILY
Qty: 21 TABLET | Refills: 0 | Status: SHIPPED | OUTPATIENT
Start: 2023-05-05 | End: 2023-05-06 | Stop reason: HOSPADM

## 2023-05-05 RX ORDER — BISACODYL 10 MG
10 SUPPOSITORY, RECTAL RECTAL DAILY PRN
Status: DISCONTINUED | OUTPATIENT
Start: 2023-05-05 | End: 2023-05-06 | Stop reason: HOSPADM

## 2023-05-05 RX ORDER — ONDANSETRON 2 MG/ML
INJECTION INTRAMUSCULAR; INTRAVENOUS AS NEEDED
Status: DISCONTINUED | OUTPATIENT
Start: 2023-05-05 | End: 2023-05-05 | Stop reason: SURG

## 2023-05-05 RX ORDER — HYDROCODONE BITARTRATE AND ACETAMINOPHEN 7.5; 325 MG/1; MG/1
1 TABLET ORAL EVERY 4 HOURS PRN
Status: DISCONTINUED | OUTPATIENT
Start: 2023-05-05 | End: 2023-05-06 | Stop reason: HOSPADM

## 2023-05-05 RX ADMIN — PROPOFOL 50 MG: 10 INJECTION, EMULSION INTRAVENOUS at 08:23

## 2023-05-05 RX ADMIN — HYDROMORPHONE HYDROCHLORIDE 0.5 MG: 1 INJECTION, SOLUTION INTRAMUSCULAR; INTRAVENOUS; SUBCUTANEOUS at 10:15

## 2023-05-05 RX ADMIN — SODIUM CHLORIDE, POTASSIUM CHLORIDE, SODIUM LACTATE AND CALCIUM CHLORIDE 80 ML/HR: 600; 310; 30; 20 INJECTION, SOLUTION INTRAVENOUS at 12:01

## 2023-05-05 RX ADMIN — FENTANYL CITRATE 50 MCG: 50 INJECTION, SOLUTION INTRAMUSCULAR; INTRAVENOUS at 09:37

## 2023-05-05 RX ADMIN — ROCURONIUM BROMIDE 50 MG: 10 INJECTION, SOLUTION INTRAVENOUS at 08:18

## 2023-05-05 RX ADMIN — ONDANSETRON 4 MG: 2 INJECTION INTRAMUSCULAR; INTRAVENOUS at 08:51

## 2023-05-05 RX ADMIN — CEFAZOLIN SODIUM 2 G: 2 INJECTION, SOLUTION INTRAVENOUS at 08:12

## 2023-05-05 RX ADMIN — HYDROCODONE BITARTRATE AND ACETAMINOPHEN 2 TABLET: 7.5; 325 TABLET ORAL at 21:09

## 2023-05-05 RX ADMIN — DEXAMETHASONE SODIUM PHOSPHATE 8 MG: 4 INJECTION, SOLUTION INTRA-ARTICULAR; INTRALESIONAL; INTRAMUSCULAR; INTRAVENOUS; SOFT TISSUE at 08:26

## 2023-05-05 RX ADMIN — ONDANSETRON 4 MG: 2 INJECTION INTRAMUSCULAR; INTRAVENOUS at 12:02

## 2023-05-05 RX ADMIN — LORAZEPAM 0.5 MG: 0.5 TABLET ORAL at 14:46

## 2023-05-05 RX ADMIN — HYDROMORPHONE HYDROCHLORIDE 0.5 MG: 1 INJECTION, SOLUTION INTRAMUSCULAR; INTRAVENOUS; SUBCUTANEOUS at 10:05

## 2023-05-05 RX ADMIN — FENTANYL CITRATE 50 MCG: 50 INJECTION, SOLUTION INTRAMUSCULAR; INTRAVENOUS at 09:43

## 2023-05-05 RX ADMIN — BUPIVACAINE HYDROCHLORIDE AND EPINEPHRINE BITARTRATE 30 ML: 5; .005 INJECTION, SOLUTION EPIDURAL; INTRACAUDAL; PERINEURAL at 07:35

## 2023-05-05 RX ADMIN — FENTANYL CITRATE 50 MCG: 50 INJECTION, SOLUTION INTRAMUSCULAR; INTRAVENOUS at 08:17

## 2023-05-05 RX ADMIN — KETOROLAC TROMETHAMINE 15 MG: 30 INJECTION, SOLUTION INTRAMUSCULAR; INTRAVENOUS at 16:50

## 2023-05-05 RX ADMIN — KETOROLAC TROMETHAMINE 15 MG: 30 INJECTION, SOLUTION INTRAMUSCULAR; INTRAVENOUS at 12:02

## 2023-05-05 RX ADMIN — ONDANSETRON 4 MG: 2 INJECTION INTRAMUSCULAR; INTRAVENOUS at 20:18

## 2023-05-05 RX ADMIN — CELECOXIB 200 MG: 100 CAPSULE ORAL at 07:11

## 2023-05-05 RX ADMIN — PROPOFOL 150 MG: 10 INJECTION, EMULSION INTRAVENOUS at 08:17

## 2023-05-05 RX ADMIN — CEFAZOLIN SODIUM 2 G: 2 INJECTION, SOLUTION INTRAVENOUS at 23:11

## 2023-05-05 RX ADMIN — Medication 20 MG: at 08:17

## 2023-05-05 RX ADMIN — ACETAMINOPHEN 1000 MG: 500 TABLET ORAL at 07:11

## 2023-05-05 RX ADMIN — OXYCODONE HYDROCHLORIDE 5 MG: 5 TABLET ORAL at 10:10

## 2023-05-05 RX ADMIN — SODIUM CHLORIDE, POTASSIUM CHLORIDE, SODIUM LACTATE AND CALCIUM CHLORIDE 9 ML/HR: 600; 310; 30; 20 INJECTION, SOLUTION INTRAVENOUS at 06:43

## 2023-05-05 RX ADMIN — SODIUM CHLORIDE, POTASSIUM CHLORIDE, SODIUM LACTATE AND CALCIUM CHLORIDE: 600; 310; 30; 20 INJECTION, SOLUTION INTRAVENOUS at 09:35

## 2023-05-05 RX ADMIN — OXYCODONE HYDROCHLORIDE 5 MG: 5 TABLET ORAL at 10:25

## 2023-05-05 RX ADMIN — GLYCOPYRROLATE 0.1 MG: 0.2 INJECTION INTRAMUSCULAR; INTRAVENOUS at 08:33

## 2023-05-05 RX ADMIN — MIDAZOLAM HYDROCHLORIDE 2 MG: 1 INJECTION, SOLUTION INTRAMUSCULAR; INTRAVENOUS at 07:15

## 2023-05-05 RX ADMIN — FENTANYL CITRATE 50 MCG: 50 INJECTION, SOLUTION INTRAMUSCULAR; INTRAVENOUS at 08:44

## 2023-05-05 RX ADMIN — SUGAMMADEX 200 MG: 100 INJECTION, SOLUTION INTRAVENOUS at 09:31

## 2023-05-05 RX ADMIN — Medication 30 MG: at 08:40

## 2023-05-05 RX ADMIN — KETOROLAC TROMETHAMINE 15 MG: 30 INJECTION, SOLUTION INTRAMUSCULAR; INTRAVENOUS at 23:11

## 2023-05-05 RX ADMIN — LIDOCAINE HYDROCHLORIDE 100 MG: 20 INJECTION, SOLUTION EPIDURAL; INFILTRATION; INTRACAUDAL; PERINEURAL at 08:17

## 2023-05-05 RX ADMIN — CEFAZOLIN SODIUM 2 G: 2 INJECTION, SOLUTION INTRAVENOUS at 14:45

## 2023-05-05 NOTE — H&P
[facility]   HOSPITALIST HISTORY AND PHYSICAL  Date: 2023   Patient Name: Hector Coronel  : 1955  MRN: [id number]  Primary Care Physician:  Eduardo Bowman MD  Date of admission: 2023    Subjective   Subjective   Chief Complaint: Medical management    HPI:    Hector Coronel is a 68 y.o. male who is being seen by hospitalist for medical management of general medical conditions.  He has a history of CAD/PCI , anxiety/depression, obesity with BMI >35, NOBLE (CPAP), dyslipidemia, peripheral neuropathy, BPH, osteoarthritis.  Recent lab work reviewed reveals normal BMP and CBC.  Recent A1c 5.9.  Home medications reviewed and include Prilosec, doxazosin, Proscar, atorvastatin, Wellbutrin, gabapentin, Brilinta and aspirin, Norco, multivitamin.  He has been cleared by cardiology with moderate risk for recent knee surgery (holding aspirin 7 days prior and Brilinta 5 days prior to procedure).  We will be following up with Dr. Rojas in July.    Recent vital signs shows normal BP, afebrile,: Currently on 2L with ETCO2 monitoring system.  99% sats.    Currently, resting in recliner.  Vital signs are stable.  He is alert and conversational.  Has 2L O2.  ET CO2 monitoring with 41, acceptable.  He denies any chest discomfort or dyspnea.  He denies any palpitations or dizziness.  He has already taken a few steps on his new left knee.  His wife and daughter are at bedside.    Pertinent History   Past Medical History:    Active Ambulatory Problems     Diagnosis Date Noted   • NOBLE treated with BiPAP 2021   • Class 2 obesity 2021   • Primary osteoarthritis of both knees 2021   • Major depressive disorder, recurrent, moderate 2021   • Peripheral neuropathy, idiopathic 2021   • Elevated liver enzymes 2021   • Benign prostatic hyperplasia 2021   • Cerumen debris on tympanic membrane of both ears 2021   • Screening PSA (prostate specific antigen) 2021   • Primary  osteoarthritis of right hip 03/28/2022   • Primary insomnia 05/12/2022   • Anxiety 05/12/2022   • Peripheral polyneuropathy 05/12/2022   • Cough 05/24/2022   • Shortness of breath 05/24/2022   • S/P coronary artery stent placement 05/31/2022   • Coronary artery disease involving native coronary artery of native heart without angina pectoris 12/18/2022   • Hyperlipidemia LDL goal <70 12/18/2022     Resolved Ambulatory Problems     Diagnosis Date Noted   • Abnormal nuclear stress test 03/28/2022     Past Medical History:   Diagnosis Date   • Arthritis    • Coronary artery disease 05/31/2022   • Depression    • Diabetes mellitus    • Edema    • Enlarged prostate    • History of transfusion    • Hoarseness    • Murmur, cardiac    • Neck pain    • Osteoarthritis    • PONV (postoperative nausea and vomiting)    • Sleep apnea    • SOBOE (shortness of breath on exertion)    • TIA (transient ischemic attack)    • Umbilical hernia        Past Surgical History:  Left total knee replacement 5/5/23 Dr. Talavera  Past Surgical History:   Procedure Laterality Date   • APPENDECTOMY     • CARDIAC CATHETERIZATION Right 05/31/2022    Procedure: Left Heart Cath;  Surgeon: Eduardo Buck MD;  Location: [facility] CATH INVASIVE LOC[facility];  Service: Cardiovascular;  Laterality: Right;   • CARPAL TUNNEL RELEASE Bilateral    • CHOLECYSTECTOMY     • HERNIA REPAIR      UMBILICAL   • INNER EAR SURGERY      BOTH SIDES   • THROAT SURGERY      BIOPSY, RMOVED POLYPS   • TOTAL HIP ARTHROPLASTY Right 11/22/2022    Procedure: RIGHT TOTAL HIP ARTHROPLASTY ANTERIOR;  Surgeon: Jose Talavera MD;  Location: [facility] OR;  Service: Orthopedics;  Laterality: Right;   • VASECTOMY      1979       Social History:   Former smoker  Social History     Socioeconomic History   • Marital status:    Tobacco Use   • Smoking status: Former     Packs/day: 2.50     Types: Cigarettes   • Smokeless tobacco: Never   • Tobacco comments:     quit 30 years ago   Vaping  Use   • Vaping Use: Never used   Substance and Sexual Activity   • Alcohol use: Yes     Alcohol/week: 3.0 standard drinks     Types: 3 Shots of liquor per week   • Drug use: Never   • Sexual activity: Defer       Family History:     Family History   Problem Relation Age of Onset   • Diabetes Father    • Stroke Paternal Uncle    • Heart disease Paternal Uncle    • Malig Hyperthermia Neg Hx        Home Medications (reported)  Current Outpatient Medications   Medication Instructions   • atorvastatin (LIPITOR) 40 mg, Oral, Daily   • Brilinta 90 mg, Oral, 2 Times Daily   • buPROPion XL (WELLBUTRIN XL) 150 mg, Oral, Daily   • Cholecalciferol (VITAMIN D3 PO) 1 capsule, Oral, Daily   • Cobalamin Combinations (NEURIVA PLUS PO) Oral   • Cod Liver Oil 1000 MG capsule Oral   • doxazosin (CARDURA) 4 MG tablet TAKE ONE TABLET BY MOUTH DAILY   • finasteride (PROSCAR) 5 mg, Oral, Daily   • fluticasone (FLONASE) 50 MCG/ACT nasal spray SPRAY TWO SPRAYS IN EACH NOSTRIL DAILY   • gabapentin (NEURONTIN) 100 MG capsule TAKE ONE CAPSULE BY MOUTH THREE TIMES A DAY   • glucosamine-chondroitin 500-400 MG capsule capsule 1 capsule, Oral, Daily   • HYDROcodone-acetaminophen (Norco) 7.5-325 MG per tablet 1 tablet, Oral, Every 4 Hours PRN   • Multiple Vitamins-Minerals (PreserVision AREDS) capsule 1 tablet, Oral, Daily   • omeprazole (PRILOSEC) 20 mg, Oral, Daily   • Probiotic Product (ACIDOPHILUS PROBIOTIC BLEND PO) Oral   • traMADol (ULTRAM) 50 mg, Oral, Every 6 Hours PRN   • vitamin B-12 (CYANOCOBALAMIN) 2,500 mcg, Oral, Daily   • Vitamins-Lipotropics (LIPO FLAVONOID PLUS PO) Oral       Allergies:  Allergies   Allergen Reactions   • Metal Rash     CHEAP METAL- BELT HAZEL BREAK HIM OUT        REVIEW OF SYSTEMS: All other systems reviewed and are negative.   • GEN: No fevers. No chills. No weight gain. No weight loss.     • HEENT:   No dysphagia/odynophagia. No visual disturbance.    • GI:    No N/V.  No abd pain. No diarrhea. No  constipation.  No bloody/black tarry stools. No hematemesis.   • CV: No chest discomfort.  No palps. No lightheadedness. No syncope. No orthopnea/PND. No edema.   • RESP:    No cough. No wheeze.  No increased sputum. No hemoptysis. No SIERRA.  • :   No dysuria or suprapubic discomfort. No frequency.No urgency. No hesitancy. No incontinence. No hematuria. No flank pain.    • MS:   + Left knee joint stiffness and pain. No myalgias. No muscle weakness.    • SKIN:   No painful or pruritic rashes.  No skin discoloration.  • NEURO:  No focal numbness or weakness.  No headaches.  No ataxia. No slurred speech. No receptive/expressive aphasia.      • PSYCH:   No anxiety. No depression.  • ENDO:  No tremor, hair loss, heat or cold intolerance.    Objective   Objective   Vitals:   Temp:  [98 °F (36.7 °C)-98.6 °F (37 °C)] 98 °F (36.7 °C)  Heart Rate:  [68-86] 76  Resp:  [14-20] 14  BP: (119-155)/(64-82) 145/81  PHYSICAL EXAM   • CON: WN. WD. NAD.   • EYES:  Sclera anicteric. EOMI. Normal conjunctiva.   • ENT:  Oral mucosa normal without ulcers or thrush.    • NECK:  No thyromegaly. No stridor. Trachea midline.  • RESP:  CTA. No wheezes. No crackles.  No work of breathing or tachypnea.   • CV:  Rhythm regular. Rate WNL. No murmur noted.  No edema.  • GI:  Soft and nontender. Nondistended.  Bowel sounds present.   • EXT: Left knee dressing intact.  Distally, L LE intact neurovascularly.  • LYMPH:  No lymphedema noted.  No cervical lymphadenopathy.  • PSYCH:  Alert. Oriented. Normal affect and mood.  • NEURO:  CNII-XII grossly intact. No dysarthria or aphasia. No unilateral weakness or paresthesia.  • SKIN: No chronic venous stasis changes or varicosities.  No cellulitis    Result Review    Result Review:  I have personally reviewed the results from the time of this admission to 5/5/2023 11:15 EDT and agree with these findings:  []  Laboratory  []  Microbiology  []  Radiology  []  EKG/Telemetry   []  Cardiology/Vascular   []   Pathology  []  Old records  []  Other:    Assessment & Plan   Assessment / Plan   Assessment:  • Medical management  • CAD/PCI 2022  • Dyslipidemia  • Obesity with BMI greater than 35  • NOBLE (CPAP)  • Osteoarthritis  • BPH  • Neuropathy  • Prediabetes  • Anxiety/depression  • Recent L TKR 5/5/23 by Dr. Talavera     Plan:  • History of PCI 1 year ago; would like to resume dual antiplatelet therapy pending discussion with orthopedist regarding concurrent DVT prophylaxis ... Considering Eliquis x1 week with aspirin, then continue with aspirin/Brilinta thereafter.  • Continue home medical regimen  • Gentle IV fluid hydration  • Agree with PT and OT  • As needed pain meds as tolerated  • End-tidal CO2 monitoring.  Okay to use home CPAP here.  • Discussed plan with RN    DVT prophylaxis:  Medical and mechanical DVT prophylaxis orders are present.  CODE STATUS:         Electronically signed by JUAN FRANCISCO Bansal, 05/05/23, 11:15 AM EDT.

## 2023-05-05 NOTE — THERAPY EVALUATION
Acute Care - Physical Therapy Initial Evaluation  SERVANDO Cisse     Patient Name: Hector Coronel  : 1955  MRN: [id number]  Today's Date: 2023     Admit date: 2023     Referring Physician: Ryan Tyler MD     Surgery Date:2023   Procedure(s) (LRB):  TOTAL KNEE ARTHROPLASTY WITH RYLAND ROBOT (Left)             Visit Dx:     ICD-10-CM ICD-9-CM   1. Difficulty in walking  R26.2 719.7     Patient Active Problem List   Diagnosis   • NOBLE treated with BiPAP   • Class 2 obesity   • Primary osteoarthritis of both knees   • Major depressive disorder, recurrent, moderate   • Peripheral neuropathy, idiopathic   • Elevated liver enzymes   • Benign prostatic hyperplasia   • Cerumen debris on tympanic membrane of both ears   • Screening PSA (prostate specific antigen)   • Primary osteoarthritis of right hip   • Primary insomnia   • Anxiety   • Peripheral polyneuropathy   • Cough   • Shortness of breath   • S/P coronary artery stent placement   • Coronary artery disease involving native coronary artery of native heart without angina pectoris   • Hyperlipidemia LDL goal <70   • Primary osteoarthritis of left knee     Past Medical History:   Diagnosis Date   • Anxiety    • Arthritis     BACK   • Coronary artery disease 2022    BLOCKAGE- 1 STENT PLACED 2022- SEE'S DILEEP, JENNY CP/SOB   • Cough     ALLERGIES   • Depression    • Diabetes mellitus     BORDERLINE- DOESN'T CHECK BG AT HOME   • Edema     PRIOR TO STENT PLACEMENT   • Enlarged prostate    • History of transfusion     AS A CHILD STATES HE DOESNT THINK ANY ISSUE WITH IT   • Hoarseness    • Murmur, cardiac     ASYMPTOMATIC- SOAOE   • Neck pain     VERTABRA PUSHING ON THROAT   • Osteoarthritis     OG HIPS, BILATERL KNEES, L SHOULDER   • PONV (postoperative nausea and vomiting)    • Sleep apnea     USES BIPAP   • SOBOE (shortness of breath on exertion)    • TIA (transient ischemic attack)     MULTI. YEARS AGO- NO RESIDUAL   • Umbilical hernia       Past Surgical History:   Procedure Laterality Date   • APPENDECTOMY     • CARDIAC CATHETERIZATION Right 05/31/2022    Procedure: Left Heart Cath;  Surgeon: Eduardo Buck MD;  Location: [facility] CATH INVASIVE LOCATION;  Service: Cardiovascular;  Laterality: Right;   • CARPAL TUNNEL RELEASE Bilateral    • CHOLECYSTECTOMY     • HERNIA REPAIR      UMBILICAL   • INNER EAR SURGERY      BOTH SIDES   • THROAT SURGERY      BIOPSY, RMOVED POLYPS   • TOTAL HIP ARTHROPLASTY Right 11/22/2022    Procedure: RIGHT TOTAL HIP ARTHROPLASTY ANTERIOR;  Surgeon: Jose Talavera MD;  Location: [facility] MAIN OR;  Service: Orthopedics;  Laterality: Right;   • VASECTOMY      1979     PT Assessment (last 12 hours)     PT Evaluation and Treatment     Row Name 05/05/23 1104          Physical Therapy Time and Intention    Subjective Information no complaints  -ROCHELLE     Document Type evaluation  -ROCHELLE     Mode of Treatment individual therapy;physical therapy  -ROCHELLE     Patient Effort good  -ROCHELLE     Row Name 05/05/23 1104          General Information    Patient Observations alert;cooperative;agree to therapy  -ROCHELLE     Prior Level of Function independent:;all household mobility;community mobility  -ROCHELLE     Equipment Currently Used at Home none  has a RW from prior sx  -ROCHELLE     Existing Precautions/Restrictions fall;weight bearing  -ROCHELLE     Barriers to Rehab none identified  -ROCHELLE     Row Name 05/05/23 1104          Living Environment    People in Home spouse  -ROCHELLE     Row Name 05/05/23 1104          Range of Motion (ROM)    Range of Motion left lower extremity  10-80° knee  -ROCHELLE     Row Name 05/05/23 1104          Strength (Manual Muscle Testing)    Strength (Manual Muscle Testing) left lower extremity  3-/5 hip and knee  -ROCHELLE     Row Name 05/05/23 1104          Mobility    Extremity Weight-bearing Status left lower extremity  -ROCHELLE     Left Lower Extremity (Weight-bearing Status) weight-bearing as tolerated (WBAT)  -ROCHELLE     Row Name 05/05/23 1104          Bed  Mobility    Bed Mobility bed mobility (all) activities;supine-sit  -ROCHELLE     All Activities, [place] (Bed Mobility) contact guard  -ROCHELLE     Supine-Sit [place] (Bed Mobility) minimum assist (75% patient effort)  -ROCHELLE     Row Name 05/05/23 1104          Transfers    Transfers bed-chair transfer;sit-stand transfer  -ROCHELLE     Row Name 05/05/23 1104          Bed-Chair Transfer    Bed-Chair [place] (Transfers) contact guard  -ROCHELLE     Assistive Device (Bed-Chair Transfers) walker, front-wheeled  -ROCHELLE     Row Name 05/05/23 1104          Sit-Stand Transfer    Sit-Stand [place] (Transfers) contact guard  -ROCHELLE     Assistive Device (Sit-Stand Transfers) walker, front-wheeled  -ROCHELLE     Row Name 05/05/23 1104          Gait/Stairs (Locomotion)    Gait/Stairs Locomotion gait/ambulation assistive device  -ROCHELLE     [place] Level (Gait) contact guard  -ROCHELLE     Assistive Device (Gait) walker, front-wheeled  -ROCHELLE     Row Name 05/05/23 1104          Safety Issues, Functional Mobility    Impairments Affecting Function (Mobility) balance;pain;range of motion (ROM);strength  -ROCHELLE     Row Name 05/05/23 1104          Balance    Balance Assessment standing dynamic balance  -ROCHELLE     Dynamic Standing Balance contact guard  -ROCHELLE     Position/Device Used, Standing Balance walker, front-wheeled  -ROCHELLE     Row Name             [REMOVED] Wound 11/22/22 1010 Right anterior hip Incision    Wound - Properties Group Placement Date: 11/22/22  -SC Placement Time: 1010  -SC Present on Hospital Admission: N  -SC Side: Right  -SC Orientation: anterior  -SC Location: hip  -SC Primary Wound Type: Incision  -SC Removal Date: 05/05/23  -JDA Removal Time: 0630 -JDA    Retired Wound - Properties Group Placement Date: 11/22/22  -SC Placement Time: 1010  -SC Present on Hospital Admission: N  -SC Side: Right  -SC Orientation: anterior  -SC Location: hip  -SC Primary Wound Type: Incision  -SC Removal Date: 05/05/23  -JDA Removal Time: 0630 -JDA    Retired Wound  - Properties Group Date first assessed: 11/22/22  -SC Time first assessed: 1010  -SC Present on Hospital Admission: N  -SC Side: Right  -SC Location: hip  -SC Primary Wound Type: Incision  -SC Resolution Date: 05/05/23  -JDA Resolution Time: 0630  -JDA    Row Name             Wound 05/05/23 0844    Wound - Properties Group Placement Date: 05/05/23  -KJ Placement Time: 0844  -KJ Present on Hospital Admission: --  -KJ Side: --  -KJ Orientation: --  -KJ Location: --  -KJ Primary Wound Type: --  -KJ    Retired Wound - Properties Group Placement Date: 05/05/23  -KJ Placement Time: 0844  -KJ Present on Hospital Admission: --  -KJ Side: --  -KJ Orientation: --  -KJ Location: --  -KJ Primary Wound Type: --  -KJ    Retired Wound - Properties Group Date first assessed: 05/05/23  -KJ Time first assessed: 0844  -KJ Present on Hospital Admission: --  -KJ Side: --  -KJ Location: --  -KJ Primary Wound Type: --  -KJ    Row Name             Wound 05/05/23 0844 Left anterior knee Incision    Wound - Properties Group Placement Date: 05/05/23  -KJ Placement Time: 0844  -KJ Present on Hospital Admission: N  -KJ Side: Left  -KJ Orientation: anterior  -KJ Location: knee  -KJ Primary Wound Type: Incision  -KJ    Retired Wound - Properties Group Placement Date: 05/05/23  -KJ Placement Time: 0844  -KJ Present on Hospital Admission: N  -KJ Side: Left  -KJ Orientation: anterior  -KJ Location: knee  -KJ Primary Wound Type: Incision  -KJ    Retired Wound - Properties Group Date first assessed: 05/05/23  -KJ Time first assessed: 0844  -KJ Present on Hospital Admission: N  -KJ Side: Left  -KJ Location: knee  -KJ Primary Wound Type: Incision  -KJ    Row Name 05/05/23 1104          Plan of Care Review    Plan of Care Reviewed With patient  -ROCHELLE     Outcome Evaluation Patient presents with decreased strength, transfers and ambulation.  Skilled physical therapy services will be required to address these mobility deficits.  Recommend home with  outpatient therapy services  -ROCHELLE     Row Name 05/05/23 1104          Therapy Assessment/Plan (PT)    Patient/Family Therapy Goals Statement (PT) Patient was to have less pain  -ROCHELLE     Rehab Potential (PT) good, to achieve stated therapy goals  -ROCHELLE     Criteria for Skilled Interventions Met (PT) skilled treatment is necessary  -ROCHELLE     Therapy Frequency (PT) 2 times/day  -ROCHELLE     Predicted Duration of Therapy Intervention (PT) 10 days  -ROCHELLE     Problem List (PT) problems related to;balance;mobility;strength;range of motion (ROM);pain  -ROCHELLE     Activity Limitations Related to Problem List (PT) unable to ambulate safely;unable to transfer safely  -ROCHELLE     Row Name 05/05/23 1104          PT Evaluation Complexity    History, PT Evaluation Complexity no personal factors and/or comorbidities  -ROCHELLE     Examination of Body Systems (PT Eval Complexity) total of 4 or more elements  -ROCHELLE     Clinical Presentation (PT Evaluation Complexity) stable  -ROCHELLE     Clinical Decision Making (PT Evaluation Complexity) low complexity  -ROCHELLE     Overall Complexity (PT Evaluation Complexity) low complexity  -ROCHELLE     Row Name 05/05/23 1104          Therapy Plan Review/Discharge Plan (PT)    Therapy Plan Review (PT) evaluation/treatment results reviewed;participants voiced agreement with care plan;participants included;patient  -ROCHELLE     Row Name 05/05/23 1104          Physical Therapy Goals    Transfer Goal Selection (PT) transfer, PT goal 1  -ROCHELLE     Gait Training Goal Selection (PT) gait training, PT goal 1  -ROCHELLE     Row Name 05/05/23 1104          Transfer Goal 1 (PT)    Activity/Assistive Device (Transfer Goal 1, PT) transfers, all  -ROCHELLE     [place] Level/Cues Needed (Transfer Goal 1, PT) independent  -ROCHELLE     Time Frame (Transfer Goal 1, PT) long term goal (LTG);10 days  -ROCHELLE     Row Name 05/05/23 1104          Gait Training Goal 1 (PT)    Activity/Assistive Device (Gait Training Goal 1, PT) gait (walking locomotion);assistive device use;walker,  rolling  -ROCHELLE     [place] Level (Gait Training Goal 1, PT) independent  -ROCHELLE     Distance (Gait Training Goal 1, PT) 300  -ROCHELLE     Time Frame (Gait Training Goal 1, PT) long term goal (LTG);10 days  -ROCHELLE           User Key  (r) = Recorded By, (t) = Taken By, (c) = Cosigned By    Initials Name Provider Type    SC Nava Gutiérrez, RN Registered Nurse    Lashae Reyes RN Registered Nurse    Gaston Zambrano, PT Physical Therapist    Alisson Roper RN Registered Nurse                Physical Therapy Education     Title: PT OT SLP Therapies (In Progress)     Topic: Physical Therapy (Done)     Point: Mobility training (Done)     Learning Progress Summary           Patient Acceptance, E,TB, VU by ROCHELLE at 5/5/2023 1117                   Point: Precautions (Done)     Learning Progress Summary           Patient Acceptance, E,TB, VU by ROCHELLE at 5/5/2023 1117                               User Key     Initials Effective Dates Name Provider Type Discipline    ROCHELLE 06/03/21 -  Gaston Quevedo, PT Physical Therapist PT              PT Recommendation and Plan  Anticipated Discharge Disposition (PT): home with outpatient therapy services  Planned Therapy Interventions (PT): balance training, bed mobility training, gait training, home exercise program, stair training, strengthening, transfer training  Therapy Frequency (PT): 2 times/day  Plan of Care Reviewed With: patient  Outcome Evaluation: Patient presents with decreased strength, transfers and ambulation.  Skilled physical therapy services will be required to address these mobility deficits.  Recommend home with outpatient therapy services   Outcome Measures     Row Name 05/05/23 1117 05/05/23 1100          How much help from another person do you currently need...    Turning from your back to your side while in flat bed without using bedrails? 3  -ROCHELLE --     Moving from lying on back to sitting on the side of a flat bed without bedrails? 3  -ROCHELLE --     Moving to and  from a bed to a chair (including a wheelchair)? 3  -ROCHELLE --     Standing up from a chair using your arms (e.g., wheelchair, bedside chair)? 3  -ROCHELLE --     Climbing 3-5 steps with a railing? 3  -ROCHELLE --     To walk in hospital room? 3  -ROCHELLE --     AM-PAC 6 Clicks Score (PT) 18  -ROCHELLE --        Functional Assessment    Outcome Measure Options AM-PAC 6 Clicks Basic Mobility (PT)  -ROCHELLE AM-PAC 6 Clicks Basic Mobility (PT)  -ROCHELLE           User Key  (r) = Recorded By, (t) = Taken By, (c) = Cosigned By    Initials Name Provider Type    Gatson Zambrano PT Physical Therapist                 Time Calculation:    PT Charges     Row Name 05/05/23 1110             Time Calculation    PT Received On 05/05/23  -ROCHELLE      PT Goal Re-Cert Due Date 05/14/23  -ROCHELLE         Untimed Charges    PT Eval/Re-eval Minutes 25  -ROCHELLE         Total Minutes    Untimed Charges Total Minutes 25  -ROCHELLE       Total Minutes 25  -ROCHELLE            User Key  (r) = Recorded By, (t) = Taken By, (c) = Cosigned By    Initials Name Provider Type    Gaston Zambrano, PT Physical Therapist              Therapy Charges for Today     Code Description Service Date Service Provider Modifiers Qty    [id number] HC PT EVAL LOW COMPLEXITY 2 5/5/2023 Gaston Quevedo PT GP 1          PT G-Codes  Outcome Measure Options: AM-PAC 6 Clicks Basic Mobility (PT)  AM-PAC 6 Clicks Score (PT): 18    Gaston Quevedo PT  5/5/2023

## 2023-05-05 NOTE — OP NOTE
TOTAL KNEE ARTHROPLASTY WITH RYLAND ROBOT  Procedure Report    Patient Name:  Hector Coronel  YOB: 1955    Date of Surgery:  5/5/2023       Pre-op Diagnosis:   Primary osteoarthritis of left knee [M17.12]       Post-Op Diagnosis Codes:     * Primary osteoarthritis of left knee [M17.12]    Procedure/CPT® Codes:      Procedure(s):  Left TOTAL KNEE ARTHROPLASTY WITH RYLAND ROBOT    Surgical Approach: Knee Medial Parapatellar        Staff:  Surgeon(s):  Jose Talavera MD    Assistant: Lisa Galvez RN; Brad Cr    Anesthesia: General    Estimated Blood Loss: 50ml    Implants:    Implant Name Type Inv. Item Serial No.  Lot No. LRB No. Used Action   CMT BONE PALACOS R HI/VISC 1X40 - [id number] Implant CMT BONE PALACOS R HI/VISC 1X40  [facility] [id number] Left 2 Implanted   PAT KN PERSONA ALLPOLY CMT 8.5X32MM - [id number] Implant PAT KN PERSONA ALLPOLY CMT 8.5X32MM  [employer] [id number] Left 1 Implanted   STEM TIB/KN PERSONA CMT 5D SZF LT - [id number] Implant STEM TIB/KN PERSONA CMT 5D SZF LT  [employer] [id number] Left 1 Implanted   ART/SRF KN PERSONA/VE PS EF 8TO11 10MM LT - [id number] Implant ART/SRF KN PERSONA/VE PS EF 8TO11 10MM LT  [employer] [id number] Left 1 Implanted   COMP FEM/KN PERSONA TIVANIUM CR CMT STD SZ10 LT - [id number] Implant COMP FEM/KN PERSONA TIVANIUM CR CMT STD SZ10 LT  [employer] [id number] Left 1 Implanted       Specimen:          None    Findings: See description    Complications: None    Description of Procedure: Patient was taken to the operating room placed supine operating table after abductor canal blocks and in preoperative holding.  After general tracheal anesthesia was established the  left knee and lower extremity were prepped and draped in standard surgical fashion using alcohol ChloraPrep.  The Ryland robot was also draped sterilely and calibrated.  Incision was made 2 fingerbreadth superior superior pole of the patella down to the medial aspect  of tibial tubercle the knife and full-thickness skin flaps were raised laterally and medially.  A medial parapatellar arthrotomy was then undertaken and the knee was brought into flexion.  Retractors were placed to protect protect the collateral ligaments.  Soft tissue releases and osteophytes removed as deemed necessary.  Then the tracking device was mounted on the distal femur in a standard fashion as well as through separate stab incisions in the distal tibia 5 fingerbreadths inferior to the tibial tubercle.  Then all the femoral points were mapped out using the Tina software the tibial points were mapped out as well.  While the plan for resection was being completed the patella was everted calipered and cut to the appropriate thickness.  The correct size patella was chosen in the locals for the patella were reamed and a trial patella was placed and the knee was brought back into flexion.  The distal femoral cutting block was then brought in using robotic assisted arm and the distal femoral cut was made it was checked and verified and accepted.  Then the external rotation arm of the robot was used to pin the distal femur and the correct external rotation decided by the intraoperative plan using the previously mapped points.  Then the tibia was cut after removing the robotic arm and to the appropriate position to cut the tibia the cutting block was pinned and the proximal tibia cut was made excess bone from the cut was removed and the 4-in-1 cutting block was then used in the distal femur.  The tibial cut was verified and accepted femoral and tibial trials were then placed and the knee was taken through range of motion verifying flexion extension gaps and extension and flexion range of motion to be acceptable by using the software in a standard fashion.  Locals for the femur were then drilled the trials were removed the bone ends were copiously irrigated with bacitracin Simpulse irrigation.  The tibia was cemented  in place according to marked external rotation from the trials the femur was cemented in position second and then the real polychosen was placed.  Excess cement removed from the implant edges the knee was held in extension until the cement dried and the patella was cemented into place and held with a patellar clamp.  After the cement hardened excess management from the implant edges Irrisept was used and wound was copiously irrigated the knee was taken through range of motion and checked 1 last time the patella tracked in the center of the trochlear groove the femur using no thumbs technique.  Then the arthrotomy was closed in 45 degrees of flexion with Ethibond the deep fat was closed 0 Vicryl subcu was closed with 2-0 Vicryl and the skin was closed with staples incision was washed and dried and sterile dressings were applied the patient tolerated the procedure well and was taken to recovery room.       Jose Talavera MD     Date: 5/5/2023  Time: 12:44 EDT

## 2023-05-05 NOTE — ANESTHESIA PROCEDURE NOTES
Peripheral Block      Patient reassessed immediately prior to procedure    Patient location during procedure: pre-op  Start time: 5/5/2023 7:35 AM  Stop time: 5/5/2023 7:39 AM  Reason for block: at surgeon's request and post-op pain management  Performed by  Anesthesiologist: Sj Shea MD  Preanesthetic Checklist  Completed: patient identified, IV checked, site marked, risks and benefits discussed, surgical consent, monitors and equipment checked, pre-op evaluation and timeout performed  Prep:  Pt Position: supine  Sterile barriers:alcohol skin prep, partial drape, cap, washed/disinfected hands, mask and gloves  Prep: ChloraPrep  Patient monitoring: blood pressure monitoring, continuous pulse oximetry and EKG  Procedure    Sedation: yes  Performed under: local infiltration  Guidance:ultrasound guided and nerve stimulator    ULTRASOUND INTERPRETATION.  Using ultrasound guidance a 20 G gauge needle was placed in close proximity to the nerve, at which point, under ultrasound guidance anesthetic was injected in the area of the nerve and spread of the anesthesia was seen on ultrasound in close proximity thereto.  There were no abnormalities seen on ultrasound; a digital image was taken; and the patient tolerated the procedure with no complications. Images:still images obtained, printed/placed on chart    Laterality:left  Block Type:adductor canal block  Injection Technique:single-shot  Needle Type:echogenic  Needle Gauge:20 G (4in)  Resistance on Injection: none    Medications Used: bupivacaine-EPINEPHrine PF (MARCAINE w/EPI) 0.5% -[id number] injection - Injection   30 mL - 5/5/2023 7:35:00 AM      Post Assessment  Injection Assessment: negative aspiration for heme, no paresthesia on injection and incremental injection  Patient Tolerance:comfortable throughout block  Complications:no  Additional Notes  The block or continuous infusion is requested by the referring physician for management of postoperative pain, or  pain related to a procedure. Ultrasound guidance (deemed medically necessary). Painless injection, pt was awake and conversant during the procedure without complications. Needle and surrounding structures visualized throughout procedure. No adverse reactions or complications seen during this period. Post-procedure image showed no signs of complication, and anatomy was consistent with an uncomplicated nerve blockade.

## 2023-05-05 NOTE — PLAN OF CARE
Goal Outcome Evaluation:       Patient has been cooperative since coming from PACU. Medicated for anxiety once. Requires one person assistance when ambulating. Pain has remained at an acceptable level. Voiding without complication.  Anticipating discharge tomorrow.

## 2023-05-05 NOTE — ANESTHESIA PREPROCEDURE EVALUATION
Anesthesia Evaluation     Patient summary reviewed and Nursing notes reviewed   no history of anesthetic complications:  NPO Solid Status: > 8 hours  NPO Liquid Status: > 2 hours           Airway   Mallampati: I  TM distance: >3 FB  Neck ROM: limited  Possible difficult intubation  Dental    (+) poor dentition        Pulmonary - normal exam    breath sounds clear to auscultation  (+) sleep apnea,   Cardiovascular - normal exam  Exercise tolerance: good (4-7 METS)    Rhythm: regular  Rate: normal    (+) CAD, cardiac stents hyperlipidemia,     ROS comment: Off Brilinta since sunday    Neuro/Psych  (+) TIA, numbness,    GI/Hepatic/Renal/Endo    (+)   diabetes mellitus,     Musculoskeletal     (+) neck pain,   Abdominal    Substance History      OB/GYN          Other        ROS/Med Hx Other: <4METS, SOAE, DECREASED MOBILITY. HX DM, SLEEP APNEA, CAD, STENT 5/31/22.   ECHO 6/17/22 EF 60-65%, NO VALVE STENOSIS.   CARDS CLEARANCE/MED DIRECTIVE 4/24/23. TJR. KT                   Anesthesia Plan    ASA 3     ERAS Protocol and general with block     (Patient understands anesthesia not responsible for dental damage. Regional anesthesia options discussed with patient. Pt accepts regional block.)  intravenous induction     Anesthetic plan, risks, benefits, and alternatives have been provided, discussed and informed consent has been obtained with: patient and spouse/significant other.    Plan discussed with CRNA.        CODE STATUS:

## 2023-05-05 NOTE — ANESTHESIA POSTPROCEDURE EVALUATION
Patient: Hector Coronel    Procedure Summary     Date: 05/05/23 Room / Location: [facility] OR 03 / [facility] MAIN OR    Anesthesia Start: 0812 Anesthesia Stop: 0956    Procedure: TOTAL KNEE ARTHROPLASTY WITH RYLAND ROBOT (Left: Knee) Diagnosis:       Primary osteoarthritis of left knee      (Primary osteoarthritis of left knee [M17.12])    Surgeons: Jose Talavera MD Provider: Sj Shae MD    Anesthesia Type: ERAS Protocol, general with block ASA Status: 3          Anesthesia Type: ERAS Protocol, general with block    Vitals  Vitals Value Taken Time   /81 05/05/23 1024   Temp     Pulse 86 05/05/23 1025   Resp     SpO2 100 % 05/05/23 1025   Vitals shown include unvalidated device data.        Post Anesthesia Care and Evaluation    Patient location during evaluation: bedside  Patient participation: complete - patient participated  Level of consciousness: awake  Pain management: adequate    Airway patency: patent  PONV Status: none  Cardiovascular status: acceptable  Respiratory status: acceptable  Hydration status: acceptable    Comments: An Anesthesiologist personally participated in the most demanding procedures (including induction and emergence if applicable) in the anesthesia plan, monitored the course of anesthesia administration at frequent intervals and remained physically present and available for immediate diagnosis and treatment of emergencies.

## 2023-05-05 NOTE — H&P
"h and p      Chief Complaint  Follow-up and Pain of the Left Knee and Follow-up and Pain of the Right Hip        Subjective          Hector Coronel presents to [facility] for follow-up of right hip and left knee.  He recently underwent right total hip arthroplasty in 11/27/2022 by Dr. ALEE Talavera.  He presents today for follow-up reporting he feels fully recovered from his right hip.  He has been discharged from physical therapy and feels he has been able to return to activities as normal from a right hip standpoint.  Unfortunately, he is complaining of significant left knee pain and states \"my left knee is killing me\".  He feels ready to proceed with left total knee arthroplasty.           Objective            Allergies   Allergen Reactions   • Metal Rash       CHEAP METAL- BELT HAZEL BREAK HIM OUT          Vital Signs:   Ht 172.7 cm (68\")   Wt 109 kg (240 lb)   BMI 36.49 kg/m²        Physical Exam                         Constitutional: Awake, alert. Well nourished appearance.               Integumentary: Warm, dry, intact. No obvious rashes.               HENT: Atraumatic, normocephalic.              Respiratory: Non labored respirations .              Cardiovascular: Intact peripheral pulses.               Psychiatric: Normal mood and affect. A&O X3     Ortho Exam  Right hip: Well-healed surgical scar noted.  Skin is warm, dry, and intact.  No pain with passive internal or external rotation of the hip.  Full flexion and extension of the knee.  Full plantarflexion and dorsiflexion of the ankle.  Sensation intact to light touch.  Distal neurovascular intact.     Left knee: Skin is warm, dry, and intact.  Obvious arthritic changes.  Crepitus with ROM.  Varus alignment noted.  Full knee extension and flexion to 130 degrees.  Full plantarflexion and dorsiflexion of the ankle.  Sensation intact light touch.  Distal neurovascular intact.             Imaging Results (Most Recent)      " Procedure Component Value Units Date/Time     XR Knee 3 View Left [[id number]] Resulted: 04/17/23 1241       Updated: 04/17/23 1244     Narrative:       X-Ray Report:  Study: X-rays ordered, taken in the office, and reviewed today.   Site: Left knee Xray  Indication: Pain  View: AP/Lateral, Standing and Sunrise view(s)  Findings: Advanced tricompartmental osteoarthritis of the right knee with   significant patellar bone spurring present. Bone-on-bone findings in the   medial compartment.   Prior studies available for comparison: yes                 Narrative & Impression   X-Ray Report:  Study: X-rays ordered, taken in the office, and reviewed today.   Site: Right hip Xray  Indication: DIANE  View: AP/Lateral view(s)  Findings: Intact right total hip arthroplasty . No evidence of hardware malfunction.   Prior studies available for comparison: yes       This result has not been signed. Information might be incomplete.                   Assessment      Assessment and Plan       Problem List Items Addressed This Visit               Musculoskeletal and Injuries     Primary osteoarthritis of left knee     Overview       Added automatically from request for surgery [id number]             Other Visit Diagnoses      Aftercare following right hip joint replacement surgery    -  Primary     Relevant Orders     XR Hip With or Without Pelvis 2 - 3 View Right (Completed)     Left knee pain, unspecified chronicity         Relevant Orders     XR Knee 3 View Left (Completed)          Follow Up   Return for Recheck.  Patient is a former smoker.  Encouraged continued tobacco cessation.  Did not discuss options for smoking cessation.     Patient Instructions   Patient is doing very well from a R hip standpoint. He is c/o severe left knee pain interfering with ADLs and overall quality of life.  X-rays revealing bone-on-bone osteoarthritis and he is ready to proceed with L TKA. Discussed with Dr. Talavera, orders placed.      The patient does  report a history of coronary artery disease status post stent placement.  Reports he is on chronic antiplatelet therapy with Brilinta.  He believes he was supposed to discontinue this several months ago.  He he has been instructed to follow-up with cardiology in order to obtain cardiology clearance and instructions regarding Brilinta.     Discussed surgery. Risks/benefits discussed with patient including, but not limited to: infection, bleeding, neurovascular damage, malunion, nonunion, aesthetic deformity, need for further surgery, and death. Discussed with patient the implant type being used during surgery and patient understands and desires to proceed. Surgery pamphlet provided to patient. Call or return if worsening symptoms.        Patient was given instructions and counseling regarding his condition or for health maintenance advice. Please see specific information pulled into the AVS if appropriate.                 Jose Talavera MD  05/05/23

## 2023-05-06 VITALS
WEIGHT: 241.4 LBS | SYSTOLIC BLOOD PRESSURE: 142 MMHG | OXYGEN SATURATION: 97 % | DIASTOLIC BLOOD PRESSURE: 67 MMHG | HEART RATE: 76 BPM | TEMPERATURE: 99.4 F | HEIGHT: 69 IN | RESPIRATION RATE: 18 BRPM | BODY MASS INDEX: 35.76 KG/M2

## 2023-05-06 LAB
DEPRECATED RDW RBC AUTO: 48.6 FL (ref 37–54)
ERYTHROCYTE [DISTWIDTH] IN BLOOD BY AUTOMATED COUNT: 14.9 % (ref 12.3–15.4)
HCT VFR BLD AUTO: 39.9 % (ref 37.5–51)
HCT VFR BLD AUTO: 39.9 % (ref 37.5–51)
HGB BLD-MCNC: 13.3 G/DL (ref 13–17.7)
HGB BLD-MCNC: 13.3 G/DL (ref 13–17.7)
MCH RBC QN AUTO: 29.8 PG (ref 26.6–33)
MCHC RBC AUTO-ENTMCNC: 33.3 G/DL (ref 31.5–35.7)
MCV RBC AUTO: 89.5 FL (ref 79–97)
PLATELET # BLD AUTO: 153 10*3/MM3 (ref 140–450)
PMV BLD AUTO: 10.3 FL (ref 6–12)
RBC # BLD AUTO: 4.46 10*6/MM3 (ref 4.14–5.8)
WBC NRBC COR # BLD: 10.31 10*3/MM3 (ref 3.4–10.8)

## 2023-05-06 PROCEDURE — A9270 NON-COVERED ITEM OR SERVICE: HCPCS | Performed by: ORTHOPAEDIC SURGERY

## 2023-05-06 PROCEDURE — 85014 HEMATOCRIT: CPT | Performed by: ORTHOPAEDIC SURGERY

## 2023-05-06 PROCEDURE — 63710000001 HYDROCODONE-ACETAMINOPHEN 7.5-325 MG TABLET: Performed by: ORTHOPAEDIC SURGERY

## 2023-05-06 PROCEDURE — 63710000001 APIXABAN 2.5 MG TABLET: Performed by: ORTHOPAEDIC SURGERY

## 2023-05-06 PROCEDURE — 85027 COMPLETE CBC AUTOMATED: CPT | Performed by: ORTHOPAEDIC SURGERY

## 2023-05-06 PROCEDURE — 97150 GROUP THERAPEUTIC PROCEDURES: CPT

## 2023-05-06 PROCEDURE — 97110 THERAPEUTIC EXERCISES: CPT

## 2023-05-06 PROCEDURE — 25010000002 KETOROLAC TROMETHAMINE PER 15 MG: Performed by: ORTHOPAEDIC SURGERY

## 2023-05-06 PROCEDURE — 99213 OFFICE O/P EST LOW 20 MIN: CPT | Performed by: PHYSICIAN ASSISTANT

## 2023-05-06 PROCEDURE — 25010000002 ONDANSETRON PER 1 MG: Performed by: ORTHOPAEDIC SURGERY

## 2023-05-06 PROCEDURE — 97165 OT EVAL LOW COMPLEX 30 MIN: CPT

## 2023-05-06 PROCEDURE — 63710000001 ACETAMINOPHEN 500 MG TABLET: Performed by: ORTHOPAEDIC SURGERY

## 2023-05-06 PROCEDURE — 97535 SELF CARE MNGMENT TRAINING: CPT

## 2023-05-06 PROCEDURE — 94799 UNLISTED PULMONARY SVC/PX: CPT

## 2023-05-06 PROCEDURE — 97116 GAIT TRAINING THERAPY: CPT

## 2023-05-06 PROCEDURE — 85018 HEMOGLOBIN: CPT | Performed by: ORTHOPAEDIC SURGERY

## 2023-05-06 RX ORDER — CALCIUM CARBONATE 200(500)MG
2 TABLET,CHEWABLE ORAL 3 TIMES DAILY PRN
Status: DISCONTINUED | OUTPATIENT
Start: 2023-05-06 | End: 2023-05-06 | Stop reason: HOSPADM

## 2023-05-06 RX ADMIN — APIXABAN 2.5 MG: 2.5 TABLET, FILM COATED ORAL at 08:39

## 2023-05-06 RX ADMIN — ACETAMINOPHEN 1000 MG: 500 TABLET ORAL at 12:10

## 2023-05-06 RX ADMIN — KETOROLAC TROMETHAMINE 15 MG: 30 INJECTION, SOLUTION INTRAMUSCULAR; INTRAVENOUS at 05:44

## 2023-05-06 RX ADMIN — HYDROCODONE BITARTRATE AND ACETAMINOPHEN 1 TABLET: 7.5; 325 TABLET ORAL at 09:08

## 2023-05-06 RX ADMIN — ONDANSETRON 4 MG: 2 INJECTION INTRAMUSCULAR; INTRAVENOUS at 02:52

## 2023-05-06 NOTE — PROGRESS NOTES
[facility]   Hospitalist Progress Note       Patient Name: Hector Coronel  : 1955  MRN: [id number]  Primary Care Physician: Eduardo Bowman MD  Date of admission: 2023  Today's Date: 2023  Room / Bed:   236/1  Subjective   Chief Complaint: Medical management     HPI:    Hector Coronel is a 68 y.o. male who is being seen by hospitalist for medical management of general medical conditions.  He has a history of CAD/PCI , anxiety/depression, obesity with BMI >35, NOBLE (CPAP), dyslipidemia, peripheral neuropathy, BPH, osteoarthritis.  Recent lab work reviewed reveals normal BMP and CBC.  Recent A1c 5.9.  Home medications reviewed and include Prilosec, doxazosin, Proscar, atorvastatin, Wellbutrin, gabapentin, Brilinta and aspirin, Norco, multivitamin.  He has been cleared by cardiology with moderate risk for recent knee surgery (holding aspirin 7 days prior and Brilinta 5 days prior to procedure).  We will be following up with Dr. Rojas in July.     Recent vital signs shows normal BP, afebrile,: Currently on 2L with ETCO2 monitoring system.  99% sats.       Interval Followup: 2023    • Resting in bed.  No new complaints.  • Has done well with physical therapy.  He is eager to return home.  • Remains medically stable.  • Discussed discharge disposition with him.  Daughter at bedside.      REVIEW OF SYSTEMS: All other systems reviewed and are negative.   • GEN: No fevers. No chills. No weight gain. No weight loss.     • HEENT:   No dysphagia/odynophagia. No visual disturbance.    • GI:    No N/V.  No abd pain. No diarrhea. No constipation.  No bloody/black tarry stools. No hematemesis.   • CV: No chest discomfort.  No palps. No lightheadedness. No syncope. No orthopnea/PND. No edema.   • RESP:    No cough. No wheeze.  No increased sputum. No hemoptysis. No SIERRA.  • :   No dysuria or suprapubic discomfort. No frequency.No urgency. No hesitancy. No incontinence. No hematuria. No flank pain.    • MS:    No joint stiffness and pain. No myalgias. No muscle weakness.    • SKIN:   No painful or pruritic rashes.  No skin discoloration.  • NEURO:  No focal numbness or weakness.  No headaches.  No ataxia. No slurred speech. No receptive/expressive aphasia.      • PSYCH:   No anxiety. No depression.  • ENDO:  No tremor, hair loss, heat or cold intolerance.  Objective   Temp:  [97.4 °F (36.3 °C)-99.4 °F (37.4 °C)] 99.4 °F (37.4 °C)  Heart Rate:  [69-91] 76  Resp:  [13-21] 18  BP: (125-170)/(64-94) 142/67  Flow (L/min):  [1-2] 1  PHYSICAL EXAM   • CON: WN. WD. NAD.   • EYES:  Sclera anicteric. EOMI. Normal conjunctiva.   • ENT:  Oropharyngeal mucosa without ulcers or thrush.    • NECK:  No thyromegaly. No stridor. Trachea midline.  • RESP:  CTA. No wheezes. No crackles.  No work of breathing or tachypnea.   • CV:  Rhythm regular. Rate WNL. No murmur noted.  No edema.  • GI:  Soft and nontender. Nondistended.  Bowel sounds present.   • EXT: Peripheral pulses intact.  No joint deformities or cyanosis.  • LYMPH:  No lymphedema noted.  No cervical lymphadenopathy.  • PSYCH:  Alert. Oriented. Normal affect and mood.  • NEURO:  CNII-XII grossly intact. No dysarthria or aphasia. No unilateral weakness or paresthesia.  • SKIN: No chronic venous stasis changes or varicosities.  No cellulitis    Results from last 7 days   Lab Units 05/06/23  0506   WBC 10*3/mm3 10.31   HEMOGLOBIN g/dL 13.3  13.3   HEMATOCRIT % 39.9  39.9   PLATELETS 10*3/mm3 153               RESULTS REVIEWED:  I have personally reviewed the results from the time of this admission to 5/6/2023 11:42 EDT and agree with these findings:  []  Laboratory  []  Microbiology  []  Radiology  []  EKG/Telemetry   []  Cardiology/Vascular   []  Pathology  []  Old records  []  Other:  Assessment / Plan   Assessment:    • Medical management  • CAD/PCI 2022  • Dyslipidemia  • Obesity with BMI greater than 35  • NOBLE  (CPAP)  • Osteoarthritis  • BPH  • Neuropathy  • Prediabetes  • Anxiety/depression  • Recent L TKR 5/5/23 by Dr. Talavera     Plan:  • Home today  • Post op: Eliquis x 1 week with aspirin, then continue with aspirin/Brilinta thereafter.  • Continue home medical regimen    Discussed plan with RN  DVT prophylaxis:  Medical and mechanical DVT prophylaxis orders are present.  CODE STATUS:            Electronically signed by JUAN FRANCISCO Bansal, 05/06/23, 11:42 AM EDT.

## 2023-05-06 NOTE — PLAN OF CARE
Problem: Adult Inpatient Plan of Care  Goal: Plan of Care Review  Outcome: Ongoing, Progressing  Flowsheets (Taken 5/6/2023 0454)  Progress: improving  Plan of Care Reviewed With:   patient   spouse  Goal: Patient-Specific Goal (Individualized)  Outcome: Ongoing, Progressing  Goal: Absence of Hospital-Acquired Illness or Injury  Outcome: Ongoing, Progressing  Intervention: Identify and Manage Fall Risk  Recent Flowsheet Documentation  Taken 5/6/2023 0443 by Mirian Song LPN  Safety Promotion/Fall Prevention: safety round/check completed  Taken 5/6/2023 0255 by Mirian Song LPN  Safety Promotion/Fall Prevention: safety round/check completed  Goal: Optimal Comfort and Wellbeing  Outcome: Ongoing, Progressing  Goal: Readiness for Transition of Care  Outcome: Ongoing, Progressing   Goal Outcome Evaluation:  Plan of Care Reviewed With: patient, spouse        Progress: improving   Vitals stable. Complaints of nausea and pain treated well per eMAR. Wife at bedside throughout the shift. Will likely discharge today. Will continue to monitor progress.

## 2023-05-06 NOTE — PLAN OF CARE
Goal Outcome Evaluation:  Plan of Care Reviewed With: patient, spouse        Progress: no change  Outcome Evaluation: Patient presents with limitations that impede his/her ability to perform ADLS. The skills of a therapist are necessary to maximize independence with ADLs.  Recommend home with assist and outpatient therapy services following hospital discharge

## 2023-05-06 NOTE — THERAPY TREATMENT NOTE
Acute Care - Physical Therapy Treatment Note  SERVANDO C[facility]     Patient Name: Hector Coronel  : 1955  MRN: [id number]  Today's Date: 2023      Visit Dx:     ICD-10-CM ICD-9-CM   1. Difficulty in walking  R26.2 719.7   2. Primary osteoarthritis of left knee  M17.12 715.16   3. Decreased activities of daily living (ADL)  Z78.9 V49.89     Patient Active Problem List   Diagnosis   • NOBLE treated with BiPAP   • Class 2 obesity   • Primary osteoarthritis of both knees   • Major depressive disorder, recurrent, moderate   • Peripheral neuropathy, idiopathic   • Elevated liver enzymes   • Benign prostatic hyperplasia   • Cerumen debris on tympanic membrane of both ears   • Screening PSA (prostate specific antigen)   • Primary osteoarthritis of right hip   • Primary insomnia   • Anxiety   • Peripheral polyneuropathy   • Cough   • Shortness of breath   • S/P coronary artery stent placement   • Coronary artery disease involving native coronary artery of native heart without angina pectoris   • Hyperlipidemia LDL goal <70   • Primary osteoarthritis of left knee     Past Medical History:   Diagnosis Date   • Anxiety    • Arthritis     BACK   • Coronary artery disease 2022    BLOCKAGE- 1 STENT PLACED 2022- SEE'S DILEEP, DENIED CP/SOB   • Cough     ALLERGIES   • Depression    • Diabetes mellitus     BORDERLINE- DOESN'T CHECK BG AT HOME   • Edema     PRIOR TO STENT PLACEMENT   • Enlarged prostate    • History of transfusion     AS A CHILD STATES HE DOESNT THINK ANY ISSUE WITH IT   • Hoarseness    • Murmur, cardiac     ASYMPTOMATIC- SOAOE   • Neck pain     VERTABRA PUSHING ON THROAT   • Osteoarthritis     OG HIPS, BILATERL KNEES, L SHOULDER   • PONV (postoperative nausea and vomiting)    • Sleep apnea     USES BIPAP   • SOBOE (shortness of breath on exertion)    • TIA (transient ischemic attack)     MULTI. YEARS AGO- NO RESIDUAL   • Umbilical hernia      Past Surgical History:   Procedure Laterality Date   •  APPENDECTOMY     • CARDIAC CATHETERIZATION Right 05/31/2022    Procedure: Left Heart Cath;  Surgeon: Eduardo Buck MD;  Location: [facility] CATH INVASIVE LOCATION;  Service: Cardiovascular;  Laterality: Right;   • CARPAL TUNNEL RELEASE Bilateral    • CHOLECYSTECTOMY     • HERNIA REPAIR      UMBILICAL   • INNER EAR SURGERY      BOTH SIDES   • THROAT SURGERY      BIOPSY, RMOVED POLYPS   • TOTAL HIP ARTHROPLASTY Right 11/22/2022    Procedure: RIGHT TOTAL HIP ARTHROPLASTY ANTERIOR;  Surgeon: Jose Talavera MD;  Location: [facility] MAIN OR;  Service: Orthopedics;  Laterality: Right;   • VASECTOMY      1979     PT Assessment (last 12 hours)     PT Evaluation and Treatment     Row Name 05/06/23 0929          Physical Therapy Time and Intention    Subjective Information complains of;pain  -TS     Document Type therapy note (daily note)  -TS     Mode of Treatment individual therapy;group therapy;physical therapy  -TS     Row Name 05/06/23 0929          Pain    Pretreatment Pain Rating 4/10  -TS     Pain Location - Side/Orientation Left  -TS     Pain Location - knee  -TS     Row Name 05/06/23 0929          Cognition    Affect/Mental Status (Cognition) WNL  -TS     Row Name 05/06/23 0929          Range of Motion (ROM)    Range of Motion left lower extremity ROM  10-80°  -TS     Row Name 05/06/23 0929          Mobility    Extremity Weight-bearing Status left lower extremity  -TS     Left Lower Extremity (Weight-bearing Status) weight-bearing as tolerated (WBAT)  -TS     Row Name 05/06/23 0929          Transfers    Transfers sit-stand transfer;stand-sit transfer  -TS     Row Name 05/06/23 0929          Sit-Stand Transfer    Sit-Stand Brielle (Transfers) standby assist;1 person assist;verbal cues  -TS     Assistive Device (Sit-Stand Transfers) walker, front-wheeled  -TS     Row Name 05/06/23 0929          Stand-Sit Transfer    Stand-Sit Brielle (Transfers) standby assist;1 person assist;verbal cues  -TS     Assistive  Device (Stand-Sit Transfers) walker, front-wheeled  -TS     Row Name 05/06/23 0929          Gait/Stairs (Locomotion)    Gait/Stairs Locomotion gait/ambulation assistive device  -TS     Terrell Level (Gait) standby assist;contact guard;1 person assist;verbal cues  -TS     Assistive Device (Gait) walker, front-wheeled  -TS     Distance in Feet (Gait) 300  -TS     Pattern (Gait) 3-point;step-through  -TS     Negotiation (Stairs) stairs independence;stairs assistive device  -TS     Terrell Level (Stairs) contact guard  -TS     Handrail Location (Stairs) both sides  -TS     Number of Steps (Stairs) 4  -TS     Ascending Technique (Stairs) step-to-step  -TS     Descending Technique (Stairs) step-to-step  -TS     Stairs, Impairments ROM decreased;strength decreased;impaired balance;pain  -TS     Row Name 05/06/23 0929          Safety Issues, Functional Mobility    Impairments Affecting Function (Mobility) balance;pain;range of motion (ROM);strength  -TS     Row Name 05/06/23 0929          Motor Skills    Therapeutic Exercise hip;knee;ankle  AAROM LLE x20 reps, recumbant position in a group setting.  -TS     Row Name             Wound 05/05/23 0844    Wound - Properties Group Placement Date: 05/05/23  -KJ Placement Time: 0844  -KJ Present on Hospital Admission: --  -KJ Side: --  -KJ Orientation: --  -KJ Location: --  -KJ Primary Wound Type: --  -KJ    Retired Wound - Properties Group Placement Date: 05/05/23  -KJ Placement Time: 0844  -KJ Present on Hospital Admission: --  -KJ Side: --  -KJ Orientation: --  -KJ Location: --  -KJ Primary Wound Type: --  -KJ    Retired Wound - Properties Group Date first assessed: 05/05/23  -KJ Time first assessed: 0844  -KJ Present on Hospital Admission: --  -KJ Side: --  -KJ Location: --  -KJ Primary Wound Type: --  -KJ    Row Name             Wound 05/05/23 0844 Left anterior knee Incision    Wound - Properties Group Placement Date: 05/05/23  -KJ Placement Time: 0844  -KJ Present  on Hospital Admission: N  -KJ Side: Left  -KJ Orientation: anterior  -KJ Location: knee  -KJ Primary Wound Type: Incision  -KJ    Retired Wound - Properties Group Placement Date: 05/05/23  -KJ Placement Time: 0844  -KJ Present on Hospital Admission: N  -KJ Side: Left  -KJ Orientation: anterior  -KJ Location: knee  -KJ Primary Wound Type: Incision  -KJ    Retired Wound - Properties Group Date first assessed: 05/05/23  -KJ Time first assessed: 0844  -KJ Present on Hospital Admission: N  -KJ Side: Left  -KJ Location: knee  -KJ Primary Wound Type: Incision  -KJ    Row Name 05/06/23 0929          Positioning and Restraints    Pre-Treatment Position sitting in chair/recliner  -TS     Post Treatment Position chair  -TS     In Chair reclined;call light within reach;exit alarm on;heels elevated;with family/caregiver  -TS     Row Name 05/06/23 0929          Progress Summary (PT)    Progress Toward Functional Goals (PT) progress toward functional goals is good  -TS           User Key  (r) = Recorded By, (t) = Taken By, (c) = Cosigned By    Initials Name Provider Type    Joe Sandy PTA Physical Therapist Assistant    Alisson Roper RN Registered Nurse            Left Knee Ther-ex, group setting  Exercise  Reps  Sets    Long arc Quads   10 2   Short arc Quads  10 2   Heel Slides  10 2   Ankle Pumps  10 2   Quad sets  10 2   Glut sets  10 2   Straight leg raise  10 2            Physical Therapy Education     Title: PT OT SLP Therapies (Resolved)     Topic: Physical Therapy (Resolved)     Point: Mobility training (Resolved)     Learning Progress Summary           Patient Acceptance, E,TB, VU by ROCHELLE at 5/5/2023 1117                   Point: Precautions (Resolved)     Learning Progress Summary           Patient Acceptance, E,TB, VU by ROCHELLE at 5/5/2023 1117                               User Key     Initials Effective Dates Name Provider Type Discipline    ROCHELLE 06/03/21 -  Gaston Quevedo, PT Physical Therapist PT               PT Recommendation and Plan     Progress Summary (PT)  Progress Toward Functional Goals (PT): progress toward functional goals is good   Outcome Measures     Row Name 05/06/23 1500 05/05/23 1117 05/05/23 1100       How much help from another person do you currently need...    Turning from your back to your side while in flat bed without using bedrails? 3  -TS 3  -ROCHELLE --    Moving from lying on back to sitting on the side of a flat bed without bedrails? 3  -TS 3  -ROCHELLE --    Moving to and from a bed to a chair (including a wheelchair)? 3  -TS 3  -ROCHELLE --    Standing up from a chair using your arms (e.g., wheelchair, bedside chair)? 4  -TS 3  -ROCHELLE --    Climbing 3-5 steps with a railing? 3  -TS 3  -ROCHELLE --    To walk in hospital room? 3  -TS 3  -ROCHELLE --    AM-PAC 6 Clicks Score (PT) 19  -TS 18  -ROCHELLE --       Functional Assessment    Outcome Measure Options -- AM-PAC 6 Clicks Basic Mobility (PT)  -ROCHELLE AM-PAC 6 Clicks Basic Mobility (PT)  -ROCHELLE          User Key  (r) = Recorded By, (t) = Taken By, (c) = Cosigned By    Initials Name Provider Type    Joe Sandy PTA Physical Therapist Assistant    Gaston Zambrano, PT Physical Therapist                 Time Calculation:    PT Charges     Row Name 05/06/23 0929             Time Calculation    PT Received On 05/06/23  -TS      PT Goal Re-Cert Due Date 05/14/23  -TS         Timed Charges    41615 - Gait Training Minutes  10  -TS      78533 - PT Therapeutic Activity Minutes 3  -TS         Untimed Charges    PT Group Therapy Minutes 30  -TS         Total Minutes    Timed Charges Total Minutes 13  -TS      Untimed Charges Total Minutes 30  -TS       Total Minutes 43  -TS            User Key  (r) = Recorded By, (t) = Taken By, (c) = Cosigned By    Initials Name Provider Type    Joe Sandy PTA Physical Therapist Assistant              Therapy Charges for Today     Code Description Service Date Service Provider Modifiers Qty    [id number] HC GAIT TRAINING EA 15 MIN  5/6/2023 Joe Roy, PTA GP 1    [id number] HC PT THER PROC GROUP 5/6/2023 Joe Roy, LISA GP 1          PT G-Codes  Outcome Measure Options: AM-PAC 6 Clicks Daily Activity (OT), Optimal Instrument  AM-PAC 6 Clicks Score (PT): 19  AM-PAC 6 Clicks Score (OT): 20    Joe Roy PTA  5/6/2023

## 2023-05-06 NOTE — THERAPY EVALUATION
Patient Name: Hector Coronel  : 1955    MRN: [id number]                              Today's Date: 2023       Admit Date: 2023    Visit Dx:     ICD-10-CM ICD-9-CM   1. Difficulty in walking  R26.2 719.7   2. Primary osteoarthritis of left knee  M17.12 715.16   3. Decreased activities of daily living (ADL)  Z78.9 V49.89     Patient Active Problem List   Diagnosis   • NOBLE treated with BiPAP   • Class 2 obesity   • Primary osteoarthritis of both knees   • Major depressive disorder, recurrent, moderate   • Peripheral neuropathy, idiopathic   • Elevated liver enzymes   • Benign prostatic hyperplasia   • Cerumen debris on tympanic membrane of both ears   • Screening PSA (prostate specific antigen)   • Primary osteoarthritis of right hip   • Primary insomnia   • Anxiety   • Peripheral polyneuropathy   • Cough   • Shortness of breath   • S/P coronary artery stent placement   • Coronary artery disease involving native coronary artery of native heart without angina pectoris   • Hyperlipidemia LDL goal <70   • Primary osteoarthritis of left knee     Past Medical History:   Diagnosis Date   • Anxiety    • Arthritis     BACK   • Coronary artery disease 2022    BLOCKAGE- 1 STENT PLACED 2022- SEE'S DILEEP, DENIED CP/SOB   • Cough     ALLERGIES   • Depression    • Diabetes mellitus     BORDERLINE- DOESN'T CHECK BG AT HOME   • Edema     PRIOR TO STENT PLACEMENT   • Enlarged prostate    • History of transfusion     AS A CHILD STATES HE DOESNT THINK ANY ISSUE WITH IT   • Hoarseness    • Murmur, cardiac     ASYMPTOMATIC- SOAOE   • Neck pain     VERTABRA PUSHING ON THROAT   • Osteoarthritis     OG HIPS, BILATERL KNEES, L SHOULDER   • PONV (postoperative nausea and vomiting)    • Sleep apnea     USES BIPAP   • SOBOE (shortness of breath on exertion)    • TIA (transient ischemic attack)     MULTI. YEARS AGO- NO RESIDUAL   • Umbilical hernia      Past Surgical History:   Procedure Laterality Date   • APPENDECTOMY      • CARDIAC CATHETERIZATION Right 05/31/2022    Procedure: Left Heart Cath;  Surgeon: Eduardo Buck MD;  Location: [facility] CATH INVASIVE LOCATION;  Service: Cardiovascular;  Laterality: Right;   • CARPAL TUNNEL RELEASE Bilateral    • CHOLECYSTECTOMY     • HERNIA REPAIR      UMBILICAL   • INNER EAR SURGERY      BOTH SIDES   • THROAT SURGERY      BIOPSY, RMOVED POLYPS   • TOTAL HIP ARTHROPLASTY Right 11/22/2022    Procedure: RIGHT TOTAL HIP ARTHROPLASTY ANTERIOR;  Surgeon: Jose Talavera MD;  Location: [facility] MAIN OR;  Service: Orthopedics;  Laterality: Right;   • VASECTOMY      1979      General Information     Row Name 05/06/23 0954 05/06/23 0935       OT Time and Intention    Document Type therapy note (daily note)  -AC evaluation  -AC    Mode of Treatment individual therapy;occupational therapy  -AC individual therapy;occupational therapy  -AC    Row Name 05/06/23 0954 05/06/23 0935       General Information    Patient Profile Reviewed yes  -AC yes  -AC    Prior Level of Function -- --  Patient reports independent with functional mobility without an assistive device, assist with lower body ADLs with use of sock aid, has rolling walker and a cane as well as 3 in 1 commode.  -AC    Existing Precautions/Restrictions fall;weight bearing  -AC fall;weight bearing  -AC    Barriers to Rehab none identified  -AC none identified  -AC    Row Name 05/06/23 0935          Occupational Profile    Reason for Services/Referral (Occupational Profile) Pt. is a 68year old male admitted for the above diagnosis status post left total knee replacement on 5/5/2023. Pt. referred to OT services to assess independence with ADLs and adl transfers/fx'l mobility. No previous OT services for current condition.  -AC     Row Name 05/06/23 0935          Living Environment    People in Home spouse  -AC     Row Name 05/06/23 0954 05/06/23 0935       Cognition    Orientation Status (Cognition) oriented x 3  -AC oriented x 3  -AC    Row Name  05/06/23 0954 05/06/23 0935       Safety Issues, Functional Mobility    Impairments Affecting Function (Mobility) balance;pain;endurance/activity tolerance  -AC balance;pain;endurance/activity tolerance  -AC          User Key  (r) = Recorded By, (t) = Taken By, (c) = Cosigned By    Initials Name Provider Type    AC Saima Clark OT Occupational Therapist                 Mobility/ADL's     Row Name 05/06/23 0955 05/06/23 0938       Bed Mobility    Comment, (Bed Mobility) Patient seated in recliner upon OT arrival in the room.  -AC Patient in recliner upon OT arrival in the room.  -AC    Row Name 05/06/23 0955 05/06/23 0938       Transfers    Transfers sit-stand transfer;stand-sit transfer  -AC sit-stand transfer;stand-sit transfer  -AC    Row Name 05/06/23 0955 05/06/23 0938       Sit-Stand Transfer    Sit-Stand [place] (Transfers) contact guard;standby assist;1 person assist  -AC contact guard;standby assist;1 person assist  -AC    Assistive Device (Sit-Stand Transfers) walker, front-wheeled  -AC walker, front-wheeled  -AC    Comment, (Sit-Stand Transfer) Patient was contact-guard assist/standby assist with rolling walker for sit to/from stand x3 with cues for safety and technique.  -AC --    Row Name 05/06/23 0955 05/06/23 0938       Stand-Sit Transfer    Stand-Sit [place] (Transfers) contact guard;standby assist;1 person assist  -AC contact guard;standby assist;1 person assist  -AC    Assistive Device (Stand-Sit Transfers) walker, front-wheeled  -AC walker, front-wheeled  -AC    Row Name 05/06/23 0955 05/06/23 0938       Functional Mobility    Functional Mobility- Ind. Level contact guard assist;standby assist;1 person  -AC contact guard assist;standby assist;1 person  -AC    Functional Mobility- Device walker, front-wheeled  -AC walker, front-wheeled  -AC    Functional Mobility- Comment Patient was contact-guard assist/standby assist with rolling walker for recliner to/from sink with cues for safety  and technique.  -AC --    Row Name 05/06/23 0955 05/06/23 0938       Activities of Daily Living    BADL Assessment/Intervention upper body dressing;lower body dressing;grooming  - --  Patient is set up for self-feeding, set up for grooming, set up for upper body bathing/dressing, mod /max assist for lower body dressing/bathing, contact-guard assist/standby assist for toileting  -    Row Name 05/06/23 0955 05/06/23 0938       Mobility    Extremity Weight-bearing Status left lower extremity  - left lower extremity  -AC    Left Lower Extremity (Weight-bearing Status) weight-bearing as tolerated (WBAT)  - weight-bearing as tolerated (WBAT)  -    Row Name 05/06/23 0955          Upper Body Dressing Assessment/Training    [place] Level (Upper Body Dressing) upper body dressing skills;doff;don;pull-over garment;set up  -AC     Position (Upper Body Dressing) unsupported sitting  -     Row Name 05/06/23 0955          Lower Body Dressing Assessment/Training    [place] Level (Lower Body Dressing) lower body dressing skills;doff;don;pants/bottoms;shoes/slippers;socks;moderate assist (50% patient effort);maximum assist (25% patient effort);verbal cues  -AC     Position (Lower Body Dressing) unsupported sitting;supported standing  -     Row Name 05/06/23 0955          Grooming Assessment/Training    [place] Level (Grooming) grooming skills;oral care regimen;verbal cues;standby assist;contact guard assist  -AC     Position (Grooming) sink side;supported standing  -           User Key  (r) = Recorded By, (t) = Taken By, (c) = Cosigned By    Initials Name Provider Type     Saima Clark OT Occupational Therapist               Obj/Interventions     Row Name 05/06/23 0957 05/06/23 0939       Sensory Assessment (Somatosensory)    Sensory Assessment (Somatosensory) sensation intact  - sensation intact  -    Row Name 05/06/23 0957 05/06/23 0939       Vision Assessment/Intervention    Visual  Impairment/Limitations corrective lenses full-time  -AC corrective lenses full-time  -AC    Row Name 05/06/23 0957 05/06/23 0939       Range of Motion Comprehensive    General Range of Motion bilateral upper extremity ROM WFL  -AC bilateral upper extremity ROM WFL  -AC    Row Name 05/06/23 0957 05/06/23 0939       Strength Comprehensive (MMT)    General Manual Muscle Testing (MMT) Assessment no strength deficits identified  -AC no strength deficits identified  -AC    Row Name 05/06/23 0957 05/06/23 0939       Motor Skills    Motor Skills coordination;functional endurance  -AC coordination;functional endurance  -AC    Coordination WFL  -AC WFL  -AC    Functional Endurance fair  -AC Fair  -AC    Row Name 05/06/23 0957 05/06/23 0939       Balance    Balance Assessment standing dynamic balance  -AC standing dynamic balance  -AC    Dynamic Standing Balance contact guard;standby assist;1-person assist;verbal cues  -AC contact guard;standby assist;1-person assist  -AC    Position/Device Used, Standing Balance supported;walker, front-wheeled  -AC supported;walker, front-wheeled  -AC    Balance Interventions standing;sit to stand;supported;dynamic;minimal challenge;occupation based/functional task  - --          User Key  (r) = Recorded By, (t) = Taken By, (c) = Cosigned By    Initials Name Provider Type    AC Saima Clark OT Occupational Therapist               Goals/Plan     [facility] Name 05/06/23 0940          Transfer Goal 1 (OT)    Activity/Assistive Device (Transfer Goal 1, OT) transfers, all  -AC     [place] Level/Cues Needed (Transfer Goal 1, OT) modified independence  -AC     Time Frame (Transfer Goal 1, OT) long term goal (LTG);10 days  -[facility] Name 05/06/23 0940          Bathing Goal 1 (OT)    Activity/Device (Bathing Goal 1, OT) bathing skills, all  -AC     [place] Level/Cues Needed (Bathing Goal 1, OT) modified independence  -AC     Time Frame (Bathing Goal 1, OT) long term goal (LTG);10 days  -      Row Name 05/06/23 0940          Dressing Goal 1 (OT)    Activity/Device (Dressing Goal 1, OT) dressing skills, all  -AC     [place]/Cues Needed (Dressing Goal 1, OT) modified independence  -AC     Time Frame (Dressing Goal 1, OT) long term goal (LTG);10 days  -AC     Row Name 05/06/23 0940          Toileting Goal 1 (OT)    Activity/Device (Toileting Goal 1, OT) toileting skills, all  -AC     [place] Level/Cues Needed (Toileting Goal 1, OT) modified independence  -AC     Time Frame (Toileting Goal 1, OT) long term goal (LTG);10 days  -AC     Row Name 05/06/23 0940          Grooming Goal 1 (OT)    Activity/Device (Grooming Goal 1, OT) grooming skills, all  -AC     [place] (Grooming Goal 1, OT) modified independence  -AC     Time Frame (Grooming Goal 1, OT) long term goal (LTG);10 days  -AC     Row Name 05/06/23 0940          Therapy Assessment/Plan (OT)    Planned Therapy Interventions (OT) activity tolerance training;BADL retraining;functional balance retraining;occupation/activity based interventions;patient/caregiver education/training;transfer/mobility retraining;ROM/therapeutic exercise  -AC           User Key  (r) = Recorded By, (t) = Taken By, (c) = Cosigned By    Initials Name Provider Type    AC Saima Clark OT Occupational Therapist               Clinical Impression     Row Name 05/06/23 0958 05/06/23 0939       Pain Assessment    Pretreatment Pain Rating 0/10 - no pain  -AC 0/10 - no pain  -AC    Posttreatment Pain Rating 0/10 - no pain  -AC 0/10 - no pain  -AC    Row Name 05/06/23 0958 05/06/23 0939       Plan of Care Review    Plan of Care Reviewed With patient;spouse  -AC patient;spouse  -AC    Progress improving  -AC no change  -AC    Outcome Evaluation Patient instructed in lower body adaptive dressing technique, weightbearing status, joint protection and safety with ADL transfers.  Patient to continue with therapy services in order to maximize independence and safety with ADLs.   - Patient presents with limitations that impede his/her ability to perform ADLS. The skills of a therapist are necessary to maximize independence with ADLs.  Recommend home with assist and outpatient therapy services following hospital discharge  -    Row Name 05/06/23 0939          Therapy Assessment/Plan (OT)    Patient/Family Therapy Goal Statement (OT) Patient wants less pain and to get his other knee done.  -     Rehab Potential (OT) good, to achieve stated therapy goals  -     Criteria for Skilled Therapeutic Interventions Met (OT) yes;meets criteria;skilled treatment is necessary  -     Therapy Frequency (OT) 5 times/wk  -     Row Name 05/06/23 0939          Therapy Plan Review/Discharge Plan (OT)    Equipment Needs Upon Discharge (OT) walker, rolling;commode chair  -     Anticipated Discharge Disposition (OT) home with assist;home with outpatient therapy services  -     Row Name 05/06/23 0939          Positioning and Restraints    Pre-Treatment Position sitting in chair/recliner  -     Post Treatment Position chair  -     In Chair call light within reach;encouraged to call for assist;exit alarm on;reclined;legs elevated  -           User Key  (r) = Recorded By, (t) = Taken By, (c) = Cosigned By    Initials Name Provider Type     Saima Clark, OT Occupational Therapist               Outcome Measures     Row Name 05/06/23 0924          How much help from another is currently needed...    Putting on and taking off regular lower body clothing? 2  -AC     Bathing (including washing, rinsing, and drying) 3  -AC     Toileting (which includes using toilet bed pan or urinal) 3  -AC     Putting on and taking off regular upper body clothing 4  -AC     Taking care of personal grooming (such as brushing teeth) 4  -AC     Eating meals 4  -AC     AM-PAC 6 Clicks Score (OT) 20  -     Row Name 05/06/23 0839          How much help from another person do you currently need...    Turning from your  back to your side while in flat bed without using bedrails? 3  -MG     Moving from lying on back to sitting on the side of a flat bed without bedrails? 3  -MG     Moving to and from a bed to a chair (including a wheelchair)? 3  -MG     Standing up from a chair using your arms (e.g., wheelchair, bedside chair)? 3  -MG     Climbing 3-5 steps with a railing? 3  -MG     To walk in hospital room? 3  -MG     AM-PAC 6 Clicks Score (PT) 18  -MG     Highest level of mobility 6 --> Walked 10 steps or more  -MG     Row Name 05/06/23 0944          Functional Assessment    Outcome Measure Options AM-PAC 6 Clicks Daily Activity (OT);Optimal Instrument  -AC     Row Name 05/06/23 0944          Optimal Instrument    Optimal Instrument Optimal - 3  -AC     Bending/Stooping 4  -AC     Standing 2  -AC     Reaching 1  -AC     From the list, choose the 3 activities you would most like to be able to do without any difficulty Bending/stooping;Standing;Reaching  -AC     Total Score Optimal - 3 7  -AC           User Key  (r) = Recorded By, (t) = Taken By, (c) = Cosigned By    Initials Name Provider Type    AC Saima Clark OT Occupational Therapist    MG Rach Cagle, RN Registered Nurse                Occupational Therapy Education     Title: PT OT SLP Therapies (Resolved)     Topic: Occupational Therapy (Resolved)     Point: ADL training (Resolved)     Description:   Instruct learner(s) on proper safety adaptation and remediation techniques during self care or transfers.   Instruct in proper use of assistive devices.              Learning Progress Summary           Patient Acceptance, E,TB,D, VU,DU by  at 5/6/2023 0944   Family Acceptance, E,TB,D, VU,DU by  at 5/6/2023 0944                   Point: Home exercise program (Resolved)     Description:   Instruct learner(s) on appropriate technique for monitoring, assisting and/or progressing therapeutic exercises/activities.              Learning Progress Summary           Patient  Acceptance, E,TB,D, VU,DU by  at 5/6/2023 0944   Family Acceptance, E,TB,D, VU,DU by  at 5/6/2023 0944                   Point: Precautions (Resolved)     Description:   Instruct learner(s) on prescribed precautions during self-care and functional transfers.              Learning Progress Summary           Patient Acceptance, E,TB,D, VU,DU by  at 5/6/2023 0944   Family Acceptance, E,TB,D, VU,DU by  at 5/6/2023 0944                   Point: Body mechanics (Resolved)     Description:   Instruct learner(s) on proper positioning and spine alignment during self-care, functional mobility activities and/or exercises.              Learning Progress Summary           Patient Acceptance, E,TB,D, VU,DU by  at 5/6/2023 0944   Family Acceptance, E,TB,D, VU,DU by  at 5/6/2023 0944                               User Key     Initials Effective Dates Name Provider Type Discipline     06/16/21 -  Saima Clark OT Occupational Therapist OT              OT Recommendation and Plan  Planned Therapy Interventions (OT): activity tolerance training, BADL retraining, functional balance retraining, occupation/activity based interventions, patient/caregiver education/training, transfer/mobility retraining, ROM/therapeutic exercise  Therapy Frequency (OT): 5 times/wk  Plan of Care Review  Plan of Care Reviewed With: patient, spouse  Progress: improving  Outcome Evaluation: Patient instructed in lower body adaptive dressing technique, weightbearing status, joint protection and safety with ADL transfers.  Patient to continue with therapy services in order to maximize independence and safety with ADLs.     Time Calculation:    Time Calculation- OT     Row Name 05/06/23 0954             Time Calculation- OT    OT Received On 05/06/23  -      OT Goal Re-Cert Due Date 05/15/23  -         Timed Charges    45542 - OT Therapeutic Activity Minutes 7  -      14746 - OT Self Care/Mgmt Minutes 15  -         Untimed Charges    OT  Eval/Re-eval Minutes 32  -AC         Total Minutes    Timed Charges Total Minutes 22  -AC      Untimed Charges Total Minutes 32  -AC       Total Minutes 54  -AC            User Key  (r) = Recorded By, (t) = Taken By, (c) = Cosigned By    Initials Name Provider Type    AC Saima Clark OT Occupational Therapist              Therapy Charges for Today     Code Description Service Date Service Provider Modifiers Qty    [id number]  OT SELF CARE/MGMT/TRAIN EA 15 MIN 5/6/2023 Saima Clark OT GO 1    [id number]  OT EVAL LOW COMPLEXITY 3 5/6/2023 Saima Clark OT GO 1               Saima Clark OT  5/6/2023

## 2023-05-06 NOTE — THERAPY TREATMENT NOTE
Acute Care - Physical Therapy Treatment Note  SERVANDO Cisse     Patient Name: Hector Coronel  : 1955  MRN: [id number]  Today's Date: 2023      Visit Dx:     ICD-10-CM ICD-9-CM   1. Difficulty in walking  R26.2 719.7   2. Primary osteoarthritis of left knee  M17.12 715.16   3. Decreased activities of daily living (ADL)  Z78.9 V49.89     Patient Active Problem List   Diagnosis   • NOBLE treated with BiPAP   • Class 2 obesity   • Primary osteoarthritis of both knees   • Major depressive disorder, recurrent, moderate   • Peripheral neuropathy, idiopathic   • Elevated liver enzymes   • Benign prostatic hyperplasia   • Cerumen debris on tympanic membrane of both ears   • Screening PSA (prostate specific antigen)   • Primary osteoarthritis of right hip   • Primary insomnia   • Anxiety   • Peripheral polyneuropathy   • Cough   • Shortness of breath   • S/P coronary artery stent placement   • Coronary artery disease involving native coronary artery of native heart without angina pectoris   • Hyperlipidemia LDL goal <70   • Primary osteoarthritis of left knee     Past Medical History:   Diagnosis Date   • Anxiety    • Arthritis     BACK   • Coronary artery disease 2022    BLOCKAGE- 1 STENT PLACED 2022- SEE'S DILEEP, DENIED CP/SOB   • Cough     ALLERGIES   • Depression    • Diabetes mellitus     BORDERLINE- DOESN'T CHECK BG AT HOME   • Edema     PRIOR TO STENT PLACEMENT   • Enlarged prostate    • History of transfusion     AS A CHILD STATES HE DOESNT THINK ANY ISSUE WITH IT   • Hoarseness    • Murmur, cardiac     ASYMPTOMATIC- SOAOE   • Neck pain     VERTABRA PUSHING ON THROAT   • Osteoarthritis     OG HIPS, BILATERL KNEES, L SHOULDER   • PONV (postoperative nausea and vomiting)    • Sleep apnea     USES BIPAP   • SOBOE (shortness of breath on exertion)    • TIA (transient ischemic attack)     MULTI. YEARS AGO- NO RESIDUAL   • Umbilical hernia      Past Surgical History:   Procedure Laterality Date   •  APPENDECTOMY     • CARDIAC CATHETERIZATION Right 05/31/2022    Procedure: Left Heart Cath;  Surgeon: Eduardo Buck MD;  Location: [facility] CATH INVASIVE LOCATION;  Service: Cardiovascular;  Laterality: Right;   • CARPAL TUNNEL RELEASE Bilateral    • CHOLECYSTECTOMY     • HERNIA REPAIR      UMBILICAL   • INNER EAR SURGERY      BOTH SIDES   • THROAT SURGERY      BIOPSY, RMOVED POLYPS   • TOTAL HIP ARTHROPLASTY Right 11/22/2022    Procedure: RIGHT TOTAL HIP ARTHROPLASTY ANTERIOR;  Surgeon: Jose Talavera MD;  Location: [facility] MAIN OR;  Service: Orthopedics;  Laterality: Right;   • VASECTOMY      1979     PT Assessment (last 12 hours)     PT Evaluation and Treatment     Row Name 05/06/23 1503 05/06/23 0929       Physical Therapy Time and Intention    Subjective Information complains of;pain  -TS complains of;pain  -TS    Document Type therapy note (daily note)  -TS therapy note (daily note)  -TS    Mode of Treatment individual therapy;physical therapy  -TS individual therapy;group therapy;physical therapy  -TS    Row Name 05/06/23 1503 05/06/23 0929       Pain    Pretreatment Pain Rating 3/10  -TS 4/10  -TS    Pain Location - Side/Orientation Left  -TS Left  -TS    Pain Location - knee  -TS knee  -TS    Row Name 05/06/23 1503 05/06/23 0929       Cognition    Affect/Mental Status (Cognition) WNL  -TS WNL  -TS    Row Name 05/06/23 0929          Range of Motion (ROM)    Range of Motion left lower extremity ROM  10-80°  -TS     Row Name 05/06/23 1503 05/06/23 0929       Mobility    Extremity Weight-bearing Status left lower extremity  -TS left lower extremity  -TS    Left Lower Extremity (Weight-bearing Status) weight-bearing as tolerated (WBAT)  -TS weight-bearing as tolerated (WBAT)  -TS    Row Name 05/06/23 1503 05/06/23 0929       Transfers    Transfers sit-stand transfer;stand-sit transfer  -TS sit-stand transfer;stand-sit transfer  -TS    Row Name 05/06/23 1503 05/06/23 0929       Sit-Stand Transfer    Sit-Stand  [place] (Transfers) standby assist;1 person assist;verbal cues  -TS standby assist;1 person assist;verbal cues  -TS    Assistive Device (Sit-Stand Transfers) walker, front-wheeled  -TS walker, front-wheeled  -TS    Row Name 05/06/23 1503 05/06/23 0929       Stand-Sit Transfer    Stand-Sit [place] (Transfers) standby assist;1 person assist;verbal cues  -TS standby assist;1 person assist;verbal cues  -TS    Assistive Device (Stand-Sit Transfers) walker, front-wheeled  -TS walker, front-wheeled  -TS    Row Name 05/06/23 1503 05/06/23 0929       Gait/Stairs (Locomotion)    Gait/Stairs Locomotion gait/ambulation assistive device  -TS gait/ambulation assistive device  -TS    [place] Level (Gait) standby assist;contact guard;1 person assist;verbal cues  -TS standby assist;contact guard;1 person assist;verbal cues  -TS    Assistive Device (Gait) walker, front-wheeled  -TS walker, front-wheeled  -TS    Distance in Feet (Gait) 210  -  -TS    Pattern (Gait) 3-point;step-through  -TS 3-point;step-through  -TS    Negotiation (Stairs) stairs independence;stairs assistive device  -TS stairs independence;stairs assistive device  -TS    [place] Level (Stairs) contact guard  -TS contact guard  -TS    Handrail Location (Stairs) both sides  -TS both sides  -TS    Number of Steps (Stairs) 4  -TS 4  -TS    Ascending Technique (Stairs) step-to-step  -TS step-to-step  -TS    Descending Technique (Stairs) step-to-step  -TS step-to-step  -TS    Stairs, Impairments ROM decreased;strength decreased;impaired balance;pain  -TS ROM decreased;strength decreased;impaired balance;pain  -TS    Row Name 05/06/23 1503 05/06/23 0929       Safety Issues, Functional Mobility    Impairments Affecting Function (Mobility) balance;pain;range of motion (ROM);strength  -TS balance;pain;range of motion (ROM);strength  -TS    Row Name 05/06/23 1503 05/06/23 0929       Motor Skills    Therapeutic Exercise hip;knee;ankle  AAROM LLE x20  reps, recumbant position  -TS hip;knee;ankle  AAROM LLE x20 reps, recumbant position in a group setting.  -TS    Row Name             Wound 05/05/23 0844    Wound - Properties Group Placement Date: 05/05/23  -KJ Placement Time: 0844  -KJ Present on Hospital Admission: --  -KJ Side: --  -KJ Orientation: --  -KJ Location: --  -KJ Primary Wound Type: --  -KJ    Retired Wound - Properties Group Placement Date: 05/05/23  -KJ Placement Time: 0844  -KJ Present on Hospital Admission: --  -KJ Side: --  -KJ Orientation: --  -KJ Location: --  -KJ Primary Wound Type: --  -KJ    Retired Wound - Properties Group Date first assessed: 05/05/23  -KJ Time first assessed: 0844  -KJ Present on Hospital Admission: --  -KJ Side: --  -KJ Location: --  -KJ Primary Wound Type: --  -KJ    Row Name             Wound 05/05/23 0844 Left anterior knee Incision    Wound - Properties Group Placement Date: 05/05/23  -KJ Placement Time: 0844  -KJ Present on Hospital Admission: N  -KJ Side: Left  -KJ Orientation: anterior  -KJ Location: knee  -KJ Primary Wound Type: Incision  -KJ    Retired Wound - Properties Group Placement Date: 05/05/23  -KJ Placement Time: 0844  -KJ Present on Hospital Admission: N  -KJ Side: Left  -KJ Orientation: anterior  -KJ Location: knee  -KJ Primary Wound Type: Incision  -KJ    Retired Wound - Properties Group Date first assessed: 05/05/23  -KJ Time first assessed: 0844  -KJ Present on Hospital Admission: N  -KJ Side: Left  -KJ Location: knee  -KJ Primary Wound Type: Incision  -KJ    Row Name 05/06/23 1503 05/06/23 0929       Positioning and Restraints    Pre-Treatment Position sitting in chair/recliner  -TS sitting in chair/recliner  -TS    Post Treatment Position chair  -TS chair  -TS    In Chair reclined;call light within reach;exit alarm on;heels elevated;with family/caregiver  -TS reclined;call light within reach;exit alarm on;heels elevated;with family/caregiver  -TS    Row Name 05/06/23 1503 05/06/23 0929        Progress Summary (PT)    Progress Toward Functional Goals (PT) progress toward functional goals is good  -TS progress toward functional goals is good  -TS          User Key  (r) = Recorded By, (t) = Taken By, (c) = Cosigned By    Initials Name Provider Type    TS Joe Roy PTA Physical Therapist Assistant    Alisson Roper RN Registered Nurse            Left Knee Ther-ex   Exercise  Reps  Sets    Long arc Quads   10 2   Short arc Quads  10 2   Heel Slides  10 2   Ankle Pumps  10 2   Quad sets  10 2   Glut sets  10 2   Straight leg raise  10 2            Physical Therapy Education     Title: PT OT SLP Therapies (Resolved)     Topic: Physical Therapy (Resolved)     Point: Mobility training (Resolved)     Learning Progress Summary           Patient Acceptance, E,TB, VU by ROCHELLE at 5/5/2023 1117                   Point: Precautions (Resolved)     Learning Progress Summary           Patient Acceptance, E,TB, VU by ROCHELLE at 5/5/2023 1117                               User Key     Initials Effective Dates Name Provider Type Discipline     06/03/21 -  Gaston Quevedo, PT Physical Therapist PT              PT Recommendation and Plan     Progress Summary (PT)  Progress Toward Functional Goals (PT): progress toward functional goals is good   Outcome Measures     Row Name 05/06/23 1500 05/05/23 1117 05/05/23 1100       How much help from another person do you currently need...    Turning from your back to your side while in flat bed without using bedrails? 3  -TS 3  -ROCHELLE --    Moving from lying on back to sitting on the side of a flat bed without bedrails? 3  -TS 3  -ROCHELLE --    Moving to and from a bed to a chair (including a wheelchair)? 3  -TS 3  -ROCHELLE --    Standing up from a chair using your arms (e.g., wheelchair, bedside chair)? 4  -TS 3  -ROCHELLE --    Climbing 3-5 steps with a railing? 3  -TS 3  -ROCHELLE --    To walk in hospital room? 3  -TS 3  -ROCHELLE --    AM-PAC 6 Clicks Score (PT) 19  -TS 18  -ROCHELLE --       Functional  Assessment    Outcome Measure Options -- AM-PAC 6 Clicks Basic Mobility (PT)  -ROCHELLE AM-PAC 6 Clicks Basic Mobility (PT)  -ROCHELLE          User Key  (r) = Recorded By, (t) = Taken By, (c) = Cosigned By    Initials Name Provider Type    FERCHO Joe Roy PTA Physical Therapist Assistant    Gaston Zambrano, PT Physical Therapist                 Time Calculation:    PT Charges     Row Name 05/06/23 1502 05/06/23 0929          Time Calculation    PT Received On -- 05/06/23  -TS     PT Goal Re-Cert Due Date -- 05/14/23  -TS        Timed Charges    33782 - PT Therapeutic Exercise Minutes 15  -TS --     32983 - Gait Training Minutes  8  -TS 10  -TS     34967 - PT Therapeutic Activity Minutes 2  -TS 3  -TS        Untimed Charges    PT Group Therapy Minutes -- 30  -TS        Total Minutes    Timed Charges Total Minutes 25  -TS 13  -TS     Untimed Charges Total Minutes -- 30  -TS      Total Minutes 25  -TS 43  -TS           User Key  (r) = Recorded By, (t) = Taken By, (c) = Cosigned By    Initials Name Provider Type    FERCHO Joe Roy, PTA Physical Therapist Assistant              Therapy Charges for Today     Code Description Service Date Service Provider Modifiers Qty    66332936852 HC GAIT TRAINING EA 15 MIN 5/6/2023 Swords, Joe, PTA GP 1    95639225230 HC PT THER PROC GROUP 5/6/2023 Swords, Joe, PTA GP 1    96819309895 HC PT THER PROC EA 15 MIN 5/6/2023 Swords, Joe, PTA GP 1    10967664072 HC GAIT TRAINING EA 15 MIN 5/6/2023 Swords, Joe, PTA GP 1          PT G-Codes  Outcome Measure Options: AM-PAC 6 Clicks Daily Activity (OT), Optimal Instrument  AM-PAC 6 Clicks Score (PT): 19  AM-PAC 6 Clicks Score (OT): 20    Joe Roy PTA  5/6/2023

## 2023-05-08 ENCOUNTER — TREATMENT (OUTPATIENT)
Dept: PHYSICAL THERAPY | Facility: CLINIC | Age: 68
End: 2023-05-08
Payer: MEDICARE

## 2023-05-08 DIAGNOSIS — M25.562 LEFT KNEE PAIN, UNSPECIFIED CHRONICITY: ICD-10-CM

## 2023-05-08 DIAGNOSIS — M25.662 KNEE STIFFNESS, LEFT: ICD-10-CM

## 2023-05-08 DIAGNOSIS — Z96.652 S/P TOTAL KNEE ARTHROPLASTY, LEFT: Primary | ICD-10-CM

## 2023-05-08 DIAGNOSIS — R26.2 DIFFICULTY WALKING: ICD-10-CM

## 2023-05-08 PROCEDURE — 97161 PT EVAL LOW COMPLEX 20 MIN: CPT | Performed by: PHYSICAL THERAPIST

## 2023-05-08 PROCEDURE — 97110 THERAPEUTIC EXERCISES: CPT | Performed by: PHYSICAL THERAPIST

## 2023-05-08 NOTE — PROGRESS NOTES
Physical Therapy Initial Evaluation and Plan of Care                    [facility] [address] [place] [address]    Patient: Hector Coronel   : 1955  Diagnosis/ICD-10 Code:  S/P total knee arthroplasty, left [Z96.652]  Referring practitioner: Jose Talavera MD  Date of Initial Visit: 2023  Today's Date: 2023  Patient seen for 1 sessions           Subjective Questionnaire: LEFS:  = 30% Function      Subjective Evaluation    History of Present Illness  Mechanism of injury: The patient presents to physical therapy s/p left total knee replacement on 23. He spent one night in the hospital and was discharged the following day. He reports that most of his pain is located in the front of his left knee. He denies any n/t or radicular symptoms down into his left calf/shin. His pain is increased bending/straightening his knee and with walking. His pain is improved with rest, ice, elevation, and taking prescribed pain medication. He recently had his right hip replaced.      Patient Occupation: Retired Pain  Current pain rating: 3  At best pain ratin  At worst pain ratin    Patient Goals  Patient goal: The patient would like to be able to walk longer distances, pick things up from the floor, and return to woodworking.           Objective          Tenderness     Additional Tenderness Details  Tenderness in left knee around incision site, at medial/lateral joint lines, and over quadriceps.    Neurological Testing     Additional Neurological Details  Sensation to light touch intact and equal bilaterally from L2-S1 dermatomal levels.    Active Range of Motion   Left Knee   Flexion: 96 degrees with pain  Extension: 5 (lacking) degrees with pain    Passive Range of Motion   Left Knee   Flexion: 102 degrees with pain  Extension: 3 (lacking) degrees with pain    Patellar Mobility   Left Knee Patellar tendons within functional limits include the medial, lateral, superior and inferior.      Strength/Myotome Testing     Left Hip   Planes of Motion   Flexion: 3+    Left Knee   Flexion: 4+  Extension: 4-  Quadriceps contraction: good    Right Knee   Flexion: 5  Extension: 5  Quadriceps contraction: good    Ambulation     Ambulation: Level Surfaces     Additional Level Surfaces Ambulation Details  The patient ambulates with a rolling walker with slightly decreased stance time on the left lower extremity and decreased knee flexion during swing phase on the left.     General Comments     Knee Comments  Left calf supple to palpation with no signs/symptoms of infection or DVT at this time.     See Exercise, Manual, and Modality Logs for complete treatment.     Assessment & Plan     Assessment  Impairments: abnormal gait, abnormal muscle firing, abnormal or restricted ROM, activity intolerance, impaired balance, impaired physical strength, lacks appropriate home exercise program, pain with function, safety issue and weight-bearing intolerance  Functional Limitations: walking, uncomfortable because of pain, moving in bed, standing and stooping  Assessment details: The patient presents to physical therapy with complaints of left knee pain s/p left TKA on 5/5/23. The patient presents with associated left knee weakness, stiffness, antalgic gait, and functional deficits (LEFS). The patient would benefit from skilled PT intervention to address the above mentioned functional limitations to allow the patient to return to his prior level of function.   Prognosis: good    Goals  Plan Goals: KNEE PROBLEMS    1. The patient has limited ROM of the left knee.   LTG 1:12 weeks:  The patient will demonstrate 0 to 120 degrees of ROM for the left knee in order to allow patient to am.    STATUS:  New   STG 1a: 6 weeks:  The patient will demonstrate 5 to 105 degrees of ROM for the left  knee.    STATUS:  New    2. The patient has limited strength of the left knee.   LTG 2: 12 weeks: The patient will demonstrate 5 /5 strength  for left  knee flexion and extension in order to allow patient improved joint stability    STATUS:  New   STG 2a: 6 weeks: The patient will demonstrate 4+ /5 strength for left knee flexion and extension    STATUS:  New      3. The patient has gait dysfunction.   LTG 3: 12 weeks:  The patient will ambulate without assistive device, independently, for community distances with normal biomechanics of the left  lower extremity in order to improve mobility and allow patient to perform activities such as grocery shopping with greater ease.    STATUS:  New   STG 3a: 4 weeks: The patient will ambulate with a single tipped cane with appropriate gait mechanics.    STATUS:  New    4. Mobility: Walking/Moving Around Functional Limitation     LTG 4: 12 weeks:  The patient will demonstrate 25 % limitation by achieving a score of 60/80 on the LEFS.    STATUS:  New   STG 4 a: 6 weeks:  The patient will demonstrate 50 % limitation by achieving a score of 40/80 on the LEFS.      STATUS:  New     Plan  Therapy options: will be seen for skilled therapy services  Planned modality interventions: cryotherapy, electrical stimulation/Russian stimulation and TENS  Planned therapy interventions: balance/weight-bearing training, ADL retraining, soft tissue mobilization, strengthening, stretching, therapeutic activities, joint mobilization, home exercise program, gait training, functional ROM exercises, flexibility, body mechanics training, postural training, neuromuscular re-education and manual therapy  Frequency: 3x week  Duration in weeks: 12  Treatment plan discussed with: patient        Visit Diagnoses:    ICD-10-CM ICD-9-CM   1. S/P total knee arthroplasty, left  Z96.652 V43.65   2. Left knee pain, unspecified chronicity  M25.562 719.46   3. Knee stiffness, left  M25.662 719.56   4. Difficulty walking  R26.2 719.7       History # of Personal Factors and/or Comorbidities: MODERATE (1-2)  Examination of Body System(s): # of elements: LOW  (1-2)  Clinical Presentation: STABLE   Clinical Decision Making: LOW       Timed:         Manual Therapy:    0     mins  67130;     Therapeutic Exercise:    8     mins  64088;     Neuromuscular Dlian:    0    mins  06075;    Therapeutic Activity:     0     mins  81568;     Gait Trainin     mins  72771;     Ultrasound:     0     mins  88973;    Ionto                               0    mins   64391  Self Care                       0     mins   04807  Canalith Repos    0     mins 69131      Un-Timed:  Electrical Stimulation:    0     mins  89076 (MC );  Dry Needling     0     mins self-pay  Traction     0     mins 24605  Low Eval     25     Mins  60328  Mod Eval     0     Mins  63760  High Eval                       0     Mins  81152  Re-Eval                           0    mins  64034    Timed Treatment:   8   mins   Total Treatment:     33   mins    PT SIGNATURE: Raffy Chamberlain PT    Electronically signed 2023    [place] License: PT - [id number]      Initial Certification  Certification Period: 2023 thru 2023  I certify that the therapy services are furnished while this patient is under my care.  The services outlined above are required by this patient, and will be reviewed every 90 days.     PHYSICIAN: Jose Talavera MD  NPI: [id number]      DATE:     Please sign and return via fax to [phone]. Thank you, [facility].

## 2023-05-11 ENCOUNTER — TREATMENT (OUTPATIENT)
Dept: PHYSICAL THERAPY | Facility: CLINIC | Age: 68
End: 2023-05-11
Payer: MEDICARE

## 2023-05-11 DIAGNOSIS — M25.662 KNEE STIFFNESS, LEFT: ICD-10-CM

## 2023-05-11 DIAGNOSIS — R26.2 DIFFICULTY WALKING: ICD-10-CM

## 2023-05-11 DIAGNOSIS — M25.562 LEFT KNEE PAIN, UNSPECIFIED CHRONICITY: ICD-10-CM

## 2023-05-11 DIAGNOSIS — Z96.652 S/P TOTAL KNEE ARTHROPLASTY, LEFT: Primary | ICD-10-CM

## 2023-05-11 NOTE — PROGRESS NOTES
Physical Therapy Daily Treatment Note                      [facility] [address] [facility] [place] [address]    Patient: Hector Coronel   : 1955  Diagnosis/ICD-10 Code:  S/P total knee arthroplasty, left [Z96.652]  Referring practitioner: Jose Talavera MD  Date of Initial Visit: Type: THERAPY  Noted: 2023  Today's Date: 2023  Patient seen for 2 sessions           Subjective   The patient reported that his knee is more sore than at his last visit. He has been compliant with all of his home exercises. He rates his pain today as a 4-5/10.     Objective   See Exercise, Manual, and Modality Logs for complete treatment.     Assessment/Plan  The patient demonstrated good tolerance to all interventions today. His knee ROM is progressing very well. Continue with current PT plan of care.       Timed:  Manual Therapy:    8     mins  22114;  Therapeutic Exercise:    20     mins  86253;     Neuromuscular Dilan:   0    mins  93247;    Therapeutic Activity:     10     mins  86416;     Gait Trainin     mins  46653;     Aquatics                         0      mins  84117    Un-timed:  Mechanical Traction      0     mins  60395  Dry Needling     0     mins self-pay  Electrical Stimulation:    0     mins  29398 ( );      Timed Treatment:   38   mins   Total Treatment:     38   mins    Raffy Chamberlain PT  Physical Therapist    Electronically signed 2023    [place] License: PT - [id number]

## 2023-05-15 ENCOUNTER — TREATMENT (OUTPATIENT)
Dept: PHYSICAL THERAPY | Facility: CLINIC | Age: 68
End: 2023-05-15
Payer: MEDICARE

## 2023-05-15 DIAGNOSIS — Z96.652 S/P TOTAL KNEE ARTHROPLASTY, LEFT: Primary | ICD-10-CM

## 2023-05-15 DIAGNOSIS — R26.2 DIFFICULTY WALKING: ICD-10-CM

## 2023-05-15 DIAGNOSIS — M25.662 KNEE STIFFNESS, LEFT: ICD-10-CM

## 2023-05-15 DIAGNOSIS — M25.562 LEFT KNEE PAIN, UNSPECIFIED CHRONICITY: ICD-10-CM

## 2023-05-15 DIAGNOSIS — M17.12 PRIMARY OSTEOARTHRITIS OF LEFT KNEE: ICD-10-CM

## 2023-05-15 PROCEDURE — 97110 THERAPEUTIC EXERCISES: CPT | Performed by: PHYSICAL THERAPIST

## 2023-05-15 PROCEDURE — 97530 THERAPEUTIC ACTIVITIES: CPT | Performed by: PHYSICAL THERAPIST

## 2023-05-15 NOTE — PROGRESS NOTES
"Outpatient Physical Therapy  [address] [facility], [place] [address]                 Physical Therapy Daily Treatment Note    Patient: Hector Coronel   : 1955  Diagnosis/ICD-10 Code:  S/P total knee arthroplasty, left [Z96.652]  Referring practitioner: Jose Talavera MD  Date of Initial Visit: Type: THERAPY  Noted: 2023  Today's Date: 5/15/2023  Patient seen for 3 sessions             Subjective   Hector Coronel reports: he was not able to sleep last night due to the pain in his left knee. Patient comments he wasn't able to find a position to get it comfortable.     Pain: 5/10 pain, in left knee \"around the knee cap\"    Objective     See Exercise, Manual, and Modality Logs for complete treatment.     Assessment/Plan  Hector still experiencing increased left knee pain, especially at night. Pt tolerated exercises well, with just general fatigue. Pt would benefit from skilled PT to address Range of Motion  and Strength deficits, pain management and any concerns with ADLs.     Progress per Plan of Care       Timed:  Manual Therapy:    8     mins  13312;  Therapeutic Exercise:    10     mins  88780;     Neuromuscular Dilan:    0    mins  65706;    Therapeutic Activity:     10     mins  70048;     Gait Trainin     mins  51888;    Aquatic Therapy:     0     mins  74778;       Untimed:  Electrical Stimulation:    0     mins  24355 ( );  Mechanical Traction:    0     mins  52544;       Timed Treatment:   28   mins   Total Treatment:     28   mins      Electronically signed:   Jessica Mccollum PTA  Physical Therapist Assistant  [facility] License #: [id number]  "

## 2023-05-17 RX ORDER — HYDROCODONE BITARTRATE AND ACETAMINOPHEN 7.5; 325 MG/1; MG/1
1 TABLET ORAL EVERY 4 HOURS PRN
Qty: 42 TABLET | Refills: 0 | Status: SHIPPED | OUTPATIENT
Start: 2023-05-17

## 2023-05-18 ENCOUNTER — TREATMENT (OUTPATIENT)
Dept: PHYSICAL THERAPY | Facility: CLINIC | Age: 68
End: 2023-05-18
Payer: MEDICARE

## 2023-05-18 DIAGNOSIS — Z96.652 S/P TOTAL KNEE ARTHROPLASTY, LEFT: Primary | ICD-10-CM

## 2023-05-18 DIAGNOSIS — M25.662 KNEE STIFFNESS, LEFT: ICD-10-CM

## 2023-05-18 DIAGNOSIS — M25.562 LEFT KNEE PAIN, UNSPECIFIED CHRONICITY: ICD-10-CM

## 2023-05-18 DIAGNOSIS — R26.2 DIFFICULTY WALKING: ICD-10-CM

## 2023-05-18 NOTE — PROGRESS NOTES
Outpatient Physical Therapy  [address] [facility], [place] [address]                 Physical Therapy Daily Treatment Note    Patient: Hector Coronel   : 1955  Diagnosis/ICD-10 Code:  S/P total knee arthroplasty, left [Z96.652]  Referring practitioner: Jose Talavera MD  Date of Initial Visit: Type: THERAPY  Noted: 2023  Today's Date: 2023  Patient seen for 4 sessions             Subjective   Hector Coronel reports: his pain is a little more elevated today and he forgot to take pain medication before coming today.     Pain: 4-5/10 pain, in left knee at time of arrival.     Objective     See Exercise, Manual, and Modality Logs for complete treatment.     Assessment/Plan  Hector still experiencing increased left knee pain, especially since he forgot to take pain medication before therapy appointment. Pt tolerated exercises well, and was instructed to start using STC at home. Pt would benefit from skilled PT to address Range of Motion  and Strength deficits, pain management and any concerns with ADLs.     Progress per Plan of Care       Timed:  Manual Therapy:    12     mins  76834;  Therapeutic Exercise:    10     mins  55205;     Neuromuscular Dilan:    0    mins  44760;    Therapeutic Activity:     8     mins  52143;     Gait Trainin     mins  27330;    Aquatic Therapy:     0     mins  71955;       Untimed:  Electrical Stimulation:    0     mins  75499 ( );  Mechanical Traction:    0     mins  40252;       Timed Treatment:   38   mins   Total Treatment:     38   mins      Electronically signed:   Jessica Mccollum PTA  Physical Therapist Assistant  [facility] License #: [id number]

## 2023-05-19 ENCOUNTER — OFFICE VISIT (OUTPATIENT)
Dept: ORTHOPEDIC SURGERY | Facility: CLINIC | Age: 68
End: 2023-05-19
Payer: MEDICARE

## 2023-05-19 VITALS — OXYGEN SATURATION: 95 % | HEART RATE: 92 BPM | HEIGHT: 69 IN | WEIGHT: 241 LBS | BODY MASS INDEX: 35.7 KG/M2

## 2023-05-19 DIAGNOSIS — Z47.1 AFTERCARE FOLLOWING LEFT KNEE JOINT REPLACEMENT SURGERY: ICD-10-CM

## 2023-05-19 DIAGNOSIS — Z96.652 AFTERCARE FOLLOWING LEFT KNEE JOINT REPLACEMENT SURGERY: ICD-10-CM

## 2023-05-19 DIAGNOSIS — M25.562 LEFT KNEE PAIN, UNSPECIFIED CHRONICITY: Primary | ICD-10-CM

## 2023-05-19 NOTE — PROGRESS NOTES
"Chief Complaint  Follow-up of the Left Knee and Suture / Staple Removal    Subjective      Hector Coronel presents to [facility] for follow-up of left total knee arthroplasty with Tina stack performed on 5/5/2023 by Dr. Talavera.  Patient presents today with use of cane.  Reports that his left knee is \"so far so good\".  He has started outpatient physical therapy through [facility] and reports this is progressing well.    Objective   Allergies   Allergen Reactions   • Metal Rash     CHEAP METAL- BELT HAZEL BREAK HIM OUT        Vital Signs:   Pulse 92   Ht 175.3 cm (69\")   Wt 109 kg (241 lb)   SpO2 95%   BMI 35.59 kg/m²       Physical Exam    Constitutional: Awake, alert. Well nourished appearance.    Integumentary: Warm, dry, intact. No obvious rashes.    HENT: Atraumatic, normocephalic.   Respiratory: Non labored respirations .   Cardiovascular: Intact peripheral pulses.    Psychiatric: Normal mood and affect. A&O X3    Ortho Exam  Left knee: Staples removed in office today.  Well-healing surgical incision visualized, which is clean, dry, and intact.  There is no evidence of wound dehiscence, surrounding erythema, warmth, or drainage.  Patella is well tracking and knee is stable to varus and valgus stress.  Full knee extension and flexion to 95 degrees.  Full plantarflexion and dorsiflexion of the ankle.  Sensation intact light touch.  Distal neurovascular intact.  Patient is ambulatory with assistance of a cane, nonantalgic gait.    Imaging Results (Most Recent)     Procedure Component Value Units Date/Time    XR Knee 3 View Left [[id number]] Resulted: 05/22/23 0905     Updated: 05/22/23 0906    Narrative:      X-Ray Report:  Study: X-rays ordered, taken in the office, and reviewed today.   Site: Left knee xray  Indication: TKA  View: AP/Lateral, Standing and Sunrise view(s)  Findings: Intact left total knee arthroplasty. No evidence of hardware   malfunction.   Prior " studies available for comparison: yes                     Assessment and Plan   Problem List Items Addressed This Visit    None  Visit Diagnoses     Left knee pain, unspecified chronicity    -  Primary    Relevant Orders    XR Knee 3 View Left (Completed)    Aftercare following left knee joint replacement surgery            Follow Up   Return in about 4 weeks (around 6/16/2023).    Tobacco Use: Medium Risk   • Smoking Tobacco Use: Former   • Smokeless Tobacco Use: Never   • Passive Exposure: Not on file     Patient is a former smoker.  Encouraged continued tobacco cessation.  Did not discuss options for smoking cessation.    Patient Instructions   X-rays taken and reviewed, showing intact hardware.    Staples removed in office and Steri-Strips applied.  Patient educated on incision care.  Please keep incision clean and dry.  Do not soak or submerge in water until incision is fully healed.  Do not apply creams or lotions over the incision.  Please allow Steri-Strips to fall off on their own within 7 to 10 days.    Continue icing and elevation of the knee as needed to help with pain and swelling.  Ice knee up to 3 or 4 times daily for no longer than 15 to 20 minutes at a time.    Continue PT to progress ROM, strength, and weightbearing status.    Follow-up in 4 weeks. Repeat x-rays not needed at this visit.  Please call with questions or concerns.      Patient was given instructions and counseling regarding his condition or for health maintenance advice. Please see specific information pulled into the AVS if appropriate.

## 2023-05-22 ENCOUNTER — TREATMENT (OUTPATIENT)
Dept: PHYSICAL THERAPY | Facility: CLINIC | Age: 68
End: 2023-05-22
Payer: MEDICARE

## 2023-05-22 DIAGNOSIS — R26.2 DIFFICULTY WALKING: ICD-10-CM

## 2023-05-22 DIAGNOSIS — M25.562 LEFT KNEE PAIN, UNSPECIFIED CHRONICITY: ICD-10-CM

## 2023-05-22 DIAGNOSIS — Z96.652 S/P TOTAL KNEE ARTHROPLASTY, LEFT: Primary | ICD-10-CM

## 2023-05-22 DIAGNOSIS — M25.662 KNEE STIFFNESS, LEFT: ICD-10-CM

## 2023-05-22 PROCEDURE — 97110 THERAPEUTIC EXERCISES: CPT | Performed by: PHYSICAL THERAPIST

## 2023-05-22 PROCEDURE — 97530 THERAPEUTIC ACTIVITIES: CPT | Performed by: PHYSICAL THERAPIST

## 2023-05-22 NOTE — PROGRESS NOTES
Physical Therapy Daily Treatment Note                      [facility] [address] [place] [address]    Patient: Hector Coronel   : 1955  Diagnosis/ICD-10 Code:  S/P total knee arthroplasty, left [Z96.652]  Referring practitioner: Jose Talavera MD  Date of Initial Visit: Type: THERAPY  Noted: 2023  Today's Date: 2023  Patient seen for 5 sessions           Subjective   The patient reported that his pain level is a 3/10 today.    Objective   See Exercise, Manual, and Modality Logs for complete treatment.     Left knee PROM: 2-107 degrees following manual therapy    Assessment/Plan  The patient demonstrated good tolerance to all functional lower extremity interventions today. His ROM is progressing well. Continue to progress per patient tolerance.       Timed:  Manual Therapy:    6     mins  56085;  Therapeutic Exercise:    14     mins  78377;     Neuromuscular Dilan:   0    mins  88691;    Therapeutic Activity:     10     mins  82612;     Gait Trainin     mins  49664;     Aquatics                         0      mins  18702    Un-timed:  Mechanical Traction      0     mins  78882  Dry Needling     0     mins self-pay  Electrical Stimulation:    0     mins  27331 ( );      Timed Treatment:   30   mins   Total Treatment:     30   mins    Raffy Chamberlain PT  Physical Therapist    Electronically signed 2023    [place] License: PT - [id number]

## 2023-05-25 ENCOUNTER — TREATMENT (OUTPATIENT)
Dept: PHYSICAL THERAPY | Facility: CLINIC | Age: 68
End: 2023-05-25
Payer: MEDICARE

## 2023-05-25 DIAGNOSIS — M25.562 LEFT KNEE PAIN, UNSPECIFIED CHRONICITY: ICD-10-CM

## 2023-05-25 DIAGNOSIS — R26.2 DIFFICULTY WALKING: ICD-10-CM

## 2023-05-25 DIAGNOSIS — Z96.652 S/P TOTAL KNEE ARTHROPLASTY, LEFT: Primary | ICD-10-CM

## 2023-05-25 DIAGNOSIS — M25.662 KNEE STIFFNESS, LEFT: ICD-10-CM

## 2023-05-25 NOTE — PROGRESS NOTES
Physical Therapy Daily Treatment Note                      [facility] [address] [place] [address]    Patient: Hector Coronel   : 1955  Diagnosis/ICD-10 Code:  S/P total knee arthroplasty, left [Z96.652]  Referring practitioner: Jose Talavera MD  Date of Initial Visit: Type: THERAPY  Noted: 2023  Today's Date: 2023  Patient seen for 6 sessions           Subjective   The patient reported that his pain level today is a 3/10. His knee feels more stiff than painful today.     Objective   See Exercise, Manual, and Modality Logs for complete treatment.     Assessment/Plan  The patient demonstrated good tolerance to all interventions today. His knee ROM is progressing well at this time. He demonstrated full knee extension ROM after manual therapy today.       Timed:  Manual Therapy:    6     mins  13060;  Therapeutic Exercise:    14     mins  24383;     Neuromuscular Dilan:   0    mins  22558;    Therapeutic Activity:     18     mins  31780;     Gait Trainin     mins  07521;     Aquatics                         0      mins  08150    Un-timed:  Mechanical Traction      0     mins  43830  Dry Needling     0     mins self-pay  Electrical Stimulation:    0     mins  11636 ( );      Timed Treatment:   38   mins   Total Treatment:     38   mins    Raffy Chamberlain PT  Physical Therapist    Electronically signed 2023    [place] License: PT - [id number]

## 2023-06-01 ENCOUNTER — TREATMENT (OUTPATIENT)
Dept: PHYSICAL THERAPY | Facility: CLINIC | Age: 68
End: 2023-06-01

## 2023-06-01 DIAGNOSIS — M25.562 LEFT KNEE PAIN, UNSPECIFIED CHRONICITY: ICD-10-CM

## 2023-06-01 DIAGNOSIS — Z96.652 S/P TOTAL KNEE ARTHROPLASTY, LEFT: Primary | ICD-10-CM

## 2023-06-01 DIAGNOSIS — M25.662 KNEE STIFFNESS, LEFT: ICD-10-CM

## 2023-06-01 DIAGNOSIS — R26.2 DIFFICULTY WALKING: ICD-10-CM

## 2023-06-01 NOTE — PROGRESS NOTES
Physical Therapy Daily Treatment Note                      [facility] [address] [place] [address]    Patient: Hector Coronel   : 1955  Diagnosis/ICD-10 Code:  S/P total knee arthroplasty, left [Z96.652]  Referring practitioner: Jose Talavera MD  Date of Initial Visit: Type: THERAPY  Noted: 2023  Today's Date: 2023  Patient seen for 7 sessions           Subjective   The patient reported that his pain level is a 2/10. He had more pain this morning, but took a pain pill before therapy and is feeling better now.    Objective   See Exercise, Manual, and Modality Logs for complete treatment.     L knee PROM following manual: 0-117 degrees    Assessment/Plan  The patient demonstrated good tolerance to all interventions today. His passive ROM continues to progress well. Continue with current PT plan of care.       Timed:  Manual Therapy:    5     mins  47493;  Therapeutic Exercise:    20     mins  98390;     Neuromuscular Dilan:   0    mins  26970;    Therapeutic Activity:     13     mins  36868;     Gait Trainin     mins  26414;     Aquatics                         0      mins  10403    Un-timed:  Mechanical Traction      0     mins  31403  Dry Needling     0     mins self-pay  Electrical Stimulation:    0     mins  16583 ( );      Timed Treatment:   38   mins   Total Treatment:     38   mins    Raffy Chamberlain PT  Physical Therapist    Electronically signed 2023    [place] License: PT - [id number]

## 2023-06-05 ENCOUNTER — TREATMENT (OUTPATIENT)
Dept: PHYSICAL THERAPY | Facility: CLINIC | Age: 68
End: 2023-06-05
Payer: MEDICARE

## 2023-06-05 DIAGNOSIS — Z96.652 S/P TOTAL KNEE ARTHROPLASTY, LEFT: Primary | ICD-10-CM

## 2023-06-05 DIAGNOSIS — M25.662 KNEE STIFFNESS, LEFT: ICD-10-CM

## 2023-06-05 DIAGNOSIS — R26.2 DIFFICULTY WALKING: ICD-10-CM

## 2023-06-05 DIAGNOSIS — M25.562 LEFT KNEE PAIN, UNSPECIFIED CHRONICITY: ICD-10-CM

## 2023-06-05 NOTE — PROGRESS NOTES
Outpatient Physical Therapy                   Physical Therapy Daily Treatment Note    Patient: Hector Coronel   : 1955  Diagnosis/ICD-10 Code:  S/P total knee arthroplasty, left [Z96.652]  Referring practitioner: Jose Talavera MD  Date of Initial Visit: Type: THERAPY  Noted: 2023  Today's Date: 2023  Patient seen for 8 sessions             Subjective   Hector Coronel reports: his knee is no longer disturbing his sleep.     Pain: 2/10 pain, in left knee at time of arrival.     Objective     See Exercise, Manual, and Modality Logs for complete treatment.     Assessment/Plan  Patient reports he still has difficulty descending stairs in his home because his steps are deeper than typical steps. Patient states the limiting factors are lack of control and lack of knee flexion. Patient states he uses his cane when descending the stairs.     Progress per Plan of Care       Timed:  Manual Therapy:    10     mins  84993;  Therapeutic Exercise:    8     mins  15155;     Neuromuscular Dilan:    0    mins  58461;    Therapeutic Activity:     10     mins  12946;     Gait Trainin     mins  24928;    Aquatic Therapy:     0     mins  82366;       Untimed:  Electrical Stimulation:    0     mins  48377 ( );  Mechanical Traction:    0     mins  85723;       Timed Treatment:   28   mins   Total Treatment:     28   mins      Electronically signed:   Jessica Mccollum PTA  Physical Therapist Assistant  [facility] License #: [id number]

## 2023-06-08 ENCOUNTER — TREATMENT (OUTPATIENT)
Dept: PHYSICAL THERAPY | Facility: CLINIC | Age: 68
End: 2023-06-08
Payer: MEDICARE

## 2023-06-08 DIAGNOSIS — M25.562 LEFT KNEE PAIN, UNSPECIFIED CHRONICITY: ICD-10-CM

## 2023-06-08 DIAGNOSIS — R26.2 DIFFICULTY WALKING: ICD-10-CM

## 2023-06-08 DIAGNOSIS — Z96.652 S/P TOTAL KNEE ARTHROPLASTY, LEFT: Primary | ICD-10-CM

## 2023-06-08 DIAGNOSIS — M25.662 KNEE STIFFNESS, LEFT: ICD-10-CM

## 2023-06-08 NOTE — PROGRESS NOTES
Progress Note  [facility] [address] [place] [address]      Patient: Hector Coronel   : 1955  Diagnosis/ICD-10 Code:  S/P total knee arthroplasty, left [Z96.652]  Referring practitioner: Jose Talavera MD  Date of Initial Visit: Type: THERAPY  Noted: 2023  Today's Date: 2023  Patient seen for 9 sessions      Subjective:   Subjective Questionnaire: LEFS: 49/80 = 61.25% Function  Clinical Progress: improved  Home Program Compliance: Yes  Treatment has included: therapeutic exercise, neuromuscular re-education, manual therapy, therapeutic activity, and gait training    Subjective   The patient reported that his pain level today is a 2/10. Over the past month, he has noticed improvements in pain, knee flexibility, and in his ability to do things in/around his house. He can get dressed more easily and has been able to get out and work in his shop some. He does still use a cane in public, but doesn't normally use it in his house. He still isn't back to doing all of the things that he would like to be able to do and would like to continue with PT.    Objective          Tenderness     Additional Tenderness Details  Tenderness in left knee around incision site, at medial/lateral joint lines, and over quadriceps.    Neurological Testing     Additional Neurological Details  Sensation to light touch intact and equal bilaterally from L2-S1 dermatomal levels.    Active Range of Motion   Left Knee   Flexion: 96 degrees with pain  Extension: 5 (lacking) degrees with pain    Passive Range of Motion   Left Knee   Flexion: 102 degrees with pain  Extension: 3 (lacking) degrees with pain    Patellar Mobility   Left Knee Patellar tendons within functional limits include the medial, lateral, superior and inferior.     Strength/Myotome Testing     Left Hip   Planes of Motion   Flexion: 3+    Left Knee   Flexion: 4+  Extension: 4-  Quadriceps contraction: good    Right Knee   Flexion: 5  Extension:  5  Quadriceps contraction: good    Ambulation     Ambulation: Level Surfaces     Additional Level Surfaces Ambulation Details  The patient ambulates with a rolling walker with slightly decreased stance time on the left lower extremity and decreased knee flexion during swing phase on the left.     General Comments     Knee Comments  Left calf supple to palpation with no signs/symptoms of infection or DVT at this time.   See Exercise, Manual, and Modality Logs for complete treatment.     Assessment & Plan     Assessment    Assessment details: The patient was re-evaluated today and presents with improvements in left knee pain, left knee strength, left knee ROM, gait, and function (LEFS) compared to his initial evaluation. Although improved, he continues to present with left knee stiffness and weakness which impact ADL performance. He would benefit from continued skilled physical therapy to address remaining functional deficits and to allow the patient to return to his prior level of function.     Goals  Plan Goals: KNEE PROBLEMS    1. The patient has limited ROM of the left knee.              LTG 1:12 weeks: The patient will demonstrate 0 to 120 degrees of ROM for the left knee in order to allow patient to am.                          STATUS: Progressing              STG 1a: 6 weeks: The patient will demonstrate 5 to 105 degrees of ROM for the left knee.                          STATUS: Met    2. The patient has limited strength of the left knee.              LTG 2: 12 weeks: The patient will demonstrate 5 /5 strength for left knee flexion and extension in order to allow patient improved joint stability                          STATUS: Progressing              STG 2a: 6 weeks: The patient will demonstrate 4+ /5 strength for left knee flexion and extension                          STATUS: Progressing                3. The patient has gait dysfunction.              LTG 3: 12 weeks: The patient will ambulate without  assistive device, independently, for community distances with normal biomechanics of the left lower extremity in order to improve mobility and allow patient to perform activities such as grocery shopping with greater ease.                          STATUS: Progressing              STG 3a: 4 weeks: The patient will ambulate with a single tipped cane with appropriate gait mechanics.                          STATUS: Progressing    4. Mobility: Walking/Moving Around Functional Limitation                               LTG 4: 12 weeks: The patient will demonstrate 25 % limitation by achieving a score of 60/80 on the LEFS.                          STATUS: Progressing              STG 4 a: 6 weeks: The patient will demonstrate 50 % limitation by achieving a score of 40/80 on the LEFS.                           STATUS: Met      Progress toward previous goals: Partially Met      Recommendations: Continue as planned  Timeframe: 2 months  Prognosis to achieve goals: good    PT Signature: Raffy Chamberlain PT    Electronically signed 2023    [place] License: PT - [id number]       Timed:  Manual Therapy:    8     mins  30013;  Therapeutic Exercise:    20     mins  35307;     Neuromuscular Dilan:    0    mins  01645;    Therapeutic Activity:     10     mins  26731;     Gait Trainin     mins  42174;     Aquatics                         0      mins  64476    Un-timed:  Mechanical Traction      0     mins  73429  Dry Needling     0     mins self-pay  Electrical Stimulation:    0     mins  75860 ( );    Timed Treatment:   38   mins   Total Treatment:     38   mins

## 2023-06-12 ENCOUNTER — TREATMENT (OUTPATIENT)
Dept: PHYSICAL THERAPY | Facility: CLINIC | Age: 68
End: 2023-06-12
Payer: MEDICARE

## 2023-06-12 DIAGNOSIS — Z96.652 S/P TOTAL KNEE ARTHROPLASTY, LEFT: Primary | ICD-10-CM

## 2023-06-12 DIAGNOSIS — M25.662 KNEE STIFFNESS, LEFT: ICD-10-CM

## 2023-06-12 DIAGNOSIS — R26.2 DIFFICULTY WALKING: ICD-10-CM

## 2023-06-12 DIAGNOSIS — M25.562 LEFT KNEE PAIN, UNSPECIFIED CHRONICITY: ICD-10-CM

## 2023-06-12 PROCEDURE — 97530 THERAPEUTIC ACTIVITIES: CPT | Performed by: PHYSICAL THERAPIST

## 2023-06-12 PROCEDURE — 97110 THERAPEUTIC EXERCISES: CPT | Performed by: PHYSICAL THERAPIST

## 2023-06-12 NOTE — PROGRESS NOTES
Physical Therapy Daily Treatment Note                      [facility] [address] [facility] [place] [address]    Patient: Hector Coronel   : 1955  Diagnosis/ICD-10 Code:  S/P total knee arthroplasty, left [Z96.652]  Referring practitioner: No ref. provider found  Date of Initial Visit: Type: THERAPY  Noted: 2023  Today's Date: 2023  Patient seen for 10 sessions           Subjective   The patient reported that his knee pain is a 1/10 today. Overall, the left knee is doing well.    Objective   See Exercise, Manual, and Modality Logs for complete treatment.     Assessment/Plan  The patient demonstrated good tolerance to all functional lower extremity strengthening exercise. Continue to progress per patient tolerance.       Timed:  Manual Therapy:    6     mins  65510;  Therapeutic Exercise:    16     mins  14189;     Neuromuscular Dilan:   0    mins  04053;    Therapeutic Activity:     8     mins  03165;     Gait Trainin     mins  38948;     Aquatics                         0      mins  34095    Un-timed:  Mechanical Traction      0     mins  84166  Dry Needling     0     mins self-pay  Electrical Stimulation:    0     mins  74504 ( );      Timed Treatment:   30   mins   Total Treatment:     30   mins    Raffy Chamberlain PT  Physical Therapist    Electronically signed 2023    [place] License: PT - [id number]

## 2023-06-13 DIAGNOSIS — M25.562 LEFT KNEE PAIN, UNSPECIFIED CHRONICITY: ICD-10-CM

## 2023-06-13 DIAGNOSIS — Z47.1 AFTERCARE FOLLOWING LEFT KNEE JOINT REPLACEMENT SURGERY: Primary | ICD-10-CM

## 2023-06-13 DIAGNOSIS — Z96.652 AFTERCARE FOLLOWING LEFT KNEE JOINT REPLACEMENT SURGERY: Primary | ICD-10-CM

## 2023-06-15 ENCOUNTER — TREATMENT (OUTPATIENT)
Dept: PHYSICAL THERAPY | Facility: CLINIC | Age: 68
End: 2023-06-15
Payer: MEDICARE

## 2023-06-15 DIAGNOSIS — R26.2 DIFFICULTY WALKING: ICD-10-CM

## 2023-06-15 DIAGNOSIS — M25.562 LEFT KNEE PAIN, UNSPECIFIED CHRONICITY: ICD-10-CM

## 2023-06-15 DIAGNOSIS — M25.662 KNEE STIFFNESS, LEFT: ICD-10-CM

## 2023-06-15 DIAGNOSIS — Z96.652 S/P TOTAL KNEE ARTHROPLASTY, LEFT: Primary | ICD-10-CM

## 2023-06-15 PROCEDURE — 97110 THERAPEUTIC EXERCISES: CPT | Performed by: PHYSICAL THERAPIST

## 2023-06-15 PROCEDURE — 97530 THERAPEUTIC ACTIVITIES: CPT | Performed by: PHYSICAL THERAPIST

## 2023-06-15 NOTE — PROGRESS NOTES
Outpatient Physical Therapy                   Physical Therapy Daily Treatment Note    Patient: Hector Coronel   : 1955  Diagnosis/ICD-10 Code:  S/P total knee arthroplasty, left [Z96.652]  Referring practitioner: Jose Talavera MD  Date of Initial Visit: Type: THERAPY  Noted: 2023  Today's Date: 6/15/2023  Patient seen for 11 sessions             Subjective   Hector Coronel reports: his pain is much less than it has been the past several mornings because he took a pain pill before today's appointment. Patient states the pain has been 2-3/10 pain consistently for the last 2-3 days. Patient states he hasn't been propping his leg up with ice as often as he was and has been more mobile. Patient states he also is putting more pressure on his left leg/knee since he has stopped using his cane, especially when ascending or descending stairs.     Pain: 1/10 pain, in left knee at time of arrival.     Objective     See Exercise, Manual, and Modality Logs for complete treatment.     Assessment/Plan  Hector still experiencing increased left knee pain, especially without pain medication. Pt tolerated exercises well, with just general fatigue. Patient was became SOA with total gym squats  and step ups but was able to take a short rest break and continue. Pt would benefit from skilled PT to address Range of Motion  and Strength deficits, pain management and any concerns with ADLs.     Progress per Plan of Care       Timed:  Manual Therapy:    0     mins  77696;  Therapeutic Exercise:    15     mins  56836;     Neuromuscular Dilan:    0    mins  49087;    Therapeutic Activity:     9     mins  39356;     Gait Trainin     mins  32117;    Aquatic Therapy:     0     mins  30259;       Untimed:  Electrical Stimulation:    0     mins  97778 ( );  Mechanical Traction:    0     mins  91677;       Timed Treatment:   24   mins   Total Treatment:     24   mins      Electronically signed:   Jessica Mccollum PTA  Physical  Therapist Assistant  [facility] License #: [id number]

## 2023-06-19 ENCOUNTER — TREATMENT (OUTPATIENT)
Dept: PHYSICAL THERAPY | Facility: CLINIC | Age: 68
End: 2023-06-19
Payer: MEDICARE

## 2023-06-19 DIAGNOSIS — R26.2 DIFFICULTY WALKING: ICD-10-CM

## 2023-06-19 DIAGNOSIS — Z96.652 S/P TOTAL KNEE ARTHROPLASTY, LEFT: Primary | ICD-10-CM

## 2023-06-19 DIAGNOSIS — M25.662 KNEE STIFFNESS, LEFT: ICD-10-CM

## 2023-06-19 DIAGNOSIS — M25.562 LEFT KNEE PAIN, UNSPECIFIED CHRONICITY: ICD-10-CM

## 2023-06-19 PROCEDURE — 97140 MANUAL THERAPY 1/> REGIONS: CPT | Performed by: PHYSICAL THERAPIST

## 2023-06-19 PROCEDURE — 97110 THERAPEUTIC EXERCISES: CPT | Performed by: PHYSICAL THERAPIST

## 2023-06-19 NOTE — PROGRESS NOTES
"Outpatient Physical Therapy                   Physical Therapy Daily Treatment Note    Patient: Hector Coronel   : 1955  Diagnosis/ICD-10 Code:  S/P total knee arthroplasty, left [Z96.652]  Referring practitioner: Jose Talavera MD  Date of Initial Visit: Type: THERAPY  Noted: 2023  Today's Date: 2023  Patient seen for 12 sessions             Subjective   Hector Coronel reports: he still has difficulty descending the stairs in his home because they are steeper than a standard step. Patient states he is no longer using his cane at all.     Pain: 1/10 pain, left knee at time of arrival. Patient comments \"I almost have no pain at all\"     Objective   Left knee ROM:  Active extension: lacking 1 degree  Passive flexion: 115 degrees    See Exercise, Manual, and Modality Logs for complete treatment.     Assessment/Plan  Hector progressing as evident by decreased overall knee pain. Pt tolerated exercises and manual well, just general fatigue. Pt would benefit from skilled PT to address Range of Motion  and Strength deficits with surgical protocol, pain management and any concerns with ADLs.     Progress per Plan of Care       Timed:  Manual Therapy:    8     mins  35342;  Therapeutic Exercise:    10     mins  13774;     Neuromuscular Dilan:    0    mins  12377;    Therapeutic Activity:     7     mins  54618;     Gait Trainin     mins  35965;    Aquatic Therapy:     0     mins  81723;       Untimed:  Electrical Stimulation:    0     mins  73345 ( );  Mechanical Traction:    0     mins  06989;       Timed Treatment:   25   mins   Total Treatment:     25   mins      Electronically signed:   Jessica Mccollum PTA  Physical Therapist Assistant  [facility] License #: [id number]  "

## 2023-08-02 ENCOUNTER — OFFICE VISIT (OUTPATIENT)
Dept: ORTHOPEDIC SURGERY | Facility: CLINIC | Age: 68
End: 2023-08-02
Payer: MEDICARE

## 2023-08-02 VITALS
DIASTOLIC BLOOD PRESSURE: 76 MMHG | SYSTOLIC BLOOD PRESSURE: 126 MMHG | HEART RATE: 73 BPM | WEIGHT: 230 LBS | HEIGHT: 69 IN | BODY MASS INDEX: 34.07 KG/M2 | OXYGEN SATURATION: 97 %

## 2023-08-02 DIAGNOSIS — Z96.652 S/P TOTAL KNEE ARTHROPLASTY, LEFT: Primary | ICD-10-CM

## 2023-08-02 PROCEDURE — 99024 POSTOP FOLLOW-UP VISIT: CPT

## 2023-10-30 RX ORDER — DOXAZOSIN MESYLATE 4 MG/1
TABLET ORAL
Qty: 90 TABLET | Refills: 3 | OUTPATIENT
Start: 2023-10-30

## 2023-12-01 RX ORDER — DOXAZOSIN MESYLATE 4 MG/1
TABLET ORAL
Qty: 90 TABLET | Refills: 3 | OUTPATIENT
Start: 2023-12-01

## 2024-01-22 ENCOUNTER — OFFICE VISIT (OUTPATIENT)
Dept: PODIATRY | Facility: CLINIC | Age: 69
End: 2024-01-22
Payer: MEDICARE

## 2024-01-22 VITALS
TEMPERATURE: 98.4 F | OXYGEN SATURATION: 98 % | HEART RATE: 72 BPM | WEIGHT: 244 LBS | BODY MASS INDEX: 36.14 KG/M2 | DIASTOLIC BLOOD PRESSURE: 77 MMHG | SYSTOLIC BLOOD PRESSURE: 138 MMHG | HEIGHT: 69 IN

## 2024-01-22 DIAGNOSIS — B35.1 ONYCHOMYCOSIS: ICD-10-CM

## 2024-01-22 DIAGNOSIS — M20.12 VALGUS DEFORMITY OF BOTH GREAT TOES: Primary | ICD-10-CM

## 2024-01-22 DIAGNOSIS — M20.11 VALGUS DEFORMITY OF BOTH GREAT TOES: Primary | ICD-10-CM

## 2024-01-22 DIAGNOSIS — M20.42 HAMMER TOES OF BOTH FEET: ICD-10-CM

## 2024-01-22 DIAGNOSIS — M20.41 HAMMER TOES OF BOTH FEET: ICD-10-CM

## 2024-01-22 PROCEDURE — 1160F RVW MEDS BY RX/DR IN RCRD: CPT | Performed by: PODIATRIST

## 2024-01-22 PROCEDURE — 1159F MED LIST DOCD IN RCRD: CPT | Performed by: PODIATRIST

## 2024-01-22 PROCEDURE — 99203 OFFICE O/P NEW LOW 30 MIN: CPT | Performed by: PODIATRIST

## 2024-01-22 RX ORDER — CELECOXIB 200 MG/1
CAPSULE ORAL
COMMUNITY
Start: 2024-01-09

## 2024-01-22 NOTE — PROGRESS NOTES
[facility] - PODIATRY    Today's Date: 01/22/24    Patient Name: Hector Coronel  MRN: [id number]  CSN: [id number]  PCP: Eduardo Bowman MD,   Referring Provider: No ref. provider found    SUBJECTIVE     Chief Complaint   Patient presents with    Right Foot - Establish Care, Bunions     No pain     Left Foot - Establish Care, Hammer Toe     No pain   Balance issues with toe crossing over      HPI: Hector Coronel, a 68 y.o.male, presents to clinic.    Patient is a 68-year-old male presenting with bilateral bunion and hammertoes.  Patient states he does have some balance issues on his left foot but is not all the time.  Patient states he has had bunions and hammertoes for several years.  Patient states that hammertoes on the left given the most issues but it is not every day and is not constant.    Past Medical History:   Diagnosis Date    Anxiety     Arthritis     BACK    Bunion     Coronary artery disease 05/31/2022    BLOCKAGE- 1 STENT PLACED 5-- SEE'S DILEEP, DENIED CP/SOB    Cough     ALLERGIES    Depression     Diabetes mellitus     BORDERLINE- DOESN'T CHECK BG AT HOME    Edema     PRIOR TO STENT PLACEMENT    Enlarged prostate     Hammer toe     History of transfusion     AS A CHILD STATES HE DOESNT THINK ANY ISSUE WITH IT    Hoarseness     Murmur, cardiac     ASYMPTOMATIC- SOAOE    Neck pain     VERTABRA PUSHING ON THROAT    Osteoarthritis     OG HIPS, BILATERL KNEES, L SHOULDER    PONV (postoperative nausea and vomiting)     Sleep apnea     USES BIPAP    SOBOE (shortness of breath on exertion)     TIA (transient ischemic attack)     MULTI. YEARS AGO- NO RESIDUAL    Umbilical hernia      Past Surgical History:   Procedure Laterality Date    APPENDECTOMY      CARDIAC CATHETERIZATION Right 05/31/2022    Procedure: Left Heart Cath;  Surgeon: Eduardo Buck MD;  Location: [facility] CATH INVASIVE LOCATION;  Service: Cardiovascular;  Laterality: Right;    CARPAL TUNNEL RELEASE Bilateral      CHOLECYSTECTOMY      HERNIA REPAIR      UMBILICAL    INNER EAR SURGERY      BOTH SIDES    THROAT SURGERY      BIOPSY, RMOVED POLYPS    TOTAL HIP ARTHROPLASTY Right 2022    Procedure: RIGHT TOTAL HIP ARTHROPLASTY ANTERIOR;  Surgeon: Jose Talavera MD;  Location: [facility] MAIN OR;  Service: Orthopedics;  Laterality: Right;    TOTAL KNEE ARTHROPLASTY Left 2023    Procedure: TOTAL KNEE ARTHROPLASTY WITH RYLAND ROBOT;  Surgeon: Jose Talavera MD;  Location: [facility] MAIN OR;  Service: Robotics - Ortho;  Laterality: Left;    VASECTOMY           Family History   Problem Relation Age of Onset    Diabetes Father     Stroke Paternal Uncle     Heart disease Paternal Uncle     Malig Hyperthermia Neg Hx      Social History     Socioeconomic History    Marital status:    Tobacco Use    Smoking status: Former     Packs/day: 2.5     Types: Cigarettes     Quit date:      Years since quittin.0    Smokeless tobacco: Never    Tobacco comments:     quit 30 years ago   Vaping Use    Vaping Use: Never used   Substance and Sexual Activity    Alcohol use: Yes     Alcohol/week: 3.0 standard drinks of alcohol     Types: 3 Shots of liquor per week    Drug use: Never    Sexual activity: Defer     Allergies   Allergen Reactions    Metal Rash     CHEAP METAL- BELT HAZEL BREAK HIM OUT      Current Outpatient Medications   Medication Sig Dispense Refill    aspirin 81 MG EC tablet Take 1 tablet by mouth Daily.      atorvastatin (LIPITOR) 40 MG tablet Take 1 tablet by mouth Daily. 90 tablet 3    buPROPion XL (WELLBUTRIN XL) 150 MG 24 hr tablet Take 1 tablet by mouth Daily. 90 tablet 3    celecoxib (CeleBREX) 200 MG capsule       Cholecalciferol (VITAMIN D3 PO) Take 1 capsule by mouth Daily.      Cobalamin Combinations (NEURIVA PLUS PO) Take  by mouth.      Cod Liver Oil 1000 MG capsule Take  by mouth.      doxazosin (CARDURA) 4 MG tablet TAKE ONE TABLET BY MOUTH DAILY (Patient taking differently: Every Night.) 90 tablet  3    finasteride (PROSCAR) 5 MG tablet Take 1 tablet by mouth Daily.      fluticasone (FLONASE) 50 MCG/ACT nasal spray SPRAY TWO SPRAYS IN EACH NOSTRIL DAILY (Patient taking differently: 2 sprays into the nostril(s) as directed by provider Daily.) 48 mL 1    glucosamine-chondroitin 500-400 MG capsule capsule Take 1 capsule by mouth Daily.      HYDROcodone-acetaminophen (Norco) 7.5-325 MG per tablet Take 1 tablet by mouth Every 4 (Four) Hours As Needed for Moderate Pain. 42 tablet 0    omeprazole (priLOSEC) 20 MG capsule Take 1 capsule by mouth Daily. 90 capsule 3    Probiotic Product (ACIDOPHILUS PROBIOTIC BLEND PO) Take 1 tablet by mouth Daily.      sennosides-docusate (PERICOLACE) 8.6-50 MG per tablet Take 1 tablet by mouth Daily.      traMADol (ULTRAM) 50 MG tablet Take 1 tablet by mouth Every 6 (Six) Hours As Needed for Moderate Pain.      vitamin B-12 (CYANOCOBALAMIN) 1000 MCG tablet Take 2.5 tablets by mouth Daily. 225 tablet 3    Vitamins-Lipotropics (LIPO FLAVONOID PLUS PO) Take  by mouth.      gabapentin (NEURONTIN) 300 MG capsule Take 1 capsule by mouth Daily. (Patient not taking: Reported on 1/22/2024)       No current facility-administered medications for this visit.     Review of Systems   All other systems reviewed and are negative.      OBJECTIVE     Vitals:    01/22/24 0927   BP: 138/77   Pulse: 72   Temp: 98.4 °F (36.9 °C)   SpO2: 98%       WBC   Date Value Ref Range Status   05/06/2023 10.31 3.40 - 10.80 10*3/mm3 Final     RBC   Date Value Ref Range Status   05/06/2023 4.46 4.14 - 5.80 10*6/mm3 Final     Hemoglobin   Date Value Ref Range Status   05/06/2023 13.3 13.0 - 17.7 g/dL Final   05/06/2023 13.3 13.0 - 17.7 g/dL Final     Hematocrit   Date Value Ref Range Status   05/06/2023 39.9 37.5 - 51.0 % Final   05/06/2023 39.9 37.5 - 51.0 % Final     MCV   Date Value Ref Range Status   05/06/2023 89.5 79.0 - 97.0 fL Final     MCH   Date Value Ref Range Status   05/06/2023 29.8 26.6 - 33.0 pg Final      MCHC   Date Value Ref Range Status   05/06/2023 33.3 31.5 - 35.7 g/dL Final     RDW   Date Value Ref Range Status   05/06/2023 14.9 12.3 - 15.4 % Final     RDW-SD   Date Value Ref Range Status   05/06/2023 48.6 37.0 - 54.0 fl Final     MPV   Date Value Ref Range Status   05/06/2023 10.3 6.0 - 12.0 fL Final     Platelets   Date Value Ref Range Status   05/06/2023 153 140 - 450 10*3/mm3 Final     Neutrophil %   Date Value Ref Range Status   04/19/2023 71.6 42.7 - 76.0 % Final     Lymphocyte %   Date Value Ref Range Status   04/19/2023 16.5 (L) 19.6 - 45.3 % Final     Monocyte %   Date Value Ref Range Status   04/19/2023 8.1 5.0 - 12.0 % Final     Eosinophil %   Date Value Ref Range Status   04/19/2023 2.9 0.3 - 6.2 % Final     Basophil %   Date Value Ref Range Status   04/19/2023 0.7 0.0 - 1.5 % Final     Immature Grans %   Date Value Ref Range Status   04/19/2023 0.2 0.0 - 0.5 % Final     Neutrophils, Absolute   Date Value Ref Range Status   04/19/2023 3.96 1.70 - 7.00 10*3/mm3 Final     Lymphocytes, Absolute   Date Value Ref Range Status   04/19/2023 0.91 0.70 - 3.10 10*3/mm3 Final     Monocytes, Absolute   Date Value Ref Range Status   04/19/2023 0.45 0.10 - 0.90 10*3/mm3 Final     Eosinophils, Absolute   Date Value Ref Range Status   04/19/2023 0.16 0.00 - 0.40 10*3/mm3 Final     Basophils, Absolute   Date Value Ref Range Status   04/19/2023 0.04 0.00 - 0.20 10*3/mm3 Final     Immature Grans, Absolute   Date Value Ref Range Status   04/19/2023 0.01 0.00 - 0.05 10*3/mm3 Final     nRBC   Date Value Ref Range Status   04/19/2023 0.0 0.0 - 0.2 /100 WBC Final         Lab Results   Component Value Date    GLUCOSE 90 04/19/2023    BUN 15 04/19/2023    CREATININE 1.00 04/19/2023    EGFRIFNONA 65 11/11/2021    BCR 15.0 04/19/2023    K 4.2 04/19/2023    CO2 24.0 04/19/2023    CALCIUM 9.7 04/19/2023    ALBUMIN 4.2 04/19/2023    LABIL2 1.3 (L) 05/26/2021    AST 22 04/19/2023    ALT 31 04/19/2023       Patient seen in no  apparent distress.      PHYSICAL EXAM:     Foot/Ankle Exam    GENERAL  Appearance:  appears stated age  Orientation:  AAOx3  Affect:  appropriate  Gait:  unimpaired  Assistance:  independent  Right shoe gear: casual shoe  Left shoe gear: casual shoe    VASCULAR     Right Foot Vascularity   Normal vascular exam    Dorsalis pedis:  2+  Posterior tibial:  2+  Skin temperature:  warm  Edema grading:  None  CFT:  < 3 seconds  Pedal hair growth:  Present  Varicosities:  none     Left Foot Vascularity   Normal vascular exam    Dorsalis pedis:  2+  Posterior tibial:  2+  Skin temperature:  warm  Edema grading:  None  CFT:  < 3 seconds  Pedal hair growth:  Present  Varicosities:  none     NEUROLOGIC     Right Foot Neurologic   Normal sensation    Light touch sensation: normal  Vibratory sensation: normal  Hot/Cold sensation: normal  Protective Sensation using Prue-Fernie Monofilament:   Sites intact: 10  Sites tested: 10     Left Foot Neurologic   Normal sensation    Light touch sensation: normal  Vibratory sensation: normal  Hot/Cold sensation:  normal  Protective Sensation using Prue-Fernie Monofilament:   Sites intact: 10  Sites tested: 10    MUSCULOSKELETAL     Right Foot Musculoskeletal   Hammertoe:  Second toe, third toe, fourth toe and fifth toe  Hallux limitus: Yes       Left Foot Musculoskeletal   Hammertoe:  Second toe, fourth toe, third toe and fifth toe  Hallux limitus: Yes      MUSCLE STRENGTH     Right Foot Muscle Strength   Foot dorsiflexion:  4  Foot plantar flexion:  4  Foot inversion:  4  Foot eversion:  4     Left Foot Muscle Strength   Foot dorsiflexion:  4  Foot plantar flexion:  4  Foot inversion:  4  Foot eversion:  4    RANGE OF MOTION     Right Foot Range of Motion   Foot and ankle ROM within normal limits       Left Foot Range of Motion   Foot and ankle ROM within normal limits      DERMATOLOGIC      Right Foot Dermatologic   Skin  Right foot skin is intact.   Nails  1.  Positive for  Dillon Mcconnell elongated and onychomycosis.  2.  Positive for elongated and onychomycosis.  3.  Positive for elongated and onychomycosis.  4.  Positive for elongated and onychomycosis.  5.  Positive for elongated and onychomycosis.     Left Foot Dermatologic   Skin  Left foot skin is intact.   Nails  1.  Positive for elongated and onychomycosis.  2.  Positive for elongated and onychomycosis.  3.  Positive for elongated and onychomycosis.  4.  Positive for elongated and onychomycosis.  5.  Positive for elongated and onychomycosis.      RADIOLOGY:          No results found.    ASSESSMENT/PLAN     Diagnoses and all orders for this visit:    1. Valgus deformity of both great toes (Primary)    2. Hammer toes of both feet    3. Onychomycosis        Comprehensive lower extremity examination and evaluation was performed.    Patient to begin wearing inserts with support.  Discussed conservative and surgical options for the bunions and hammertoes.    Patient to return to clinic patient request PRN follow-up.    Discussed findings and treatment plan including risks, benefits, and treatment options with patient in detail. Patient agreed with treatment plan.    Medications and allergies reviewed.  Reviewed available lab values along with other pertinent labs.  These were discussed with the patient.    An After Visit Summary was printed and given to the patient at discharge, including (if requested) any available informative/educational handouts regarding diagnosis, treatment, or medications. All questions were answered to patient/family satisfaction. Should symptoms fail to improve or worsen they agree to call or return to clinic or to go to the Emergency Department. Discussed the importance of following up with any needed screening tests/labs/specialist appointments and any requested follow-up recommended by me today. Importance of maintaining follow-up discussed and patient accepts that missed appointments can delay diagnosis and potentially  lead to worsening of conditions.    Return if symptoms worsen or fail to improve., or sooner if acute issues arise.    This document has been electronically signed by Rico Field DPM on January 22, 2024 11:36 EST

## 2024-07-22 ENCOUNTER — TELEPHONE (OUTPATIENT)
Dept: ORTHOPEDIC SURGERY | Facility: CLINIC | Age: 69
End: 2024-07-22
Payer: MEDICARE

## 2024-07-22 NOTE — TELEPHONE ENCOUNTER
Hub staff attempted to follow warm transfer process and was unsuccessful     Caller: SHAILA HOANG-YES VERBAL ON FILE     Relationship to patient: Emergency Contact    Best call back number: [phone]    Patient is needing: PATIENT HAVING RIGHT KEEN PAIN WITH POPPING AND GIVING OUT UNABLE TO SLEEP DUE TO PAIN . FIRST AVAILABLE NEW PROBLEM APPOINTMENT 8-14-24

## 2024-07-26 ENCOUNTER — TELEPHONE (OUTPATIENT)
Dept: ORTHOPEDIC SURGERY | Facility: CLINIC | Age: 69
End: 2024-07-26
Payer: MEDICARE

## 2024-07-26 NOTE — TELEPHONE ENCOUNTER
[facility], [facility]TH F[facility]  [address] 234 beds at the [facility] [address]      Isaac Bhagat :1975 MRN:[id number]    2022 Time Session Began: 11:00 pm   Time Session Ended:  11:23 pm     Due to COVID-19 precautions, this visit was performed via live interactive two-way Video visit with patient's verbal consent.   Clinician Location:Home  Patient Location: Home.  Verified patient identity:  [x] Yes    Session Type:Therapy 16-32 minutes (92658)    Others Present: no one    Intervention: Cognitive, Supportive    Suicide/Homicide/Violence Ideation: No    If Yes, explain:     Current Outpatient Medications   Medication Sig Dispense Refill   • DULoxetine (CYMBALTA) 60 MG capsule Take 1 capsule by mouth daily. 90 capsule 1     No current facility-administered medications for this visit.       Change in Medication(s) Reported: N/A  If Yes, explain:     Patient/Family Education Provided: Yes  Patient/Family Displays Understanding: Yes    If No, explain:     Chief complaint in patient's own words: \"feeling trapped\" due to conflict with coworker.    Progress Note containing chief complaint and symptoms/problems related to the complaint:    (Data/Action/Response/Plan)    D: Client reports conflict with an alcoholic coworker. The person was frequently calling off from work which meant client had to work on his off days.  Then the coworker intentionally messed up client's things. Client had reached out to him several times to try to get him help. Client didn't want the man to lose his job but also felt he could no longer handle the situation. Client stated he felt trapped and had suicidal thoughts but none which he would act on. 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 He also made an appointment with this therapist. 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 It is client's understanding that he  Complex tear of lateral meniscus, current injury, left knee, initial encounter. ESTABLISHED WITH DR LANDA. JENNY REFERRAL 7/25/2024, PROG NOTES 7/15/2024, MRI LT KNEE 7/25/2024     MRI DONE YESTERDAY (7-25) AND IS IN MEDIA   will be let go. Client feels badly about this. Several of the people client spoke with mentioned their appreciation for his work. Client stated he feels better now although he is still sad for the person. He is no longer having suicidal thoughts. He initially didn't know how to turn this situation around. 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     A: Client was reinforced for reaching out to others and using meditation to gain perspective. This provider went over with client how to diffuse the limbic system response and feeling of being trapped. Next we went over an exercise to help client realize there are always choices and then client was encouraged to use his problem solving skills. Education was provided on anger being a motivating force for change. We discussed enabling and consequences of substance abuse.. Client was thinking he was the reason the coworker was fired but also realized it was the coworkers behavior.       R: Client states he is doing better now. He had no other concerns at this time and feels  ready for discharge.     P: Discharge    Need for Community Resources Assessed: Yes    Resources Needed: Yes    If Yes, what resources:      Primary Diagnosis: Adjustment disorder with mixed anxiety and depressed mood.     Treatment Plan: N/A    Discharge Plan: discharge   Next Appointment: N/A   Julieth Whaley LCSW

## 2024-09-18 ENCOUNTER — OFFICE VISIT (OUTPATIENT)
Dept: ORTHOPEDIC SURGERY | Facility: CLINIC | Age: 69
End: 2024-09-18
Payer: MEDICARE

## 2024-09-18 ENCOUNTER — PREP FOR SURGERY (OUTPATIENT)
Dept: OTHER | Facility: HOSPITAL | Age: 69
End: 2024-09-18
Payer: MEDICARE

## 2024-09-18 VITALS
HEART RATE: 87 BPM | HEIGHT: 69 IN | SYSTOLIC BLOOD PRESSURE: 132 MMHG | BODY MASS INDEX: 38.18 KG/M2 | WEIGHT: 257.8 LBS | DIASTOLIC BLOOD PRESSURE: 75 MMHG | OXYGEN SATURATION: 95 %

## 2024-09-18 DIAGNOSIS — M17.11 OSTEOARTHRITIS OF RIGHT KNEE, UNSPECIFIED OSTEOARTHRITIS TYPE: ICD-10-CM

## 2024-09-18 DIAGNOSIS — M25.561 RIGHT KNEE PAIN, UNSPECIFIED CHRONICITY: Primary | ICD-10-CM

## 2024-09-18 DIAGNOSIS — Z96.651 AFTERCARE FOLLOWING RIGHT KNEE JOINT REPLACEMENT SURGERY: Primary | ICD-10-CM

## 2024-09-18 DIAGNOSIS — Z47.1 AFTERCARE FOLLOWING RIGHT KNEE JOINT REPLACEMENT SURGERY: Primary | ICD-10-CM

## 2024-09-18 RX ORDER — TRANEXAMIC ACID 10 MG/ML
1000 INJECTION, SOLUTION INTRAVENOUS ONCE
OUTPATIENT
Start: 2024-09-18 | End: 2024-09-18

## 2024-09-19 DIAGNOSIS — Z01.818 ENCOUNTER FOR PREADMISSION TESTING: Primary | ICD-10-CM

## 2024-09-24 ENCOUNTER — TELEPHONE (OUTPATIENT)
Dept: ORTHOPEDIC SURGERY | Facility: CLINIC | Age: 69
End: 2024-09-24
Payer: MEDICARE

## 2024-09-24 ENCOUNTER — PRE-ADMISSION TESTING (OUTPATIENT)
Dept: PREADMISSION TESTING | Facility: HOSPITAL | Age: 69
End: 2024-09-24
Payer: MEDICARE

## 2024-09-24 ENCOUNTER — DOCUMENTATION (OUTPATIENT)
Dept: PHYSICAL THERAPY | Facility: CLINIC | Age: 69
End: 2024-09-24
Payer: MEDICARE

## 2024-09-24 VITALS
HEART RATE: 72 BPM | RESPIRATION RATE: 16 BRPM | HEIGHT: 69 IN | SYSTOLIC BLOOD PRESSURE: 124 MMHG | OXYGEN SATURATION: 97 % | DIASTOLIC BLOOD PRESSURE: 64 MMHG | TEMPERATURE: 97.2 F | WEIGHT: 254.19 LBS | BODY MASS INDEX: 37.65 KG/M2

## 2024-09-24 DIAGNOSIS — Z01.818 ENCOUNTER FOR PREADMISSION TESTING: ICD-10-CM

## 2024-09-24 DIAGNOSIS — M25.561 RIGHT KNEE PAIN, UNSPECIFIED CHRONICITY: ICD-10-CM

## 2024-09-24 LAB
ALBUMIN SERPL-MCNC: 4 G/DL (ref 3.5–5.2)
ALBUMIN/GLOB SERPL: 1.4 G/DL
ALP SERPL-CCNC: 97 U/L (ref 39–117)
ALT SERPL W P-5'-P-CCNC: 38 U/L (ref 1–41)
ANION GAP SERPL CALCULATED.3IONS-SCNC: 8.7 MMOL/L (ref 5–15)
AST SERPL-CCNC: 22 U/L (ref 1–40)
BASOPHILS # BLD AUTO: 0.04 10*3/MM3 (ref 0–0.2)
BASOPHILS NFR BLD AUTO: 0.6 % (ref 0–1.5)
BILIRUB SERPL-MCNC: 0.5 MG/DL (ref 0–1.2)
BUN SERPL-MCNC: 18 MG/DL (ref 8–23)
BUN/CREAT SERPL: 16.7 (ref 7–25)
CALCIUM SPEC-SCNC: 9.2 MG/DL (ref 8.6–10.5)
CHLORIDE SERPL-SCNC: 105 MMOL/L (ref 98–107)
CO2 SERPL-SCNC: 24.3 MMOL/L (ref 22–29)
CREAT SERPL-MCNC: 1.08 MG/DL (ref 0.76–1.27)
DEPRECATED RDW RBC AUTO: 44 FL (ref 37–54)
EGFRCR SERPLBLD CKD-EPI 2021: 73.4 ML/MIN/1.73
EOSINOPHIL # BLD AUTO: 0.36 10*3/MM3 (ref 0–0.4)
EOSINOPHIL NFR BLD AUTO: 5.5 % (ref 0.3–6.2)
ERYTHROCYTE [DISTWIDTH] IN BLOOD BY AUTOMATED COUNT: 13.1 % (ref 12.3–15.4)
GLOBULIN UR ELPH-MCNC: 2.9 GM/DL
GLUCOSE SERPL-MCNC: 100 MG/DL (ref 65–99)
HBA1C MFR BLD: 5.7 % (ref 4.8–5.6)
HCT VFR BLD AUTO: 44.3 % (ref 37.5–51)
HGB BLD-MCNC: 14.7 G/DL (ref 13–17.7)
IMM GRANULOCYTES # BLD AUTO: 0.01 10*3/MM3 (ref 0–0.05)
IMM GRANULOCYTES NFR BLD AUTO: 0.2 % (ref 0–0.5)
LYMPHOCYTES # BLD AUTO: 1.01 10*3/MM3 (ref 0.7–3.1)
LYMPHOCYTES NFR BLD AUTO: 15.4 % (ref 19.6–45.3)
MCH RBC QN AUTO: 30.4 PG (ref 26.6–33)
MCHC RBC AUTO-ENTMCNC: 33.2 G/DL (ref 31.5–35.7)
MCV RBC AUTO: 91.5 FL (ref 79–97)
MONOCYTES # BLD AUTO: 0.58 10*3/MM3 (ref 0.1–0.9)
MONOCYTES NFR BLD AUTO: 8.9 % (ref 5–12)
NEUTROPHILS NFR BLD AUTO: 4.55 10*3/MM3 (ref 1.7–7)
NEUTROPHILS NFR BLD AUTO: 69.4 % (ref 42.7–76)
NRBC BLD AUTO-RTO: 0 /100 WBC (ref 0–0.2)
PLATELET # BLD AUTO: 143 10*3/MM3 (ref 140–450)
PMV BLD AUTO: 10.2 FL (ref 6–12)
POTASSIUM SERPL-SCNC: 4.2 MMOL/L (ref 3.5–5.2)
PROT SERPL-MCNC: 6.9 G/DL (ref 6–8.5)
QT INTERVAL: 357 MS
QTC INTERVAL: 385 MS
RBC # BLD AUTO: 4.84 10*6/MM3 (ref 4.14–5.8)
SODIUM SERPL-SCNC: 138 MMOL/L (ref 136–145)
WBC NRBC COR # BLD AUTO: 6.55 10*3/MM3 (ref 3.4–10.8)

## 2024-09-24 PROCEDURE — 36415 COLL VENOUS BLD VENIPUNCTURE: CPT

## 2024-09-24 PROCEDURE — 93005 ELECTROCARDIOGRAM TRACING: CPT

## 2024-09-24 PROCEDURE — 83036 HEMOGLOBIN GLYCOSYLATED A1C: CPT

## 2024-09-24 PROCEDURE — 80053 COMPREHEN METABOLIC PANEL: CPT

## 2024-09-24 PROCEDURE — 85025 COMPLETE CBC W/AUTO DIFF WBC: CPT

## 2024-09-24 RX ORDER — POTASSIUM CHLORIDE 1500 MG/1
20 TABLET, EXTENDED RELEASE ORAL DAILY
COMMUNITY

## 2024-09-24 RX ORDER — FUROSEMIDE 40 MG
40 TABLET ORAL DAILY
COMMUNITY

## 2024-09-24 RX ORDER — QUETIAPINE FUMARATE 50 MG/1
50 TABLET, EXTENDED RELEASE ORAL NIGHTLY
COMMUNITY

## 2024-09-24 RX ORDER — ASPIRIN 325 MG
325 TABLET, DELAYED RELEASE (ENTERIC COATED) ORAL DAILY
COMMUNITY

## 2024-09-24 RX ORDER — MULTIVITAMIN WITH IRON
250 TABLET ORAL DAILY
COMMUNITY

## 2024-09-24 RX ORDER — BUPROPION HYDROCHLORIDE 300 MG/1
300 TABLET ORAL DAILY
COMMUNITY

## 2024-09-26 ENCOUNTER — OFFICE VISIT (OUTPATIENT)
Dept: CARDIOLOGY | Facility: CLINIC | Age: 69
End: 2024-09-26
Payer: MEDICARE

## 2024-09-26 VITALS
SYSTOLIC BLOOD PRESSURE: 125 MMHG | HEIGHT: 69 IN | DIASTOLIC BLOOD PRESSURE: 79 MMHG | WEIGHT: 251.2 LBS | HEART RATE: 75 BPM | BODY MASS INDEX: 37.2 KG/M2 | OXYGEN SATURATION: 96 %

## 2024-09-26 DIAGNOSIS — E78.2 MIXED HYPERLIPIDEMIA: ICD-10-CM

## 2024-09-26 DIAGNOSIS — Z01.810 PREOP CARDIOVASCULAR EXAM: ICD-10-CM

## 2024-09-26 DIAGNOSIS — I25.10 CORONARY ARTERY DISEASE INVOLVING NATIVE CORONARY ARTERY OF NATIVE HEART WITHOUT ANGINA PECTORIS: Primary | ICD-10-CM

## 2024-09-27 ENCOUNTER — ANESTHESIA EVENT (OUTPATIENT)
Dept: PERIOP | Facility: HOSPITAL | Age: 69
End: 2024-09-27
Payer: MEDICARE

## 2024-09-27 ENCOUNTER — TELEPHONE (OUTPATIENT)
Dept: ORTHOPEDIC SURGERY | Facility: CLINIC | Age: 69
End: 2024-09-27
Payer: MEDICARE

## 2024-09-27 ENCOUNTER — TELEPHONE (OUTPATIENT)
Dept: CARDIOLOGY | Facility: CLINIC | Age: 69
End: 2024-09-27
Payer: MEDICARE

## 2024-10-01 ENCOUNTER — ANESTHESIA EVENT CONVERTED (OUTPATIENT)
Dept: ANESTHESIOLOGY | Facility: HOSPITAL | Age: 69
End: 2024-10-01
Payer: MEDICARE

## 2024-10-01 ENCOUNTER — HOSPITAL ENCOUNTER (OUTPATIENT)
Facility: HOSPITAL | Age: 69
Discharge: HOME | End: 2024-10-02
Attending: ORTHOPAEDIC SURGERY | Admitting: ORTHOPAEDIC SURGERY
Payer: MEDICARE

## 2024-10-01 ENCOUNTER — APPOINTMENT (OUTPATIENT)
Dept: GENERAL RADIOLOGY | Facility: HOSPITAL | Age: 69
End: 2024-10-01
Payer: MEDICARE

## 2024-10-01 ENCOUNTER — ANESTHESIA (OUTPATIENT)
Dept: PERIOP | Facility: HOSPITAL | Age: 69
End: 2024-10-01
Payer: MEDICARE

## 2024-10-01 DIAGNOSIS — M25.561 RIGHT KNEE PAIN, UNSPECIFIED CHRONICITY: ICD-10-CM

## 2024-10-01 DIAGNOSIS — R26.2 DIFFICULTY WALKING: Primary | ICD-10-CM

## 2024-10-01 LAB
GLUCOSE BLDC GLUCOMTR-MCNC: 101 MG/DL (ref 70–99)
GLUCOSE BLDC GLUCOMTR-MCNC: 124 MG/DL (ref 70–99)
QT INTERVAL: 357 MS
QTC INTERVAL: 385 MS

## 2024-10-01 PROCEDURE — 63710000001 ACETAMINOPHEN EXTRA STRENGTH 500 MG TABLET: Performed by: ANESTHESIOLOGY

## 2024-10-01 PROCEDURE — 25810000003 LACTATED RINGERS PER 1000 ML: Performed by: ORTHOPAEDIC SURGERY

## 2024-10-01 PROCEDURE — C1713 ANCHOR/SCREW BN/BN,TIS/BN: HCPCS | Performed by: ORTHOPAEDIC SURGERY

## 2024-10-01 PROCEDURE — 25010000002 ROPIVACAINE PER 1 MG: Performed by: ANESTHESIOLOGY

## 2024-10-01 PROCEDURE — 25010000002 CEFAZOLIN PER 500 MG: Performed by: ORTHOPAEDIC SURGERY

## 2024-10-01 PROCEDURE — 25010000002 ONDANSETRON PER 1 MG: Performed by: NURSE ANESTHETIST, CERTIFIED REGISTERED

## 2024-10-01 PROCEDURE — 25010000002 LIDOCAINE PF 2% 2 % SOLUTION: Performed by: NURSE ANESTHETIST, CERTIFIED REGISTERED

## 2024-10-01 PROCEDURE — 63710000001 SCOPOLAMINE 1 MG/3DAYS PATCH 72 HOUR: Performed by: ANESTHESIOLOGY

## 2024-10-01 PROCEDURE — 25010000002 DEXAMETHASONE PER 1 MG: Performed by: NURSE ANESTHETIST, CERTIFIED REGISTERED

## 2024-10-01 PROCEDURE — 63710000001 CELECOXIB 100 MG CAPSULE: Performed by: ANESTHESIOLOGY

## 2024-10-01 PROCEDURE — 94760 N-INVAS EAR/PLS OXIMETRY 1: CPT

## 2024-10-01 PROCEDURE — 82948 REAGENT STRIP/BLOOD GLUCOSE: CPT | Performed by: NURSE ANESTHETIST, CERTIFIED REGISTERED

## 2024-10-01 PROCEDURE — 63710000001 SENNOSIDES-DOCUSATE 8.6-50 MG TABLET: Performed by: ORTHOPAEDIC SURGERY

## 2024-10-01 PROCEDURE — 94799 UNLISTED PULMONARY SVC/PX: CPT

## 2024-10-01 PROCEDURE — 25010000002 MIDAZOLAM PER 1MG: Performed by: ANESTHESIOLOGY

## 2024-10-01 PROCEDURE — 63710000001 ACETAMINOPHEN EXTRA STRENGTH 500 MG TABLET: Performed by: ORTHOPAEDIC SURGERY

## 2024-10-01 PROCEDURE — 25010000002 SUGAMMADEX 200 MG/2ML SOLUTION: Performed by: NURSE ANESTHETIST, CERTIFIED REGISTERED

## 2024-10-01 PROCEDURE — 27447 TOTAL KNEE ARTHROPLASTY: CPT | Performed by: ORTHOPAEDIC SURGERY

## 2024-10-01 PROCEDURE — 25010000002 PROPOFOL 10 MG/ML EMULSION: Performed by: NURSE ANESTHETIST, CERTIFIED REGISTERED

## 2024-10-01 PROCEDURE — C1776 JOINT DEVICE (IMPLANTABLE): HCPCS | Performed by: ORTHOPAEDIC SURGERY

## 2024-10-01 PROCEDURE — A9270 NON-COVERED ITEM OR SERVICE: HCPCS | Performed by: ORTHOPAEDIC SURGERY

## 2024-10-01 PROCEDURE — 25810000003 LACTATED RINGERS PER 1000 ML: Performed by: ANESTHESIOLOGY

## 2024-10-01 PROCEDURE — S0260 H&P FOR SURGERY: HCPCS | Performed by: ORTHOPAEDIC SURGERY

## 2024-10-01 PROCEDURE — 25010000002 MORPHINE PER 10 MG: Performed by: ORTHOPAEDIC SURGERY

## 2024-10-01 PROCEDURE — A9270 NON-COVERED ITEM OR SERVICE: HCPCS | Performed by: ANESTHESIOLOGY

## 2024-10-01 PROCEDURE — 25010000002 FENTANYL CITRATE (PF) 50 MCG/ML SOLUTION: Performed by: NURSE ANESTHETIST, CERTIFIED REGISTERED

## 2024-10-01 PROCEDURE — 25010000002 KETOROLAC TROMETHAMINE PER 15 MG: Performed by: ORTHOPAEDIC SURGERY

## 2024-10-01 PROCEDURE — 20985 CPTR-ASST DIR MS PX: CPT | Performed by: ORTHOPAEDIC SURGERY

## 2024-10-01 PROCEDURE — 25010000002 ROPIVACAINE PER 1 MG: Performed by: ORTHOPAEDIC SURGERY

## 2024-10-01 PROCEDURE — 25010000002 EPINEPHRINE 1 MG/ML SOLUTION: Performed by: ORTHOPAEDIC SURGERY

## 2024-10-01 PROCEDURE — 73560 X-RAY EXAM OF KNEE 1 OR 2: CPT

## 2024-10-01 PROCEDURE — 97161 PT EVAL LOW COMPLEX 20 MIN: CPT

## 2024-10-01 PROCEDURE — 82948 REAGENT STRIP/BLOOD GLUCOSE: CPT | Performed by: ANESTHESIOLOGY

## 2024-10-01 DEVICE — ART/SRF KN PERSONA/VE MC EF 8TO11 10MM RT: Type: IMPLANTABLE DEVICE | Site: KNEE | Status: FUNCTIONAL

## 2024-10-01 DEVICE — CMT BONE PALACOS R HI/VISC 1X40: Type: IMPLANTABLE DEVICE | Site: KNEE | Status: FUNCTIONAL

## 2024-10-01 DEVICE — IMPLANTABLE DEVICE: Type: IMPLANTABLE DEVICE | Site: KNEE | Status: FUNCTIONAL

## 2024-10-01 DEVICE — CAP TOTL KN CMT PRIMARY W/ROSA: Type: IMPLANTABLE DEVICE | Site: KNEE | Status: FUNCTIONAL

## 2024-10-01 DEVICE — STEM TIB/KN PERSONA CMT 5D SZF RT: Type: IMPLANTABLE DEVICE | Site: KNEE | Status: FUNCTIONAL

## 2024-10-01 RX ORDER — SCOLOPAMINE TRANSDERMAL SYSTEM 1 MG/1
PATCH, EXTENDED RELEASE TRANSDERMAL AS NEEDED
Status: DISCONTINUED | OUTPATIENT
Start: 2024-10-01 | End: 2024-10-01 | Stop reason: SURG

## 2024-10-01 RX ORDER — ONDANSETRON 2 MG/ML
4 INJECTION INTRAMUSCULAR; INTRAVENOUS ONCE AS NEEDED
Status: DISCONTINUED | OUTPATIENT
Start: 2024-10-01 | End: 2024-10-01 | Stop reason: HOSPADM

## 2024-10-01 RX ORDER — TRANEXAMIC ACID 10 MG/ML
1000 INJECTION, SOLUTION INTRAVENOUS ONCE
Status: COMPLETED | OUTPATIENT
Start: 2024-10-01 | End: 2024-10-01

## 2024-10-01 RX ORDER — NALOXONE HCL 0.4 MG/ML
0.4 VIAL (ML) INJECTION
Status: DISCONTINUED | OUTPATIENT
Start: 2024-10-01 | End: 2024-10-02 | Stop reason: HOSPADM

## 2024-10-01 RX ORDER — LIDOCAINE HYDROCHLORIDE 20 MG/ML
INJECTION, SOLUTION EPIDURAL; INFILTRATION; INTRACAUDAL; PERINEURAL AS NEEDED
Status: DISCONTINUED | OUTPATIENT
Start: 2024-10-01 | End: 2024-10-01 | Stop reason: SURG

## 2024-10-01 RX ORDER — ROCURONIUM BROMIDE 10 MG/ML
INJECTION, SOLUTION INTRAVENOUS AS NEEDED
Status: DISCONTINUED | OUTPATIENT
Start: 2024-10-01 | End: 2024-10-01 | Stop reason: SURG

## 2024-10-01 RX ORDER — ACETAMINOPHEN 500 MG
1000 TABLET ORAL EVERY 6 HOURS
Status: DISCONTINUED | OUTPATIENT
Start: 2024-10-01 | End: 2024-10-02 | Stop reason: HOSPADM

## 2024-10-01 RX ORDER — ONDANSETRON 4 MG/1
4 TABLET, ORALLY DISINTEGRATING ORAL EVERY 6 HOURS PRN
Status: DISCONTINUED | OUTPATIENT
Start: 2024-10-01 | End: 2024-10-02 | Stop reason: HOSPADM

## 2024-10-01 RX ORDER — ACETAMINOPHEN 500 MG
1000 TABLET ORAL ONCE
Status: COMPLETED | OUTPATIENT
Start: 2024-10-01 | End: 2024-10-01

## 2024-10-01 RX ORDER — SODIUM CHLORIDE 0.9 % (FLUSH) 0.9 %
10 SYRINGE (ML) INJECTION EVERY 12 HOURS SCHEDULED
Status: DISCONTINUED | OUTPATIENT
Start: 2024-10-01 | End: 2024-10-02 | Stop reason: HOSPADM

## 2024-10-01 RX ORDER — BISACODYL 5 MG/1
10 TABLET, DELAYED RELEASE ORAL DAILY PRN
Status: DISCONTINUED | OUTPATIENT
Start: 2024-10-01 | End: 2024-10-02 | Stop reason: HOSPADM

## 2024-10-01 RX ORDER — MIDAZOLAM HYDROCHLORIDE 2 MG/2ML
2 INJECTION, SOLUTION INTRAMUSCULAR; INTRAVENOUS ONCE
Status: COMPLETED | OUTPATIENT
Start: 2024-10-01 | End: 2024-10-01

## 2024-10-01 RX ORDER — FENTANYL CITRATE 50 UG/ML
INJECTION, SOLUTION INTRAMUSCULAR; INTRAVENOUS AS NEEDED
Status: DISCONTINUED | OUTPATIENT
Start: 2024-10-01 | End: 2024-10-01 | Stop reason: SURG

## 2024-10-01 RX ORDER — SODIUM CHLORIDE, SODIUM LACTATE, POTASSIUM CHLORIDE, CALCIUM CHLORIDE 600; 310; 30; 20 MG/100ML; MG/100ML; MG/100ML; MG/100ML
9 INJECTION, SOLUTION INTRAVENOUS CONTINUOUS PRN
Status: DISCONTINUED | OUTPATIENT
Start: 2024-10-01 | End: 2024-10-01 | Stop reason: HOSPADM

## 2024-10-01 RX ORDER — ONDANSETRON 2 MG/ML
INJECTION INTRAMUSCULAR; INTRAVENOUS AS NEEDED
Status: DISCONTINUED | OUTPATIENT
Start: 2024-10-01 | End: 2024-10-01 | Stop reason: SURG

## 2024-10-01 RX ORDER — AMOXICILLIN 250 MG
2 CAPSULE ORAL 2 TIMES DAILY PRN
Status: DISCONTINUED | OUTPATIENT
Start: 2024-10-01 | End: 2024-10-02 | Stop reason: HOSPADM

## 2024-10-01 RX ORDER — SODIUM CHLORIDE 0.9 % (FLUSH) 0.9 %
10 SYRINGE (ML) INJECTION AS NEEDED
Status: DISCONTINUED | OUTPATIENT
Start: 2024-10-01 | End: 2024-10-01 | Stop reason: HOSPADM

## 2024-10-01 RX ORDER — ROPIVACAINE HYDROCHLORIDE 5 MG/ML
INJECTION, SOLUTION EPIDURAL; INFILTRATION; PERINEURAL
Status: COMPLETED | OUTPATIENT
Start: 2024-10-01 | End: 2024-10-01

## 2024-10-01 RX ORDER — SODIUM CHLORIDE 9 MG/ML
40 INJECTION, SOLUTION INTRAVENOUS AS NEEDED
Status: DISCONTINUED | OUTPATIENT
Start: 2024-10-01 | End: 2024-10-02 | Stop reason: HOSPADM

## 2024-10-01 RX ORDER — DEXAMETHASONE SODIUM PHOSPHATE 4 MG/ML
INJECTION, SOLUTION INTRA-ARTICULAR; INTRALESIONAL; INTRAMUSCULAR; INTRAVENOUS; SOFT TISSUE AS NEEDED
Status: DISCONTINUED | OUTPATIENT
Start: 2024-10-01 | End: 2024-10-01 | Stop reason: SURG

## 2024-10-01 RX ORDER — HYDROCODONE BITARTRATE AND ACETAMINOPHEN 7.5; 325 MG/1; MG/1
2 TABLET ORAL EVERY 4 HOURS PRN
Status: DISCONTINUED | OUTPATIENT
Start: 2024-10-01 | End: 2024-10-02 | Stop reason: HOSPADM

## 2024-10-01 RX ORDER — KETOROLAC TROMETHAMINE 15 MG/ML
15 INJECTION, SOLUTION INTRAMUSCULAR; INTRAVENOUS EVERY 6 HOURS
Status: COMPLETED | OUTPATIENT
Start: 2024-10-01 | End: 2024-10-02

## 2024-10-01 RX ORDER — HYDROCODONE BITARTRATE AND ACETAMINOPHEN 7.5; 325 MG/1; MG/1
1 TABLET ORAL EVERY 4 HOURS PRN
Status: DISCONTINUED | OUTPATIENT
Start: 2024-10-01 | End: 2024-10-02 | Stop reason: HOSPADM

## 2024-10-01 RX ORDER — BISACODYL 10 MG
10 SUPPOSITORY, RECTAL RECTAL DAILY PRN
Status: DISCONTINUED | OUTPATIENT
Start: 2024-10-01 | End: 2024-10-02 | Stop reason: HOSPADM

## 2024-10-01 RX ORDER — ONDANSETRON 2 MG/ML
4 INJECTION INTRAMUSCULAR; INTRAVENOUS EVERY 6 HOURS PRN
Status: DISCONTINUED | OUTPATIENT
Start: 2024-10-01 | End: 2024-10-02 | Stop reason: HOSPADM

## 2024-10-01 RX ORDER — PROMETHAZINE HYDROCHLORIDE 12.5 MG/1
25 TABLET ORAL ONCE AS NEEDED
Status: DISCONTINUED | OUTPATIENT
Start: 2024-10-01 | End: 2024-10-01 | Stop reason: HOSPADM

## 2024-10-01 RX ORDER — SODIUM CHLORIDE 9 MG/ML
40 INJECTION, SOLUTION INTRAVENOUS AS NEEDED
Status: DISCONTINUED | OUTPATIENT
Start: 2024-10-01 | End: 2024-10-01 | Stop reason: HOSPADM

## 2024-10-01 RX ORDER — PROMETHAZINE HYDROCHLORIDE 25 MG/1
25 SUPPOSITORY RECTAL ONCE AS NEEDED
Status: DISCONTINUED | OUTPATIENT
Start: 2024-10-01 | End: 2024-10-01 | Stop reason: HOSPADM

## 2024-10-01 RX ORDER — OXYCODONE HYDROCHLORIDE 5 MG/1
5 TABLET ORAL
Status: DISCONTINUED | OUTPATIENT
Start: 2024-10-01 | End: 2024-10-01 | Stop reason: HOSPADM

## 2024-10-01 RX ORDER — MAGNESIUM HYDROXIDE 1200 MG/15ML
LIQUID ORAL AS NEEDED
Status: DISCONTINUED | OUTPATIENT
Start: 2024-10-01 | End: 2024-10-01 | Stop reason: HOSPADM

## 2024-10-01 RX ORDER — CELECOXIB 100 MG/1
200 CAPSULE ORAL ONCE
Status: COMPLETED | OUTPATIENT
Start: 2024-10-01 | End: 2024-10-01

## 2024-10-01 RX ORDER — SODIUM CHLORIDE, SODIUM LACTATE, POTASSIUM CHLORIDE, CALCIUM CHLORIDE 600; 310; 30; 20 MG/100ML; MG/100ML; MG/100ML; MG/100ML
80 INJECTION, SOLUTION INTRAVENOUS CONTINUOUS
Status: DISCONTINUED | OUTPATIENT
Start: 2024-10-01 | End: 2024-10-02 | Stop reason: HOSPADM

## 2024-10-01 RX ORDER — PROPOFOL 10 MG/ML
VIAL (ML) INTRAVENOUS AS NEEDED
Status: DISCONTINUED | OUTPATIENT
Start: 2024-10-01 | End: 2024-10-01 | Stop reason: SURG

## 2024-10-01 RX ORDER — SODIUM CHLORIDE 0.9 % (FLUSH) 0.9 %
10 SYRINGE (ML) INJECTION AS NEEDED
Status: DISCONTINUED | OUTPATIENT
Start: 2024-10-01 | End: 2024-10-02 | Stop reason: HOSPADM

## 2024-10-01 RX ADMIN — SODIUM CHLORIDE 2 G: 9 INJECTION, SOLUTION INTRAVENOUS at 10:39

## 2024-10-01 RX ADMIN — KETOROLAC TROMETHAMINE 15 MG: 15 INJECTION, SOLUTION INTRAMUSCULAR; INTRAVENOUS at 20:23

## 2024-10-01 RX ADMIN — LIDOCAINE HYDROCHLORIDE 5 ML: 20 INJECTION, SOLUTION INTRAVENOUS at 10:32

## 2024-10-01 RX ADMIN — Medication 10 ML: at 20:24

## 2024-10-01 RX ADMIN — SENNOSIDES AND DOCUSATE SODIUM 2 TABLET: 50; 8.6 TABLET ORAL at 20:23

## 2024-10-01 RX ADMIN — SODIUM CHLORIDE, POTASSIUM CHLORIDE, SODIUM LACTATE AND CALCIUM CHLORIDE: 600; 310; 30; 20 INJECTION, SOLUTION INTRAVENOUS at 10:26

## 2024-10-01 RX ADMIN — TRANEXAMIC ACID 1000 MG: 10 INJECTION, SOLUTION INTRAVENOUS at 09:35

## 2024-10-01 RX ADMIN — KETOROLAC TROMETHAMINE 15 MG: 15 INJECTION, SOLUTION INTRAMUSCULAR; INTRAVENOUS at 13:41

## 2024-10-01 RX ADMIN — SUGAMMADEX 200 MG: 100 INJECTION, SOLUTION INTRAVENOUS at 11:56

## 2024-10-01 RX ADMIN — TRANEXAMIC ACID 1000 MG: 10 INJECTION, SOLUTION INTRAVENOUS at 11:42

## 2024-10-01 RX ADMIN — SODIUM CHLORIDE 2 G: 9 INJECTION, SOLUTION INTRAVENOUS at 17:49

## 2024-10-01 RX ADMIN — SODIUM CHLORIDE, POTASSIUM CHLORIDE, SODIUM LACTATE AND CALCIUM CHLORIDE 80 ML/HR: 600; 310; 30; 20 INJECTION, SOLUTION INTRAVENOUS at 13:26

## 2024-10-01 RX ADMIN — FENTANYL CITRATE 50 MCG: 50 INJECTION, SOLUTION INTRAMUSCULAR; INTRAVENOUS at 10:47

## 2024-10-01 RX ADMIN — ROPIVACAINE HYDROCHLORIDE 30 ML: 5 INJECTION, SOLUTION EPIDURAL; INFILTRATION; PERINEURAL at 10:19

## 2024-10-01 RX ADMIN — CELECOXIB 200 MG: 100 CAPSULE ORAL at 09:54

## 2024-10-01 RX ADMIN — DEXAMETHASONE SODIUM PHOSPHATE 4 MG: 4 INJECTION, SOLUTION INTRAMUSCULAR; INTRAVENOUS at 10:34

## 2024-10-01 RX ADMIN — MIDAZOLAM HYDROCHLORIDE 2 MG: 1 INJECTION, SOLUTION INTRAMUSCULAR; INTRAVENOUS at 09:54

## 2024-10-01 RX ADMIN — PROPOFOL 170 MG: 10 INJECTION, EMULSION INTRAVENOUS at 10:32

## 2024-10-01 RX ADMIN — SODIUM CHLORIDE, POTASSIUM CHLORIDE, SODIUM LACTATE AND CALCIUM CHLORIDE: 600; 310; 30; 20 INJECTION, SOLUTION INTRAVENOUS at 11:15

## 2024-10-01 RX ADMIN — FENTANYL CITRATE 50 MCG: 50 INJECTION, SOLUTION INTRAMUSCULAR; INTRAVENOUS at 10:32

## 2024-10-01 RX ADMIN — Medication 10 ML: at 13:42

## 2024-10-01 RX ADMIN — ROCURONIUM BROMIDE 50 MG: 10 INJECTION, SOLUTION INTRAVENOUS at 10:34

## 2024-10-01 RX ADMIN — SCOPALAMINE 1 PATCH: 1 PATCH, EXTENDED RELEASE TRANSDERMAL at 10:24

## 2024-10-01 RX ADMIN — ACETAMINOPHEN 1000 MG: 500 TABLET ORAL at 09:54

## 2024-10-01 RX ADMIN — ONDANSETRON HYDROCHLORIDE 4 MG: 2 SOLUTION INTRAMUSCULAR; INTRAVENOUS at 10:34

## 2024-10-01 RX ADMIN — ACETAMINOPHEN 1000 MG: 500 TABLET ORAL at 17:17

## 2024-10-01 NOTE — THERAPY EVALUATION
Acute Care - Physical Therapy Initial Evaluation   Tanna     Patient Name: Hector Coronel  : 1955  MRN: [id number]  Today's Date: 10/1/2024      Visit Dx: Admit date: 10/1/2024     Referring Physician: Balwinder Sweeney MD     Surgery Date:10/1/2024   Procedure(s) (LRB):  RIGHT TOTAL KNEE ARTHROPLASTY (Right)       ICD-10-CM ICD-9-CM   1. Difficulty walking  R26.2 719.7   2. Right knee pain, unspecified chronicity  M25.561 719.46     Patient Active Problem List   Diagnosis    NOBLE treated with BiPAP    Class 2 obesity    Primary osteoarthritis of both knees    Major depressive disorder, recurrent, moderate    Peripheral neuropathy, idiopathic    Elevated liver enzymes    Benign prostatic hyperplasia    Cerumen debris on tympanic membrane of both ears    Screening PSA (prostate specific antigen)    Primary osteoarthritis of right hip    Primary insomnia    Anxiety    Peripheral polyneuropathy    Cough    Shortness of breath    S/P coronary artery stent placement    Coronary artery disease involving native coronary artery of native heart without angina pectoris    Hyperlipidemia LDL goal <70    Primary osteoarthritis of left knee    Osteoarthrosis, localized, primary, knee, right    Right knee pain     Past Medical History:   Diagnosis Date    Anxiety     Arthritis     BACK    Bunion     Coronary artery disease 2022    BLOCKAGE- 1 STENT PLACED 2022- SEE'S FALK, DENIES CP BUT GETS SOAE. DECREASED ACTIVITY D/T RIGHT KNEE PAIN    Cough     ALLERGIES    Depression     Diabetes mellitus     BORDERLINE- DOESN'T CHECK BG AT HOME    Edema     PRIOR TO STENT PLACEMENT    Enlarged prostate     GERD (gastroesophageal reflux disease)     Hammer toe     History of transfusion     AS A CHILD STATES HE DOESNT THINK ANY ISSUE WITH IT    Hoarseness     Hyperlipidemia     Murmur, cardiac     ASYMPTOMATIC- SOAOE    Neck pain     VERTABRA PUSHING ON THROAT    Osteoarthritis     OG HIPS, BILATERL KNEES, L SHOULDER    PONV  (postoperative nausea and vomiting)     Sleep apnea     USES BIPAP    SOBOE (shortness of breath on exertion)     TIA (transient ischemic attack)     MULTI. YEARS AGO- NO RESIDUAL    Umbilical hernia      Past Surgical History:   Procedure Laterality Date    APPENDECTOMY      CARDIAC CATHETERIZATION Right 05/31/2022    Procedure: Left Heart Cath;  Surgeon: Eduardo Buck MD;  Location: [facility] CATH INVASIVE LOCATION;  Service: Cardiovascular;  Laterality: Right;    CARPAL TUNNEL RELEASE Bilateral     CHOLECYSTECTOMY      HERNIA REPAIR      UMBILICAL    INNER EAR SURGERY      BOTH SIDES    THROAT SURGERY      BIOPSY, RMOVED POLYPS    TOTAL HIP ARTHROPLASTY Right 11/22/2022    Procedure: RIGHT TOTAL HIP ARTHROPLASTY ANTERIOR;  Surgeon: Jose Talavera MD;  Location: [facility] MAIN OR;  Service: Orthopedics;  Laterality: Right;    TOTAL KNEE ARTHROPLASTY Left 5/5/2023    Procedure: TOTAL KNEE ARTHROPLASTY WITH RYLAND ROBOT;  Surgeon: Jose Talavera MD;  Location: [facility] MAIN OR;  Service: Robotics - Ortho;  Laterality: Left;    VASECTOMY      1979     PT Assessment (Last 12 Hours)       PT Evaluation and Treatment       Row Name 10/01/24 1400          Physical Therapy Time and Intention    Subjective Information no complaints  -DP     Document Type evaluation  -DP     Mode of Treatment individual therapy;physical therapy  -DP     Patient Effort good  -DP       Row Name 10/01/24 1400          General Information    Patient Profile Reviewed yes  -DP     Patient Observations alert;cooperative;agree to therapy  -DP     Prior Level of Function independent:;gait;transfer;bed mobility;ADL's  -DP     Equipment Currently Used at Home none  -DP     Existing Precautions/Restrictions fall  -DP     Barriers to Rehab none identified  -DP       Row Name 10/01/24 1400          Living Environment    Current Living Arrangements home  -DP     Home Accessibility stairs to enter home  -DP     People in Home spouse  -DP       Row Name  10/01/24 1400          Home Main Entrance    Number of Stairs, Main Entrance three  -DP       Row Name 10/01/24 1400          Cognition    Orientation Status (Cognition) oriented x 3  -DP       Row Name 10/01/24 1400          Range of Motion Comprehensive    Comment, General Range of Motion BLE ROM:WFL  except R knee: 5-85  -DP       Row Name 10/01/24 1400          Strength Comprehensive (MMT)    Comment, General Manual Muscle Testing (MMT) Assessment BLE: WFL except R knee: 3-/5  -DP       Row Name 10/01/24 1400          Mobility    Extremity Weight-bearing Status right lower extremity  -DP     Right Lower Extremity (Weight-bearing Status) weight-bearing as tolerated (WBAT)  -DP       Row Name 10/01/24 1400          Bed Mobility    Bed Mobility supine-sit  -DP     Supine-Sit Alcorn (Bed Mobility) standby assist  -DP       Row Name 10/01/24 1400          Transfers    Transfers sit-stand transfer  -DP       Row Name 10/01/24 1400          Sit-Stand Transfer    Sit-Stand Alcorn (Transfers) contact guard  -DP       Row Name 10/01/24 1400          Gait/Stairs (Locomotion)    Gait/Stairs Locomotion gait/ambulation assistive device  -DP     Alcorn Level (Gait) contact guard  -DP     Assistive Device (Gait) walker, front-wheeled  -DP     Patient was able to Ambulate yes  -DP     Distance in Feet (Gait) 20  -DP       Row Name 10/01/24 1400          Balance    Balance Assessment standing dynamic balance  -DP     Dynamic Standing Balance contact guard  -DP     Position/Device Used, Standing Balance supported;walker, front-wheeled  -DP       Row Name             [REMOVED] Wound 05/05/23 0844 Left anterior knee Incision    Wound - Properties Group Placement Date: 05/05/23  -KJ Placement Time: 0844  -KJ Present on Original Admission: N  -KJ Side: Left  -KJ Orientation: anterior  -KJ Location: knee  -KJ Primary Wound Type: Incision  -KJ Removal Date: 10/01/24  -MA Removal Time: 0918 -MA Wound Outcome: Healed   -MA    Retired Wound - Properties Group Placement Date: 05/05/23  -KJ Placement Time: 0844  -KJ Present on Original Admission: N  -KJ Side: Left  -KJ Orientation: anterior  -KJ Location: knee  -KJ Primary Wound Type: Incision  -KJ Removal Date: 10/01/24  -MA Removal Time: 0918 -MA Wound Outcome: Healed  -MA    Retired Wound - Properties Group Date first assessed: 05/05/23  -KJ Time first assessed: 0844  -KJ Present on Original Admission: N  -KJ Side: Left  -KJ Location: knee  -KJ Primary Wound Type: Incision  -KJ Resolution Date: 10/01/24  -MA Resolution Time: 0918 -MA Wound Outcome: Healed  -MA      Row Name             [REMOVED] Wound 05/05/23 0844    Wound - Properties Group Placement Date: 05/05/23  -KJ Placement Time: 0844  -KJ Present on Original Admission: --  -KJ Side: --  -KJ Orientation: --  -KJ Location: --  -KJ Primary Wound Type: --  -KJ Removal Date: 10/01/24  -MA Removal Time: 0919 -MA Wound Outcome: Healed  -MA    Retired Wound - Properties Group Placement Date: 05/05/23  -KJ Placement Time: 0844  -KJ Present on Original Admission: --  -KJ Side: --  -KJ Orientation: --  -KJ Location: --  -KJ Primary Wound Type: --  -KJ Removal Date: 10/01/24  -MA Removal Time: 0919 -MA Wound Outcome: Healed  -MA    Retired Wound - Properties Group Date first assessed: 05/05/23  -KJ Time first assessed: 0844  -KJ Present on Original Admission: --  -KJ Side: --  -KJ Location: --  -KJ Primary Wound Type: --  -KJ Resolution Date: 10/01/24  -MA Resolution Time: 0919 -MA Wound Outcome: Healed  -MA      Row Name             Wound medial umbilical area Other (comment)    Wound - Properties Group Orientation: medial  -MA Location: umbilical area  -MA Primary Wound Type: Other  -MA, WOUND FROM  DISSOLVABLE STITCH     Retired Wound - Properties Group Orientation: medial  -MA Location: umbilical area  -MA Primary Wound Type: Other  -MA, WOUND FROM  DISSOLVABLE STITCH     Retired Wound - Properties Group Location: umbilical  area  -MA Primary Wound Type: Other  -MA, WOUND FROM  DISSOLVABLE STITCH       Row Name             Wound 10/01/24 1058 Right anterior knee Incision    Wound - Properties Group Placement Date: 10/01/24  -MW Placement Time: 1058  -MW Side: Right  -MW Orientation: anterior  -MW Location: knee  -MW Primary Wound Type: Incision  -MW    Retired Wound - Properties Group Placement Date: 10/01/24  -MW Placement Time: 1058  -MW Side: Right  -MW Orientation: anterior  -MW Location: knee  -MW Primary Wound Type: Incision  -MW    Retired Wound - Properties Group Date first assessed: 10/01/24  -MW Time first assessed: 1058  -MW Side: Right  -MW Location: knee  -MW Primary Wound Type: Incision  -MW      Row Name             Wound 10/01/24 1058 Right proximal leg Puncture    Wound - Properties Group Placement Date: 10/01/24  -HT Placement Time: 1058  -HT Side: Right  -HT Orientation: proximal  -HT Location: leg  -HT Primary Wound Type: Puncture  -HT    Retired Wound - Properties Group Placement Date: 10/01/24  -HT Placement Time: 1058  -HT Side: Right  -HT Orientation: proximal  -HT Location: leg  -HT Primary Wound Type: Puncture  -HT    Retired Wound - Properties Group Date first assessed: 10/01/24  -HT Time first assessed: 1058  -HT Side: Right  -HT Location: leg  -HT Primary Wound Type: Puncture  -HT      Row Name 10/01/24 1400          Plan of Care Review    Plan of Care Reviewed With patient  -DP     Outcome Evaluation Pt had a joint replacement surgery today, and presents with decreased strength, ROM and ambulation. Will benefit from inpatient PT services and outpatient PT services upon DC. Recommend RW and BSC to use upon DC.  -DP       Row Name 10/01/24 1400          Positioning and Restraints    Post Treatment Position chair  -DP     In Chair exit alarm on;encouraged to call for assist;call light within reach  -DP       Row Name 10/01/24 1400          Therapy Assessment/Plan (PT)    Rehab Potential (PT) good, to achieve  stated therapy goals  -DP     Criteria for Skilled Interventions Met (PT) yes;meets criteria  -DP     Therapy Frequency (PT) 2 times/day  -DP     Predicted Duration of Therapy Intervention (PT) 10 days  -DP     Problem List (PT) problems related to;balance;mobility;range of motion (ROM)  -DP     Activity Limitations Related to Problem List (PT) unable to transfer safely;unable to ambulate safely  -DP       Row Name 10/01/24 1400          PT Evaluation Complexity    History, PT Evaluation Complexity no personal factors and/or comorbidities  -DP     Examination of Body Systems (PT Eval Complexity) total of 4 or more elements  -DP     Clinical Presentation (PT Evaluation Complexity) stable  -DP     Clinical Decision Making (PT Evaluation Complexity) low complexity  -DP     Overall Complexity (PT Evaluation Complexity) low complexity  -DP       Row Name 10/01/24 1400          Physical Therapy Goals    Transfer Goal Selection (PT) transfer, PT goal 1  -DP     Gait Training Goal Selection (PT) gait training, PT goal 1  -DP       Row Name 10/01/24 1400          Transfer Goal 1 (PT)    Activity/Assistive Device (Transfer Goal 1, PT) sit-to-stand/stand-to-sit  -DP     [place] Level/Cues Needed (Transfer Goal 1, PT) supervision required  -DP     Time Frame (Transfer Goal 1, PT) 10 days  -DP       Row Name 10/01/24 1400          Gait Training Goal 1 (PT)    Activity/Assistive Device (Gait Training Goal 1, PT) assistive device use;walker, rolling  -DP     [place] Level (Gait Training Goal 1, PT) supervision required  -DP     Distance (Gait Training Goal 1, PT) 300  -DP     Time Frame (Gait Training Goal 1, PT) 10 days  -DP               User Key  (r) = Recorded By, (t) = Taken By, (c) = Cosigned By      Initials Name Provider Type    Lexii Crandall, RN Registered Nurse    Lexii Crandall, RN Registered Nurse    Shirley Graf, PT Physical Therapist    Jessica Rendon RN Registered Nurse    BRAYDON Browne,  Alisson, RN Registered Nurse    Elisa Ferrer RN Registered Nurse                      PT Recommendation and Plan  Anticipated Discharge Disposition (PT): home with outpatient therapy services  Planned Therapy Interventions (PT): balance training, bed mobility training, gait training, home exercise program, ROM (range of motion), strengthening  Therapy Frequency (PT): 2 times/day  Plan of Care Reviewed With: patient  Outcome Evaluation: Pt had a joint replacement surgery today, and presents with decreased strength, ROM and ambulation. Will benefit from inpatient PT services and outpatient PT services upon DC. Recommend RW and BSC to use upon DC.   Outcome Measures       Row Name 10/01/24 1400             How much help from another person do you currently need...    Turning from your back to your side while in flat bed without using bedrails? 4  -DP      Moving from lying on back to sitting on the side of a flat bed without bedrails? 3  -DP      Moving to and from a bed to a chair (including a wheelchair)? 3  -DP      Standing up from a chair using your arms (e.g., wheelchair, bedside chair)? 3  -DP      Climbing 3-5 steps with a railing? 3  -DP      To walk in hospital room? 3  -DP      AM-PAC 6 Clicks Score (PT) 19  -DP      Highest Level of Mobility Goal 6 --> Walk 10 steps or more  -DP         Functional Assessment    Outcome Measure Options AM-PAC 6 Clicks Basic Mobility (PT)  -DP                User Key  (r) = Recorded By, (t) = Taken By, (c) = Cosigned By      Initials Name Provider Type    Shirley Graf, PT Physical Therapist                     Time Calculation:    PT Charges       Row Name 10/01/24 1418             Time Calculation    PT Received On 10/01/24  -DP      PT Goal Re-Cert Due Date 10/10/24  -DP         Untimed Charges    PT Eval/Re-eval Minutes 25  -DP         Total Minutes    Untimed Charges Total Minutes 25  -DP       Total Minutes 25  -DP                User Key  (r) = Recorded By,  (t) = Taken By, (c) = Cosigned By      Initials Name Provider Type    Shirley Graf, PT Physical Therapist                      PT G-Codes  Outcome Measure Options: AM-PAC 6 Clicks Basic Mobility (PT)  AM-PAC 6 Clicks Score (PT): 19    Shirley Dey, PT  10/1/2024

## 2024-10-01 NOTE — OP NOTE
TOTAL KNEE ARTHROPLASTY WITH KORTNEY ROBOT  Procedure Report    Patient Name:  Hector Coronel  YOB: 1955    Date of Surgery:  10/1/2024       Pre-op Diagnosis:   Right knee pain, unspecified chronicity [M25.561]       Post-Op Diagnosis Codes:     * Right knee pain, unspecified chronicity [M25.561]    Procedure/CPT® Codes:      Procedure(s):  RIGHT TOTAL KNEE ARTHROPLASTY with kortney robot    Surgical Approach: Knee Medial Parapatellar        Staff:  Surgeon(s):  Jose Talavera MD    Assistant: Lisa Galvez RN; Brad Cr    Anesthesia: General    Estimated Blood Loss: 50ml    Implants:    Implant Name Type Inv. Item Serial No.  Lot No. LRB No. Used Action   CMT BONE PALACOS R HI/VISC 1X40 - [id number] Implant CMT BONE PALACOS R HI/VISC 1X40  [facility] [id number] Right 2 Implanted   ART/SRF KN PERSONA/VE MC EF 8TO11 10MM RT - [id number] Implant ART/SRF KN PERSONA/VE MC EF 8TO11 10MM RT  [employer] [id number] Right 1 Implanted   STEM TIB/KN PERSONA CMT 5D SZF RT - [id number] Implant STEM TIB/KN PERSONA CMT 5D SZF RT  [employer] [id number] Right 1 Implanted   COMP FEM/KN PERSONA TIVANIUM CR CMT SZ10 RT - [id number] Implant COMP FEM/KN PERSONA TIVANIUM CR CMT SZ10 RT  [employer] [id number] Right 1 Implanted   PAT KN PERSONA ALLPOLY CMT 8X29MM - [id number] Implant PAT KN PERSONA ALLPOLY CMT 8X29MM  [employer] [id number] Right 1 Implanted       Specimen:          None    Findings: See description    Complications: none    Description of Procedure: Patient was taken to the operating room placed supine operating table after abductor canal blocks and in preoperative holding.  After general tracheal anesthesia was established the right knee and lower extremity were prepped and draped in standard surgical fashion using alcohol ChloraPrep.  The Kortney robot was also draped sterilely and calibrated.  Incision was made 2 fingerbreadth superior superior pole of the patella down to the medial  aspect of tibial tubercle the knife and full-thickness skin flaps were raised laterally and medially.  A medial parapatellar arthrotomy was then undertaken and the knee was brought into flexion.  Retractors were placed to protect protect the collateral ligaments.  Soft tissue releases and osteophytes removed as deemed necessary.  Then the tracking device was mounted on the distal femur in a standard fashion as well as through separate stab incisions in the distal tibia 5 fingerbreadths inferior to the tibial tubercle.  Then all the femoral points were mapped out using the Tina software the tibial points were mapped out as well.  While the plan for resection was being completed the patella was everted calipered and cut to the appropriate thickness.  The correct size patella was chosen in the locals for the patella were reamed and a trial patella was placed and the knee was brought back into flexion.  The distal femoral cutting block was then brought in using robotic assisted arm and the distal femoral cut was made it was checked and verified and accepted.  Then the external rotation arm of the robot was used to pin the distal femur and the correct external rotation decided by the intraoperative plan using the previously mapped points.  Then the tibia was cut after removing the robotic arm and to the appropriate position to cut the tibia the cutting block was pinned and the proximal tibia cut was made excess bone from the cut was removed and the 4-in-1 cutting block was then used in the distal femur.  The tibial cut was verified and accepted femoral and tibial trials were then placed and the knee was taken through range of motion verifying flexion extension gaps and extension and flexion range of motion to be acceptable by using the software in a standard fashion.  Locals for the femur were then drilled the trials were removed the bone ends were copiously irrigated with bacitracin Simpulse irrigation.  The tibia was  cemented in place according to marked external rotation from the trials the femur was cemented in position second and then the real polychosen was placed.  Excess cement removed from the implant edges the knee was held in extension until the cement dried and the patella was cemented into place and held with a patellar clamp.  After the cement hardened excess management from the implant edges Irrisept was used and wound was copiously irrigated the knee was taken through range of motion and checked 1 last time the patella tracked in the center of the trochlear groove the femur using no thumbs technique.  Then the arthrotomy was closed in 45 degrees of flexion with Ethibond the deep fat was closed 0 Vicryl subcu was closed with 2-0 Vicryl and the skin was closed with staples incision was washed and dried and sterile dressings were applied the patient tolerated the procedure well and was taken to recovery room.       Jose Talavera MD     Date: 10/1/2024  Time: 12:06 EDT

## 2024-10-01 NOTE — ANESTHESIA PROCEDURE NOTES
Peripheral Block      Patient reassessed immediately prior to procedure    Patient location during procedure: pre-op  Start time: 10/1/2024 10:18 AM  Stop time: 10/1/2024 10:19 AM  Reason for block: at surgeon's request and post-op pain management  Performed by  Anesthesiologist: Ben Coronel MD  Preanesthetic Checklist  Completed: patient identified, IV checked, site marked, risks and benefits discussed, surgical consent, monitors and equipment checked, pre-op evaluation and timeout performed  Prep:  Pt Position: supine  Sterile barriers:alcohol skin prep, partial drape, cap, washed/disinfected hands, mask and gloves  Prep: ChloraPrep  Patient monitoring: blood pressure monitoring, continuous pulse oximetry and EKG  Procedure    Sedation: yes  Performed under: local infiltration  Guidance:ultrasound guided    ULTRASOUND INTERPRETATION.  Using ultrasound guidance a 20 G gauge needle was placed in close proximity to the nerve, at which point, under ultrasound guidance anesthetic was injected in the area of the nerve and spread of the anesthesia was seen on ultrasound in close proximity thereto.  There were no abnormalities seen on ultrasound; a digital image was taken; and the patient tolerated the procedure with no complications. Images:still images obtained, printed/placed on chart    Laterality:right  Block Type:adductor canal block  Injection Technique:single-shot  Needle Type:echogenic  Needle Gauge:20 G (4in)  Resistance on Injection: none    Medications Used: ropivacaine (NAROPIN) 0.5 % injection - Injection   30 mL - 10/1/2024 10:19:00 AM      Post Assessment  Injection Assessment: negative aspiration for heme, no paresthesia on injection and incremental injection  Patient Tolerance:comfortable throughout block  Complications:no  Additional Notes  The block or continuous infusion is requested by the referring physician for management of postoperative pain, or pain related to a procedure. Ultrasound  guidance (deemed medically necessary). Painless injection, pt was awake and conversant during the procedure without complications. Needle and surrounding structures visualized throughout procedure. No adverse reactions or complications seen during this period. Post-procedure image showed no signs of complication, and anatomy was consistent with an uncomplicated nerve blockade.  Performed by: Ben Coronel MD

## 2024-10-01 NOTE — ANESTHESIA POSTPROCEDURE EVALUATION
Patient: Hector Coronel    Procedure Summary       Date: 10/01/24 Room / Location: [facility] OR 06 / [facility] MAIN OR    Anesthesia Start: 1026 Anesthesia Stop: 1208    Procedure: RIGHT TOTAL KNEE ARTHROPLASTY (Right: Knee) Diagnosis:       Right knee pain, unspecified chronicity      (Right knee pain, unspecified chronicity [M25.561])    Surgeons: Jose Talavera MD Provider: Jani Worthington MD    Anesthesia Type: general with block, ERAS Protocol ASA Status: 3            Anesthesia Type: general with block, ERAS Protocol    Vitals  Vitals Value Taken Time   /71 10/01/24 1222   Temp 36.1 °C (97 °F) 10/01/24 1220   Pulse 69 10/01/24 1227   Resp 15 10/01/24 1220   SpO2 95 % 10/01/24 1227   Vitals shown include unfiled device data.        Post Anesthesia Care and Evaluation    Patient location during evaluation: bedside  Patient participation: complete - patient participated  Level of consciousness: awake and alert  Pain management: adequate    Airway patency: patent  Anesthetic complications: No anesthetic complications  PONV Status: none  Cardiovascular status: acceptable  Respiratory status: acceptable  Hydration status: acceptable    Comments: An Anesthesiologist personally participated in the most demanding procedures (including induction and emergence if applicable) in the anesthesia plan, monitored the course of anesthesia administration at frequent intervals and remained physically present and available for immediate diagnosis and treatment of emergencies.

## 2024-10-01 NOTE — PLAN OF CARE
Goal Outcome Evaluation:  Plan of Care Reviewed With: patient, spouse           Outcome Evaluation: Patient alert and oriented x 4, calm, cooperative, and pleasant.  VS WNL.  LCTA.  Dsg to R knee dry, clean, and intact.  No complaints of pain as patient states that block is still effective.  Patient participated in therapy services this shift.  Spouse and family at bedside and supportive of patient.  Patient currently sitting up in chair with call light in reach.

## 2024-10-01 NOTE — PLAN OF CARE
Goal Outcome Evaluation:  Plan of Care Reviewed With: patient           Outcome Evaluation: Pt had a joint replacement surgery today, and presents with decreased strength, ROM and ambulation. Will benefit from inpatient PT services and outpatient PT services upon DC. Recommend RW and BSC to use upon DC.      Anticipated Discharge Disposition (PT): home with outpatient therapy services

## 2024-10-01 NOTE — ANESTHESIA PREPROCEDURE EVALUATION
Anesthesia Evaluation     Patient summary reviewed and Nursing notes reviewed   no history of anesthetic complications:   NPO Solid Status: > 8 hours  NPO Liquid Status: > 2 hours           Airway   Mallampati: III  TM distance: >3 FB  Neck ROM: full  Large neck circumference and Possible difficult intubation  Comment: Large tongue  Dental      Pulmonary - negative pulmonary ROS and normal exam    breath sounds clear to auscultation  (+) ,shortness of breath, sleep apnea  Cardiovascular - negative cardio ROS and normal exam  Exercise tolerance: good (4-7 METS)    Rhythm: regular  Rate: normal    (+) valvular problems/murmurs murmur and AS, CAD, hyperlipidemia    ROS comment: 5/22 TTE:  Interpretation Summary    Normal left ventricular systolic function with an estimated ejection fraction of 60-65%.  No regional wall motion abnormalities were observed.  Left ventricular diastolic function was consistent with impaired relaxation (grade I diastolic dysfunction).  Mild concentric left ventricular hypertrophy.  No hemodynamically significant valvular pathology.  Right ventricular systolic pressure could not be accurately estimated due to an insufficient TR velocity profile.  Cardiac History    Diagnosis Date Comment Source  Coronary artery disease 05/31/2022 BLOCKAGE- 1 STENT PLACED 5-- SEE'S FALK, DENIES CP BUT GETS SOAE. DECREASED ACTIVITY D/T RIGHT KNEE PAIN   Diabetes mellitus  BORDERLINE- DOESN'T CHECK BG AT HOME   Hyperlipidemia     Murmur, cardiac  ASYMPTOMATIC- SOAOE     Social History    Tobacco Use    Former; Cigarettes: Quit 1990  Smokeless Tobacco: Never used smokeless tobacco.  Comments: quit 30 years ago    Vaping Use    Never used    Family Cardiac History as of 6/24/2022  Pedigree Problem Relation Age of Onset  Diabetes Father   Heart disease Paternal Uncle     Additional Study Details    Additional Study Details    A complete transthoracic echocardiogram with complete Doppler and color flow was  performed. The study is technically adequate for diagnosis.    Echocardiogram Findings    Left Ventricle Calculated left ventricular EF = 65% Left ventricular ejection fraction appears to be 61 - 65%. Left ventricular systolic function is normal.   Normal left ventricular cavity size noted. Left ventricular wall thickness is consistent with mild concentric hypertrophy. All left ventricular wall segments contract normally. Left ventricular diastolic function is consistent with (grade I) impaired relaxation. No evidence of left ventricular thrombus or mass present.  Right Ventricle Normal right ventricular cavity size and systolic function noted.  Left Atrium Normal left atrial cavity size noted. No left atrial thrombus or mass visualized.  Right Atrium Normal right atrial cavity size noted.  Aortic Valve No aortic valve regurgitation or stenosis is present. The aortic valve structure was not well visualized.  Mitral Valve Trace mitral valve regurgitation is present. No significant mitral valve stenosis is present. No evidence of mitral valve prolapse.  Tricuspid Valve Trace tricuspid valve regurgitation is present. Insufficient TR velocity profile to estimate the right ventricular systolic pressure.  Pulmonic Valve The pulmonic valve is not well visualized.  Greater Vessels No dilation of the aortic root is present. Aortic root = 2.6 cm  Inferior vena cava not well visualized.  Pericardium There is no evidence of pericardial effusion. .        Neuro/Psych- negative ROS  (+) TIA, numbness, psychiatric history Depression  GI/Hepatic/Renal/Endo - negative ROS   (+) GERD well controlled, diabetes mellitus type 2    Musculoskeletal     (+) neck pain  Abdominal    Substance History - negative use     OB/GYN negative ob/gyn ROS         Other - negative ROS       ROS/Med Hx Other: METS<4. SOAE.HX CAD W/ STENT, NOBLE, DM, TIA, DIFFICULTY SWALLOWING. . ECHO 6/22 EF 60-65%,STRESS 5/25/22 ANB. CATH 5/31/22 PCI,EF 60-65%,  SUGGESTIVE OF MILD AS. CARDS CLEARANCE MODERATE RISK 9/27/24. KT                 Anesthesia Plan    ASA 3     general with block and ERAS Protocol     (Patient understands anesthesia not responsible for dental damage.  Have video laryngoscope in OR)  intravenous induction     Anesthetic plan, risks, benefits, and alternatives have been provided, discussed and informed consent has been obtained with: patient.  Pre-procedure education provided  Plan discussed with CRNA.      CODE STATUS:

## 2024-10-01 NOTE — H&P
"Chief Complaint  Initial Evaluation of the Right Knee        Subjective  Hector Coronel presents to [facility] for initial evaluation of the right knee. He has had pain in the right knee for awhile.  He has had injections in the past.  He had a fall recently and had some pre patellar bursitis.  He was noted to have a patella fracture and was treated in [place]. He has increase pain with prolonged standing, ambulation and stairs.  He has to ambulate with a cane.       Allergies         Allergies   Allergen Reactions    Metal Rash       CHEAP METAL- BELT HAZEL BREAK HIM OUT             Social History   Social History            Socioeconomic History    Marital status:    Tobacco Use    Smoking status: Former       Current packs/day: 0.00       Types: Cigarettes       Quit date:        Years since quittin.7    Smokeless tobacco: Never    Tobacco comments:       quit 30 years ago   Vaping Use    Vaping status: Never Used   Substance and Sexual Activity    Alcohol use: Yes       Alcohol/week: 3.0 standard drinks of alcohol       Types: 3 Shots of liquor per week    Drug use: Never    Sexual activity: Defer            I reviewed the patient's chief complaint, history of present illness, review of systems, past medical history, surgical history, family history, social history, medications, and allergy list.      Review of Systems      Constitutional: Denies fevers, chills, weight loss  Cardiovascular: Denies chest pain, shortness of breath  Skin: Denies rashes, acute skin changes  Neurologic: Denies headache, loss of consciousness           Vital Signs:   /75   Pulse 87   Ht 175.3 cm (69\")   Wt 117 kg (257 lb 12.8 oz)   SpO2 95%   BMI 38.07 kg/m²           Physical Exam  General: Alert. No acute distress     Ortho Exam          RIGHT KNEE Flexion 105. Extension -3. Stable to varus/valgus stress. Stable to anterior/posterior drawer. Neurovascularly intact. " Negative Lance. Negative Lachman. Positive EHL, FHL, HS and TA. Sensation intact to light touch all 5 nerves of the foot. Ambulates with Antalgic gait. Patella is well tracking. Calf supple, non-tender. Positive tenderness to the medial joint line. Positive tenderness to the lateral joint line. Positive Crepitus. Good strength to hamstrings, quadriceps, dorsiflexors, and plantar flexors.  Knee Extensor Mechanism intact Moderate swelling. Kellgren-Dre grading scale is a 4.      Procedures           Imaging Results (Most Recent)         Procedure Component Value Units Date/Time     XR Knee 3 View Right [[id number]] Resulted: 09/18/24 1427       Updated: 09/18/24 1429                      Result Review  :      X-Ray Report:  Right knee X-Ray  Indication: Evaluation of the right knee  AP/Lateral and [place] view(s)  Findings: Moderately advanced degenerative bone on bone arthritis.   Prior studies available for comparison: no      MRI 7/25/24  COMPARISON: None  FINDINGS: There is a complex tear involving the lateral meniscus with a flipped fragment anteriorly and medially. The medial  meniscus demonstrates a tear involving the posterior horn. The body and anterior horn remain intact.  Moderate degenerative changes noted in the posterior cruciate ligament. The anterior cruciate ligament also demonstrates moderate  degenerative changes.  Medial collateral ligament remains intact. The lateral collateral ligament remains intact. The popliteus demonstrates tendinosis.  Biceps femoris and iliotibial band remains intact.  Sartorius, gracilis and semitendinosis remains intact.  Tibiofibular ligament, lateral patellofemoral ligament and the medial patellofemoral ligament remains intact.  There is a moderate knee joint effusion. There is edema in the skin and subcutaneous soft tissues around the knee.  No fractures. No dislocations. No evidence of avascular necrosis. Tricompartmental degenerative changes are noted. There  is  nonspecific prepatellar soft tissue edema.  IMPRESSION:  Complex tear throughout the lateral meniscus  The medial meniscus demonstrates tearing of the posterior horn  Degenerative changes in the cruciates  The collaterals remain intact.  Tricompartmental cartilage disease  Knee joint effusion  Nonspecific soft tissue edema around the right knee  Prepatellar soft tissue edema           Assessment  Assessment and Plan      Diagnoses and all orders for this visit:     1. Right knee pain, unspecified chronicity (Primary)  -     XR Knee 3 View Right     2. Osteoarthritis of right knee, unspecified osteoarthritis type           Discussed the treatment plan with the patient. I reviewed the X-rays that were obtained today with the patient. I reviewed the MRI results with the patient.      Discussed the treatment options with the patient, operative vs non-operative. The patient expressed understanding and wished to proceed with a right total knee arthroplasty      Discussed surgery., Risks/benefits discussed with patient including, but not limited to: infection, bleeding, neurovascular damage, re-rupture, aesthetic deformity, need for further surgery, and death., Discussed with patient the implant type being used during surgery and patient understands., Surgery pamphlet given., Call or return if worsening symptoms., and DME order for a 3 in 1 given today due to patient will be confined to one room/level of the home that does not offer a toilet during postop recovery.      Follow Up      2 weeks postoperatively

## 2024-10-01 NOTE — PROGRESS NOTES
[facility]     Progress Note    Patient Name: Hector Coronel  : 1955  MRN: [id number]  Primary Care Physician:  Eduardo Bowman MD  Date of admission: 10/1/2024      Subjective   Brief summary.  Patient admitted electively for knee replacement      HPI:  Status post right knee replacement.  Doing well, awake alert family at bedside.  Eating food for lunch.  No chest pain no shortness of breath no nausea.  Some pain in the leg.  Taken a.m. meds before he came.  Also requesting his BiPAP.    Review of Systems     Somewhat fatigue, no chest pain no shortness of breath.  No abdominal pain no nausea vomiting.  Patient tolerating food.  Knee pain.  Some numbness and tingling in the leg      Objective     Vitals:   Temp:  [96 °F (35.6 °C)-98.6 °F (37 °C)] 97.7 °F (36.5 °C)  Heart Rate:  [60-89] 61  Resp:  [14-20] 16  BP: (113-151)/(57-80) 138/80  Flow (L/min):  [0-10] 0  FiO2 (%):  [55 %-100 %] 100 %    Physical Exam :     Elderly obese male not in acute distress.  Heart regular.  Lungs clear.  Abdomen soft and obese.  Nontender.  Extremities postsurgical changes with need dressing.  Peripheral pulses intact.  Trace of edema      Result Review:  I have personally reviewed the results from the time of this admission to 10/1/2024 14:10 EDT and agree with these findings:  [x]  Laboratory  []  Microbiology  [x]  Radiology  []  EKG/Telemetry   []  Cardiology/Vascular   []  Pathology  []  Old records  []  Other:           Assessment / Plan       Active Hospital Problems:  Active Hospital Problems    Diagnosis     **Right knee pain     Osteoarthrosis, localized, primary, knee, right     Coronary artery disease involving native coronary artery of native heart without angina pectoris     Anxiety        Plan:   Patient admitted postop, awake alert and oriented.  Doing fairly well.  No immediate intra or postop complication  Restart essential home meds  PT OT  VTE and GI prophylaxis  Discussed with patient and family at  bedside.         VTE Prophylaxis:  Pharmacologic & mechanical VTE prophylaxis orders are present.        CODE STATUS:      Full code      Balwinder Sweeney MD 10/01/24 14:10 EDT

## 2024-10-02 VITALS
OXYGEN SATURATION: 95 % | HEART RATE: 83 BPM | BODY MASS INDEX: 36.77 KG/M2 | DIASTOLIC BLOOD PRESSURE: 65 MMHG | HEIGHT: 69 IN | SYSTOLIC BLOOD PRESSURE: 130 MMHG | RESPIRATION RATE: 16 BRPM | TEMPERATURE: 97.9 F | WEIGHT: 248.24 LBS

## 2024-10-02 LAB
DEPRECATED RDW RBC AUTO: 42.8 FL (ref 37–54)
ERYTHROCYTE [DISTWIDTH] IN BLOOD BY AUTOMATED COUNT: 12.9 % (ref 12.3–15.4)
HCT VFR BLD AUTO: 42.5 % (ref 37.5–51)
HGB BLD-MCNC: 14.2 G/DL (ref 13–17.7)
HOLD SPECIMEN: NORMAL
MCH RBC QN AUTO: 30.2 PG (ref 26.6–33)
MCHC RBC AUTO-ENTMCNC: 33.4 G/DL (ref 31.5–35.7)
MCV RBC AUTO: 90.4 FL (ref 79–97)
PLATELET # BLD AUTO: 166 10*3/MM3 (ref 140–450)
PMV BLD AUTO: 10 FL (ref 6–12)
RBC # BLD AUTO: 4.7 10*6/MM3 (ref 4.14–5.8)
WBC NRBC COR # BLD AUTO: 11.45 10*3/MM3 (ref 3.4–10.8)

## 2024-10-02 PROCEDURE — A9270 NON-COVERED ITEM OR SERVICE: HCPCS | Performed by: ORTHOPAEDIC SURGERY

## 2024-10-02 PROCEDURE — 97535 SELF CARE MNGMENT TRAINING: CPT

## 2024-10-02 PROCEDURE — 63710000001 HYDROCODONE-ACETAMINOPHEN 7.5-325 MG TABLET: Performed by: ORTHOPAEDIC SURGERY

## 2024-10-02 PROCEDURE — 97150 GROUP THERAPEUTIC PROCEDURES: CPT

## 2024-10-02 PROCEDURE — 85027 COMPLETE CBC AUTOMATED: CPT | Performed by: ORTHOPAEDIC SURGERY

## 2024-10-02 PROCEDURE — 25010000002 CEFAZOLIN PER 500 MG: Performed by: ORTHOPAEDIC SURGERY

## 2024-10-02 PROCEDURE — 97165 OT EVAL LOW COMPLEX 30 MIN: CPT

## 2024-10-02 PROCEDURE — 63710000001 APIXABAN 2.5 MG TABLET: Performed by: ORTHOPAEDIC SURGERY

## 2024-10-02 PROCEDURE — 25010000002 KETOROLAC TROMETHAMINE PER 15 MG: Performed by: ORTHOPAEDIC SURGERY

## 2024-10-02 PROCEDURE — 97116 GAIT TRAINING THERAPY: CPT

## 2024-10-02 PROCEDURE — 94799 UNLISTED PULMONARY SVC/PX: CPT

## 2024-10-02 PROCEDURE — 97530 THERAPEUTIC ACTIVITIES: CPT

## 2024-10-02 RX ORDER — ASPIRIN 325 MG
325 TABLET ORAL DAILY
Qty: 21 TABLET | Refills: 1 | Status: SHIPPED | OUTPATIENT
Start: 2024-10-02

## 2024-10-02 RX ORDER — HYDROCODONE BITARTRATE AND ACETAMINOPHEN 7.5; 325 MG/1; MG/1
1 TABLET ORAL EVERY 4 HOURS PRN
Qty: 45 TABLET | Refills: 0 | Status: SHIPPED | OUTPATIENT
Start: 2024-10-02 | End: 2024-10-14 | Stop reason: SDUPTHER

## 2024-10-02 RX ADMIN — SODIUM CHLORIDE 2 G: 9 INJECTION, SOLUTION INTRAVENOUS at 03:00

## 2024-10-02 RX ADMIN — HYDROCODONE BITARTRATE AND ACETAMINOPHEN 1 TABLET: 7.5; 325 TABLET ORAL at 01:18

## 2024-10-02 RX ADMIN — APIXABAN 2.5 MG: 2.5 TABLET, FILM COATED ORAL at 08:34

## 2024-10-02 RX ADMIN — KETOROLAC TROMETHAMINE 15 MG: 15 INJECTION, SOLUTION INTRAMUSCULAR; INTRAVENOUS at 03:00

## 2024-10-02 RX ADMIN — KETOROLAC TROMETHAMINE 15 MG: 15 INJECTION, SOLUTION INTRAMUSCULAR; INTRAVENOUS at 08:34

## 2024-10-02 RX ADMIN — Medication 10 ML: at 08:34

## 2024-10-02 RX ADMIN — HYDROCODONE BITARTRATE AND ACETAMINOPHEN 2 TABLET: 7.5; 325 TABLET ORAL at 08:34

## 2024-10-02 NOTE — THERAPY TREATMENT NOTE
Acute Care - Physical Therapy Treatment Note  SERVANDO Cisse     Patient Name: Hector Coronel  : 1955  MRN: [id number]  Today's Date: 10/2/2024      Visit Dx:     ICD-10-CM ICD-9-CM   1. Difficulty walking  R26.2 719.7   2. Right knee pain, unspecified chronicity  M25.561 719.46     Patient Active Problem List   Diagnosis    NOBLE treated with BiPAP    Class 2 obesity    Primary osteoarthritis of both knees    Major depressive disorder, recurrent, moderate    Peripheral neuropathy, idiopathic    Elevated liver enzymes    Benign prostatic hyperplasia    Cerumen debris on tympanic membrane of both ears    Screening PSA (prostate specific antigen)    Primary osteoarthritis of right hip    Primary insomnia    Anxiety    Peripheral polyneuropathy    Cough    Shortness of breath    S/P coronary artery stent placement    Coronary artery disease involving native coronary artery of native heart without angina pectoris    Hyperlipidemia LDL goal <70    Primary osteoarthritis of left knee    Osteoarthrosis, localized, primary, knee, right    Right knee pain     Past Medical History:   Diagnosis Date    Anxiety     Arthritis     BACK    Bunion     Coronary artery disease 2022    BLOCKAGE- 1 STENT PLACED 2022- SEE'S FALK, DENIES CP BUT GETS SOAE. DECREASED ACTIVITY D/T RIGHT KNEE PAIN    Cough     ALLERGIES    Depression     Diabetes mellitus     BORDERLINE- DOESN'T CHECK BG AT HOME    Edema     PRIOR TO STENT PLACEMENT    Enlarged prostate     GERD (gastroesophageal reflux disease)     Hammer toe     History of transfusion     AS A CHILD STATES HE DOESNT THINK ANY ISSUE WITH IT    Hoarseness     Hyperlipidemia     Murmur, cardiac     ASYMPTOMATIC- SOAOE    Neck pain     VERTABRA PUSHING ON THROAT    Osteoarthritis     OG HIPS, BILATERL KNEES, L SHOULDER    PONV (postoperative nausea and vomiting)     Sleep apnea     USES BIPAP    SOBOE (shortness of breath on exertion)     TIA (transient ischemic attack)     MULTI.  YEARS AGO- NO RESIDUAL    Umbilical hernia      Past Surgical History:   Procedure Laterality Date    APPENDECTOMY      CARDIAC CATHETERIZATION Right 05/31/2022    Procedure: Left Heart Cath;  Surgeon: Eduardo Buck MD;  Location: [facility] CATH INVASIVE LOCATION;  Service: Cardiovascular;  Laterality: Right;    CARPAL TUNNEL RELEASE Bilateral     CHOLECYSTECTOMY      HERNIA REPAIR      UMBILICAL    INNER EAR SURGERY      BOTH SIDES    THROAT SURGERY      BIOPSY, RMOVED POLYPS    TOTAL HIP ARTHROPLASTY Right 11/22/2022    Procedure: RIGHT TOTAL HIP ARTHROPLASTY ANTERIOR;  Surgeon: Jose Talavera MD;  Location: [facility] MAIN OR;  Service: Orthopedics;  Laterality: Right;    TOTAL KNEE ARTHROPLASTY Left 5/5/2023    Procedure: TOTAL KNEE ARTHROPLASTY WITH RYLAND ROBOT;  Surgeon: Jose Talavera MD;  Location: [facility] MAIN OR;  Service: Robotics - Ortho;  Laterality: Left;    TOTAL KNEE ARTHROPLASTY Right 10/1/2024    Procedure: RIGHT TOTAL KNEE ARTHROPLASTY;  Surgeon: Jose Talavera MD;  Location: [facility] MAIN OR;  Service: Robotics - Ortho;  Laterality: Right;    VASECTOMY      1979     PT Assessment (Last 12 Hours)       PT Evaluation and Treatment       Row Name 10/02/24 1404          Physical Therapy Time and Intention    Subjective Information complains of;pain  -     Document Type therapy note (daily note)  -     Mode of Treatment individual therapy;group therapy;physical therapy  -     Patient Effort good  -     Comment Gait training performed individually; therapeutic exercises performed in a group setting with 3 participants  -       Row Name 10/02/24 1406          General Information    Patient Observations alert;cooperative;agree to therapy  -       Row Name 10/02/24 1400          Pain    Additional Documentation Pain Scale: FACES Pre/Post-Treatment (Group)  -       Row Name 10/02/24 1409          Pain Scale: FACES Pre/Post-Treatment    Pain: FACES Scale, Pretreatment 0-->no hurt  -      Posttreatment Pain Rating 2-->hurts little bit  -RH     Pain Location - Side/Orientation Right  -     Pain Location - knee  -       Row Name 10/02/24 1404          Range of Motion (ROM)    Range of Motion --  Pt R knee AAROM at 90 degrees flex and 8 degrees ext.  -       Row Name 10/02/24 1404          Strength (Manual Muscle Testing)    Strength (Manual Muscle Testing) --  Pt R knee ext strength at 3-/5.  -       Row Name 10/02/24 1404          Mobility    Extremity Weight-bearing Status right lower extremity  -RH     Right Lower Extremity (Weight-bearing Status) weight-bearing as tolerated (WBAT)  -       Row Name 10/02/24 1404          Transfers    Transfers sit-stand transfer;stand-sit transfer  -       Row Name 10/02/24 1404          Sit-Stand Transfer    Sit-Stand [place] (Transfers) contact guard  -     Assistive Device (Sit-Stand Transfers) walker, front-wheeled  -       Row Name 10/02/24 1404          Stand-Sit Transfer    Stand-Sit [place] (Transfers) contact guard  -     Assistive Device (Stand-Sit Transfers) walker, front-wheeled  -       Row Name 10/02/24 1404          Gait/Stairs (Locomotion)    Gait/Stairs Locomotion gait/ambulation assistive device  -     [place] Level (Gait) contact guard  -     Patient was able to Ambulate yes  -RH     Distance in Feet (Gait) 125  -RH     Pattern (Gait) --  Pt able to achieve and maintain reciprocal gait.  -RH     Deviations/Abnormal Patterns (Gait) stride length decreased;gait speed decreased  -RH     Right Sided Gait Deviations heel strike decreased  -RH     Gait Assessment/Intervention Pt amb with RW and CGA with reciprocal gait with decreased gait speed, stride length, and RLE heel strike.  -       Row Name 10/02/24 1404          Safety Issues, Functional Mobility    Impairments Affecting Function (Mobility) balance;pain;range of motion (ROM);strength  -       Row Name 10/02/24 1404          Balance    Balance  Assessment standing dynamic balance  -     Dynamic Standing Balance contact guard  -     Position/Device Used, Standing Balance walker, front-wheeled  -       Row Name 10/02/24 1404          Motor Skills    Therapeutic Exercise knee;hip;ankle  -       Row Name 10/02/24 1404          Hip (Therapeutic Exercise)    Hip (Therapeutic Exercise) isometric exercises  -     Hip Isometrics (Therapeutic Exercise) right;gluteal sets;10 repetitions;2 sets  -       Row Name 10/02/24 1404          Knee (Therapeutic Exercise)    Knee (Therapeutic Exercise) isometric exercises;strengthening exercise  -     Knee Isometrics (Therapeutic Exercise) right;quad sets;10 repetitions;2 sets  -RH     Knee Strengthening (Therapeutic Exercise) right;heel slides;SLR (straight leg raise);SAQ (short arc quad);LAQ (long arc quad);10 repetitions;2 sets  -       Row Name 10/02/24 1404          Ankle (Therapeutic Exercise)    Ankle (Therapeutic Exercise) AROM (active range of motion)  -     Ankle AROM (Therapeutic Exercise) right;dorsiflexion;plantarflexion;10 repetitions;2 sets  -       Row Name             Wound medial umbilical area Other (comment)    Wound - Properties Group Orientation: medial  -MA Location: umbilical area  -MA Primary Wound Type: Other  -MA, WOUND FROM  DISSOLVABLE STITCH     Retired Wound - Properties Group Orientation: medial  -MA Location: umbilical area  -MA Primary Wound Type: Other  -MA, WOUND FROM  DISSOLVABLE STITCH     Retired Wound - Properties Group Location: umbilical area  -MA Primary Wound Type: Other  -MA, WOUND FROM  DISSOLVABLE STITCH       Row Name             Wound 10/01/24 1058 Right anterior knee Incision    Wound - Properties Group Placement Date: 10/01/24  -MW Placement Time: 1058  -MW Side: Right  -MW Orientation: anterior  -MW Location: knee  -MW Primary Wound Type: Incision  -MW    Retired Wound - Properties Group Placement Date: 10/01/24  -MW Placement Time: 1058  -MW Side: Right   -MW Orientation: anterior  -MW Location: knee  -MW Primary Wound Type: Incision  -MW    Retired Wound - Properties Group Date first assessed: 10/01/24  -MW Time first assessed: 1058  -MW Side: Right  -MW Location: knee  -MW Primary Wound Type: Incision  -MW      Row Name             Wound 10/01/24 1058 Right proximal leg Puncture    Wound - Properties Group Placement Date: 10/01/24  -HT Placement Time: 1058  -HT Side: Right  -HT Orientation: proximal  -HT Location: leg  -HT Primary Wound Type: Puncture  -HT    Retired Wound - Properties Group Placement Date: 10/01/24  -HT Placement Time: 1058  -HT Side: Right  -HT Orientation: proximal  -HT Location: leg  -HT Primary Wound Type: Puncture  -HT    Retired Wound - Properties Group Date first assessed: 10/01/24  -HT Time first assessed: 1058  -HT Side: Right  -HT Location: leg  -HT Primary Wound Type: Puncture  -HT      Row Name 10/02/24 1404          Positioning and Restraints    In Chair reclined;call light within reach;encouraged to call for assist;exit alarm on  -       Row Name 10/02/24 1404          Progress Summary (PT)    Progress Toward Functional Goals (PT) progress toward functional goals is good  -     Daily Progress Summary (PT) Pt is progressing well with their exercise program.  Will continue current plan of care.  -               User Key  (r) = Recorded By, (t) = Taken By, (c) = Cosigned By      Initials Name Provider Type    Lexii Crandall, RN Registered Nurse    Joey Pickens PTA Physical Therapist Assistant     Jessica Cisneros RN Registered Nurse     Elisa Mckee RN Registered Nurse                    Physical Therapy Education       Title: PT OT SLP Therapies (Resolved)       Topic: Physical Therapy (Resolved)       Point: Mobility training (Resolved)       Learner Progress:  Not documented in this visit.              Point: Precautions (Resolved)       Learner Progress:  Not documented in this visit.                                   PT Recommendation and Plan     Progress Summary (PT)  Progress Toward Functional Goals (PT): progress toward functional goals is good  Daily Progress Summary (PT): Pt is progressing well with their exercise program.  Will continue current plan of care.   Outcome Measures       Row Name 10/02/24 1400 10/01/24 1400          How much help from another person do you currently need...    Turning from your back to your side while in flat bed without using bedrails? 4  -RH 4  -DP     Moving from lying on back to sitting on the side of a flat bed without bedrails? 3  -RH 3  -DP     Moving to and from a bed to a chair (including a wheelchair)? 3  -RH 3  -DP     Standing up from a chair using your arms (e.g., wheelchair, bedside chair)? 3  -RH 3  -DP     Climbing 3-5 steps with a railing? 4  -RH 3  -DP     To walk in hospital room? 4  -RH 3  -DP     AM-PAC 6 Clicks Score (PT) 21  -RH 19  -DP     Highest Level of Mobility Goal 6 --> Walk 10 steps or more  -RH 6 --> Walk 10 steps or more  -DP        Functional Assessment    Outcome Measure Options -- AM-PAC 6 Clicks Basic Mobility (PT)  -DP               User Key  (r) = Recorded By, (t) = Taken By, (c) = Cosigned By      Initials Name Provider Type    RH Joey Maher PTA Physical Therapist Assistant    Shirley Graf, PT Physical Therapist                     Time Calculation:    PT Charges       Row Name 10/02/24 1403             Time Calculation    PT Received On 10/02/24  -RH         Timed Charges    86599 - Gait Training Minutes  8  -RH      65182 - PT Therapeutic Activity Minutes 4  -RH         Untimed Charges    PT Group Therapy Minutes 25  -RH         Total Minutes    Timed Charges Total Minutes 12  -RH      Untimed Charges Total Minutes 25  -RH       Total Minutes 37  -RH                User Key  (r) = Recorded By, (t) = Taken By, (c) = Cosigned By      Initials Name Provider Type    Joey Pickens PTA Physical Therapist Assistant                   Therapy Charges for Today       Code Description Service Date Service Provider Modifiers Qty    13627659019 HC GAIT TRAINING EA 15 MIN 10/2/2024 Joey Maher, LISA GP 1    18902603670 HC PT THER PROC GROUP 10/2/2024 Joey Maher PTA GP 1            PT G-Codes  Outcome Measure Options: AM-PAC 6 Clicks Daily Activity (OT), Optimal Instrument  AM-PAC 6 Clicks Score (PT): 21  AM-PAC 6 Clicks Score (OT): 21    Joey Maher PTA  10/2/2024

## 2024-10-02 NOTE — PLAN OF CARE
Goal Outcome Evaluation:           Progress: improving  Outcome Evaluation: Pt is A&O x4. Pt's pain has been controlled with PRN pain medication throughout my shift. Pt was able to work with PT and OT today before discharging. Pt was able to walk 200 feet with PT today. Pt will be going home with the help of his wife. Pt will be going to  PT in [facility] on 10/3/24 at 10:00.

## 2024-10-02 NOTE — DISCHARGE SUMMARY
[facility]         DISCHARGE SUMMARY    Patient Name: Hector Coronel  : 1955  MRN: [id number]    Date of Admission: 10/1/2024  Date of Discharge:   Primary Care Physician: Eduardo Bowman MD    Consults       Date and Time Order Name Status Description    10/1/2024  1:02 PM Inpatient Hospitalist Consult              Presenting Problem:   Right knee pain, unspecified chronicity [M25.561]  Osteoarthrosis, localized, primary, knee, right [M17.11]    Active and Resolved Hospital Problems:  Active Hospital Problems    Diagnosis POA    **Right knee pain [M25.561] Yes    Osteoarthrosis, localized, primary, knee, right [M17.11] Yes    Coronary artery disease involving native coronary artery of native heart without angina pectoris [I25.10] Yes    Anxiety [F41.9] Yes      Resolved Hospital Problems   No resolved problems to display.         Hospital Course     Hospital Course:  Hector Coronel is a 69 y.o. male admitted electively for knee replacement.  Patient has advanced osteoarthritis and failed outpatient treatment, patient is postop, surgery yesterday.  Patient doing much better.  Able to get up and walk with PT.  Patient seen by orthopedic surgeon cleared for discharge, there is no nausea vomiting chest pain or shortness of breath.  Patient lives with his wife.  Who is a nurse and has done rehab in the past for postop patient.  She will be taking care of him at home and he will follow-up as an outpatient for PT OT.        DISCHARGE Follow Up Recommendations for labs and diagnostics:   Discharged home with outpatient follow-up      Day of Discharge     Vital Signs:  Temp:  [96 °F (35.6 °C)-98.6 °F (37 °C)] 98.1 °F (36.7 °C)  Heart Rate:  [] 76  Resp:  [14-20] 16  BP: (106-152)/(57-80) 129/66  Flow (L/min):  [0-10] 0  FiO2 (%):  [55 %-100 %] 100 %    Physical Exam:    Elderly male not in acute distress.  Heart regular.  Lungs clear.  Abdomen soft nontender.  Obese.  Extremities trace  of edema.  Ankle postsurgical changes.      Pertinent  and/or Most Recent Results     LAB RESULTS:      Lab 10/02/24  0412   WBC 11.45*   HEMOGLOBIN 14.2   HEMATOCRIT 42.5   PLATELETS 166   MCV 90.4                             Brief Urine Lab Results       None          Microbiology Results (last 10 days)       ** No results found for the last 240 hours. **            PROCEDURES:    XR Knee 1 or 2 View Right    Result Date: 10/1/2024  Impression: Impression: Expected postoperative changes from total knee arthroplasty with patellar resurfacing Electronically Signed: Suresh Schwab MD  10/1/2024 12:53 PM EDT  Workstation ID: [id number]             Results for orders placed during the hospital encounter of 06/17/22    Adult Transthoracic Echo Complete w/ Color, Spectral and Contrast if necessary per protocol    Interpretation Summary  Normal left ventricular systolic function with an estimated ejection fraction of 60-65%.  No regional wall motion abnormalities were observed.  Left ventricular diastolic function was consistent with impaired relaxation (grade I diastolic dysfunction).  Mild concentric left ventricular hypertrophy.  No hemodynamically significant valvular pathology.  Right ventricular systolic pressure could not be accurately estimated due to an insufficient TR velocity profile.      Labs Pending at Discharge:        Discharge Details        Discharge Medications        New Medications        Instructions Start Date   apixaban 2.5 MG tablet tablet  Commonly known as: ELIQUIS   2.5 mg, Oral, Every 12 Hours Scheduled      aspirin 325 MG tablet  Commonly known as: Rhona Aspirin  Replaces: aspirin 325 MG EC tablet   325 mg, Oral, Daily, Begin after completing Eliquis      HYDROcodone-acetaminophen 7.5-325 MG per tablet  Commonly known as: Norco   1 tablet, Oral, Every 4 Hours PRN             Changes to Medications        Instructions Start Date   doxazosin 4 MG tablet  Commonly known as: CARDURA  What  changed:   how much to take  how to take this  when to take this   TAKE ONE TABLET BY MOUTH DAILY      fluticasone 50 MCG/ACT nasal spray  Commonly known as: FLONASE  What changed: See the new instructions.   SPRAY TWO SPRAYS IN EACH NOSTRIL DAILY             Continue These Medications        Instructions Start Date   atorvastatin 40 MG tablet  Commonly known as: LIPITOR   40 mg, Oral, Daily      buPROPion  MG 24 hr tablet  Commonly known as: WELLBUTRIN XL   300 mg, Oral, Daily      finasteride 5 MG tablet  Commonly known as: PROSCAR   5 mg, Oral, Nightly      furosemide 20 MG tablet  Commonly known as: LASIX   20 mg, Oral, Daily      Magnesium 250 MG tablet   250 mg, Oral, Daily      omeprazole 20 MG capsule  Commonly known as: priLOSEC   20 mg, Oral, Daily      potassium chloride 20 MEQ CR tablet  Commonly known as: KLOR-CON M20   20 mEq, Oral, Daily      QUEtiapine fumarate ER 50 MG tablet sustained-release 24 hour tablet  Commonly known as: SEROquel XR   50 mg, Oral, Nightly      VITAMIN B-12 PO   2,500 mcg, Oral, Daily      VITAMIN D3 PO   2,000 Units, Oral, Daily             Stop These Medications      aspirin 325 MG EC tablet  Replaced by: aspirin 325 MG tablet     celecoxib 200 MG capsule  Commonly known as: CeleBREX              Allergies   Allergen Reactions    Metal Rash     CHEAP METAL- BELT HAZEL BREAK HIM OUT          Discharge Disposition:    Home or Self Care    Diet:    Heart healthy    Discharge Activity:     Activity Instructions       Activity as Tolerated              Future Appointments   Date Time Provider Department Center   10/3/2024  1:00 PM Eduar Hancock PT [facility] PT [place] [facility]   10/14/2024  9:45 AM Ana Roman APRN [facility] ORS RING [facility]   4/10/2025  1:30 PM Apolinar Pinedo MD [facility] ENT [place] [facility]   9/25/2025 11:00 AM Rosie Liao APRN [facility] CD EDIXE [facility]       Additional Instructions for the Follow-ups that You Need to Schedule       Discharge Follow-up with PCP   As  directed       Currently Documented PCP:    Eduardo Bowman MD    PCP Phone Number:    [phone]     Follow Up Details: in 1-2 week        Discharge Follow-up with Specified Provider: Dr. Talavera is recommended   As directed      To: Dr. Talavera is recommended                Time spent on Discharge including face to face service: 22 minutes.            I have dictated this note utilizing Dragon Dictation.             Please note that portions of this note were completed with a voice recognition program.             Part of this note may be an electronic transcription/translation of spoken language to printed text         using the Dragon Dictation System.       Electronically signed by Balwinder Sweeney MD, 10/02/24, 9:25 AM EDT.

## 2024-10-02 NOTE — THERAPY EVALUATION
Patient Name: Hector Coronel  : 1955    MRN: [id number]                              Today's Date: 10/2/2024       Admit Date: 10/1/2024    Visit Dx:     ICD-10-CM ICD-9-CM   1. Difficulty walking  R26.2 719.7   2. Right knee pain, unspecified chronicity  M25.561 719.46     Patient Active Problem List   Diagnosis    NOBLE treated with BiPAP    Class 2 obesity    Primary osteoarthritis of both knees    Major depressive disorder, recurrent, moderate    Peripheral neuropathy, idiopathic    Elevated liver enzymes    Benign prostatic hyperplasia    Cerumen debris on tympanic membrane of both ears    Screening PSA (prostate specific antigen)    Primary osteoarthritis of right hip    Primary insomnia    Anxiety    Peripheral polyneuropathy    Cough    Shortness of breath    S/P coronary artery stent placement    Coronary artery disease involving native coronary artery of native heart without angina pectoris    Hyperlipidemia LDL goal <70    Primary osteoarthritis of left knee    Osteoarthrosis, localized, primary, knee, right    Right knee pain     Past Medical History:   Diagnosis Date    Anxiety     Arthritis     BACK    Bunion     Coronary artery disease 2022    BLOCKAGE- 1 STENT PLACED 2022- SEE'S FALK, DENIES CP BUT GETS SOAE. DECREASED ACTIVITY D/T RIGHT KNEE PAIN    Cough     ALLERGIES    Depression     Diabetes mellitus     BORDERLINE- DOESN'T CHECK BG AT HOME    Edema     PRIOR TO STENT PLACEMENT    Enlarged prostate     GERD (gastroesophageal reflux disease)     Hammer toe     History of transfusion     AS A CHILD STATES HE DOESNT THINK ANY ISSUE WITH IT    Hoarseness     Hyperlipidemia     Murmur, cardiac     ASYMPTOMATIC- SOAOE    Neck pain     VERTABRA PUSHING ON THROAT    Osteoarthritis     OG HIPS, BILATERL KNEES, L SHOULDER    PONV (postoperative nausea and vomiting)     Sleep apnea     USES BIPAP    SOBOE (shortness of breath on exertion)     TIA (transient ischemic attack)     MULTI.  YEARS AGO- NO RESIDUAL    Umbilical hernia      Past Surgical History:   Procedure Laterality Date    APPENDECTOMY      CARDIAC CATHETERIZATION Right 05/31/2022    Procedure: Left Heart Cath;  Surgeon: Eduardo Buck MD;  Location: [facility] CATH INVASIVE LOCATION;  Service: Cardiovascular;  Laterality: Right;    CARPAL TUNNEL RELEASE Bilateral     CHOLECYSTECTOMY      HERNIA REPAIR      UMBILICAL    INNER EAR SURGERY      BOTH SIDES    THROAT SURGERY      BIOPSY, RMOVED POLYPS    TOTAL HIP ARTHROPLASTY Right 11/22/2022    Procedure: RIGHT TOTAL HIP ARTHROPLASTY ANTERIOR;  Surgeon: Jose Talavera MD;  Location: [facility] MAIN OR;  Service: Orthopedics;  Laterality: Right;    TOTAL KNEE ARTHROPLASTY Left 5/5/2023    Procedure: TOTAL KNEE ARTHROPLASTY WITH RYLAND ROBOT;  Surgeon: Jose Talavera MD;  Location: [facility] MAIN OR;  Service: Robotics - Ortho;  Laterality: Left;    VASECTOMY      1979      General Information       Row Name 10/02/24 0856 10/02/24 0846       OT Time and Intention    Document Type therapy note (daily note)  -PG evaluation  -PG    Mode of Treatment individual therapy;occupational therapy  -PG individual therapy;occupational therapy  -PG      Row Name 10/02/24 0846          General Information    Prior Level of Function independent:;transfer;ADL's  -PG     Existing Precautions/Restrictions fall  -PG     Barriers to Rehab none identified  -PG       Row Name 10/02/24 0846          Occupational Profile    Reason for Services/Referral (Occupational Profile) Patient is a pleasant 69-year-old male admitted for a right total knee arthroplasty.  Patient is being evaluated by Occupational Therapy due to recent decline in ADL function.  No previous OT services identified  -PG       Row Name 10/02/24 0846          Living Environment    People in Home spouse  -PG       Row Name 10/02/24 0846          Cognition    Orientation Status (Cognition) oriented x 3  -PG       Row Name 10/02/24 0846          Safety  Issues, Functional Mobility    Impairments Affecting Function (Mobility) balance;strength;pain;range of motion (ROM)  -PG               User Key  (r) = Recorded By, (t) = Taken By, (c) = Cosigned By      Initials Name Provider Type    PG Rios Jimenez OT Occupational Therapist                     Mobility/ADL's       Row Name 10/02/24 0856 10/02/24 0848       Transfers    Transfers bed-chair transfer  -PG bed-chair transfer  -PG      Row Name 10/02/24 0856 10/02/24 0848       Bed-Chair Transfer    Bed-Chair Hunt (Transfers) contact guard;verbal cues  -PG contact guard;verbal cues  -PG    Assistive Device (Bed-Chair Transfers) walker, front-wheeled  -PG walker, front-wheeled  -PG      Row Name 10/02/24 0848          Activities of Daily Living    BADL Assessment/Intervention bathing;upper body dressing;lower body dressing;grooming;toileting  -PG       Row Name 10/02/24 0856 10/02/24 0848       Bathing Assessment/Intervention    Hunt Level (Bathing) bathing skills;minimum assist (75% patient effort)  -PG bathing skills;minimum assist (75% patient effort)  -PG    Position (Bathing) supported standing  -PG supported standing  -PG      Row Name 10/02/24 0856 10/02/24 0848       Upper Body Dressing Assessment/Training    Hunt Level (Upper Body Dressing) upper body dressing skills;set up  -PG upper body dressing skills;set up  -PG      Row Name 10/02/24 0856 10/02/24 0848       Lower Body Dressing Assessment/Training    Hunt Level (Lower Body Dressing) lower body dressing skills;minimum assist (75% patient effort)  -PG lower body dressing skills;minimum assist (75% patient effort)  -PG    Position (Lower Body Dressing) supported standing  -PG supported standing  -PG      Row Name 10/02/24 0856 10/02/24 0848       Grooming Assessment/Training    Hunt Level (Grooming) grooming skills;set up  -PG grooming skills;set up  -PG      Row Name 10/02/24 0856 10/02/24 0848       Toileting  Assessment/Training    Clarion Level (Toileting) toileting skills;contact guard assist  -PG toileting skills;contact guard assist  -PG    Position (Toileting) supported standing  -PG supported standing  -PG              User Key  (r) = Recorded By, (t) = Taken By, (c) = Cosigned By      Initials Name Provider Type    Rios Wynn OT Occupational Therapist                   Obj/Interventions       Row Name 10/02/24 0850          Sensory Assessment (Somatosensory)    Sensory Assessment (Somatosensory) sensation intact  -PG       Row Name 10/02/24 0850          Vision Assessment/Intervention    Visual Impairment/Limitations WFL  -PG       Row Name 10/02/24 0850          Range of Motion Comprehensive    General Range of Motion no range of motion deficits identified  -PG       Row Name 10/02/24 0850          Strength Comprehensive (MMT)    General Manual Muscle Testing (MMT) Assessment no strength deficits identified  -PG       Row Name 10/02/24 0850          Motor Skills    Motor Skills coordination;functional endurance  -PG     Coordination WFL  -PG     Functional Endurance Fair plus  -PG               User Key  (r) = Recorded By, (t) = Taken By, (c) = Cosigned By      Initials Name Provider Type    PG Rios Jimenez OT Occupational Therapist                   Goals/Plan       Row Name 10/02/24 0851          Transfer Goal 1 (OT)    Activity/Assistive Device (Transfer Goal 1, OT) transfers, all  -PG     Clarion Level/Cues Needed (Transfer Goal 1, OT) modified independence  -PG     Time Frame (Transfer Goal 1, OT) long term goal (LTG);10 days  -PG       Row Name 10/02/24 0851          Bathing Goal 1 (OT)    Activity/Device (Bathing Goal 1, OT) bathing skills, all  -PG     Clarion Level/Cues Needed (Bathing Goal 1, OT) modified independence  -PG     Time Frame (Bathing Goal 1, OT) long term goal (LTG);10 days  -PG       Row Name 10/02/24 0851          Dressing Goal 1 (OT)    Activity/Device (Dressing  Goal 1, OT) dressing skills, all  -PG     Clifford/Cues Needed (Dressing Goal 1, OT) modified independence  -PG     Time Frame (Dressing Goal 1, OT) long term goal (LTG);10 days  -PG       Row Name 10/02/24 0851          Toileting Goal 1 (OT)    Activity/Device (Toileting Goal 1, OT) toileting skills, all  -PG     Clifford Level/Cues Needed (Toileting Goal 1, OT) modified independence  -PG     Time Frame (Toileting Goal 1, OT) long term goal (LTG);10 days  -PG       Row Name 10/02/24 0851          Grooming Goal 1 (OT)    Activity/Device (Grooming Goal 1, OT) grooming skills, all  -PG     Clifford (Grooming Goal 1, OT) modified independence  -PG     Time Frame (Grooming Goal 1, OT) long term goal (LTG);10 days  -PG       Row Name 10/02/24 0851          Therapy Assessment/Plan (OT)    Planned Therapy Interventions (OT) BADL retraining;occupation/activity based interventions;patient/caregiver education/training;transfer/mobility retraining  -PG               User Key  (r) = Recorded By, (t) = Taken By, (c) = Cosigned By      Initials Name Provider Type    PG Rios Jimenez, OT Occupational Therapist                   Clinical Impression       Row Name 10/02/24 0850          Pain Assessment    Pretreatment Pain Rating 4/10  -PG     Posttreatment Pain Rating 4/10  -PG     Pain Location - Side/Orientation Right  -PG     Pain Location - knee  -PG     Pain Intervention(s) Nursing Notified  -PG       Row Name 10/02/24 0850          Plan of Care Review    Plan of Care Reviewed With patient  -PG     Progress no change  -PG     Outcome Evaluation Patient presents with limitations affecting strength, activity tolerance, and balance impacting patient's ability to return home safely and independently.  The skills of a therapist will be required to safely and effectively implement the following treatment plan to restore maximal level of function  -PG       Row Name 10/02/24 0850          Therapy Assessment/Plan (OT)     Patient/Family Therapy Goal Statement (OT) Get stronger and be able to walk without pain  -PG     Rehab Potential (OT) good, to achieve stated therapy goals  -PG     Criteria for Skilled Therapeutic Interventions Met (OT) yes;meets criteria;skilled treatment is necessary  -PG     Therapy Frequency (OT) 5 times/wk  -PG       Row Name 10/02/24 0850          Therapy Plan Review/Discharge Plan (OT)    Anticipated Discharge Disposition (OT) home with outpatient therapy services  -PG               User Key  (r) = Recorded By, (t) = Taken By, (c) = Cosigned By      Initials Name Provider Type    PG Rios Jimenez OT Occupational Therapist                   Outcome Measures       Row Name 10/02/24 0852          How much help from another is currently needed...    Putting on and taking off regular lower body clothing? 3  -PG     Bathing (including washing, rinsing, and drying) 3  -PG     Toileting (which includes using toilet bed pan or urinal) 3  -PG     Putting on and taking off regular upper body clothing 4  -PG     Taking care of personal grooming (such as brushing teeth) 4  -PG     Eating meals 4  -PG     AM-PAC 6 Clicks Score (OT) 21  -PG       Row Name 10/02/24 0852          Functional Assessment    Outcome Measure Options AM-PAC 6 Clicks Daily Activity (OT);Optimal Instrument  -PG       Row Name 10/02/24 0852          Optimal Instrument    Optimal Instrument Optimal - 3  -PG     Bending/Stooping 2  -PG     Standing 2  -PG     Reaching 1  -PG     From the list, choose the 3 activities you would most like to be able to do without any difficulty Bending/stooping;Standing;Reaching  -PG     Total Score Optimal - 3 5  -PG               User Key  (r) = Recorded By, (t) = Taken By, (c) = Cosigned By      Initials Name Provider Type    Rios Wynn OT Occupational Therapist                    Occupational Therapy Education       Title: PT OT SLP Therapies (Done)       Topic: Occupational Therapy (Done)       Point: ADL  training (Done)       Description:   Instruct learner(s) on proper safety adaptation and remediation techniques during self care or transfers.   Instruct in proper use of assistive devices.                  Learning Progress Summary             Patient Acceptance, E,D, DU by PG at 10/2/2024 0852                         Point: Home exercise program (Done)       Description:   Instruct learner(s) on appropriate technique for monitoring, assisting and/or progressing therapeutic exercises/activities.                  Learning Progress Summary             Patient Acceptance, E,D, DU by PG at 10/2/2024 0852                         Point: Precautions (Done)       Description:   Instruct learner(s) on prescribed precautions during self-care and functional transfers.                  Learning Progress Summary             Patient Acceptance, E,D, DU by PG at 10/2/2024 0852                         Point: Body mechanics (Done)       Description:   Instruct learner(s) on proper positioning and spine alignment during self-care, functional mobility activities and/or exercises.                  Learning Progress Summary             Patient Acceptance, E,D, DU by PG at 10/2/2024 0852                                         User Key       Initials Effective Dates Name Provider Type Discipline     06/16/21 -  Rios Jimenez OT Occupational Therapist OT                  OT Recommendation and Plan  Planned Therapy Interventions (OT): BADL retraining, occupation/activity based interventions, patient/caregiver education/training, transfer/mobility retraining  Therapy Frequency (OT): 5 times/wk  Plan of Care Review  Plan of Care Reviewed With: patient  Progress: no change  Outcome Evaluation: Patient presents with limitations affecting strength, activity tolerance, and balance impacting patient's ability to return home safely and independently.  The skills of a therapist will be required to safely and effectively implement the following  treatment plan to restore maximal level of function     Time Calculation:   Evaluation Complexity (OT)  Review Occupational Profile/Medical/Therapy History Complexity: brief/low complexity  Assessment, Occupational Performance/Identification of Deficit Complexity: 3-5 performance deficits  Clinical Decision Making Complexity (OT): problem focused assessment/low complexity  Overall Complexity of Evaluation (OT): low complexity     Time Calculation- OT       Row Name 10/02/24 0853             Time Calculation- OT    OT Received On 10/02/24  -PG      OT Goal Re-Cert Due Date 10/11/24  -PG         Timed Charges    98129 - OT Therapeutic Activity Minutes 10  -PG      21225 - OT Self Care/Mgmt Minutes 15  -PG         Untimed Charges    OT Eval/Re-eval Minutes 20  -PG         Total Minutes    Timed Charges Total Minutes 25  -PG      Untimed Charges Total Minutes 20  -PG       Total Minutes 45  -PG                User Key  (r) = Recorded By, (t) = Taken By, (c) = Cosigned By      Initials Name Provider Type    PG Rios Jimenez OT Occupational Therapist                  Therapy Charges for Today       Code Description Service Date Service Provider Modifiers Qty    43840107869  OT THERAPEUTIC ACT EA 15 MIN 10/2/2024 Rios Jimeenz OT GO 1    22424222029 HC OT SELF CARE/MGMT/TRAIN EA 15 MIN 10/2/2024 Rios Jimenez OT GO 1    12283322226 HC OT EVAL LOW COMPLEXITY 2 10/2/2024 Rios Jimenez OT GO 1                 Rios Jimenez OT  10/2/2024

## 2024-10-02 NOTE — SIGNIFICANT NOTE
10/02/24 0803   Plan   Final Discharge Disposition Code 01 - home or self-care   Final Note SERVANDO Hilliard. Appt: 10/03/24 at 1PM.

## 2024-10-02 NOTE — PLAN OF CARE
Goal Outcome Evaluation:  Plan of Care Reviewed With: patient        Progress: improving  Outcome Evaluation: Pt. VSS, remains A & O X 4. Dressing to right knee remains CDI. Pain well controlled with ordered pain medication. Pt. ambulated 280 feet this shift. IV fluids and antibiotics completed this shift. Pt. voiding well. Tina dressing displays shadow drainage.    New drainage noted to Aquacel at shift change, Aquacel changed, incision cleaned with alcohol prep pads.

## 2024-10-02 NOTE — PLAN OF CARE
Goal Outcome Evaluation:           Progress: improving  Outcome Evaluation: Pt is A&O x4. Pt's pain has been controlled with PRN pain medication throughout my shift. Pt was able to work with PT and OT today before discharging. Pt was able to walk 200 feet with PT today. Pt will be going home with the help of his wife. Pt will be going to  PT in [facility] on 10/3/24 at 10:00.

## 2024-10-03 ENCOUNTER — TREATMENT (OUTPATIENT)
Dept: PHYSICAL THERAPY | Facility: CLINIC | Age: 69
End: 2024-10-03
Payer: MEDICARE

## 2024-10-03 DIAGNOSIS — Z96.651 S/P TOTAL KNEE REPLACEMENT, RIGHT: Primary | ICD-10-CM

## 2024-10-03 DIAGNOSIS — M25.661 DECREASED RANGE OF MOTION (ROM) OF RIGHT KNEE: ICD-10-CM

## 2024-10-03 DIAGNOSIS — R26.9 GAIT DISTURBANCE: ICD-10-CM

## 2024-10-03 DIAGNOSIS — R29.898 WEAKNESS OF RIGHT LOWER EXTREMITY: ICD-10-CM

## 2024-10-03 DIAGNOSIS — M25.461 PAIN AND SWELLING OF RIGHT KNEE: ICD-10-CM

## 2024-10-03 DIAGNOSIS — M25.561 PAIN AND SWELLING OF RIGHT KNEE: ICD-10-CM

## 2024-10-03 NOTE — PROGRESS NOTES
Physical Therapy Initial Evaluation and Plan of Care     [address] [place] [id number]    Patient: Hector Coronel   : 1955  Diagnosis/ICD-10 Code:  S/P total knee replacement, right [Z96.651]  Referring practitioner: Jose Talavera MD  Date of Initial Visit: 10/3/2024  Today's Date: 10/3/2024  Patient seen for 1 sessions           Subjective Questionnaire: LEFS:  = 16% function      Subjective  : Pt presents to physical therapy S/p Right TKA 10/1/24 presents today using a rolling walker with a ABBI hose bilaterally. Incisions (proximal over knee and distal from RYLAND robot) covered with bandages with no excess drainage. He has been doing his exercises, pain reported with bending and walking.    Pt occupation/Living environment: lives with his wife in a single level home.    Patient Goals: Improve his right knee function.    Current Pain 7/10  Best Pain: 5/10  Worst Pain: 8/10  Quality of pain: aching        Past Medical Hx:    Past Medical History:   Diagnosis Date    Anxiety     Arthritis     BACK    Bunion     Coronary artery disease 2022    BLOCKAGE- 1 STENT PLACED 2022- SEE'S FALK, DENIES CP BUT GETS SOAE. DECREASED ACTIVITY D/T RIGHT KNEE PAIN    Cough     ALLERGIES    Depression     Diabetes mellitus     BORDERLINE- DOESN'T CHECK BG AT HOME    Edema     PRIOR TO STENT PLACEMENT    Enlarged prostate     GERD (gastroesophageal reflux disease)     Hammer toe     History of transfusion     AS A CHILD STATES HE DOESNT THINK ANY ISSUE WITH IT    Hoarseness     Hyperlipidemia     Murmur, cardiac     ASYMPTOMATIC- SOAOE    Neck pain     VERTABRA PUSHING ON THROAT    Osteoarthritis     OG HIPS, BILATERL KNEES, L SHOULDER    PONV (postoperative nausea and vomiting)     Sleep apnea     USES BIPAP    SOBOE (shortness of breath on exertion)     TIA (transient ischemic attack)     MULTI. YEARS AGO- NO RESIDUAL    Umbilical hernia       Past Surgical History:   Procedure Laterality Date     APPENDECTOMY      CARDIAC CATHETERIZATION Right 05/31/2022    Procedure: Left Heart Cath;  Surgeon: Eduardo Buck MD;  Location: [facility] CATH INVASIVE LOCATION;  Service: Cardiovascular;  Laterality: Right;    CARPAL TUNNEL RELEASE Bilateral     CHOLECYSTECTOMY      HERNIA REPAIR      UMBILICAL    INNER EAR SURGERY      BOTH SIDES    THROAT SURGERY      BIOPSY, RMOVED POLYPS    TOTAL HIP ARTHROPLASTY Right 11/22/2022    Procedure: RIGHT TOTAL HIP ARTHROPLASTY ANTERIOR;  Surgeon: Jose Talavera MD;  Location: [facility] MAIN OR;  Service: Orthopedics;  Laterality: Right;    TOTAL KNEE ARTHROPLASTY Left 5/5/2023    Procedure: TOTAL KNEE ARTHROPLASTY WITH RYLAND ROBOT;  Surgeon: Jose Talavera MD;  Location: [facility] MAIN OR;  Service: Robotics - Ortho;  Laterality: Left;    TOTAL KNEE ARTHROPLASTY Right 10/1/2024    Procedure: RIGHT TOTAL KNEE ARTHROPLASTY;  Surgeon: Jose Talavera MD;  Location: [facility] MAIN OR;  Service: Robotics - Ortho;  Laterality: Right;    VASECTOMY      1979             Objective          Observations     Additional Knee Observation Details  Right: Incisions covered with bandage, minimal drainage.    Neurological Testing     Sensation     Knee   Left Knee   Intact: Light touch    Right Knee   Intact: light touch     Comments   Right light touch: dimished surrounding incision.     Active Range of Motion     Right Knee   Flexion: 50 degrees   Extension: Right knee active extension: -7 degrees from full extension.     Passive Range of Motion     Right Knee   Flexion: 86 degrees   Extension: Right knee passive extension: -6 degrees from full extension.     Strength/Myotome Testing     Left Hip   Planes of Motion   Flexion: 4+  Extension: 4+  Abduction: 4  Adduction: 4+    Right Hip   Planes of Motion   Flexion: 4-  Extension: 4-  Abduction: 4-  Adduction: 4-    Left Knee   Flexion: 4+  Extension: 4+    Right Knee   Flexion: 3+  Extension: 3+  Quadriceps contraction: good    Ambulation      Comments   Ambulates with a rolling walker with decreased right knee extension, decreased stance time on right.      See Exercise, Manual, and Modality Logs for complete treatment.     Assessment & Plan       Assessment  Impairments: abnormal gait, abnormal muscle firing, abnormal or restricted ROM, activity intolerance, impaired balance, impaired physical strength, pain with function and weight-bearing intolerance   Functional limitations: walking, standing and stooping   Assessment details: The patient presents to physical therapy S/p R TKA 10/1/24. He is progressing well thus far with his exercises and demonstrates good function with sit <> stand and sit <> supine transfers, currently using a rolling walker for ambulation. The patient presents with associated knee weakness, stiffness, antalgic gait, and functional deficits (LEFS). The patient would benefit from skilled PT intervention to address the above mentioned functional limitations.     Prognosis: good    Goals  Plan Goals: KNEE PROBLEMS    1. The patient has limited ROM of the right knee.   LTG 1:12 weeks:  The patient will demonstrate 0 to 120 degrees of ROM for the right knee in order to allow patient to ambulate.    STATUS:  New   STG 1a: 6 weeks:  The patient will demonstrate 5 to 100 degrees of ROM for the right knee.    STATUS:  New   STG 1b: 6 weeks: The patient will demonstrate independent HEP.    STATUS:  New        2. The patient has limited strength of the right knee.   LTG 2: 12 weeks: The patient will demonstrate 4+ /5 strength for knee flexion and extension in order to allow patient improved joint stability    STATUS:  New   STG 2a: 6 weeks: The patient will demonstrate 4 /5 strength for knee flexion and extension    STATUS:  New    STG 2b: 6 weeks: The patient will demonstrate full SLR with 0 degree quad lag.    STATUS:  New         3. The patient has gait dysfunction.   LTG 3: 12 weeks:  The patient will ambulate without assistive  device, independently, for community distances with normal biomechanics of the right lower extremity in order to improve mobility and allow patient to perform activities such as grocery shopping with greater ease.    STATUS:  New   STG 3a: 4 weeks: The patient will ambulate with a single tipped cane with appropriate gait mechanics.    STATUS:  New       4. Mobility: Walking/Moving Around Functional Limitation     LTG 4: 12 weeks:  The patient will demonstrate a score of 50/80 on the LEFS.    STATUS:  New   STG 4 a: 6 weeks:  The patient will demonstrate a score of 30/80 on the LEFS.      STATUS:  New       Plan  Therapy options: will be seen for skilled therapy services  Planned modality interventions: TENS, cryotherapy, thermotherapy (hydrocollator packs) and dry needling  Planned therapy interventions: functional ROM exercises, gait training, home exercise program, manual therapy, strengthening, stretching, therapeutic activities, soft tissue mobilization, joint mobilization, neuromuscular re-education, balance/weight-bearing training and transfer training  Other planned therapy interventions: aquatic therapy  Frequency: 3x week  Duration in weeks: 12  Treatment plan discussed with: patient        Visit Diagnoses:    ICD-10-CM ICD-9-CM   1. S/P total knee replacement, right  Z96.651 V43.65   2. Pain and swelling of right knee  M25.561 719.46    M25.461 719.06   3. Decreased range of motion (ROM) of right knee  M25.661 719.56   4. Weakness of right lower extremity  R29.898 729.89   5. Gait disturbance  R26.9 781.2       History # of Personal Factors and/or Comorbidities: HIGH (3+)  Examination of Body System(s): # of elements: MODERATE (3)  Clinical Presentation: STABLE   Clinical Decision Making: LOW       Timed:         Manual Therapy:    0     mins  05295;     Therapeutic Exercise:    16     mins  52188;     Neuromuscular Dilan:    0    mins  71305;    Therapeutic Activity:     0     mins  41462;     Gait  Trainin     mins  32610;     Ultrasound:     0     mins  29366;    Ionto                               0    mins   63475  Self Care                       8     mins   37019        Un-Timed:  Electrical Stimulation:    0     mins  88828 ( );  Dry Needling     0     mins self-pay  Canalith Repos    0     mins 21129  Traction     0     mins 00325      Timed Treatment:   24   mins   Total Treatment:     36   mins    PT SIGNATURE: Eduar Hancock PT     Electronically signed 10/3/2024    [place] License: PT - [id number]     Initial Certification  Certification Period: 10/3/2024 thru 2024  I certify that the therapy services are furnished while this patient is under my care.  The services outlined above are required by this patient, and will be reviewed every 90 days.     PHYSICIAN: Jose Talavera MD   NPI: [id number]                                        DATE:     Please sign and return via fax to [phone]. Thank you, [facility].

## 2024-10-07 ENCOUNTER — TREATMENT (OUTPATIENT)
Dept: PHYSICAL THERAPY | Facility: CLINIC | Age: 69
End: 2024-10-07
Payer: MEDICARE

## 2024-10-07 DIAGNOSIS — Z96.651 S/P TOTAL KNEE REPLACEMENT, RIGHT: Primary | ICD-10-CM

## 2024-10-07 DIAGNOSIS — M25.461 PAIN AND SWELLING OF RIGHT KNEE: ICD-10-CM

## 2024-10-07 DIAGNOSIS — M25.661 DECREASED RANGE OF MOTION (ROM) OF RIGHT KNEE: ICD-10-CM

## 2024-10-07 DIAGNOSIS — R26.9 GAIT DISTURBANCE: ICD-10-CM

## 2024-10-07 DIAGNOSIS — M25.561 PAIN AND SWELLING OF RIGHT KNEE: ICD-10-CM

## 2024-10-07 DIAGNOSIS — R29.898 WEAKNESS OF RIGHT LOWER EXTREMITY: ICD-10-CM

## 2024-10-07 PROCEDURE — 97110 THERAPEUTIC EXERCISES: CPT | Performed by: PHYSICAL THERAPIST

## 2024-10-07 PROCEDURE — 97530 THERAPEUTIC ACTIVITIES: CPT | Performed by: PHYSICAL THERAPIST

## 2024-10-07 NOTE — PROGRESS NOTES
Outpatient Physical Therapy                   Physical Therapy Daily Treatment Note    Patient: Hector Coronel   : 1955  Diagnosis/ICD-10 Code:  S/P total knee replacement, right [Z96.651]  Referring practitioner: No ref. provider found  Date of Initial Visit: Type: THERAPY  Noted: 10/3/2024  Today's Date: 10/7/2024  Patient seen for 2 sessions             Subjective   Hector Coronel reports: compliance with HEP; no questions.     Pain: 2/10 pain, at time of arrival located in the right knee.     Objective     See Exercise, Manual, and Modality Logs for complete treatment.     Assessment/Plan  Hector is just beginning care to attend to deficits outlined in evaluation, requiring further therapist directed strengthening. Assess how patient tolerated treatment next session.    Progress per Plan of Care       Timed:  Manual Therapy:    8     mins  58420;  Therapeutic Exercise:    10     mins  79315;     Neuromuscular Dilan:    0    mins  26951;    Therapeutic Activity:     10     mins  01207;     Gait Trainin     mins  24907;    Aquatic Therapy:     0     mins  41484;       Untimed:  Electrical Stimulation:    0     mins  64403 ( );  Mechanical Traction:    0     mins  23224;       Timed Treatment:   28   mins   Total Treatment:     28   mins      Electronically signed:   Jessica Mccollum PTA  Physical Therapist Assistant  [facility] License #: [id number]

## 2024-10-11 ENCOUNTER — TREATMENT (OUTPATIENT)
Dept: PHYSICAL THERAPY | Facility: CLINIC | Age: 69
End: 2024-10-11
Payer: MEDICARE

## 2024-10-11 DIAGNOSIS — Z96.651 S/P TOTAL KNEE REPLACEMENT, RIGHT: Primary | ICD-10-CM

## 2024-10-11 DIAGNOSIS — M25.461 PAIN AND SWELLING OF RIGHT KNEE: ICD-10-CM

## 2024-10-11 DIAGNOSIS — R29.898 WEAKNESS OF RIGHT LOWER EXTREMITY: ICD-10-CM

## 2024-10-11 DIAGNOSIS — M25.561 PAIN AND SWELLING OF RIGHT KNEE: ICD-10-CM

## 2024-10-11 DIAGNOSIS — M25.661 DECREASED RANGE OF MOTION (ROM) OF RIGHT KNEE: ICD-10-CM

## 2024-10-11 DIAGNOSIS — R26.9 GAIT DISTURBANCE: ICD-10-CM

## 2024-10-11 NOTE — PROGRESS NOTES
Outpatient Physical Therapy                   Physical Therapy Daily Treatment Note    Patient: Hector Coronel   : 1955  Diagnosis/ICD-10 Code:  S/P total knee replacement, right [Z96.651]  Referring practitioner: Jose Talavera MD  Date of Initial Visit: Type: THERAPY  Noted: 10/3/2024  Today's Date: 10/11/2024  Patient seen for 3 sessions             Subjective   Hector Coronel reports: he took two pain pills before his appointment.     Pain: 2-3/10 pain, at time of arrival.     Objective     See Exercise, Manual, and Modality Logs for complete treatment.     Assessment/Plan  Patient tolerated last session well. Patient continues to tolerated exercises well, with mostly fatigue. Pt would benefit from skilled PT to address Range of Motion  and Strength deficits, pain management and any concerns with ADLs.     Progress per Plan of Care      Timed:  Manual Therapy:    9     mins  71262;  Therapeutic Exercise:    10     mins  88516;     Neuromuscular Dilan:    0    mins  13641;    Therapeutic Activity:     9     mins  07915;     Gait Trainin     mins  45752;    Aquatic Therapy:     0     mins  26635;       Untimed:  Electrical Stimulation:    0     mins  75417 ( );  Mechanical Traction:    0     mins  33717;       Timed Treatment:   28   mins   Total Treatment:     28   mins      Electronically signed:   Jessica Mccollum PTA  Physical Therapist Assistant  [facility] License #: [id number]

## 2024-10-14 ENCOUNTER — OFFICE VISIT (OUTPATIENT)
Dept: ORTHOPEDIC SURGERY | Facility: CLINIC | Age: 69
End: 2024-10-14
Payer: MEDICARE

## 2024-10-14 VITALS
DIASTOLIC BLOOD PRESSURE: 81 MMHG | OXYGEN SATURATION: 94 % | HEIGHT: 69 IN | WEIGHT: 248 LBS | HEART RATE: 95 BPM | SYSTOLIC BLOOD PRESSURE: 145 MMHG | BODY MASS INDEX: 36.73 KG/M2

## 2024-10-14 DIAGNOSIS — M25.561 RIGHT KNEE PAIN, UNSPECIFIED CHRONICITY: ICD-10-CM

## 2024-10-14 DIAGNOSIS — Z96.651 S/P TOTAL KNEE ARTHROPLASTY, RIGHT: Primary | ICD-10-CM

## 2024-10-14 PROCEDURE — 1159F MED LIST DOCD IN RCRD: CPT

## 2024-10-14 PROCEDURE — 1160F RVW MEDS BY RX/DR IN RCRD: CPT

## 2024-10-14 PROCEDURE — 99024 POSTOP FOLLOW-UP VISIT: CPT

## 2024-10-14 RX ORDER — LACTULOSE 10 G/15ML
20 SOLUTION ORAL 2 TIMES DAILY PRN
Qty: 150 ML | Refills: 0 | Status: SHIPPED | OUTPATIENT
Start: 2024-10-14

## 2024-10-14 RX ORDER — HYDROCODONE BITARTRATE AND ACETAMINOPHEN 7.5; 325 MG/1; MG/1
1 TABLET ORAL EVERY 4 HOURS PRN
Qty: 42 TABLET | Refills: 0 | Status: SHIPPED | OUTPATIENT
Start: 2024-10-14

## 2024-10-14 NOTE — PROGRESS NOTES
"Chief Complaint  Follow-up of the Right Knee and Suture / Staple Removal    Subjective      Hector Coronel presents to [facility] for 2-week follow-up of right total knee arthroplasty with Tina stack with Dr. Talavera on 10/1/2024.  Patient presents today with use of a walker.  He is attending physical therapy at [facility] and doing well.  He is here today for follow-up and staple removal.    Objective   Allergies   Allergen Reactions    Metal Rash     CHEAP METAL- BELT HAZEL BREAK HIM OUT        Vital Signs:   /81   Pulse 95   Ht 175.3 cm (69.02\")   Wt 112 kg (248 lb)   SpO2 94%   BMI 36.61 kg/m²       Physical Exam    Constitutional: Awake, alert. Well nourished appearance.    Integumentary: Warm, dry, intact. No obvious rashes.    HENT: Atraumatic, normocephalic.   Respiratory: Non labored respirations .   Cardiovascular: Intact peripheral pulses.    Psychiatric: Normal mood and affect. A&O X3    Ortho Exam  Right knee: Incision is well-approximated healing appropriately.  There is no redness, drainage or tenderness to touch.  Mild edema generalized over the knee.  Stable to varus and valgus stress.  Stable to anterior and posterior drawer test.  Neurovascular intact.  Sensation is intact.  Knee extensor mechanism is intact.  Dorsiflexion and plantarflexion of the foot is appropriate.  Range of motion -3 to 100 degrees.    Imaging Results (Most Recent)       Procedure Component Value Units Date/Time    XR Knee 3 View Right [[id number]] Resulted: 10/14/24 1028     Updated: 10/14/24 1028    Narrative:      X-Ray Report:  Study: X-rays ordered, taken in the office, and reviewed today.   Site: Right knee xray  Indication: Right TKA  View: AP/Lateral, Standing, and Varina view(s)  Findings: Intact right total knee arthroplasty. No evidence of hardware   malfunction or periprosthetic fractures.  Patella centrally tracking.   Prior studies available for comparison: yes                "        Assessment and Plan   Problem List Items Addressed This Visit    None  Visit Diagnoses       S/P total knee arthroplasty, right    -  Primary    Relevant Orders    XR Knee 3 View Right (Completed)            Follow Up   Return in about 4 weeks (around 2024).    Patient is a non-smoker, did not discuss options for smoking cessation.     Social History     Socioeconomic History    Marital status:    Tobacco Use    Smoking status: Former     Current packs/day: 0.00     Types: Cigarettes     Quit date:      Years since quittin.8    Smokeless tobacco: Never    Tobacco comments:     quit 30 years ago   Vaping Use    Vaping status: Never Used   Substance and Sexual Activity    Alcohol use: Yes     Alcohol/week: 3.0 standard drinks of alcohol     Types: 3 Shots of liquor per week    Drug use: Never    Sexual activity: Defer       Patient Instructions   X-rays taken and reviewed, showing intact hardware.    Staples removed in office and Steri-Strips applied.  Patient educated on incision care.  Please keep incision clean and dry.  Do not soak or submerge in water until incision is fully healed.  Do not apply creams or lotions over the incision.  Please allow Steri-Strips to fall off on their own within 7 to 10 days.    Continue icing and elevation of the knee as needed to help with pain and swelling.  Ice knee up to 3 or 4 times daily for no longer than 15 to 20 minutes at a time.    Continue PT to progress ROM, strength, and weightbearing status.    Follow-up in 4 weeks. Repeat x-rays not needed at this visit.  Please call with questions or concerns.   Patient was given instructions and counseling regarding his condition or for health maintenance advice. Please see specific information pulled into the AVS if appropriate.

## 2024-10-15 ENCOUNTER — TREATMENT (OUTPATIENT)
Dept: PHYSICAL THERAPY | Facility: CLINIC | Age: 69
End: 2024-10-15
Payer: MEDICARE

## 2024-10-15 DIAGNOSIS — R29.898 WEAKNESS OF RIGHT LOWER EXTREMITY: ICD-10-CM

## 2024-10-15 DIAGNOSIS — M25.661 DECREASED RANGE OF MOTION (ROM) OF RIGHT KNEE: ICD-10-CM

## 2024-10-15 DIAGNOSIS — M25.461 PAIN AND SWELLING OF RIGHT KNEE: ICD-10-CM

## 2024-10-15 DIAGNOSIS — R26.9 GAIT DISTURBANCE: ICD-10-CM

## 2024-10-15 DIAGNOSIS — Z96.651 S/P TOTAL KNEE REPLACEMENT, RIGHT: Primary | ICD-10-CM

## 2024-10-15 DIAGNOSIS — M25.561 PAIN AND SWELLING OF RIGHT KNEE: ICD-10-CM

## 2024-10-15 PROCEDURE — 97110 THERAPEUTIC EXERCISES: CPT | Performed by: PHYSICAL THERAPIST

## 2024-10-15 PROCEDURE — 97530 THERAPEUTIC ACTIVITIES: CPT | Performed by: PHYSICAL THERAPIST

## 2024-10-15 NOTE — PROGRESS NOTES
Physical Therapy Daily Treatment Note  [address] [place] [address]    Patient: Hector Coronel   : 1955  Diagnosis/ICD-10 Code:  S/P total knee replacement, right [Z96.651]  Referring practitioner: Jose Talavera MD  Date of Initial Visit: Type: THERAPY  Noted: 10/3/2024  Today's Date: 10/15/2024  Patient seen for 4 sessions           Subjective : Patient reports he got his staples out, but he is not having a good day secondary to having a panic attack.    Objective   See Exercise, Manual, and Modality Logs for complete treatment.       Assessment/Plan : Pt tolerated today's session well. Pt is doing very well, achieves 100 degrees PROM flexion in sitting today. Pt would benefit from continued skilled therapy to address deficits. Progress per plan of care.             Timed:  Manual Therapy:    0     mins  98812;  Therapeutic Exercise:    15     mins  58744;     Neuromuscular Dilan:    0    mins  37100;    Therapeutic Activity:     10    mins  48970;     Gait Trainin     mins  74793;     Ultrasound:     0     mins  31182;    Aquatic Therapy    0     mins  30310;    Untimed:  Electrical Stimulation:    0     mins  89669 ( );  Dry Needling     0     mins self-pay;  Mechanical Traction    0     mins  52678  Moist Heat     0     mins  No charge  Canalith Repos              0     mins 00761    Timed Treatment:   25   mins   Total Treatment:     25   mins    Eduar Hancock PT  Physical Therapist    Electronically signed 10/15/2024    [place] License: PT - [id number]

## 2024-10-17 ENCOUNTER — TREATMENT (OUTPATIENT)
Dept: PHYSICAL THERAPY | Facility: CLINIC | Age: 69
End: 2024-10-17
Payer: MEDICARE

## 2024-10-17 DIAGNOSIS — M25.661 DECREASED RANGE OF MOTION (ROM) OF RIGHT KNEE: ICD-10-CM

## 2024-10-17 DIAGNOSIS — R26.9 GAIT DISTURBANCE: ICD-10-CM

## 2024-10-17 DIAGNOSIS — Z96.651 S/P TOTAL KNEE REPLACEMENT, RIGHT: Primary | ICD-10-CM

## 2024-10-17 DIAGNOSIS — R29.898 WEAKNESS OF RIGHT LOWER EXTREMITY: ICD-10-CM

## 2024-10-17 DIAGNOSIS — M25.461 PAIN AND SWELLING OF RIGHT KNEE: ICD-10-CM

## 2024-10-17 DIAGNOSIS — M25.561 PAIN AND SWELLING OF RIGHT KNEE: ICD-10-CM

## 2024-10-17 NOTE — PROGRESS NOTES
Physical Therapy Daily Treatment Note  [address] [place] [address]    Patient: Hector Coronel   : 1955  Diagnosis/ICD-10 Code:  S/P total knee replacement, right [Z96.651]  Referring practitioner: Jose Talavera MD  Date of Initial Visit: Type: THERAPY  Noted: 10/3/2024  Today's Date: 10/17/2024  Patient seen for 5 sessions           Subjective : Patient reports he is doing better, doesn't feel like he needs to walker as much for stability.    Objective   See Exercise, Manual, and Modality Logs for complete treatment.       Assessment/Plan : Pt tolerated today's session well. Continued to work on progression of strengthening and ROM. Also trialed using the quad cane in therapy today in preparation for transitioning to using it again. Pt would benefit from continued skilled therapy to address deficits. Progress per plan of care.             Timed:  Manual Therapy:    8     mins  43908;  Therapeutic Exercise:    16     mins  52090;     Neuromuscular Dilan:    0    mins  80974;    Therapeutic Activity:     10     mins  76255;     Gait Trainin     mins  84176;     Ultrasound:     0     mins  61733;    Aquatic Therapy    0     mins  69758;    Untimed:  Electrical Stimulation:    0     mins  85072 ( );  Dry Needling     0     mins self-pay;  Mechanical Traction    0     mins  88068  Moist Heat     0     mins  No charge  Canalith Repos              0     mins 13429    Timed Treatment:   38   mins   Total Treatment:     38   mins    Eduar Hancock PT  Physical Therapist    Electronically signed 10/17/2024    [place] License: PT - [id number]

## 2024-10-28 ENCOUNTER — TREATMENT (OUTPATIENT)
Dept: PHYSICAL THERAPY | Facility: CLINIC | Age: 69
End: 2024-10-28
Payer: MEDICARE

## 2024-10-28 DIAGNOSIS — M25.561 PAIN AND SWELLING OF RIGHT KNEE: ICD-10-CM

## 2024-10-28 DIAGNOSIS — Z96.651 S/P TOTAL KNEE REPLACEMENT, RIGHT: Primary | ICD-10-CM

## 2024-10-28 DIAGNOSIS — R29.898 WEAKNESS OF RIGHT LOWER EXTREMITY: ICD-10-CM

## 2024-10-28 DIAGNOSIS — R26.9 GAIT DISTURBANCE: ICD-10-CM

## 2024-10-28 DIAGNOSIS — M25.461 PAIN AND SWELLING OF RIGHT KNEE: ICD-10-CM

## 2024-10-28 DIAGNOSIS — M25.661 DECREASED RANGE OF MOTION (ROM) OF RIGHT KNEE: ICD-10-CM

## 2024-10-28 PROCEDURE — 97140 MANUAL THERAPY 1/> REGIONS: CPT | Performed by: PHYSICAL THERAPIST

## 2024-10-28 PROCEDURE — 97110 THERAPEUTIC EXERCISES: CPT | Performed by: PHYSICAL THERAPIST

## 2024-10-28 NOTE — PROGRESS NOTES
Physical Therapy Daily Treatment Note                      [facility] [address] [place] [address]    Patient: Hector Coronel   : 1955  Diagnosis/ICD-10 Code:  S/P total knee replacement, right [Z96.651]  Referring practitioner: Jose Talavera MD  Date of Initial Visit: Type: THERAPY  Noted: 10/3/2024  Today's Date: 10/28/2024  Patient seen for 6 sessions           Subjective   The patient reported that he is fatigued today. He had to miss PT last week due to battling pneumonia. He will do what he can today. He has been stretching his knee at home.    Objective   See Exercise, Manual, and Modality Logs for complete treatment.     Assessment/Plan    The patient demonstrated good tolerance to all functional lower extremity strengthening exercise. His knee ROM is progressing well. Continue to progress with current PT plan of care per patient tolerance.         Timed:  Manual Therapy:    8     mins  12709;  Therapeutic Exercise:    15     mins  82553;     Neuromuscular Dilan:   0    mins  62074;    Therapeutic Activity:     0     mins  28421;     Gait Trainin     mins  07785;     Aquatics                         0      mins  74602    Un-timed:  Mechanical Traction      0     mins  13306  Dry Needling     0     mins self-pay  Electrical Stimulation:    0     mins  34954 ( );      Timed Treatment:   23   mins   Total Treatment:     32   mins    Raffy Chamberlain PT  Physical Therapist    Electronically signed 10/28/2024    [place] License: PT - [id number]

## 2024-10-31 ENCOUNTER — TREATMENT (OUTPATIENT)
Dept: PHYSICAL THERAPY | Facility: CLINIC | Age: 69
End: 2024-10-31
Payer: MEDICARE

## 2024-10-31 DIAGNOSIS — M25.661 DECREASED RANGE OF MOTION (ROM) OF RIGHT KNEE: ICD-10-CM

## 2024-10-31 DIAGNOSIS — R29.898 WEAKNESS OF RIGHT LOWER EXTREMITY: ICD-10-CM

## 2024-10-31 DIAGNOSIS — Z96.651 S/P TOTAL KNEE REPLACEMENT, RIGHT: Primary | ICD-10-CM

## 2024-10-31 DIAGNOSIS — M25.561 PAIN AND SWELLING OF RIGHT KNEE: ICD-10-CM

## 2024-10-31 DIAGNOSIS — M25.461 PAIN AND SWELLING OF RIGHT KNEE: ICD-10-CM

## 2024-10-31 DIAGNOSIS — R26.9 GAIT DISTURBANCE: ICD-10-CM

## 2024-10-31 NOTE — PROGRESS NOTES
Physical Therapy Daily Treatment Note                      [facility] [address] [place] [address]    Patient: Hector Coronel   : 1955  Diagnosis/ICD-10 Code:  S/P total knee replacement, right [Z96.651]  Referring practitioner: Jose Talavera MD  Date of Initial Visit: Type: THERAPY  Noted: 10/3/2024  Today's Date: 10/31/2024  Patient seen for 7 sessions           Subjective   The patient reported no new complaints today. His knee pain is about the same. He feels like his breathing is a little better.    Objective   See Exercise, Manual, and Modality Logs for complete treatment.     Right knee flexion PROM: 120 degrees    Assessment/Plan    The patient demonstrated good tolerance to all functional knee strengthening exercise.His knee ROM is progressing very well. Continue to progress with current PT plan of care per patient tolerance.         Timed:  Manual Therapy:    8     mins  00094;  Therapeutic Exercise:    10     mins  54850;     Neuromuscular Dilan:   0    mins  24157;    Therapeutic Activity:     10     mins  13791;     Gait Trainin     mins  96762;     Aquatics                         0      mins  35851    Un-timed:  Mechanical Traction      0     mins  65732  Dry Needling     0     mins self-pay  Electrical Stimulation:    0     mins  89952 ( );      Timed Treatment:   28   mins   Total Treatment:     34   mins    Raffy Chamberlain PT  Physical Therapist    Electronically signed 10/31/2024    [place] License: PT - [id number]

## 2024-11-04 ENCOUNTER — TREATMENT (OUTPATIENT)
Dept: PHYSICAL THERAPY | Facility: CLINIC | Age: 69
End: 2024-11-04
Payer: MEDICARE

## 2024-11-04 DIAGNOSIS — M25.661 DECREASED RANGE OF MOTION (ROM) OF RIGHT KNEE: ICD-10-CM

## 2024-11-04 DIAGNOSIS — Z96.651 S/P TOTAL KNEE REPLACEMENT, RIGHT: Primary | ICD-10-CM

## 2024-11-04 DIAGNOSIS — R26.9 GAIT DISTURBANCE: ICD-10-CM

## 2024-11-04 DIAGNOSIS — M25.461 PAIN AND SWELLING OF RIGHT KNEE: ICD-10-CM

## 2024-11-04 DIAGNOSIS — R29.898 WEAKNESS OF RIGHT LOWER EXTREMITY: ICD-10-CM

## 2024-11-04 DIAGNOSIS — M25.561 PAIN AND SWELLING OF RIGHT KNEE: ICD-10-CM

## 2024-11-04 PROCEDURE — 97530 THERAPEUTIC ACTIVITIES: CPT | Performed by: PHYSICAL THERAPIST

## 2024-11-04 PROCEDURE — 97140 MANUAL THERAPY 1/> REGIONS: CPT | Performed by: PHYSICAL THERAPIST

## 2024-11-04 NOTE — PROGRESS NOTES
Progress Note  [facility] [address] [place] [address]      Patient: Hector Coronel   : 1955  Diagnosis/ICD-10 Code:  S/P total knee replacement, right [Z96.651]  Referring practitioner: Jose Talavera MD  Date of Initial Visit: Type: THERAPY  Noted: 10/3/2024  Today's Date: 2024  Patient seen for 8 sessions      Subjective:   Subjective Questionnaire: LEFS: 34/80 = 42.5% Function  Clinical Progress: improved  Home Program Compliance: Yes  Treatment has included: therapeutic exercise, neuromuscular re-education, manual therapy, therapeutic activity, and gait training    Subjective   The patient reported that his right knee pain is a 2/10 today. Over the past month, he has noticed improvements in knee pain, strength, ROM, and overall mobility. He still uses a cane for long distance walking, but goes around his house without an assistive device. He can tell that his knee is still stiff and his balance is off. He would like to continue with PT at this time.     Objective      Active Range of Motion      Right Knee   Flexion: 108 degrees   Extension: Right knee active extension: -3 degrees from full extension.      Passive Range of Motion      Right Knee   Flexion: 116 degrees   Extension: Right knee passive extension: -2 degrees from full extension.      Strength/Myotome Testing      Left Hip   Planes of Motion   Flexion: 4+  Extension: 4+  Abduction: 4  Adduction: 4+     Right Hip   Planes of Motion   Flexion: 4-  Extension: 4-  Abduction: 4-  Adduction: 4-     Left Knee   Flexion: 4+  Extension: 4+     Right Knee   Flexion: 4  Extension: 4+  Quadriceps contraction: good     Ambulation      Comments   Ambulates with a single point cane with decreased right knee extension, decreased stance time on right.    See Exercise, Manual, and Modality Logs for complete treatment.     Assessment/Plan  The patient was re-evaluated today and presents with improvements in right knee pain, strength, ROM,  gait, and function (LEFS) compared to his initial evaluation. Although improved, he continues to present with right knee pain, stiffness, weakness, and functional deficits (LEFS.. He would benefit from continued skilled physical therapy to address remaining functional deficits and to allow the patient to return to his prior level of function.     Goals  Plan Goals: KNEE PROBLEMS     1. The patient has limited ROM of the right knee.              LTG 1:12 weeks:  The patient will demonstrate 0 to 120 degrees of ROM for the right knee in order to allow patient to ambulate.                          STATUS:  Progressing              STG 1a: 6 weeks:  The patient will demonstrate 5 to 100 degrees of ROM for the right knee.                          STATUS:  Met              STG 1b: 6 weeks: The patient will demonstrate independent HEP.                          STATUS:  Met                   2. The patient has limited strength of the right knee.              LTG 2: 12 weeks: The patient will demonstrate 4+ /5 strength for knee flexion and extension in order to allow patient improved joint stability                          STATUS:  Progressing              STG 2a: 6 weeks: The patient will demonstrate 4 /5 strength for knee flexion and extension                          STATUS:  Met              STG 2b: 6 weeks: The patient will demonstrate full SLR with 0 degree quad lag.                          STATUS:  Progressing                               3. The patient has gait dysfunction.              LTG 3: 12 weeks:  The patient will ambulate without assistive device, independently, for community distances with normal biomechanics of the right lower extremity in order to improve mobility and allow patient to perform activities such as grocery shopping with greater ease.                          STATUS:  Progressing              STG 3a: 4 weeks: The patient will ambulate with a single tipped cane with appropriate gait  mechanics.                          STATUS:  Progressing                   4. Mobility: Walking/Moving Around Functional Limitation                               LTG 4: 12 weeks:  The patient will demonstrate a score of 50/80 on the LEFS.                          STATUS:  Progressing              STG 4 a: 6 weeks:  The patient will demonstrate a score of 30/80 on the LEFS.                            STATUS:  Met    Progress toward previous goals: Partially Met      Recommendations: Continue as planned  Timeframe: 2 months  Prognosis to achieve goals: good    PT Signature: Raffy Chamberlain PT    Electronically signed 2024    [place] License: PT - [id number]       Timed:  Manual Therapy:    8     mins  41520;  Therapeutic Exercise:    5     mins  03126;     Neuromuscular Dilan:    0    mins  48259;    Therapeutic Activity:     10     mins  05474;     Gait Trainin     mins  86410;     Aquatics                         0      mins  16408    Un-timed:  Mechanical Traction      0     mins  80597  Dry Needling     0     mins self-pay  Electrical Stimulation:    0     mins  64742 ( )  Re-evaluation:               0     mins 40855;    Timed Treatment:   23   mins   Total Treatment:     34   mins

## 2024-11-07 ENCOUNTER — TREATMENT (OUTPATIENT)
Dept: PHYSICAL THERAPY | Facility: CLINIC | Age: 69
End: 2024-11-07
Payer: MEDICARE

## 2024-11-07 DIAGNOSIS — R26.9 GAIT DISTURBANCE: ICD-10-CM

## 2024-11-07 DIAGNOSIS — M25.461 PAIN AND SWELLING OF RIGHT KNEE: ICD-10-CM

## 2024-11-07 DIAGNOSIS — Z96.651 S/P TOTAL KNEE REPLACEMENT, RIGHT: Primary | ICD-10-CM

## 2024-11-07 DIAGNOSIS — M25.561 PAIN AND SWELLING OF RIGHT KNEE: ICD-10-CM

## 2024-11-07 DIAGNOSIS — M25.661 DECREASED RANGE OF MOTION (ROM) OF RIGHT KNEE: ICD-10-CM

## 2024-11-07 DIAGNOSIS — R29.898 WEAKNESS OF RIGHT LOWER EXTREMITY: ICD-10-CM

## 2024-11-07 NOTE — PROGRESS NOTES
Outpatient Physical Therapy                   Physical Therapy Daily Treatment Note    Patient: Hector Coronel   : 1955  Diagnosis/ICD-10 Code:  S/P total knee replacement, right [Z96.651]  Referring practitioner: Jose Talavera MD  Date of Initial Visit: Type: THERAPY  Noted: 10/3/2024  Today's Date: 2024  Patient seen for 9 sessions             Subjective   Hector Coronel reports: he is getting to the point where it is pain free most of the time. He reduced his pain pills from two to one today.     Pain: 0/10 pain, at time of arrival.     Objective   R Knee PROM:   Flexion: 118 degrees with no pain  Extension: lacking 3 degrees    See Exercise, Manual, and Modality Logs for complete treatment.     Assessment/Plan  Patient arrived ambulating with a cane; he continues to bring it to therapy due to fatigue after the session. Patient states he isn't using the cane much at home anymore and hasn't needed it with community ambulation but uses the cart in the grocery for support.     Progress per Plan of Care      Timed:  Manual Therapy:    8     mins  97761;  Therapeutic Exercise:    13     mins  78546;     Neuromuscular Dilan:    0    mins  80411;    Therapeutic Activity:     8     mins  16240;     Gait Trainin     mins  08273;    Aquatic Therapy:     0     mins  15770;       Untimed:  Electrical Stimulation:    0     mins  48903 ( );  Mechanical Traction:    0     mins  57316;       Timed Treatment:   29   mins   Total Treatment:     29   mins      Electronically signed:   Jessica Mccollum PTA  Physical Therapist Assistant  [facility] License #: [id number]

## 2024-11-11 ENCOUNTER — OFFICE VISIT (OUTPATIENT)
Dept: ORTHOPEDIC SURGERY | Facility: CLINIC | Age: 69
End: 2024-11-11
Payer: MEDICARE

## 2024-11-11 ENCOUNTER — TREATMENT (OUTPATIENT)
Dept: PHYSICAL THERAPY | Facility: CLINIC | Age: 69
End: 2024-11-11
Payer: MEDICARE

## 2024-11-11 VITALS
DIASTOLIC BLOOD PRESSURE: 77 MMHG | HEIGHT: 69 IN | WEIGHT: 248 LBS | BODY MASS INDEX: 36.73 KG/M2 | HEART RATE: 98 BPM | SYSTOLIC BLOOD PRESSURE: 137 MMHG | OXYGEN SATURATION: 94 %

## 2024-11-11 DIAGNOSIS — Z96.651 S/P TOTAL KNEE ARTHROPLASTY, RIGHT: Primary | ICD-10-CM

## 2024-11-11 DIAGNOSIS — M25.461 PAIN AND SWELLING OF RIGHT KNEE: ICD-10-CM

## 2024-11-11 DIAGNOSIS — R26.9 GAIT DISTURBANCE: ICD-10-CM

## 2024-11-11 DIAGNOSIS — Z96.651 S/P TOTAL KNEE REPLACEMENT, RIGHT: Primary | ICD-10-CM

## 2024-11-11 DIAGNOSIS — M25.561 PAIN AND SWELLING OF RIGHT KNEE: ICD-10-CM

## 2024-11-11 DIAGNOSIS — M25.661 DECREASED RANGE OF MOTION (ROM) OF RIGHT KNEE: ICD-10-CM

## 2024-11-11 DIAGNOSIS — R29.898 WEAKNESS OF RIGHT LOWER EXTREMITY: ICD-10-CM

## 2024-11-11 PROCEDURE — 1160F RVW MEDS BY RX/DR IN RCRD: CPT

## 2024-11-11 PROCEDURE — 1159F MED LIST DOCD IN RCRD: CPT

## 2024-11-11 PROCEDURE — 99024 POSTOP FOLLOW-UP VISIT: CPT

## 2024-11-11 NOTE — PATIENT INSTRUCTIONS
Patient is doing very well. Advised to continue PT to completion to progress strength and ROM. Continue home exercises.  Order updated to continue PT.  Discussed a Dynasplint for extension versus continuing physical therapy.  Patient elects to continue with PT.    Continue icing knee as needed up to 4 times daily for no longer than 15-20 mins at a time.     Follow-up in 6 weeks. Repeat x-rays needed. Call with changes or concerns.

## 2024-11-11 NOTE — PROGRESS NOTES
Physical Therapy Daily Treatment Note      Patient: Hector Coronel   : 1955  Referring practitioner: Jose Talavera MD  Date of Initial Visit: Type: THERAPY  Noted: 10/3/2024  Today's Date: 2024  Patient seen for 10 sessions           Subjective Questionnaire:       Subjective Evaluation    History of Present Illness    Subjective comment: Pt reports R knee feels pretty good right now.  Pt reports his knee pain wakes him up at night and adds that he also has neck and shoulder pain from arthritis that wake him up.       Objective   See Exercise, Manual, and Modality Logs for complete treatment.       Assessment/Plan    Visit Diagnoses:    ICD-10-CM ICD-9-CM   1. S/P total knee replacement, right  Z96.651 V43.65   2. Pain and swelling of right knee  M25.561 719.46    M25.461 719.06   3. Decreased range of motion (ROM) of right knee  M25.661 719.56   4. Weakness of right lower extremity  R29.898 729.89   5. Gait disturbance  R26.9 781.2       Progress per Plan of Care and Progress strengthening /stabilization /functional activity           Timed:  Manual Therapy:    8     mins  71623;  Therapeutic Exercise:    14     mins  54252;     Neuromuscular Dilan:        mins  45898;    Therapeutic Activity:     8     mins  85466;     Gait Training:           mins  47781;     Ultrasound:          mins  67210;    Electrical Stimulation:         mins  97814 ( );  Aquatic Therapy          mins  54263    Untimed:  Electrical Stimulation:         mins  63624 ( );  Mechanical Traction:         mins  07458;     Timed Treatment:   30   mins   Total Treatment:     30   mins    Electronically signed    Tania Valencia PTA  Physical Therapist Assistant    [facility] license: [id number]

## 2024-11-11 NOTE — PROGRESS NOTES
"Chief Complaint  Follow-up of the Right Knee    Subjective      Hector Coronel presents to [facility] for 6-week follow-up of right total knee arthroplasty with Tina stack with Dr. Talavera on 10/1/2024.  Patient is here today without use of assistive devices.  He is attending physical therapy with [facility] and continues to make improvement.  Reports that his motion with PT was flexion 116 and they are now working on the strengthening.  Reports that he a few weeks after surgery he was diagnosed with pneumonia but has recovered well from this.    Objective   Allergies   Allergen Reactions    Metal Rash     CHEAP METAL- BELT HAZEL BREAK HIM OUT        Vital Signs:   /77   Pulse 98   Ht 175.3 cm (69.02\")   Wt 112 kg (248 lb)   SpO2 94%   BMI 36.61 kg/m²       Physical Exam    Constitutional: Awake, alert. Well nourished appearance.    Integumentary: Warm, dry, intact. No obvious rashes.    HENT: Atraumatic, normocephalic.   Respiratory: Non labored respirations .   Cardiovascular: Intact peripheral pulses.    Psychiatric: Normal mood and affect. A&O X3    Ortho Exam  Right knee: Incision is well-approximated healing appropriately.  There is remaining scab formation on the distal portion of the incision.  Nontender to palpation.  No erythema or drainage noted.  Mild edema generalized over the knee.  Stable to varus and valgus stress.  Stable to anterior posterior drawer test.  Extension lacks 4 degrees, flexion to 116 degrees.  Knee extensor mechanism is intact.  Dorsiflexion plantarflexion of the foot is appropriate.    Imaging Results (Most Recent)       None                      Assessment and Plan   Problem List Items Addressed This Visit    None  Visit Diagnoses       S/P total knee arthroplasty, right    -  Primary    Relevant Orders    Ambulatory Referral to Physical Therapy for Evaluation & Treatment            Follow Up   Return in about 6 weeks (around " 2024).    Patient is a non-smoker, did not discuss options for smoking cessation.     Social History     Socioeconomic History    Marital status:    Tobacco Use    Smoking status: Former     Current packs/day: 0.00     Types: Cigarettes     Quit date:      Years since quittin.8    Smokeless tobacco: Never    Tobacco comments:     quit 30 years ago   Vaping Use    Vaping status: Never Used   Substance and Sexual Activity    Alcohol use: Yes     Alcohol/week: 3.0 standard drinks of alcohol     Types: 3 Shots of liquor per week    Drug use: Never    Sexual activity: Defer       Patient Instructions   Patient is doing very well. Advised to continue PT to completion to progress strength and ROM. Continue home exercises.  Order updated to continue PT.  Discussed a Dynasplint for extension versus continuing physical therapy.  Patient elects to continue with PT.    Continue icing knee as needed up to 4 times daily for no longer than 15-20 mins at a time.     Follow-up in 6 weeks. Repeat x-rays needed. Call with changes or concerns.    Patient was given instructions and counseling regarding his condition or for health maintenance advice. Please see specific information pulled into the AVS if appropriate.

## 2024-11-14 ENCOUNTER — TREATMENT (OUTPATIENT)
Dept: PHYSICAL THERAPY | Facility: CLINIC | Age: 69
End: 2024-11-14
Payer: MEDICARE

## 2024-11-14 DIAGNOSIS — R26.9 GAIT DISTURBANCE: ICD-10-CM

## 2024-11-14 DIAGNOSIS — M25.561 PAIN AND SWELLING OF RIGHT KNEE: ICD-10-CM

## 2024-11-14 DIAGNOSIS — M25.661 DECREASED RANGE OF MOTION (ROM) OF RIGHT KNEE: ICD-10-CM

## 2024-11-14 DIAGNOSIS — Z96.651 S/P TOTAL KNEE REPLACEMENT, RIGHT: Primary | ICD-10-CM

## 2024-11-14 DIAGNOSIS — R29.898 WEAKNESS OF RIGHT LOWER EXTREMITY: ICD-10-CM

## 2024-11-14 DIAGNOSIS — M25.461 PAIN AND SWELLING OF RIGHT KNEE: ICD-10-CM

## 2024-11-14 NOTE — PROGRESS NOTES
Outpatient Physical Therapy                   Physical Therapy Daily Treatment Note    Patient: Hector Coronel   : 1955  Diagnosis/ICD-10 Code:  S/P total knee replacement, right [Z96.651]  Referring practitioner: Jose Talavera MD  Date of Initial Visit: Type: THERAPY  Noted: 10/3/2024  Today's Date: 2024  Patient seen for 11 sessions             Subjective   Hector Coronel reports: he is having very little pain. Patient states the only concern he has is his knee extension but he may be ready to discharge next session.     Pain: 1/10 pain, at time of arrival.     Objective     See Exercise, Manual, and Modality Logs for complete treatment.     Assessment/Plan  Hector progressing as evident by decreased overall knee pain. Pt tolerated exercises well, just general fatigue especially with the leg press. Patient has no complaints about his functional activities but feels his knee extension is not as good as he would like. Patient likely appropriate to be discharged next visit.     Progress per Plan of Care    Timed:  Manual Therapy:    8     mins  68673;  Therapeutic Exercise:    12     mins  54496;     Neuromuscular Dilan:    0    mins  48228;    Therapeutic Activity:     8     mins  20046;     Gait Trainin     mins  80140;       Ultrasound:                    0     mins  59025;      Untimed:  Mechanical Traction:    0     mins  83398;   Electrical Stimulation:    0     mins  30337 ( );      Timed Treatment:   28   mins   Total Treatment:     28   mins      Electronically signed:   Jessica Mccollum PTA  Physical Therapist Assistant  [facility] License #: [id number]

## 2024-11-21 ENCOUNTER — TREATMENT (OUTPATIENT)
Dept: PHYSICAL THERAPY | Facility: CLINIC | Age: 69
End: 2024-11-21
Payer: MEDICARE

## 2024-11-21 DIAGNOSIS — M25.561 PAIN AND SWELLING OF RIGHT KNEE: ICD-10-CM

## 2024-11-21 DIAGNOSIS — R26.9 GAIT DISTURBANCE: ICD-10-CM

## 2024-11-21 DIAGNOSIS — M25.661 DECREASED RANGE OF MOTION (ROM) OF RIGHT KNEE: ICD-10-CM

## 2024-11-21 DIAGNOSIS — M25.461 PAIN AND SWELLING OF RIGHT KNEE: ICD-10-CM

## 2024-11-21 DIAGNOSIS — R29.898 WEAKNESS OF RIGHT LOWER EXTREMITY: ICD-10-CM

## 2024-11-21 DIAGNOSIS — Z96.651 S/P TOTAL KNEE REPLACEMENT, RIGHT: Primary | ICD-10-CM

## 2024-11-21 NOTE — PROGRESS NOTES
Physical Therapy Daily Treatment Note / Discharge                      [facility] [address] [place] [address]    Patient: Hector Coronel   : 1955  Diagnosis/ICD-10 Code:  S/P total knee replacement, right [Z96.651]  Referring practitioner: Jose Talavera MD  Date of Initial Visit: Type: THERAPY  Noted: 10/3/2024  Today's Date: 2024  Patient seen for 12 sessions           Subjective   The patient reported that his knee pain is a 0/10. His knee feels good overall. He is back to doing all of his normal ADLs. He was released by ortho and feels ready to make today his last day of PT.    Objective   See Exercise, Manual, and Modality Logs for complete treatment.     Right knee AROM: 0-120 degrees    Right knee Strength:  Flexion: 5/5  Extension: 5/5    Assessment/Plan    The patient demonstrated good tolerance to all functional lower extremity strengthening exercise. Overall, he has regained good right knee strength, ROM, gait, and function (LEFS) compared to his previous assessment. Continue to progress with current PT plan of care per patient tolerance.         Timed:  Manual Therapy:    0     mins  75138;  Therapeutic Exercise:    14     mins  48247;     Neuromuscular Dilan:   0    mins  14135;    Therapeutic Activity:     10     mins  39123;     Gait Trainin     mins  69684;     Aquatics                         0      mins  01273    Un-timed:  Mechanical Traction      0     mins  43323  Dry Needling     0     mins self-pay  Electrical Stimulation:    0     mins  36879 ( );      Timed Treatment:   24   mins   Total Treatment:     24   mins    Raffy Chamberlain PT  Physical Therapist    Electronically signed 2024    [place] License: PT - [id number]

## 2024-12-23 ENCOUNTER — OFFICE VISIT (OUTPATIENT)
Dept: ORTHOPEDIC SURGERY | Facility: CLINIC | Age: 69
End: 2024-12-23
Payer: MEDICARE

## 2024-12-23 VITALS
OXYGEN SATURATION: 97 % | HEART RATE: 78 BPM | DIASTOLIC BLOOD PRESSURE: 77 MMHG | BODY MASS INDEX: 36.57 KG/M2 | SYSTOLIC BLOOD PRESSURE: 144 MMHG | WEIGHT: 246.91 LBS | HEIGHT: 69 IN

## 2024-12-23 DIAGNOSIS — Z96.651 S/P TOTAL KNEE ARTHROPLASTY, RIGHT: Primary | ICD-10-CM

## 2024-12-23 PROCEDURE — 99024 POSTOP FOLLOW-UP VISIT: CPT

## 2024-12-23 RX ORDER — DICLOFENAC SODIUM 75 MG/1
75 TABLET, DELAYED RELEASE ORAL 2 TIMES DAILY
Qty: 60 TABLET | Refills: 2 | Status: SHIPPED | OUTPATIENT
Start: 2024-12-23

## 2024-12-23 RX ORDER — OMEPRAZOLE 40 MG/1
CAPSULE, DELAYED RELEASE ORAL
COMMUNITY
Start: 2024-11-08

## 2024-12-23 NOTE — PROGRESS NOTES
"Chief Complaint  Follow-up of the Right Knee    Subjective      Hector Coronel presents to [facility] for 3-month follow-up of right total knee arthroplasty with Dr. Talavera on 10/1/2024.  Patient is here today for 3-month checkup.  He is getting back to normal activities and overall doing well.  He is ambulatory without use of assistive devices.  He has completed physical therapy and continuing home exercises and doing well with this.  Reports that his hip is beginning to experience some pain.  Denies injury since his last appointment.    Objective   Allergies   Allergen Reactions    Metal Rash     CHEAP METAL- BELT HAZEL BREAK HIM OUT        Vital Signs:   /77   Pulse 78   Ht 175.3 cm (69\")   Wt 112 kg (246 lb 14.6 oz)   SpO2 97%   BMI 36.46 kg/m²       Physical Exam    Constitutional: Awake, alert. Well nourished appearance.    Integumentary: Warm, dry, intact. No obvious rashes.    HENT: Atraumatic, normocephalic.   Respiratory: Non labored respirations .   Cardiovascular: Intact peripheral pulses.    Psychiatric: Normal mood and affect. A&O X3    Ortho Exam  Right knee: Incision is well scarred and healed.  There is no redness, drainage or tenderness.  Range of motion -2 to 120 degrees.  Stable to varus and valgus stress.  Stable to anterior and posterior drawer test.  Neurovascular intact.  Sensation is intact.    Imaging Results (Most Recent)       Procedure Component Value Units Date/Time    XR Knee 3 View Right [[id number]] Resulted: 12/23/24 1516     Updated: 12/23/24 1516    Narrative:      X-Ray Report:  Study: X-rays ordered, taken in the office, and reviewed today.   Site: Right knee xray  Indication: Right total knee arthroplasty  View: AP/Lateral, Standing, and Athena view(s)  Findings: Intact right total knee arthroplasty. No evidence of hardware   malfunction.  Patella centrally tracking.  Prior studies available for comparison: yes                     "   Assessment and Plan   Problem List Items Addressed This Visit    None  Visit Diagnoses       S/P total knee arthroplasty, right    -  Primary    Relevant Orders    XR Knee 3 View Right (Completed)            Follow Up   Return for Annual Recheck.    Patient is a non-smoker, did not discuss options for smoking cessation.     Social History     Socioeconomic History    Marital status:    Tobacco Use    Smoking status: Former     Current packs/day: 0.00     Types: Cigarettes     Quit date:      Years since quittin.0    Smokeless tobacco: Never    Tobacco comments:     quit 30 years ago   Vaping Use    Vaping status: Never Used   Substance and Sexual Activity    Alcohol use: Yes     Alcohol/week: 3.0 standard drinks of alcohol     Types: 3 Shots of liquor per week    Drug use: Never    Sexual activity: Defer       Patient Instructions   X-rays taken and reviewed with patient.  Hardware is intact.       Patient is doing well.  Encouraged to continue home exercises for ROM and strengthening. May continue icing as needed for no more than 20 minutes at a time for comfort and inflammation.     May apply Vitamin E, cocoa butter, etc. over incision to aid in scar appearance.     Encouraged to continue avoiding kneeling directly on prosthetic and avoid deep squatting.    Educated about needing dental prophylaxis for life.  Pt is to call our office or the dentist to receive an order for antibiotics prior to dental procedure.    Educated that numbness can improve for up to 1 year, however at the year abigail if still experiencing numbness it may not return.    Diclofenac sent to pharmacy.  Hip exercises sent home with patient.  Advised that if the hip does not feel better within 3 to 4 weeks he may call to get this looked at.    Diclofenac sent to pharmacy.     Follow-up in 9 months for updated x-rays at the 1 year anniversary. Call with questions or concerns.     Patient was given instructions and counseling  regarding his condition or for health maintenance advice. Please see specific information pulled into the AVS if appropriate.

## 2024-12-23 NOTE — PATIENT INSTRUCTIONS
X-rays taken and reviewed with patient.  Hardware is intact.       Patient is doing well.  Encouraged to continue home exercises for ROM and strengthening. May continue icing as needed for no more than 20 minutes at a time for comfort and inflammation.     May apply Vitamin E, cocoa butter, etc. over incision to aid in scar appearance.     Encouraged to continue avoiding kneeling directly on prosthetic and avoid deep squatting.    Educated about needing dental prophylaxis for life.  Pt is to call our office or the dentist to receive an order for antibiotics prior to dental procedure.    Educated that numbness can improve for up to 1 year, however at the year abigail if still experiencing numbness it may not return.    Diclofenac sent to pharmacy.  Hip exercises sent home with patient.  Advised that if the hip does not feel better within 3 to 4 weeks he may call to get this looked at.    Diclofenac sent to pharmacy.     Follow-up in 9 months for updated x-rays at the 1 year anniversary. Call with questions or concerns.

## 2025-01-22 ENCOUNTER — OFFICE VISIT (OUTPATIENT)
Dept: ORTHOPEDIC SURGERY | Facility: CLINIC | Age: 70
End: 2025-01-22
Payer: MEDICARE

## 2025-01-22 ENCOUNTER — TELEPHONE (OUTPATIENT)
Dept: CARDIOLOGY | Facility: CLINIC | Age: 70
End: 2025-01-22
Payer: MEDICARE

## 2025-01-22 ENCOUNTER — TELEPHONE (OUTPATIENT)
Dept: ORTHOPEDIC SURGERY | Facility: CLINIC | Age: 70
End: 2025-01-22

## 2025-01-22 ENCOUNTER — PREP FOR SURGERY (OUTPATIENT)
Dept: OTHER | Facility: HOSPITAL | Age: 70
End: 2025-01-22
Payer: MEDICARE

## 2025-01-22 VITALS
HEART RATE: 94 BPM | SYSTOLIC BLOOD PRESSURE: 136 MMHG | OXYGEN SATURATION: 97 % | WEIGHT: 246 LBS | DIASTOLIC BLOOD PRESSURE: 82 MMHG | BODY MASS INDEX: 36.43 KG/M2 | HEIGHT: 69 IN

## 2025-01-22 DIAGNOSIS — M16.12 OSTEOARTHRITIS OF LEFT HIP, UNSPECIFIED OSTEOARTHRITIS TYPE: Primary | ICD-10-CM

## 2025-01-22 DIAGNOSIS — M16.12 OSTEOARTHRITIS OF LEFT HIP, UNSPECIFIED OSTEOARTHRITIS TYPE: ICD-10-CM

## 2025-01-22 DIAGNOSIS — M25.552 LEFT HIP PAIN: Primary | ICD-10-CM

## 2025-01-22 RX ORDER — TRANEXAMIC ACID 10 MG/ML
1000 INJECTION, SOLUTION INTRAVENOUS ONCE
OUTPATIENT
Start: 2025-01-22 | End: 2025-01-22

## 2025-01-22 RX ORDER — CELECOXIB 200 MG/1
200 CAPSULE ORAL DAILY
COMMUNITY

## 2025-01-22 NOTE — PROGRESS NOTES
"Chief Complaint  Initial Evaluation of the Left Hip     Subjective      Hector Coronel presents to [facility] for initial evaluation of the left hip. He has had pain in the hip for about 6 weeks. He has actually had pain for years in the left hip.  He was helping his wife scoot a couch and is having pain from the hip to the ankle.  The pain is on the lateral aspect of the hip.  He has some pain in the groin.  He had a right total knee arthroplasty on 10/1/24.  His left hip has had increase pain since then for the left hip. He notes the pain in his hip is affecting his daily tasks and ADL's.  He had discussed surgical intervention of the left hip prior but decided on a different body part first.      Allergies   Allergen Reactions    Metal Rash     CHEAP METAL- BELT HAZEL BREAK HIM OUT         Social History     Socioeconomic History    Marital status:    Tobacco Use    Smoking status: Former     Current packs/day: 0.00     Types: Cigarettes     Quit date:      Years since quittin.0    Smokeless tobacco: Never    Tobacco comments:     quit 30 years ago   Vaping Use    Vaping status: Never Used   Substance and Sexual Activity    Alcohol use: Yes     Alcohol/week: 3.0 standard drinks of alcohol     Types: 3 Shots of liquor per week    Drug use: Never    Sexual activity: Defer        I reviewed the patient's chief complaint, history of present illness, review of systems, past medical history, surgical history, family history, social history, medications, and allergy list.     Review of Systems     Constitutional: Denies fevers, chills, weight loss  Cardiovascular: Denies chest pain, shortness of breath  Skin: Denies rashes, acute skin changes  Neurologic: Denies headache, loss of consciousness        Vital Signs:   /82   Pulse 94   Ht 175.3 cm (69\")   Wt 112 kg (246 lb)   SpO2 97%   BMI 36.33 kg/m²          Physical Exam  General: Alert. No acute " distress    Ortho Exam        LEFT HIP Decrease ROM of the left hip.   No skin discoloration, atrophy or swelling. Positive EHL, FHL, GS, and TA. Sensation intact to all 5 nerves of the foot. Pain straight leg raise. Leg lengths appear equal. Ambulates with Antalgic gait Negative Andres. Negative Domenic. Extensor Mechanism  intact        Procedures        Imaging Results (Most Recent)       Procedure Component Value Units Date/Time    XR Hip With or Without Pelvis 2 - 3 View Left [[id number]] Resulted: 01/22/25 0855     Updated: 01/22/25 0900             Result Review :     X-Ray Report:  Left hip X-Ray  Indication: Evaluation of the left hip   AP/Lateral view(s)  Findings: Moderately advanced degenerative changes.    Prior studies available for comparison: no       XR Knee 3 View Right    Result Date: 12/23/2024  Narrative: X-Ray Report: Study: X-rays ordered, taken in the office, and reviewed today. Site: Right knee xray Indication: Right total knee arthroplasty View: AP/Lateral, Standing, and [place] view(s) Findings: Intact right total knee arthroplasty. No evidence of hardware malfunction.  Patella centrally tracking. Prior studies available for comparison: yes             Assessment and Plan     Diagnoses and all orders for this visit:    1. Left hip pain (Primary)  -     XR Hip With or Without Pelvis 2 - 3 View Left    2. Osteoarthritis of left hip, unspecified osteoarthritis type        Discussed the treatment plan with the patient. I reviewed the X-rays that were obtained today with the patient.     Discussed the treatment options with the patient, operative vs non-operative. The patient is a candidate for a left total hip arthroplasty whenever he is ready.      Discussed IM injection and intra articular hip injection.  He is not wanting surgical intervention at this time.      The patient expressed understanding and wished to proceed with a left total hip arthroplasty.       Discussed surgery., Risks/benefits  discussed with patient including, but not limited to: infection, bleeding, neurovascular damage, re-rupture, aesthetic deformity, need for further surgery, and death., Discussed with patient the implant type being used during surgery and patient understands., Surgery pamphlet given., Call or return if worsening symptoms., DME order for a 3 in 1 given today due to patient will be confined to one room/level of the home that does not offer a toilet during postop recovery. , and Patient does not have any metal allergies.     Follow Up     2 weeks postoperatively       Patient was given instructions and counseling regarding his condition or for health maintenance advice. Please see specific information pulled into the AVS if appropriate.     Scribed for Jose Talavera MD by Alma Oneal MA.  01/22/25   08:53 EST      I have personally performed the services described in this document as scribed by the above individual and it is both accurate and complete. Jose Talavera MD 01/22/25

## 2025-01-22 NOTE — TELEPHONE ENCOUNTER
Procedure:Left Total Hip replacement    Med Directive:Aspirin    PMH:CAD post stent (2022), HLD    Last Seen:09/26/2024

## 2025-01-22 NOTE — TELEPHONE ENCOUNTER
Moderate, stable cardiovascular risk for hip replacement.  Do not hold aspirin due to history of PCI.

## 2025-01-22 NOTE — TELEPHONE ENCOUNTER
PATIENT WAS CALLED AND ADVISED PER DR FALK THAT HE COULD NOT STOP ASPIRIN PRIOR TO SURGERY.  PATIENT VOICED UNDERSTANDING.  DR LANDA INFORMED.

## 2025-01-28 DIAGNOSIS — Z47.1 AFTERCARE FOLLOWING LEFT HIP JOINT REPLACEMENT SURGERY: Primary | ICD-10-CM

## 2025-01-28 DIAGNOSIS — Z96.642 AFTERCARE FOLLOWING LEFT HIP JOINT REPLACEMENT SURGERY: Primary | ICD-10-CM

## 2025-02-26 ENCOUNTER — PRE-ADMISSION TESTING (OUTPATIENT)
Dept: PREADMISSION TESTING | Facility: HOSPITAL | Age: 70
End: 2025-02-26
Payer: MEDICARE

## 2025-02-26 VITALS
WEIGHT: 240.3 LBS | DIASTOLIC BLOOD PRESSURE: 84 MMHG | SYSTOLIC BLOOD PRESSURE: 128 MMHG | OXYGEN SATURATION: 97 % | RESPIRATION RATE: 16 BRPM | HEART RATE: 74 BPM | BODY MASS INDEX: 35.59 KG/M2 | HEIGHT: 69 IN | TEMPERATURE: 98.6 F

## 2025-02-26 DIAGNOSIS — M16.12 OSTEOARTHRITIS OF LEFT HIP, UNSPECIFIED OSTEOARTHRITIS TYPE: ICD-10-CM

## 2025-02-26 DIAGNOSIS — Z01.818 ENCOUNTER FOR PREADMISSION TESTING: Primary | ICD-10-CM

## 2025-02-26 LAB
ALBUMIN SERPL-MCNC: 4.3 G/DL (ref 3.5–5.2)
ALBUMIN/GLOB SERPL: 1.3 G/DL
ALP SERPL-CCNC: 94 U/L (ref 39–117)
ALT SERPL W P-5'-P-CCNC: 21 U/L (ref 1–41)
ANION GAP SERPL CALCULATED.3IONS-SCNC: 11.7 MMOL/L (ref 5–15)
AST SERPL-CCNC: 19 U/L (ref 1–40)
BASOPHILS # BLD AUTO: 0.03 10*3/MM3 (ref 0–0.2)
BASOPHILS NFR BLD AUTO: 0.6 % (ref 0–1.5)
BILIRUB SERPL-MCNC: 0.6 MG/DL (ref 0–1.2)
BUN SERPL-MCNC: 19 MG/DL (ref 8–23)
BUN/CREAT SERPL: 15.4 (ref 7–25)
CALCIUM SPEC-SCNC: 9.6 MG/DL (ref 8.6–10.5)
CHLORIDE SERPL-SCNC: 98 MMOL/L (ref 98–107)
CO2 SERPL-SCNC: 25.3 MMOL/L (ref 22–29)
CREAT SERPL-MCNC: 1.23 MG/DL (ref 0.76–1.27)
DEPRECATED RDW RBC AUTO: 47.7 FL (ref 37–54)
EGFRCR SERPLBLD CKD-EPI 2021: 62.8 ML/MIN/1.73
EOSINOPHIL # BLD AUTO: 0.19 10*3/MM3 (ref 0–0.4)
EOSINOPHIL NFR BLD AUTO: 3.6 % (ref 0.3–6.2)
ERYTHROCYTE [DISTWIDTH] IN BLOOD BY AUTOMATED COUNT: 14.4 % (ref 12.3–15.4)
GLOBULIN UR ELPH-MCNC: 3.2 GM/DL
GLUCOSE SERPL-MCNC: 97 MG/DL (ref 65–99)
HBA1C MFR BLD: 6 % (ref 4.8–5.6)
HCT VFR BLD AUTO: 47.6 % (ref 37.5–51)
HGB BLD-MCNC: 15.4 G/DL (ref 13–17.7)
IMM GRANULOCYTES # BLD AUTO: 0.01 10*3/MM3 (ref 0–0.05)
IMM GRANULOCYTES NFR BLD AUTO: 0.2 % (ref 0–0.5)
LYMPHOCYTES # BLD AUTO: 0.93 10*3/MM3 (ref 0.7–3.1)
LYMPHOCYTES NFR BLD AUTO: 17.9 % (ref 19.6–45.3)
MCH RBC QN AUTO: 29.2 PG (ref 26.6–33)
MCHC RBC AUTO-ENTMCNC: 32.4 G/DL (ref 31.5–35.7)
MCV RBC AUTO: 90.3 FL (ref 79–97)
MONOCYTES # BLD AUTO: 0.46 10*3/MM3 (ref 0.1–0.9)
MONOCYTES NFR BLD AUTO: 8.8 % (ref 5–12)
NEUTROPHILS NFR BLD AUTO: 3.59 10*3/MM3 (ref 1.7–7)
NEUTROPHILS NFR BLD AUTO: 68.9 % (ref 42.7–76)
NRBC BLD AUTO-RTO: 0 /100 WBC (ref 0–0.2)
PLATELET # BLD AUTO: 151 10*3/MM3 (ref 140–450)
PMV BLD AUTO: 10.1 FL (ref 6–12)
POTASSIUM SERPL-SCNC: 4.2 MMOL/L (ref 3.5–5.2)
PROT SERPL-MCNC: 7.5 G/DL (ref 6–8.5)
RBC # BLD AUTO: 5.27 10*6/MM3 (ref 4.14–5.8)
SODIUM SERPL-SCNC: 135 MMOL/L (ref 136–145)
WBC NRBC COR # BLD AUTO: 5.21 10*3/MM3 (ref 3.4–10.8)

## 2025-02-26 PROCEDURE — 36415 COLL VENOUS BLD VENIPUNCTURE: CPT

## 2025-02-26 PROCEDURE — 83036 HEMOGLOBIN GLYCOSYLATED A1C: CPT

## 2025-02-26 PROCEDURE — 80053 COMPREHEN METABOLIC PANEL: CPT

## 2025-02-26 PROCEDURE — 85025 COMPLETE CBC W/AUTO DIFF WBC: CPT

## 2025-02-26 RX ORDER — ASPIRIN 81 MG/1
81 TABLET, CHEWABLE ORAL DAILY
COMMUNITY

## 2025-02-26 NOTE — DISCHARGE INSTRUCTIONS
IMPORTANT INSTRUCTIONS - PRE-ADMISSION TESTING  DO NOT EAT OR CHEW anything after midnight the night before your procedure.    You may have CLEAR liquids up to 3 hours prior to ARRIVAL time.   Take the following medications the morning of your procedure with JUST A SIP OF WATER:  BUPROPION, ATORVASTATIN, FLONASE, OMEPRAZOLE, ASPIRIN      DO NOT BRING your medications to the hospital with you, UNLESS something has changed since your PRE-Admission Testing appointment.  Hold all vitamins, supplements, and NSAIDS (Non- steroidal anti-inflammatory meds) for one week prior to surgery (you MAY take Tylenol or Acetaminophen).  Bring your CPAP or BIPAP to hospital, ONLY IF YOU WILL BE SPENDING THE NIGHT.   Make sure you have a ride home and have someone who will stay with you the day of your procedure after you go home.  If you have any questions, please call your Pre-Admission Testing Nurse, Nava at [phone].   You will receive a call the day before surgery with an arrival time.  Main Entrance [facility], Take elevator to first floor, turn left and check in at registration.    Clear Liquid Diet        Find out when you need to start a clear liquid diet.   Think of “clear liquids” as anything you could read a newspaper through. This includes things like water, broth, sports drinks, or tea WITHOUT any kind of milk or cream.           Once you are told to start a clear liquid diet, only drink these things until 3 hours before arrival to the hospital or when the hospital says to stop. Total volume limitation: 8 oz.       Clear liquids you CAN drink:   Water   Clear broth: beef, chicken, vegetable, or bone broth with nothing in it   Gatorade   Lemonade or Kenny-aid   Soda   Tea, coffee (NO cream or honey)   Jell-O (without fruit)   Popsicles (without fruit or cream)   Italian ices   Juice without pulp: apple, white, grape   You may use salt, pepper, and sugar  NOTHING RED    Do NOT drink:   Milk or cream    Soy milk, almond milk, coconut milk, or other non-dairy drinks and   creamers   Milkshakes or smoothies   Tomato juice   Orange juice   Grapefruit juice   Cream soups or any other than broth         Clear Liquid Diet:  Do NOT eat any solid food.  Do NOT eat or suck on mints or candy.  Do NOT chew gum.  Do NOT drink thick liquids like milk or juice with pulp in it.  Do NOT add milk, cream, or anything like soy milk or almond milk to coffee or tea.     PREOPERATIVE (BEFORE SURGERY)              BATHING INSTRUCTIONS  Instructions:    You will need to shower  3 separate times utilizing the soap provided; at the times indicated   below:     3-2-25 PM  3-3-25 AM  3-3-25 PM     Wash your hair and face with normal shampoo and soap, rinse it well before using the surgical soap.      In the shower, wet the skin completely with water from your neck to your feet. Apply the cleanser to your   body ONLY FROM THE NECK TO YOUR FEET.     Do NOT USE THE CLEANSER ON YOUR FACE, HEAD, OR GENITAL (PRIVATE) AREAS.   Keep it out of your eyes, ears, and mouth because of the risk of injury to those areas.      Scrub with a clean washcloth for each bath utilizing the soap provided from the top of your body to the   bottom starting at the neck area.      Pay close attention to your armpits, groin area, and the site of surgery.      Wash your body gently for 5 minutes. Stand outside the stream or turn off the water while scrubbing your   body. Do NOT wash with your regular soap after the surgical cleanser is used.      RINSE THE CLEANSER OFF COMPLETELY with plenty of water. Rinse the area again thoroughly.      Dry off with a clean towel. The surgical soap can cause dryness; however do NOT APPLY LOTION,   CREAM, POWDER, and/or DEODORANT AFTER SHOWERING.     Be sure to where clean clothes after showering.      Ensure CLEAN BED LINENS AFTER FIRST wash with the surgical soap.      NO PETS ALLOWED IN THE BED with you after utilizing the surgical  soap.

## 2025-03-04 ENCOUNTER — APPOINTMENT (OUTPATIENT)
Dept: GENERAL RADIOLOGY | Facility: HOSPITAL | Age: 70
End: 2025-03-04
Payer: MEDICARE

## 2025-03-04 ENCOUNTER — ANESTHESIA (OUTPATIENT)
Dept: PERIOP | Facility: HOSPITAL | Age: 70
End: 2025-03-04
Payer: MEDICARE

## 2025-03-04 ENCOUNTER — HOSPITAL ENCOUNTER (OUTPATIENT)
Facility: HOSPITAL | Age: 70
Discharge: HOME | End: 2025-03-04
Attending: ORTHOPAEDIC SURGERY | Admitting: ORTHOPAEDIC SURGERY
Payer: MEDICARE

## 2025-03-04 ENCOUNTER — ANESTHESIA EVENT (OUTPATIENT)
Dept: PERIOP | Facility: HOSPITAL | Age: 70
End: 2025-03-04
Payer: MEDICARE

## 2025-03-04 VITALS
BODY MASS INDEX: 35.72 KG/M2 | WEIGHT: 241.18 LBS | DIASTOLIC BLOOD PRESSURE: 78 MMHG | HEART RATE: 88 BPM | SYSTOLIC BLOOD PRESSURE: 128 MMHG | TEMPERATURE: 98.6 F | RESPIRATION RATE: 15 BRPM | HEIGHT: 69 IN | OXYGEN SATURATION: 97 %

## 2025-03-04 DIAGNOSIS — R26.2 DIFFICULTY IN WALKING: Primary | ICD-10-CM

## 2025-03-04 DIAGNOSIS — M16.12 PRIMARY OSTEOARTHRITIS OF LEFT HIP: ICD-10-CM

## 2025-03-04 DIAGNOSIS — M16.12 OSTEOARTHRITIS OF LEFT HIP, UNSPECIFIED OSTEOARTHRITIS TYPE: ICD-10-CM

## 2025-03-04 LAB
GLUCOSE BLDC GLUCOMTR-MCNC: 96 MG/DL (ref 70–99)
HCT VFR BLD AUTO: 47.2 % (ref 37.5–51)
HGB BLD-MCNC: 14.5 G/DL (ref 13–17.7)
HOLD SPECIMEN: NORMAL

## 2025-03-04 PROCEDURE — A9270 NON-COVERED ITEM OR SERVICE: HCPCS | Performed by: ORTHOPAEDIC SURGERY

## 2025-03-04 PROCEDURE — 63710000001 SCOPOLAMINE 1 MG/3DAYS PATCH 72 HOUR: Performed by: ANESTHESIOLOGY

## 2025-03-04 PROCEDURE — 73502 X-RAY EXAM HIP UNI 2-3 VIEWS: CPT

## 2025-03-04 PROCEDURE — 25010000002 PROPOFOL 10 MG/ML EMULSION

## 2025-03-04 PROCEDURE — 25010000002 CEFAZOLIN PER 500 MG: Performed by: ORTHOPAEDIC SURGERY

## 2025-03-04 PROCEDURE — 25010000002 DIPHENHYDRAMINE PER 50 MG

## 2025-03-04 PROCEDURE — 76000 FLUOROSCOPY <1 HR PHYS/QHP: CPT

## 2025-03-04 PROCEDURE — 63710000001 ACETAMINOPHEN EXTRA STRENGTH 500 MG TABLET: Performed by: ORTHOPAEDIC SURGERY

## 2025-03-04 PROCEDURE — 25010000002 SUGAMMADEX 200 MG/2ML SOLUTION

## 2025-03-04 PROCEDURE — C1776 JOINT DEVICE (IMPLANTABLE): HCPCS | Performed by: ORTHOPAEDIC SURGERY

## 2025-03-04 PROCEDURE — 25010000002 ROPIVACAINE PER 1 MG: Performed by: ORTHOPAEDIC SURGERY

## 2025-03-04 PROCEDURE — 25010000002 EPINEPHRINE 1 MG/ML SOLUTION: Performed by: ORTHOPAEDIC SURGERY

## 2025-03-04 PROCEDURE — 25010000002 ONDANSETRON PER 1 MG

## 2025-03-04 PROCEDURE — 25810000003 LACTATED RINGERS PER 1000 ML: Performed by: ORTHOPAEDIC SURGERY

## 2025-03-04 PROCEDURE — 97116 GAIT TRAINING THERAPY: CPT

## 2025-03-04 PROCEDURE — 25010000002 DEXAMETHASONE PER 1 MG

## 2025-03-04 PROCEDURE — A9270 NON-COVERED ITEM OR SERVICE: HCPCS | Performed by: ANESTHESIOLOGY

## 2025-03-04 PROCEDURE — 85014 HEMATOCRIT: CPT | Performed by: ORTHOPAEDIC SURGERY

## 2025-03-04 PROCEDURE — 97166 OT EVAL MOD COMPLEX 45 MIN: CPT

## 2025-03-04 PROCEDURE — 25010000002 MAGNESIUM SULFATE PER 500 MG OF MAGNESIUM

## 2025-03-04 PROCEDURE — S0260 H&P FOR SURGERY: HCPCS | Performed by: ORTHOPAEDIC SURGERY

## 2025-03-04 PROCEDURE — 25010000002 HYDROMORPHONE 1 MG/ML SOLUTION

## 2025-03-04 PROCEDURE — 25010000002 KETOROLAC TROMETHAMINE PER 15 MG: Performed by: ORTHOPAEDIC SURGERY

## 2025-03-04 PROCEDURE — 82948 REAGENT STRIP/BLOOD GLUCOSE: CPT | Performed by: ANESTHESIOLOGY

## 2025-03-04 PROCEDURE — 63710000001 ACETAMINOPHEN EXTRA STRENGTH 500 MG TABLET: Performed by: ANESTHESIOLOGY

## 2025-03-04 PROCEDURE — 25810000003 LACTATED RINGERS PER 1000 ML: Performed by: ANESTHESIOLOGY

## 2025-03-04 PROCEDURE — 63710000001 OXYCODONE-ACETAMINOPHEN 5-325 MG TABLET: Performed by: ORTHOPAEDIC SURGERY

## 2025-03-04 PROCEDURE — 63710000001 FAMOTIDINE 20 MG TABLET: Performed by: ORTHOPAEDIC SURGERY

## 2025-03-04 PROCEDURE — 25010000002 HYDROMORPHONE PER 4 MG: Performed by: ORTHOPAEDIC SURGERY

## 2025-03-04 PROCEDURE — 27130 TOTAL HIP ARTHROPLASTY: CPT | Performed by: ORTHOPAEDIC SURGERY

## 2025-03-04 PROCEDURE — 85018 HEMOGLOBIN: CPT | Performed by: ORTHOPAEDIC SURGERY

## 2025-03-04 PROCEDURE — 25010000002 KETOROLAC TROMETHAMINE PER 15 MG

## 2025-03-04 PROCEDURE — 25010000002 FENTANYL CITRATE (PF) 50 MCG/ML SOLUTION

## 2025-03-04 PROCEDURE — 97161 PT EVAL LOW COMPLEX 20 MIN: CPT

## 2025-03-04 PROCEDURE — 63710000001 FERROUS SULFATE 325 (65 FE) MG TABLET: Performed by: ORTHOPAEDIC SURGERY

## 2025-03-04 PROCEDURE — 94799 UNLISTED PULMONARY SVC/PX: CPT

## 2025-03-04 PROCEDURE — 25010000002 LIDOCAINE PF 2% 2 % SOLUTION

## 2025-03-04 PROCEDURE — 97535 SELF CARE MNGMENT TRAINING: CPT

## 2025-03-04 PROCEDURE — 97110 THERAPEUTIC EXERCISES: CPT

## 2025-03-04 DEVICE — CP HIP UPCHRG OSSEOTI LTD HL CUPS: Type: IMPLANTABLE DEVICE | Site: HIP | Status: FUNCTIONAL

## 2025-03-04 DEVICE — TOTAL HIP PRIMARY: Type: IMPLANTABLE DEVICE | Site: HIP | Status: FUNCTIONAL

## 2025-03-04 DEVICE — LINER ACET G7 NTRL E1 SZE 36MM: Type: IMPLANTABLE DEVICE | Site: HIP | Status: FUNCTIONAL

## 2025-03-04 DEVICE — BIOLOX® DELTA, CERAMIC FEMORAL HEAD, S, Ø 36/-3.5, TAPER 12/14
Type: IMPLANTABLE DEVICE | Site: HIP | Status: FUNCTIONAL
Brand: BIOLOX® DELTA

## 2025-03-04 DEVICE — SUT NONABS MAXBRAID NMBR5 K60 38IN WHT/BLU: Type: IMPLANTABLE DEVICE | Site: HIP | Status: FUNCTIONAL

## 2025-03-04 DEVICE — SHLL ACET OSSEOTI G7 3H SZE 52MM: Type: IMPLANTABLE DEVICE | Site: HIP | Status: FUNCTIONAL

## 2025-03-04 DEVICE — AVENIR MÜLLER STEM 2 STANDARD
Type: IMPLANTABLE DEVICE | Site: HIP | Status: FUNCTIONAL
Brand: AVENIR® MÜLLER

## 2025-03-04 RX ORDER — KETOROLAC TROMETHAMINE 30 MG/ML
INJECTION, SOLUTION INTRAMUSCULAR; INTRAVENOUS AS NEEDED
Status: DISCONTINUED | OUTPATIENT
Start: 2025-03-04 | End: 2025-03-04 | Stop reason: SURG

## 2025-03-04 RX ORDER — PROMETHAZINE HYDROCHLORIDE 12.5 MG/1
12.5 SUPPOSITORY RECTAL EVERY 6 HOURS PRN
Status: DISCONTINUED | OUTPATIENT
Start: 2025-03-04 | End: 2025-03-04 | Stop reason: HOSPADM

## 2025-03-04 RX ORDER — ACETAMINOPHEN 500 MG
1000 TABLET ORAL EVERY 8 HOURS
Status: DISCONTINUED | OUTPATIENT
Start: 2025-03-04 | End: 2025-03-04 | Stop reason: HOSPADM

## 2025-03-04 RX ORDER — KETOROLAC TROMETHAMINE 15 MG/ML
15 INJECTION, SOLUTION INTRAMUSCULAR; INTRAVENOUS EVERY 6 HOURS
Status: DISCONTINUED | OUTPATIENT
Start: 2025-03-04 | End: 2025-03-04 | Stop reason: HOSPADM

## 2025-03-04 RX ORDER — SODIUM CHLORIDE, SODIUM LACTATE, POTASSIUM CHLORIDE, CALCIUM CHLORIDE 600; 310; 30; 20 MG/100ML; MG/100ML; MG/100ML; MG/100ML
9 INJECTION, SOLUTION INTRAVENOUS CONTINUOUS PRN
Status: DISCONTINUED | OUTPATIENT
Start: 2025-03-04 | End: 2025-03-04 | Stop reason: HOSPADM

## 2025-03-04 RX ORDER — DEXAMETHASONE SODIUM PHOSPHATE 4 MG/ML
INJECTION, SOLUTION INTRA-ARTICULAR; INTRALESIONAL; INTRAMUSCULAR; INTRAVENOUS; SOFT TISSUE AS NEEDED
Status: DISCONTINUED | OUTPATIENT
Start: 2025-03-04 | End: 2025-03-04 | Stop reason: SURG

## 2025-03-04 RX ORDER — LIDOCAINE HYDROCHLORIDE 20 MG/ML
INJECTION, SOLUTION EPIDURAL; INFILTRATION; INTRACAUDAL; PERINEURAL AS NEEDED
Status: DISCONTINUED | OUTPATIENT
Start: 2025-03-04 | End: 2025-03-04 | Stop reason: SURG

## 2025-03-04 RX ORDER — MAGNESIUM SULFATE HEPTAHYDRATE 500 MG/ML
INJECTION, SOLUTION INTRAMUSCULAR; INTRAVENOUS AS NEEDED
Status: DISCONTINUED | OUTPATIENT
Start: 2025-03-04 | End: 2025-03-04 | Stop reason: SURG

## 2025-03-04 RX ORDER — SODIUM CHLORIDE 0.9 % (FLUSH) 0.9 %
10 SYRINGE (ML) INJECTION AS NEEDED
Status: DISCONTINUED | OUTPATIENT
Start: 2025-03-04 | End: 2025-03-04 | Stop reason: HOSPADM

## 2025-03-04 RX ORDER — ACETAMINOPHEN 325 MG/1
325 TABLET ORAL EVERY 4 HOURS PRN
Status: DISCONTINUED | OUTPATIENT
Start: 2025-03-04 | End: 2025-03-04 | Stop reason: HOSPADM

## 2025-03-04 RX ORDER — PROMETHAZINE HYDROCHLORIDE 25 MG/1
25 TABLET ORAL ONCE AS NEEDED
Status: DISCONTINUED | OUTPATIENT
Start: 2025-03-04 | End: 2025-03-04 | Stop reason: HOSPADM

## 2025-03-04 RX ORDER — SCOPOLAMINE 1 MG/3D
1 PATCH, EXTENDED RELEASE TRANSDERMAL ONCE
Status: DISCONTINUED | OUTPATIENT
Start: 2025-03-04 | End: 2025-03-04 | Stop reason: HOSPADM

## 2025-03-04 RX ORDER — SODIUM CHLORIDE 0.9 % (FLUSH) 0.9 %
3 SYRINGE (ML) INJECTION EVERY 12 HOURS SCHEDULED
Status: DISCONTINUED | OUTPATIENT
Start: 2025-03-04 | End: 2025-03-04 | Stop reason: HOSPADM

## 2025-03-04 RX ORDER — FAMOTIDINE 20 MG/1
40 TABLET, FILM COATED ORAL DAILY
Status: DISCONTINUED | OUTPATIENT
Start: 2025-03-04 | End: 2025-03-04 | Stop reason: HOSPADM

## 2025-03-04 RX ORDER — PROMETHAZINE HYDROCHLORIDE 25 MG/1
12.5 TABLET ORAL EVERY 6 HOURS PRN
Status: DISCONTINUED | OUTPATIENT
Start: 2025-03-04 | End: 2025-03-04 | Stop reason: HOSPADM

## 2025-03-04 RX ORDER — OXYCODONE HYDROCHLORIDE 5 MG/1
5 TABLET ORAL
Status: DISCONTINUED | OUTPATIENT
Start: 2025-03-04 | End: 2025-03-04 | Stop reason: HOSPADM

## 2025-03-04 RX ORDER — PROPOFOL 10 MG/ML
VIAL (ML) INTRAVENOUS AS NEEDED
Status: DISCONTINUED | OUTPATIENT
Start: 2025-03-04 | End: 2025-03-04 | Stop reason: SURG

## 2025-03-04 RX ORDER — ONDANSETRON 2 MG/ML
INJECTION INTRAMUSCULAR; INTRAVENOUS AS NEEDED
Status: DISCONTINUED | OUTPATIENT
Start: 2025-03-04 | End: 2025-03-04 | Stop reason: SURG

## 2025-03-04 RX ORDER — SODIUM CHLORIDE 9 MG/ML
40 INJECTION, SOLUTION INTRAVENOUS AS NEEDED
Status: DISCONTINUED | OUTPATIENT
Start: 2025-03-04 | End: 2025-03-04 | Stop reason: HOSPADM

## 2025-03-04 RX ORDER — ONDANSETRON 4 MG/1
4 TABLET, ORALLY DISINTEGRATING ORAL EVERY 6 HOURS PRN
Status: DISCONTINUED | OUTPATIENT
Start: 2025-03-04 | End: 2025-03-04 | Stop reason: HOSPADM

## 2025-03-04 RX ORDER — FERROUS SULFATE 325(65) MG
325 TABLET ORAL
Status: DISCONTINUED | OUTPATIENT
Start: 2025-03-04 | End: 2025-03-04 | Stop reason: HOSPADM

## 2025-03-04 RX ORDER — PROMETHAZINE HYDROCHLORIDE 25 MG/1
25 SUPPOSITORY RECTAL ONCE AS NEEDED
Status: DISCONTINUED | OUTPATIENT
Start: 2025-03-04 | End: 2025-03-04 | Stop reason: HOSPADM

## 2025-03-04 RX ORDER — HYDROCODONE BITARTRATE AND ACETAMINOPHEN 7.5; 325 MG/1; MG/1
1 TABLET ORAL EVERY 6 HOURS PRN
Qty: 45 TABLET | Refills: 0 | Status: SHIPPED | OUTPATIENT
Start: 2025-03-04

## 2025-03-04 RX ORDER — ONDANSETRON 2 MG/ML
4 INJECTION INTRAMUSCULAR; INTRAVENOUS ONCE AS NEEDED
Status: DISCONTINUED | OUTPATIENT
Start: 2025-03-04 | End: 2025-03-04 | Stop reason: HOSPADM

## 2025-03-04 RX ORDER — ROCURONIUM BROMIDE 10 MG/ML
INJECTION, SOLUTION INTRAVENOUS AS NEEDED
Status: DISCONTINUED | OUTPATIENT
Start: 2025-03-04 | End: 2025-03-04 | Stop reason: SURG

## 2025-03-04 RX ORDER — ASPIRIN 325 MG
325 TABLET ORAL DAILY
Qty: 21 TABLET | Refills: 1 | Status: SHIPPED | OUTPATIENT
Start: 2025-03-04

## 2025-03-04 RX ORDER — TRANEXAMIC ACID 10 MG/ML
1000 INJECTION, SOLUTION INTRAVENOUS ONCE
Status: COMPLETED | OUTPATIENT
Start: 2025-03-04 | End: 2025-03-04

## 2025-03-04 RX ORDER — FENTANYL CITRATE 50 UG/ML
INJECTION, SOLUTION INTRAMUSCULAR; INTRAVENOUS AS NEEDED
Status: DISCONTINUED | OUTPATIENT
Start: 2025-03-04 | End: 2025-03-04 | Stop reason: SURG

## 2025-03-04 RX ORDER — OXYCODONE AND ACETAMINOPHEN 5; 325 MG/1; MG/1
2 TABLET ORAL EVERY 4 HOURS PRN
Status: DISCONTINUED | OUTPATIENT
Start: 2025-03-04 | End: 2025-03-04 | Stop reason: HOSPADM

## 2025-03-04 RX ORDER — OXYCODONE AND ACETAMINOPHEN 5; 325 MG/1; MG/1
1 TABLET ORAL EVERY 4 HOURS PRN
Status: DISCONTINUED | OUTPATIENT
Start: 2025-03-04 | End: 2025-03-04 | Stop reason: HOSPADM

## 2025-03-04 RX ORDER — AMOXICILLIN 250 MG
2 CAPSULE ORAL 2 TIMES DAILY PRN
Status: DISCONTINUED | OUTPATIENT
Start: 2025-03-04 | End: 2025-03-04 | Stop reason: HOSPADM

## 2025-03-04 RX ORDER — MAGNESIUM HYDROXIDE 1200 MG/15ML
LIQUID ORAL AS NEEDED
Status: DISCONTINUED | OUTPATIENT
Start: 2025-03-04 | End: 2025-03-04 | Stop reason: HOSPADM

## 2025-03-04 RX ORDER — ACETAMINOPHEN 500 MG
1000 TABLET ORAL ONCE
Status: COMPLETED | OUTPATIENT
Start: 2025-03-04 | End: 2025-03-04

## 2025-03-04 RX ORDER — ONDANSETRON 2 MG/ML
4 INJECTION INTRAMUSCULAR; INTRAVENOUS EVERY 6 HOURS PRN
Status: DISCONTINUED | OUTPATIENT
Start: 2025-03-04 | End: 2025-03-04 | Stop reason: HOSPADM

## 2025-03-04 RX ORDER — DIPHENHYDRAMINE HYDROCHLORIDE 50 MG/ML
INJECTION INTRAMUSCULAR; INTRAVENOUS AS NEEDED
Status: DISCONTINUED | OUTPATIENT
Start: 2025-03-04 | End: 2025-03-04 | Stop reason: SURG

## 2025-03-04 RX ORDER — SODIUM CHLORIDE, SODIUM LACTATE, POTASSIUM CHLORIDE, CALCIUM CHLORIDE 600; 310; 30; 20 MG/100ML; MG/100ML; MG/100ML; MG/100ML
100 INJECTION, SOLUTION INTRAVENOUS CONTINUOUS
Status: DISCONTINUED | OUTPATIENT
Start: 2025-03-04 | End: 2025-03-04 | Stop reason: HOSPADM

## 2025-03-04 RX ADMIN — SODIUM CHLORIDE 2 G: 9 INJECTION, SOLUTION INTRAVENOUS at 09:31

## 2025-03-04 RX ADMIN — SODIUM CHLORIDE 2 G: 9 INJECTION, SOLUTION INTRAVENOUS at 17:54

## 2025-03-04 RX ADMIN — PROPOFOL 200 MG: 10 INJECTION, EMULSION INTRAVENOUS at 09:16

## 2025-03-04 RX ADMIN — FAMOTIDINE 40 MG: 20 TABLET, FILM COATED ORAL at 12:25

## 2025-03-04 RX ADMIN — SODIUM CHLORIDE, SODIUM LACTATE, POTASSIUM CHLORIDE, CALCIUM CHLORIDE 100 ML/HR: 20; 30; 600; 310 INJECTION, SOLUTION INTRAVENOUS at 12:25

## 2025-03-04 RX ADMIN — KETOROLAC TROMETHAMINE 15 MG: 30 INJECTION, SOLUTION INTRAMUSCULAR; INTRAVENOUS at 09:26

## 2025-03-04 RX ADMIN — SCOLOPAMINE TRANSDERMAL SYSTEM 1 PATCH: 1 PATCH, EXTENDED RELEASE TRANSDERMAL at 07:50

## 2025-03-04 RX ADMIN — OXYCODONE HYDROCHLORIDE AND ACETAMINOPHEN 1 TABLET: 5; 325 TABLET ORAL at 12:25

## 2025-03-04 RX ADMIN — SUGAMMADEX 200 MG: 100 INJECTION, SOLUTION INTRAVENOUS at 10:48

## 2025-03-04 RX ADMIN — Medication 3 ML: at 12:25

## 2025-03-04 RX ADMIN — LIDOCAINE HYDROCHLORIDE 100 MG: 20 INJECTION, SOLUTION INTRAVENOUS at 09:16

## 2025-03-04 RX ADMIN — MAGNESIUM SULFATE HEPTAHYDRATE 1 G: 500 INJECTION, SOLUTION INTRAMUSCULAR; INTRAVENOUS at 09:59

## 2025-03-04 RX ADMIN — FENTANYL CITRATE 50 MCG: 50 INJECTION, SOLUTION INTRAMUSCULAR; INTRAVENOUS at 09:16

## 2025-03-04 RX ADMIN — ROCURONIUM BROMIDE 50 MG: 10 INJECTION, SOLUTION INTRAVENOUS at 09:53

## 2025-03-04 RX ADMIN — TRANEXAMIC ACID 1000 MG: 10 INJECTION, SOLUTION INTRAVENOUS at 10:27

## 2025-03-04 RX ADMIN — HYDROMORPHONE HYDROCHLORIDE 0.5 MG: 1 INJECTION, SOLUTION INTRAMUSCULAR; INTRAVENOUS; SUBCUTANEOUS at 11:20

## 2025-03-04 RX ADMIN — FENTANYL CITRATE 50 MCG: 50 INJECTION, SOLUTION INTRAMUSCULAR; INTRAVENOUS at 09:39

## 2025-03-04 RX ADMIN — ONDANSETRON 4 MG: 2 INJECTION INTRAMUSCULAR; INTRAVENOUS at 10:44

## 2025-03-04 RX ADMIN — DEXAMETHASONE SODIUM PHOSPHATE 4 MG: 4 INJECTION, SOLUTION INTRAMUSCULAR; INTRAVENOUS at 09:26

## 2025-03-04 RX ADMIN — ACETAMINOPHEN 1000 MG: 500 TABLET ORAL at 15:04

## 2025-03-04 RX ADMIN — SODIUM CHLORIDE, SODIUM LACTATE, POTASSIUM CHLORIDE, CALCIUM CHLORIDE 9 ML/HR: 20; 30; 600; 310 INJECTION, SOLUTION INTRAVENOUS at 07:45

## 2025-03-04 RX ADMIN — ACETAMINOPHEN 1000 MG: 500 TABLET ORAL at 07:50

## 2025-03-04 RX ADMIN — TRANEXAMIC ACID 1000 MG: 10 INJECTION, SOLUTION INTRAVENOUS at 09:11

## 2025-03-04 RX ADMIN — DIPHENHYDRAMINE HYDROCHLORIDE 12.5 MG: 50 INJECTION, SOLUTION INTRAMUSCULAR; INTRAVENOUS at 09:26

## 2025-03-04 RX ADMIN — FERROUS SULFATE TAB 325 MG (65 MG ELEMENTAL FE) 325 MG: 325 (65 FE) TAB at 12:25

## 2025-03-04 RX ADMIN — SODIUM CHLORIDE, SODIUM LACTATE, POTASSIUM CHLORIDE, CALCIUM CHLORIDE 9 ML/HR: 20; 30; 600; 310 INJECTION, SOLUTION INTRAVENOUS at 11:26

## 2025-03-04 RX ADMIN — ROCURONIUM BROMIDE 50 MG: 10 INJECTION, SOLUTION INTRAVENOUS at 09:16

## 2025-03-04 RX ADMIN — KETOROLAC TROMETHAMINE 15 MG: 15 INJECTION, SOLUTION INTRAMUSCULAR; INTRAVENOUS at 15:05

## 2025-03-04 NOTE — ANESTHESIA PREPROCEDURE EVALUATION
Anesthesia Evaluation     Patient summary reviewed and Nursing notes reviewed   history of anesthetic complications:  PONV  NPO Solid Status: > 8 hours  NPO Liquid Status: > 2 hours           Airway   Mallampati: III  TM distance: >3 FB  Neck ROM: full  Large neck circumference and Possible difficult intubation  Comment: Large tongue  Dental    (+) poor dentition        Pulmonary - normal exam    breath sounds clear to auscultation  (+) pneumonia ,shortness of breath, sleep apnea  Cardiovascular - normal exam  Exercise tolerance: poor (<4 METS)    Rhythm: regular  Rate: normal    (+) valvular problems/murmurs murmur and AS, CAD, cardiac stents more than 12 months ago , hyperlipidemia    ROS comment: 5/22 TTE:  Interpretation Summary    Normal left ventricular systolic function with an estimated ejection fraction of 60-65%.  No regional wall motion abnormalities were observed.  Left ventricular diastolic function was consistent with impaired relaxation (grade I diastolic dysfunction).  Mild concentric left ventricular hypertrophy.  No hemodynamically significant valvular pathology.  Right ventricular systolic pressure could not be accurately estimated due to an insufficient TR velocity profile.  Cardiac History    Diagnosis Date Comment Source  Coronary artery disease 05/31/2022 BLOCKAGE- 1 STENT PLACED 5-- SEE'S FALK, DENIES CP BUT GETS SOAE. DECREASED ACTIVITY D/T RIGHT KNEE PAIN   Diabetes mellitus  BORDERLINE- DOESN'T CHECK BG AT HOME   Hyperlipidemia     Murmur, cardiac  ASYMPTOMATIC- SOAOE     Social History    Tobacco Use    Former; Cigarettes: Quit 1990  Smokeless Tobacco: Never used smokeless tobacco.  Comments: quit 30 years ago    Vaping Use    Never used    Family Cardiac History as of 6/24/2022  Pedigree Problem Relation Age of Onset  Diabetes Father   Heart disease Paternal Uncle     Additional Study Details    Additional Study Details    A complete transthoracic echocardiogram with complete  Doppler and color flow was performed. The study is technically adequate for diagnosis.    Echocardiogram Findings    Left Ventricle Calculated left ventricular EF = 65% Left ventricular ejection fraction appears to be 61 - 65%. Left ventricular systolic function is normal.   Normal left ventricular cavity size noted. Left ventricular wall thickness is consistent with mild concentric hypertrophy. All left ventricular wall segments contract normally. Left ventricular diastolic function is consistent with (grade I) impaired relaxation. No evidence of left ventricular thrombus or mass present.  Right Ventricle Normal right ventricular cavity size and systolic function noted.  Left Atrium Normal left atrial cavity size noted. No left atrial thrombus or mass visualized.  Right Atrium Normal right atrial cavity size noted.  Aortic Valve No aortic valve regurgitation or stenosis is present. The aortic valve structure was not well visualized.  Mitral Valve Trace mitral valve regurgitation is present. No significant mitral valve stenosis is present. No evidence of mitral valve prolapse.  Tricuspid Valve Trace tricuspid valve regurgitation is present. Insufficient TR velocity profile to estimate the right ventricular systolic pressure.  Pulmonic Valve The pulmonic valve is not well visualized.  Greater Vessels No dilation of the aortic root is present. Aortic root = 2.6 cm  Inferior vena cava not well visualized.  Pericardium There is no evidence of pericardial effusion. .        Neuro/Psych  (+) TIA, numbness, psychiatric history Depression  GI/Hepatic/Renal/Endo    (+) GERD well controlled, diabetes mellitus type 2    Musculoskeletal     (+) neck pain  Abdominal   (+) obese   Substance History - negative use     OB/GYN negative ob/gyn ROS         Other   arthritis,     ROS/Med Hx Other: METS<4. SOAE.HX CAD W/ STENT, NOBLE, DM, TIA, DIFFICULTY SWALLOWING. . ECHO 6/22 EF 60-65%,STRESS 5/25/22 ANB. CATH 5/31/22 PCI,EF 60-65%,  SUGGESTIVE OF MILD AS. CARDS CLEARANCE MODERATE RISK 9/27/24. REMAINING ON ASPIRIN PERIOPERATIVELY. KT                     Anesthesia Plan    ASA 3     ERAS Protocol and general     Reason for not using neuraxial anesthesia or peripheral nerve block: Patient Preference  (Patient understands anesthesia not responsible for dental damage.)  intravenous induction     Anesthetic plan, risks, benefits, and alternatives have been provided, discussed and informed consent has been obtained with: patient, spouse/significant other and child.  Pre-procedure education provided  Use of blood products discussed with patient .    Plan discussed with CRNA.        CODE STATUS:

## 2025-03-04 NOTE — THERAPY TREATMENT NOTE
Acute Care - Physical Therapy Treatment Note  SERVANDO Cisse     Patient Name: Hector Coronel  : 1955  MRN: [id number]  Today's Date: 3/4/2025      Visit Dx:     ICD-10-CM ICD-9-CM   1. Difficulty in walking  R26.2 719.7   2. Osteoarthritis of left hip, unspecified osteoarthritis type  M16.12 715.95     Patient Active Problem List   Diagnosis    NOBLE treated with BiPAP    Class 2 obesity    Primary osteoarthritis of both knees    Major depressive disorder, recurrent, moderate    Peripheral neuropathy, idiopathic    Elevated liver enzymes    Benign prostatic hyperplasia    Cerumen debris on tympanic membrane of both ears    Screening PSA (prostate specific antigen)    Primary osteoarthritis of right hip    Primary insomnia    Anxiety    Peripheral polyneuropathy    Cough    Shortness of breath    S/P coronary artery stent placement    Coronary artery disease involving native coronary artery of native heart without angina pectoris    Hyperlipidemia LDL goal <70    Primary osteoarthritis of left knee    Osteoarthrosis, localized, primary, knee, right    Right knee pain    Osteoarthritis of left hip    Primary osteoarthritis of left hip     Past Medical History:   Diagnosis Date    Anxiety     Arthritis     BACK    Bunion     Coronary artery disease 2022    BLOCKAGE- 1 STENT PLACED 2022- SEE'S DILEEP, DENIES CP BUT GETS SOAE.    Depression     Diabetes mellitus     BORDERLINE- DOESN'T CHECK BG AT HOME    Enlarged prostate     GERD (gastroesophageal reflux disease)     h/o Edema     PRIOR TO STENT PLACEMENT    H/O Pneumonia     Hammer toe     History of transfusion     AS A CHILD STATES HE DOESNT THINK ANY ISSUE WITH IT    Hoarseness     Hyperlipidemia     Murmur, cardiac     ASYMPTOMATIC- SOAOE    Neck pain     VERTABRA PUSHING ON THROAT    Osteoarthritis     OG HIPS, BILATERL KNEES, L SHOULDER    PONV (postoperative nausea and vomiting)     Seasonal allergies     Sleep apnea     USES BIPAP    SOBOE  (shortness of breath on exertion)     TIA (transient ischemic attack)     MULTI. YEARS AGO- NO RESIDUAL    Umbilical hernia      Past Surgical History:   Procedure Laterality Date    APPENDECTOMY      CARDIAC CATHETERIZATION Right 05/31/2022    Procedure: Left Heart Cath;  Surgeon: Eduardo Buck MD;  Location: [facility] CATH INVASIVE LOCATION;  Service: Cardiovascular;  Laterality: Right;    CARPAL TUNNEL RELEASE Bilateral     CHOLECYSTECTOMY      COLONOSCOPY      HERNIA REPAIR      UMBILICAL    INNER EAR SURGERY      BOTH SIDES    THROAT SURGERY      BIOPSY, RMOVED POLYPS    TOTAL HIP ARTHROPLASTY Right 11/22/2022    Procedure: RIGHT TOTAL HIP ARTHROPLASTY ANTERIOR;  Surgeon: Jose Talavera MD;  Location: [facility] MAIN OR;  Service: Orthopedics;  Laterality: Right;    TOTAL KNEE ARTHROPLASTY Left 05/05/2023    Procedure: TOTAL KNEE ARTHROPLASTY WITH RYLAND ROBOT;  Surgeon: Jose Talavera MD;  Location: [facility] MAIN OR;  Service: Robotics - Ortho;  Laterality: Left;    TOTAL KNEE ARTHROPLASTY Right 10/01/2024    Procedure: RIGHT TOTAL KNEE ARTHROPLASTY;  Surgeon: Jose Talavera MD;  Location: [facility] MAIN OR;  Service: Robotics - Ortho;  Laterality: Right;    VASECTOMY      1979     PT Assessment (Last 12 Hours)       PT Evaluation and Treatment       Row Name 03/04/25 1459 03/04/25 1300       Physical Therapy Time and Intention    Subjective Information complains of;weakness;pain  -RH no complaints  -ROCHELLE (r) TM (t) ROCHELLE (c)    Document Type therapy note (daily note)  -RH evaluation  -ROCHELLE (r) TM (t) ROCHELLE (c)    Mode of Treatment physical therapy;individual therapy  -RH individual therapy  -ROCHELLE (r) TM (t) ROCHELLE (c)    Patient Effort adequate  -RH good  -ROCHELLE (r) TM (t) ROCHELLE (c)      Row Name 03/04/25 1300          General Information    Prior Level of Function independent:  -ROCHELLE (r) TM (t) ROCHELLE (c)     Existing Precautions/Restrictions fall  -ROCHELLE (r) TM (t) ROCHELLE (c)       Row Name 03/04/25 1300          Living Environment    Current  Living Arrangements home  -ROCHELLE (r) TM (t) ROCHELLE (c)     People in Home spouse  -ROCHELLE (r) TM (t) ROCHELLE (c)     Primary Care Provided by self  -ROCHELLE (r) TM (t) ROCHELLE (c)       Row Name 03/04/25 1459          Pain    Pain Location hip  -RH     Pain Side/Orientation left  -RH     Additional Documentation Pain Scale: FACES Pre/Post-Treatment (Group)  -RH       Row Name 03/04/25 1459          Pain Scale: FACES Pre/Post-Treatment    Pain: FACES Scale, Pretreatment 0-->no hurt  -RH     Posttreatment Pain Rating 2-->hurts little bit  -RH       Row Name 03/04/25 1459 03/04/25 1300       Range of Motion (ROM)    Range of Motion --  Pt L hip AAROM at 80 degrees flex and 20 degrees abd.  -RH ROM is WNL;bilateral lower extremities  -ROCHELLE (r) TM (t) ROCHELLE (c)      Row Name 03/04/25 1459 03/04/25 1300       Strength (Manual Muscle Testing)    Strength (Manual Muscle Testing) --  Pt L hip flex strength at 2/5.  -RH strength is WNL;bilateral lower extremities  -ROCHELLE (r) TM (t) ROCHELLE (c)      Row Name 03/04/25 1459 03/04/25 1300       Mobility    Extremity Weight-bearing Status left lower extremity  -RH left lower extremity  -ROCHELLE (r) TM (t) ROCHELLE (c)    Left Lower Extremity (Weight-bearing Status) weight-bearing as tolerated (WBAT)  -RH weight-bearing as tolerated (WBAT)  -ROCHELLE (r) TM (t) ROCHELLE (c)      Row Name 03/04/25 1300          Bed Mobility    Bed Mobility bed mobility (all) activities  -ROCHELLE (r) TM (t) ROCHELLE (c)     All Activities, [place] (Bed Mobility) independent  -ROCHELLE (r) TM (t) ROCHELLE (c)       Row Name 03/04/25 1459 03/04/25 1300       Transfers    Transfers sit-stand transfer;stand-sit transfer  -RH bed-chair transfer  -ROCHELLE (r) TM (t) ROCHELLE (c)    Maintains Weight-bearing Status (Transfers) able to maintain  -RH --      Row Name 03/04/25 1300          Bed-Chair Transfer    Bed-Chair [place] (Transfers) contact guard  -ROCHELLE (r) TM (t) ROCHELLE (c)     Assistive Device (Bed-Chair Transfers) walker, front-wheeled  -ROCHELLE (r) MABLE (t) ROCHELLE (c)       Row Name 03/04/25 1455           Sit-Stand Transfer    Sit-Stand [place] (Transfers) contact guard  -RH     Assistive Device (Sit-Stand Transfers) walker, front-wheeled  -RH       Row Name 03/04/25 1459          Stand-Sit Transfer    Stand-Sit [place] (Transfers) contact guard  -RH     Assistive Device (Stand-Sit Transfers) walker, front-wheeled  -RH       Row Name 03/04/25 1459 03/04/25 1300       Gait/Stairs (Locomotion)    Gait/Stairs Locomotion gait/ambulation independence;gait/ambulation assistive device;distance ambulated;gait pattern;gait deviations  -RH gait/ambulation independence;gait/ambulation assistive device  -ROCHELLE (r) TM (t) ROCHELLE (c)    [place] Level (Gait) contact guard  -RH --    Assistive Device (Gait) walker, front-wheeled  -RH walker, front-wheeled  -ROCHELLE (r) TM (t) ROCHELLE (c)    Patient was able to Ambulate -- yes  -ROCHELLE (r) TM (t) ROCHELLE (c)    Distance in Feet (Gait) 80  -RH 10  -ROCHELLE (r) TM (t) ROCHELLE (c)    Pattern (Gait) 3-point;step-to  -RH --    Deviations/Abnormal Patterns (Gait) antalgic;gait speed decreased;stride length decreased  -RH --    Bilateral Gait Deviations heel strike decreased;forward flexed posture  -RH --    Maintains Weight-bearing Status (Gait) able to maintain  -RH --    Gait Assessment/Intervention Pt amb with slow pace and decreased stride length and heel strike.  -RH --      Row Name 03/04/25 1459 03/04/25 1300       Balance    Balance Assessment -- standing dynamic balance  -ROCHELLE (r) TM (t) ROCHELLE (c)    Dynamic Standing Balance contact guard  -RH contact guard  -ROCHELLE (r) TM (t) ROCHELLE (c)    Position/Device Used, Standing Balance walker, front-wheeled  -RH walker, front-wheeled  -ROCHELLE (r) TM (t) ROCHELLE (c)      Row Name             [REMOVED] Wound medial umbilical area    Wound - Properties Group Orientation: medial  -MA Location: umbilical area  -MA Primary Wound Type: Other  -MA, WOUND FROM  DISSOLVABLE STITCH  Wound Outcome: Healed  -WW Removal Date: 03/04/25  -WW Removal Time: 0741  -WW    Retired Wound -  Properties Group Orientation: medial  -MA Location: umbilical area  -MA Primary Wound Type: Other  -MA, WOUND FROM  DISSOLVABLE STITCH  Wound Outcome: Healed  -WW Removal Date: 03/04/25  -WW Removal Time: 0741 -WW    Retired Wound - Properties Group Orientation: medial  -MA Location: umbilical area  -MA Primary Wound Type: Other  -MA, WOUND FROM  DISSOLVABLE STITCH  Removal Date: 03/04/25  -WW Removal Time: 0741 -WW Wound Outcome: Healed  -WW    Retired Wound - Properties Group Location: umbilical area  -MA Primary Wound Type: Other  -MA, WOUND FROM  DISSOLVABLE STITCH  Resolution Date: 03/04/25  -WW Resolution Time: 0741 -WW Wound Outcome: Healed  -WW      Row Name             [REMOVED] Wound 10/01/24 1058 Right anterior knee    Wound - Properties Group Placement Date: 10/01/24  -MW Placement Time: 1058  -MW Side: Right  -MW Orientation: anterior  -MW Location: knee  -MW Primary Wound Type: Incision  -MW Wound Outcome: Healed  -WW Removal Date: 03/04/25  -WW Removal Time: 0741 -WW    Retired Wound - Properties Group Placement Date: 10/01/24  -MW Placement Time: 1058  -MW Side: Right  -MW Orientation: anterior  -MW Location: knee  -MW Primary Wound Type: Incision  -MW Wound Outcome: Healed  -WW Removal Date: 03/04/25  -WW Removal Time: 0741 -WW    Retired Wound - Properties Group Placement Date: 10/01/24  -MW Placement Time: 1058  -MW Side: Right  -MW Orientation: anterior  -MW Location: knee  -MW Primary Wound Type: Incision  -MW Removal Date: 03/04/25  -WW Removal Time: 0741 -WW Wound Outcome: Healed  -WW    Retired Wound - Properties Group Date first assessed: 10/01/24  -MW Time first assessed: 1058  -MW Side: Right  -MW Location: knee  -MW Primary Wound Type: Incision  -MW Resolution Date: 03/04/25  -WW Resolution Time: 0741 -WW Wound Outcome: Healed  -WW      Row Name             [REMOVED] Wound 10/01/24 1058 Right proximal leg    Wound - Properties Group Placement Date: 10/01/24  -HT Placement Time:  1058  -HT Side: Right  -HT Orientation: proximal  -HT Location: leg  -HT Primary Wound Type: Puncture  -HT Wound Outcome: Healed  -WW Removal Date: 03/04/25  -WW Removal Time: 0741 -WW    Retired Wound - Properties Group Placement Date: 10/01/24  -HT Placement Time: 1058  -HT Side: Right  -HT Orientation: proximal  -HT Location: leg  -HT Primary Wound Type: Puncture  -HT Wound Outcome: Healed  -WW Removal Date: 03/04/25  -WW Removal Time: 0741  -WW    Retired Wound - Properties Group Placement Date: 10/01/24  -HT Placement Time: 1058  -HT Side: Right  -HT Orientation: proximal  -HT Location: leg  -HT Primary Wound Type: Puncture  -HT Removal Date: 03/04/25  -WW Removal Time: 0741 -WW Wound Outcome: Healed  -WW    Retired Wound - Properties Group Date first assessed: 10/01/24  -HT Time first assessed: 1058  -HT Side: Right  -HT Location: leg  -HT Primary Wound Type: Puncture  -HT Resolution Date: 03/04/25  -WW Resolution Time: 0741 -WW Wound Outcome: Healed  -WW      Row Name             Wound 03/04/25 0945 Left anterior hip    Wound - Properties Group Placement Date: 03/04/25  -SC Placement Time: 0945  -SC Side: Left  -SC Orientation: anterior  -SC Location: hip  -SC Primary Wound Type: Incision  -SC    Retired Wound - Properties Group Placement Date: 03/04/25  -SC Placement Time: 0945  -SC Side: Left  -SC Orientation: anterior  -SC Location: hip  -SC Primary Wound Type: Incision  -SC    Retired Wound - Properties Group Placement Date: 03/04/25  -SC Placement Time: 0945  -SC Side: Left  -SC Orientation: anterior  -SC Location: hip  -SC Primary Wound Type: Incision  -SC    Retired Wound - Properties Group Date first assessed: 03/04/25  -SC Time first assessed: 0945  -SC Side: Left  -SC Location: hip  -SC Primary Wound Type: Incision  -SC      Row Name 03/04/25 1300          Plan of Care Review    Plan of Care Reviewed With patient  -ROCHELLE (r) TM (t) ROCHELLE (c)     Outcome Evaluation Pt presents with balance deficits and  difficulty with ambulation. Pt requires skilled therapy services  -ROCHELLE (r) TM (t) ROCHELLE (c)       Row Name 03/04/25 1459          Vital Signs    O2 Delivery Intra Treatment room air  -RH       Row Name 03/04/25 1459          Positioning and Restraints    Post Treatment Position chair  -RH     In Chair reclined;call light within reach;encouraged to call for assist;exit alarm on;with family/caregiver  -RH       Row Name 03/04/25 1300          Therapy Assessment/Plan (PT)    Patient/Family Therapy Goals Statement (PT) walk without pain  -ROCHELLE (r) TM (t) ROCHELLE (c)     Rehab Potential (PT) good  -ROCHELLE (r) TM (t) ROCHELLE (c)     Criteria for Skilled Interventions Met (PT) skilled treatment is necessary  -ROCHELLE (r) TM (t) ROCHELLE (c)     Therapy Frequency (PT) 2 times/day  -ROCHELLE (r) TM (t) ROCHELLE (c)     Predicted Duration of Therapy Intervention (PT) 10 days  -ROCHELLE (r) TM (t) ROCHELLE (c)     Problem List (PT) problems related to;balance;mobility;strength;range of motion (ROM)  -ROCHELLE (r) TM (t) ROCHELLE (c)     Activity Limitations Related to Problem List (PT) unable to ambulate safely  -ROCHELLE (r) TM (t) ROCHELLE (c)       Row Name 03/04/25 1300          PT Evaluation Complexity    History, PT Evaluation Complexity no personal factors and/or comorbidities  -ROCHELLE (r) TM (t) ROCHELLE (c)     Examination of Body Systems (PT Eval Complexity) total of 4 or more elements  -ROCHELLE (r) TM (t) ROCHELLE (c)     Clinical Presentation (PT Evaluation Complexity) stable  -ROCHELLE (r) TM (t) ROCHELLE (c)     Clinical Decision Making (PT Evaluation Complexity) low complexity  -ROCHELLE (r) TM (t) ROCHELLE (c)     Overall Complexity (PT Evaluation Complexity) low complexity  -ROCHELLE (r) TM (t)       Row Name 03/04/25 1300          Therapy Plan Review/Discharge Plan (PT)    Therapy Plan Review (PT) evaluation/treatment results reviewed;spouse/significant other;patient  -ROCHELLE (r) TM (t) ROCHELLE (c)       Row Name 03/04/25 1300          Physical Therapy Goals    Transfer Goal Selection (PT) transfer, PT goal 1  -ROCHELLE (r) TM (t) ROCHELLE (c)     Gait  Training Goal Selection (PT) gait training, PT goal 1  -ROCHELLE (r) TM (t) ROCHELLE (c)     Balance Goal Selection (PT) balance, PT goal 1  -ROCHELLE (r) TM (t) ROCHELLE (c)       Row Name 03/04/25 1300          Transfer Goal 1 (PT)    Activity/Assistive Device (Transfer Goal 1, PT) bed-to-chair/chair-to-bed  -ROCHELLE (r) TM (t) ROCHELLE (c)     [place] Level/Cues Needed (Transfer Goal 1, PT) independent  -ROCHELLE (r) TM (t) ROCHELLE (c)     Time Frame (Transfer Goal 1, PT) 10 days  -ROCHELLE (r) TM (t) ROCHELLE (c)       Row Name 03/04/25 1300          Gait Training Goal 1 (PT)    Activity/Assistive Device (Gait Training Goal 1, PT) gait (walking locomotion);assistive device use  -ROCHELLE (r) TM (t) ROCHELLE (c)     [place] Level (Gait Training Goal 1, PT) independent  -ROCHELLE (r) TM (t) ROCHELLE (c)     Distance (Gait Training Goal 1, PT) 300'  -ROCHELLE (r) TM (t) ROCHELLE (c)     Time Frame (Gait Training Goal 1, PT) 10 days  -ROCHELLE (r) TM (t) ROCHELLE (c)       Row Name 03/04/25 1300          Balance Goal 1 (PT)    Activity/Assistive Device (Balance Goal) standing dynamic balance  -ROCHELLE (r) TM (t) ROCHELLE (c)     [place] Level/Cues Needed (Balance Goal 1, PT) independent  -ROCHELLE (r) TM (t) ROCHELLE (c)     Time Frame (Balance Goal 1, PT) 2 weeks  -ROCHELLE (r) TM (t) ROCHELLE (c)               User Key  (r) = Recorded By, (t) = Taken By, (c) = Cosigned By      Initials Name Provider Type    Nava Cr, RN Registered Nurse    Mattie Wilson, RN Registered Nurse    Lexii Crandall, RN Registered Nurse    Joey Pickens, PTA Physical Therapist Assistant    Gaston Zambrano, PT Physical Therapist    Jessica Rendon, RN Registered Nurse    Elisa Ferrer, RN Registered Nurse    TM Shahriar Estrella, PT Student PT Student                Left Hip Ther -ex   Exercise  Reps  Sets    Long arc quads   10 2   Short arc quads  10 2   Heel slides  10 2   Ankle pumps  10 2   Quad sets  10 2   Glut sets  10 2   Abduction/ Adduction  10 2          Physical Therapy Education       Title: PT OT SLP Therapies (Done)        Topic: Physical Therapy (Done)       Point: Mobility training (Done)       Learning Progress Summary            Patient Acceptance, E,TB, VU by TM at 3/4/2025 1312                      Point: Home exercise program (Done)       Learning Progress Summary            Patient Acceptance, E,TB, VU by TM at 3/4/2025 1312                      Point: Body mechanics (Done)       Learning Progress Summary            Patient Acceptance, E,TB, VU by TM at 3/4/2025 1312                      Point: Precautions (Done)       Learning Progress Summary            Patient Acceptance, E,TB, VU by TM at 3/4/2025 1312                                      User Key       Initials Effective Dates Name Provider Type Discipline     02/04/25 -  Shahriar Estrella, PT Student PT Student PT                  PT Recommendation and Plan         Outcome Measures       Row Name 03/04/25 1500 03/04/25 1300          How much help from another person do you currently need...    Turning from your back to your side while in flat bed without using bedrails? 4  - 4  -ROCHELLE (r) TM (t) ROCHELLE (c)     Moving from lying on back to sitting on the side of a flat bed without bedrails? 3  -RH 3  -ROCHELLE (r) TM (t) ROCHELLE (c)     Moving to and from a bed to a chair (including a wheelchair)? 3  -RH 3  -ROCHELLE (r) TM (t) ROCHELLE (c)     Standing up from a chair using your arms (e.g., wheelchair, bedside chair)? 3  - 3  -ROCHELLE (r) TM (t) ROCHELLE (c)     Climbing 3-5 steps with a railing? 3  -RH 3  -ROCHELLE (r) TM (t) ROCHELLE (c)     To walk in hospital room? 3  -RH 3  -ROCHELLE (r) TM (t) ROCHELLE (c)     AM-PAC 6 Clicks Score (PT) 19  -RH 19  -ROCHELLE (r) TM (t)        Functional Assessment    Outcome Measure Options -- AM-PAC 6 Clicks Basic Mobility (PT)  -ROCHELLE (r) TM (t) ROCHELLE (c)               User Key  (r) = Recorded By, (t) = Taken By, (c) = Cosigned By      Initials Name Provider Type    RH Joey Maher, PTA Physical Therapist Assistant    Gaston Zambrano, PT Physical Therapist    TM Shahriar Estrella, PT Student PT  Student                     Time Calculation:    PT Charges       Row Name 03/04/25 1457 03/04/25 1310          Time Calculation    PT Received On 03/04/25  -RH 03/04/25  -ROCHELLE (r) TM (t) ROCHELLE (c)     PT Goal Re-Cert Due Date -- 03/13/25  -ROCHELLE (r) TM (t) ROCHELLE (c)        Timed Charges    66211 - PT Therapeutic Exercise Minutes 17  -RH --     68307 - Gait Training Minutes  5  -RH --     33818 - PT Therapeutic Activity Minutes 4  -RH --        Untimed Charges    PT Eval/Re-eval Minutes -- 25  -ROCHELLE (r) TM (t) ROCHELLE (c)        Total Minutes    Timed Charges Total Minutes 26  -RH --     Untimed Charges Total Minutes -- 25  -ROCHELLE (r) TM (t)      Total Minutes 26  -RH 25  -ROCHELLE (r) TM (t)               User Key  (r) = Recorded By, (t) = Taken By, (c) = Cosigned By      Initials Name Provider Type     Joey Maher, LISA Physical Therapist Assistant    Gaston Zambrano, PT Physical Therapist    TM Shahriar Estrella, PT Student PT Student                  Therapy Charges for Today       Code Description Service Date Service Provider Modifiers Qty    69207861682 HC PT THER PROC EA 15 MIN 3/4/2025 Joey Maher, PTA GP 1    09850956700 HC GAIT TRAINING EA 15 MIN 3/4/2025 Joey Maher, PTA GP 1            PT G-Codes  Outcome Measure Options: AM-PAC 6 Clicks Basic Mobility (PT)  AM-PAC 6 Clicks Score (PT): 19    Joey Maher PTA  3/4/2025

## 2025-03-04 NOTE — OP NOTE
TOTAL HIP ARTHROPLASTY ANTERIOR  Procedure Report    Patient Name:  Hector Coronel  YOB: 1955    Date of Surgery:  3/4/2025       Pre-op Diagnosis:   Osteoarthritis of left hip, unspecified osteoarthritis type [M16.12]       Post-Op Diagnosis Codes:     * Osteoarthritis of left hip, unspecified osteoarthritis type [M16.12]    Procedure/CPT® Codes:  IN ARTHRP ACETBLR/PROX FEM PROSTC AGRFT/ALGRFT [03981]    Procedure(s):  LEFT TOTAL HIP ARTHROPLASTY ANTERIOR    Staff:  Surgeon(s):  Jose Talavera MD    Assistant: Lisa Galvez RN; Brad Cr    Surgical Approach: Hip Direct Anterior ([place])        Anesthesia: General    Estimated Blood Loss: 25 mL    Implants:    Implant Name Type Inv. Item Serial No.  Lot No. LRB No. Used Action   SUT NONABS MAXBRAID NMBR5 K60 38IN WHT/ESME - [id number] Implant SUT NONABS MAXBRAID NMBR5 K60 38IN WHT/ESME  [employer] [id number] Left 2 Implanted   SHLL ACET OSSEOTI G7 3H [place] 52MM - [id number] Implant SHLL ACET OSSEOTI G7 3H [place]O 52MM  [employer] [id number] Left 1 Implanted   LINER ACET G7 NTRL E1 [place] 36MM - [id number] Implant LINER ACET G7 NTRL E1 [place] 36MM  [employer] [id number] Left 1 Implanted   HD FEM/HIP BIOLOX/DELTA CERAM 12/[id number] MIN3.5MM - [id number] Implant HD FEM/HIP BIOLOX/DELTA CERAM 12/[id number] MIN3.5MM  [employer] [id number] Left 1 Implanted   STEM FEM/HIP AVENIR ANDERSEN HA STD SZ2 - [id number] Implant STEM FEM/HIP AVENIR ANDERSEN HA STD SZ2  [employer] [id number] Left 1 Implanted       Specimen:          None      Complications: None    Description of Procedure: See H&P for risks and benefits. The patient was taken to the operating room and placed supine on the [place] table after general endotracheal anesthesia established. The patient was placed in appropriate position for an anterior approach to the hip. The left hip and lower extremity were prepped and draped in standard fashion using alcohol and ChloraPrep.  Standard 4 inch  incision was made starting lateral to the anterior and inferior to the anterior superior spine over the tensor musculature. Dissection was carried down through the skin and a Bovie was used to dissect down to the fascial layer which was then incised in line with the incision.  Blunt dissection was taken down to place the extracapsular retractor over the superior neck.  The cobra was used to elevate the rectus off the anterior hip capsule and another cobra was placed extracapsularly over the inferior femoral neck.   The inferior vessels were ligated with an Aquamantys and Bovie cautery, and the fascial layer over the vastus was released.  L-shaped capsulotomy was carried out in standard fashion placing FiberWire and suture in both limbs of the capsule and retractors placed anterior capsularly. The saddle region of the femur was dissected with Bovie as well as medial femoral neck.  The femoral neck cut was made using oscillating saw according to preoperative templating and the head was removed and then acetabular retractors were placed, labrum was removed along with ligamentum teres, and under C-arm view acetabulum was sequentially reamed  to accommodate the appropriate size acetabular shell which was inserted under C-arm guidance. Polyethylene liner was placed and pigtail retractor was used in proximal femur and the leg was brought in extension external rotation and adduction. Proximal femur was lateralized using a chili pepper and rat tail grasp and the canal was sequentially broached to accommodate the appropriate size femoral trial. The trial was undertaken and found to be satisfactory, stable posteriorly and anteriorly and appropriate leg lengths was assessed.  The trials were removed and after copious irrigation and drying the canal the real femoral stem was placed in the same version as the trial. The trials were again undertaken. C-arm shots were taken to both sides measuring offset as well as length and  appropriate head was chosen and implanted. The hip was relocated and stable posterior anterior appear to have equal leg lengths.  The wound was copiously irrigated and capsular layer was closed with previously placed suture.  More copious irrigation followed and the fascial layer was closed with 0 Vicryl. Deep fat was closed with 0 Vicryl. I injected the anterior hip capsule, the tensor fascia, and the subcutaneous fat with a combination of ropivacaine, morphine, and Toradol mixture.  The subcutaneous was closed with 2-0 Vicryl and skin was closed with staples.  The incision was washed and dried and sterile dressing applied. The patient tolerated the procedure well. The patient was extubated and taken to recovery room.      Jose Talavera MD     Date: 3/4/2025  Time: 10:51 EST

## 2025-03-04 NOTE — THERAPY EVALUATION
Patient Name: Hector Coronel  : 1955    MRN: [id number]                              Today's Date: 3/4/2025       Admit Date: 3/4/2025    Visit Dx:     ICD-10-CM ICD-9-CM   1. Difficulty in walking  R26.2 719.7   2. Osteoarthritis of left hip, unspecified osteoarthritis type  M16.12 715.95   3. Primary osteoarthritis of left hip  M16.12 715.15     Patient Active Problem List   Diagnosis    NOBLE treated with BiPAP    Class 2 obesity    Primary osteoarthritis of both knees    Major depressive disorder, recurrent, moderate    Peripheral neuropathy, idiopathic    Elevated liver enzymes    Benign prostatic hyperplasia    Cerumen debris on tympanic membrane of both ears    Screening PSA (prostate specific antigen)    Primary osteoarthritis of right hip    Primary insomnia    Anxiety    Peripheral polyneuropathy    Cough    Shortness of breath    S/P coronary artery stent placement    Coronary artery disease involving native coronary artery of native heart without angina pectoris    Hyperlipidemia LDL goal <70    Primary osteoarthritis of left knee    Osteoarthrosis, localized, primary, knee, right    Right knee pain    Osteoarthritis of left hip    Primary osteoarthritis of left hip     Past Medical History:   Diagnosis Date    Anxiety     Arthritis     BACK    Bunion     Coronary artery disease 2022    BLOCKAGE- 1 STENT PLACED 2022- SEE'S FALK, DENIES CP BUT GETS SOAE.    Depression     Diabetes mellitus     BORDERLINE- DOESN'T CHECK BG AT HOME    Enlarged prostate     GERD (gastroesophageal reflux disease)     h/o Edema     PRIOR TO STENT PLACEMENT    H/O Pneumonia     Hammer toe     History of transfusion     AS A CHILD STATES HE DOESNT THINK ANY ISSUE WITH IT    Hoarseness     Hyperlipidemia     Murmur, cardiac     ASYMPTOMATIC- SOAOE    Neck pain     VERTABRA PUSHING ON THROAT    Osteoarthritis     OG HIPS, BILATERL KNEES, L SHOULDER    PONV (postoperative nausea and vomiting)     Seasonal  allergies     Sleep apnea     USES BIPAP    SOBOE (shortness of breath on exertion)     TIA (transient ischemic attack)     MULTI. YEARS AGO- NO RESIDUAL    Umbilical hernia      Past Surgical History:   Procedure Laterality Date    APPENDECTOMY      CARDIAC CATHETERIZATION Right 05/31/2022    Procedure: Left Heart Cath;  Surgeon: Eduardo Buck MD;  Location: [facility] CATH INVASIVE LOCATION;  Service: Cardiovascular;  Laterality: Right;    CARPAL TUNNEL RELEASE Bilateral     CHOLECYSTECTOMY      COLONOSCOPY      HERNIA REPAIR      UMBILICAL    INNER EAR SURGERY      BOTH SIDES    THROAT SURGERY      BIOPSY, RMOVED POLYPS    TOTAL HIP ARTHROPLASTY Right 11/22/2022    Procedure: RIGHT TOTAL HIP ARTHROPLASTY ANTERIOR;  Surgeon: Jose Talavera MD;  Location: [facility] OR;  Service: Orthopedics;  Laterality: Right;    TOTAL KNEE ARTHROPLASTY Left 05/05/2023    Procedure: TOTAL KNEE ARTHROPLASTY WITH RYLAND ROBOT;  Surgeon: Jose Talavera MD;  Location: [facility] OR;  Service: Robotics - Ortho;  Laterality: Left;    TOTAL KNEE ARTHROPLASTY Right 10/01/2024    Procedure: RIGHT TOTAL KNEE ARTHROPLASTY;  Surgeon: Jose Talavera MD;  Location: [facility] OR;  Service: Robotics - Ortho;  Laterality: Right;    VASECTOMY      1979      General Information       Row Name 03/04/25 1707 03/04/25 1651       OT Time and Intention    Document Type therapy note (daily note)  -SC evaluation  -SC    Mode of Treatment individual therapy;occupational therapy  -SC individual therapy;occupational therapy  -SC    Patient Effort good  -SC good  -SC      Row Name 03/04/25 1707 03/04/25 4675       General Information    Prior Level of Function -- independent:  PLOF is independence with all ADLs, basic IADLs and driving. Patient has a tub/shower combo, 3:1, and did not previously require an assistive device for mobility. He does have a sock aid and occasionally uses for LB dressing.  -SC    Existing Precautions/Restrictions fall;hip,  anterior  -SC fall;hip, anterior  -SC    Barriers to Rehab -- none identified  -SC      Row Name 03/04/25 1654          Occupational Profile    Reason for Services/Referral (Occupational Profile) Patient is a 69 year-old male admitted to [facility] on 3/4/2025.  OT consulted due to a decline in function and mobility.  No previous OT services for current condition.  -SC     Patient Goals (Occupational Profile) Patient wishes to return home at prior level of function and travel with decreased pain  -SC       Row Name 03/04/25 1654          Living Environment    People in Home spouse  -SC     Name(s) of People in Home Spouse and daughter present, helpful and supportive for assessment.  -SC       Row Name 03/04/25 1654          Home Main Entrance    Number of Stairs, Main Entrance one  -SC     Stair Railings, Main Entrance railings safe and in good condition  -SC       Row Name 03/04/25 1654          Stairs Within Home, Primary    Stairs, Within Home, Primary Non-essential  -SC     Number of Stairs, Within Home, Primary twelve  -SC     Stair Railings, Within Home, Primary railings safe and in good condition  -SC       Row Name 03/04/25 1707 03/04/25 1654       Cognition    Orientation Status (Cognition) --  Pt sleepy but pleasant, oriented and able to participate in session.  -SC oriented x 3  -SC              User Key  (r) = Recorded By, (t) = Taken By, (c) = Cosigned By      Initials Name Provider Type    SC Sumaya Stein OT Occupational Therapist                     Mobility/ADL's       Row Name 03/04/25 1658          Transfers    Transfers sit-stand transfer;stand-sit transfer  -SC       Row Name 03/04/25 1707 03/04/25 1658       Bed-Chair Transfer    Bed-Chair [place] (Transfers) contact guard  -SC contact guard  -SC    Assistive Device (Bed-Chair Transfers) walker, front-wheeled  -SC walker, front-wheeled  -SC      Row Name 03/04/25 1707 03/04/25 1658       Sit-Stand Transfer    Sit-Stand [place] (Transfers)  -- contact guard  -SC    Assistive Device (Sit-Stand Transfers) -- walker, front-wheeled  -SC    Comment, (Sit-Stand Transfer) Min vc on correct hand placement on walker. Patient has used FWW in past.  -SC --      Row Name 03/04/25 1707 03/04/25 1658       Stand-Sit Transfer    Stand-Sit [place] (Transfers) contact guard  -SC contact guard  -SC    Assistive Device (Stand-Sit Transfers) walker, front-wheeled  -SC walker, front-wheeled  -SC      Row Name 03/04/25 1658          Functional Mobility    Functional Mobility- Comment --  -SC       Row Name 03/04/25 1658          Activities of Daily Living    BADL Assessment/Intervention bathing;upper body dressing;lower body dressing;grooming;toileting  -[facility] Name 03/04/25 1658          Mobility    Extremity Weight-bearing Status left lower extremity  -SC     Left Lower Extremity (Weight-bearing Status) weight-bearing as tolerated (WBAT)  -SC       Row Name 03/04/25 1707 03/04/25 1658       Bathing Assessment/Intervention    [place] Level (Bathing) -- minimum assist (75% patient effort)  -SC    Comment, (Bathing) Instructed on use of TTB for tub/shower combo. Patient has one at home he used for a previous surgery.  -SC --      [facility] Name 03/04/25 1658          Upper Body Dressing Assessment/Training    [place] Level (Upper Body Dressing) standby assist  -SC       Row Name 03/04/25 1707 03/04/25 1658       Lower Body Dressing Assessment/Training    [place] Level (Lower Body Dressing) -- minimum assist (75% patient effort)  -SC    Comment, (Lower Body Dressing) Trained on safe positioning and adaptive technique for LB dressing.  -SC --      Row Name 03/04/25 1658          Grooming Assessment/Training    [place] Level (Grooming) standby assist  -SC       Row Name 03/04/25 1658          Toileting Assessment/Training    [place] Level (Toileting) contact guard assist  -SC               User Key  (r) = Recorded By, (t) = Taken By, (c) = Cosigned  By      Initials Name Provider Type    SC Sumaya Stein OT Occupational Therapist                   Obj/Interventions       Row Name 03/04/25 1701          Sensory Assessment (Somatosensory)    Sensory Assessment (Somatosensory) sensation intact  -SC       Row Name 03/04/25 1701          Vision Assessment/Intervention    Visual Impairment/Limitations corrective lenses full-time  -SC       Row Name 03/04/25 1701          Range of Motion Comprehensive    General Range of Motion bilateral upper extremity ROM WFL  -SC       Row Name 03/04/25 1701          Strength Comprehensive (MMT)    Comment, General Manual Muscle Testing (MMT) Assessment WFL for ADL  -SC       Row Name 03/04/25 1701          Motor Skills    Motor Skills coordination;functional endurance  -SC     Coordination WFL  -SC     Functional Endurance good  -SC       Row Name 03/04/25 1701          Balance    Balance Assessment standing static balance;standing dynamic balance  -SC     Static Standing Balance contact guard  -SC     Dynamic Standing Balance minimal assist  -SC               User Key  (r) = Recorded By, (t) = Taken By, (c) = Cosigned By      Initials Name Provider Type    SC Sumaya Stein OT Occupational Therapist                   Goals/Plan       Row Name 03/04/25 1703          Transfer Goal 1 (OT)    Activity/Assistive Device (Transfer Goal 1, OT) transfers, all  -SC     [place] Level/Cues Needed (Transfer Goal 1, OT) modified independence  -SC     Time Frame (Transfer Goal 1, OT) long term goal (LTG);10 days  -SC       Row Name 03/04/25 1703          Bathing Goal 1 (OT)    Activity/Device (Bathing Goal 1, OT) bathing skills, all  -SC     [place] Level/Cues Needed (Bathing Goal 1, OT) modified independence  -SC     Time Frame (Bathing Goal 1, OT) 10 days;long term goal (LTG)  -SC       Row Name 03/04/25 1703          Dressing Goal 1 (OT)    Activity/Device (Dressing Goal 1, OT) dressing skills, all  -SC     [place]/Cues  Needed (Dressing Goal 1, OT) modified independence  -SC     Time Frame (Dressing Goal 1, OT) long term goal (LTG);10 days  -[facility] Name 03/04/25 1703          Toileting Goal 1 (OT)    Activity/Device (Toileting Goal 1, OT) toileting skills, all  -SC     [place] Level/Cues Needed (Toileting Goal 1, OT) modified independence  -SC     Time Frame (Toileting Goal 1, OT) long term goal (LTG);10 days  -[facility] Name 03/04/25 1703          Therapy Assessment/Plan (OT)    Planned Therapy Interventions (OT) BADL retraining;patient/caregiver education/training;transfer/mobility retraining  -SC               User Key  (r) = Recorded By, (t) = Taken By, (c) = Cosigned By      Initials Name Provider Type    Sumaya Harvey, OT Occupational Therapist                   Clinical Impression       [facility] Name 03/04/25 1701          Pain Assessment    Pretreatment Pain Rating 3/10  -SC     Posttreatment Pain Rating 3/10  -SC     Pain Side/Orientation left  -[facility] Name 03/04/25 1701          Plan of Care Review    Plan of Care Reviewed With patient;spouse;daughter  -SC     Progress no change  Evaluation  -SC     Outcome Evaluation Patient presents with limitations of pain, range of motion. and balance which impede his ability to perform ADLs/transfers as prior.  The skills of a therapist will be required to safely and effectively implement treatment plan to restore maximum level function.  -SC       Row Name 03/04/25 1701          Therapy Assessment/Plan (OT)    Rehab Potential (OT) good  -SC     Criteria for Skilled Therapeutic Interventions Met (OT) yes;meets criteria;skilled treatment is necessary  -SC     Therapy Frequency (OT) 5 times/wk  -SC       Row Name 03/04/25 1701          Therapy Plan Review/Discharge Plan (OT)    Anticipated Discharge Disposition (OT) home with assist  -[facility] Name 03/04/25 1701          Positioning and Restraints    Pre-Treatment Position sitting in chair/recliner  -SC     Post  Treatment Position chair  -SC     In Chair call light within reach;encouraged to call for assist;exit alarm on  -SC               User Key  (r) = Recorded By, (t) = Taken By, (c) = Cosigned By      Initials Name Provider Type    Sumaya Harvey OT Occupational Therapist                   Outcome Measures       Row Name 03/04/25 1704          How much help from another is currently needed...    Putting on and taking off regular lower body clothing? 3  -SC     Bathing (including washing, rinsing, and drying) 3  -SC     Toileting (which includes using toilet bed pan or urinal) 3  -SC     Putting on and taking off regular upper body clothing 4  -SC     Taking care of personal grooming (such as brushing teeth) 4  -SC     Eating meals 4  -SC     AM-PAC 6 Clicks Score (OT) 21  -SC       Row Name 03/04/25 1500 03/04/25 1300       How much help from another person do you currently need...    Turning from your back to your side while in flat bed without using bedrails? 4  -RH 4  -ROCHELLE (r) TM (t) ROCHELLE (c)    Moving from lying on back to sitting on the side of a flat bed without bedrails? 3  -RH 3  -ROCHELLE (r) TM (t) ROCHELLE (c)    Moving to and from a bed to a chair (including a wheelchair)? 3  -RH 3  -ROCHELLE (r) TM (t) ROCHELLE (c)    Standing up from a chair using your arms (e.g., wheelchair, bedside chair)? 3  -RH 3  -ROCHELLE (r) TM (t) ROCHELLE (c)    Climbing 3-5 steps with a railing? 3  -RH 3  -ROCHELLE (r) TM (t) ROCHELLE (c)    To walk in hospital room? 3  -RH 3  -ROCHELLE (r) TM (t) ROCHELLE (c)    AM-PAC 6 Clicks Score (PT) 19  -RH 19  -ROCHELLE (r) TM (t)    Highest Level of Mobility Goal 6 --> Walk 10 steps or more  -RH 6 --> Walk 10 steps or more  -ROCHELLE (r) TM (t)      Row Name 03/04/25 1215          How much help from another person do you currently need...    Turning from your back to your side while in flat bed without using bedrails? 4  -KD     Moving from lying on back to sitting on the side of a flat bed without bedrails? 3  -KD     Moving to and from a bed to a chair  (including a wheelchair)? 3  -KD     Standing up from a chair using your arms (e.g., wheelchair, bedside chair)? 3  -KD     Climbing 3-5 steps with a railing? 3  -KD     To walk in hospital room? 3  -KD     AM-PAC 6 Clicks Score (PT) 19  -KD     Highest Level of Mobility Goal 6 --> Walk 10 steps or more  -KD       Row Name 03/04/25 1704 03/04/25 1300       Functional Assessment    Outcome Measure Options AM-PAC 6 Clicks Daily Activity (OT);Optimal Instrument  -SC AM-PAC 6 Clicks Basic Mobility (PT)  -ROCHELLE (r) TM (t) ROCHELLE (c)      Row Name 03/04/25 1704          Optimal Instrument    Optimal Instrument Optimal - 3  -SC     Bending/Stooping 5  -SC     Standing 2  -SC     Reaching 1  -SC     From the list, choose the 3 activities you would most like to be able to do without any difficulty Standing;Bending/stooping;Reaching  -SC     Total Score Optimal - 3 8  -SC               User Key  (r) = Recorded By, (t) = Taken By, (c) = Cosigned By      Initials Name Provider Type    RH Joey Maher, PTA Physical Therapist Assistant    Gaston Zambrano, PT Physical Therapist    SC Sumaya Stein, OT Occupational Therapist    KD Brooklynn Roberts, RN Registered Nurse    TM Shahriar Estrella, PT Student PT Student                    Occupational Therapy Education        No education to display                  OT Recommendation and Plan  Planned Therapy Interventions (OT): BADL retraining, patient/caregiver education/training, transfer/mobility retraining  Therapy Frequency (OT): 5 times/wk  Plan of Care Review  Plan of Care Reviewed With: patient, spouse, daughter  Progress: no change (Evaluation)  Outcome Evaluation: Patient presents with limitations of pain, range of motion. and balance which impede his ability to perform ADLs/transfers as prior.  The skills of a therapist will be required to safely and effectively implement treatment plan to restore maximum level function.     Time Calculation:   Evaluation Complexity (OT)  Review  Occupational Profile/Medical/Therapy History Complexity: expanded/moderate complexity  Assessment, Occupational Performance/Identification of Deficit Complexity: 1-3 performance deficits  Clinical Decision Making Complexity (OT): problem focused assessment/low complexity  Overall Complexity of Evaluation (OT): low complexity     Time Calculation- OT       Row Name 03/04/25 1706 03/04/25 1457          Time Calculation- OT    OT Received On 03/04/25  -SC --     OT Goal Re-Cert Due Date 03/13/25  -SC --        Timed Charges    07462 - Gait Training Minutes  -- 5  -RH     00837 - OT Self Care/Mgmt Minutes 14  -SC --        Untimed Charges    OT Eval/Re-eval Minutes 27  -SC --        Total Minutes    Timed Charges Total Minutes 14  -SC 5  -RH     Untimed Charges Total Minutes 27  -SC --      Total Minutes 41  -SC 5  -RH               User Key  (r) = Recorded By, (t) = Taken By, (c) = Cosigned By      Initials Name Provider Type     Joey Maher, PTA Physical Therapist Assistant    SC Sumaya Stein OT Occupational Therapist                  Therapy Charges for Today       Code Description Service Date Service Provider Modifiers Qty    89591443208 HC OT EVAL MOD COMPLEXITY 2 3/4/2025 Sumaya Stein OT GO 1    [id number]  OT SELF CARE/MGMT/TRAIN EA 15 MIN 3/4/2025 Sumaya Stein OT GO 1                 Sumaya Stein OT  3/4/2025

## 2025-03-04 NOTE — THERAPY EVALUATION
Acute Care - Physical Therapy Initial Evaluation  SERVANDO Cisse     Patient Name: Hector Coronel  : 1955  MRN: [id number]  Today's Date: 3/4/2025      Visit Dx:     ICD-10-CM ICD-9-CM   1. Difficulty in walking  R26.2 719.7   2. Osteoarthritis of left hip, unspecified osteoarthritis type  M16.12 715.95     Patient Active Problem List   Diagnosis    NOBLE treated with BiPAP    Class 2 obesity    Primary osteoarthritis of both knees    Major depressive disorder, recurrent, moderate    Peripheral neuropathy, idiopathic    Elevated liver enzymes    Benign prostatic hyperplasia    Cerumen debris on tympanic membrane of both ears    Screening PSA (prostate specific antigen)    Primary osteoarthritis of right hip    Primary insomnia    Anxiety    Peripheral polyneuropathy    Cough    Shortness of breath    S/P coronary artery stent placement    Coronary artery disease involving native coronary artery of native heart without angina pectoris    Hyperlipidemia LDL goal <70    Primary osteoarthritis of left knee    Osteoarthrosis, localized, primary, knee, right    Right knee pain    Osteoarthritis of left hip    Primary osteoarthritis of left hip     Past Medical History:   Diagnosis Date    Anxiety     Arthritis     BACK    Bunion     Coronary artery disease 2022    BLOCKAGE- 1 STENT PLACED 2022- SEE'S DILEEP, DENIES CP BUT GETS SOAE.    Depression     Diabetes mellitus     BORDERLINE- DOESN'T CHECK BG AT HOME    Enlarged prostate     GERD (gastroesophageal reflux disease)     h/o Edema     PRIOR TO STENT PLACEMENT    H/O Pneumonia     Hammer toe     History of transfusion     AS A CHILD STATES HE DOESNT THINK ANY ISSUE WITH IT    Hoarseness     Hyperlipidemia     Murmur, cardiac     ASYMPTOMATIC- SOAOE    Neck pain     VERTABRA PUSHING ON THROAT    Osteoarthritis     OG HIPS, BILATERL KNEES, L SHOULDER    PONV (postoperative nausea and vomiting)     Seasonal allergies     Sleep apnea     USES BIPAP    SOBOE  (shortness of breath on exertion)     TIA (transient ischemic attack)     MULTI. YEARS AGO- NO RESIDUAL    Umbilical hernia      Past Surgical History:   Procedure Laterality Date    APPENDECTOMY      CARDIAC CATHETERIZATION Right 05/31/2022    Procedure: Left Heart Cath;  Surgeon: Eduardo Buck MD;  Location: [facility] CATH INVASIVE LOCATION;  Service: Cardiovascular;  Laterality: Right;    CARPAL TUNNEL RELEASE Bilateral     CHOLECYSTECTOMY      COLONOSCOPY      HERNIA REPAIR      UMBILICAL    INNER EAR SURGERY      BOTH SIDES    THROAT SURGERY      BIOPSY, RMOVED POLYPS    TOTAL HIP ARTHROPLASTY Right 11/22/2022    Procedure: RIGHT TOTAL HIP ARTHROPLASTY ANTERIOR;  Surgeon: Jose Talavera MD;  Location: [facility] MAIN OR;  Service: Orthopedics;  Laterality: Right;    TOTAL KNEE ARTHROPLASTY Left 05/05/2023    Procedure: TOTAL KNEE ARTHROPLASTY WITH RYLAND ROBOT;  Surgeon: Jose Talavera MD;  Location: [facility] MAIN OR;  Service: Robotics - Ortho;  Laterality: Left;    TOTAL KNEE ARTHROPLASTY Right 10/01/2024    Procedure: RIGHT TOTAL KNEE ARTHROPLASTY;  Surgeon: Jose Talavera MD;  Location: [facility] MAIN OR;  Service: Robotics - Ortho;  Laterality: Right;    VASECTOMY      1979     PT Assessment (Last 12 Hours)       PT Evaluation and Treatment       Row Name 03/04/25 1300          Physical Therapy Time and Intention    Subjective Information no complaints (P)   -TM     Document Type evaluation (P)   -TM     Mode of Treatment individual therapy (P)   -TM     Patient Effort good (P)   -TM       Row Name 03/04/25 1300          General Information    Prior Level of Function independent: (P)   -TM     Existing Precautions/Restrictions fall (P)   -TM       Row Name 03/04/25 1300          Living Environment    Current Living Arrangements home (P)   -TM     People in Home spouse (P)   -TM     Primary Care Provided by self (P)   -TM       Row Name 03/04/25 1300          Range of Motion (ROM)    Range of Motion ROM is  WNL;bilateral lower extremities (P)   -TM       Row Name 03/04/25 1300          Strength (Manual Muscle Testing)    Strength (Manual Muscle Testing) strength is WNL;bilateral lower extremities (P)   -TM       Row Name 03/04/25 1300          Mobility    Extremity Weight-bearing Status left lower extremity (P)   -TM     Left Lower Extremity (Weight-bearing Status) weight-bearing as tolerated (WBAT) (P)   -TM       Row Name 03/04/25 1300          Bed Mobility    Bed Mobility bed mobility (all) activities (P)   -TM     All Activities, [place] (Bed Mobility) independent (P)   -TM       Row Name 03/04/25 1300          Transfers    Transfers bed-chair transfer (P)   -TM       Row Name 03/04/25 1300          Bed-Chair Transfer    Bed-Chair [place] (Transfers) contact guard (P)   -TM     Assistive Device (Bed-Chair Transfers) walker, front-wheeled (P)   -TM       Row Name 03/04/25 1300          Gait/Stairs (Locomotion)    Gait/Stairs Locomotion gait/ambulation independence;gait/ambulation assistive device (P)   -TM     Assistive Device (Gait) walker, front-wheeled (P)   -TM     Patient was able to Ambulate yes (P)   -TM     Distance in Feet (Gait) 10 (P)   -TM       Row Name 03/04/25 1300          Balance    Balance Assessment standing dynamic balance (P)   -TM     Dynamic Standing Balance contact guard (P)   -TM     Position/Device Used, Standing Balance walker, front-wheeled (P)   -TM       Row Name             [REMOVED] Wound medial umbilical area    Wound - Properties Group Orientation: medial  -MA Location: umbilical area  -MA Primary Wound Type: Other  -MA, WOUND FROM  DISSOLVABLE STITCH  Wound Outcome: Healed  -WW Removal Date: 03/04/25  -WW Removal Time: 0741 -WW    Retired Wound - Properties Group Orientation: medial  -MA Location: umbilical area  -MA Primary Wound Type: Other  -MA, WOUND FROM  DISSOLVABLE STITCH  Wound Outcome: Healed  -WW Removal Date: 03/04/25  -WW Removal Time: 0741 -WW    Retired  Wound - Properties Group Orientation: medial  -MA Location: umbilical area  -MA Primary Wound Type: Other  -MA, WOUND FROM  DISSOLVABLE STITCH  Removal Date: 03/04/25  -WW Removal Time: 0741 -WW Wound Outcome: Healed  -WW    Retired Wound - Properties Group Location: umbilical area  -MA Primary Wound Type: Other  -MA, WOUND FROM  DISSOLVABLE STITCH  Resolution Date: 03/04/25  -WW Resolution Time: 0741 -WW Wound Outcome: Healed  -WW      Row Name             [REMOVED] Wound 10/01/24 1058 Right anterior knee    Wound - Properties Group Placement Date: 10/01/24  -MW Placement Time: 1058  -MW Side: Right  -MW Orientation: anterior  -MW Location: knee  -MW Primary Wound Type: Incision  -MW Wound Outcome: Healed  -WW Removal Date: 03/04/25  -WW Removal Time: 0741 -WW    Retired Wound - Properties Group Placement Date: 10/01/24  -MW Placement Time: 1058  -MW Side: Right  -MW Orientation: anterior  -MW Location: knee  -MW Primary Wound Type: Incision  -MW Wound Outcome: Healed  -WW Removal Date: 03/04/25  -WW Removal Time: 0741 -WW    Retired Wound - Properties Group Placement Date: 10/01/24  -MW Placement Time: 1058  -MW Side: Right  -MW Orientation: anterior  -MW Location: knee  -MW Primary Wound Type: Incision  -MW Removal Date: 03/04/25  -WW Removal Time: 0741 -WW Wound Outcome: Healed  -WW    Retired Wound - Properties Group Date first assessed: 10/01/24  -MW Time first assessed: 1058  -MW Side: Right  -MW Location: knee  -MW Primary Wound Type: Incision  -MW Resolution Date: 03/04/25  -WW Resolution Time: 0741 -WW Wound Outcome: Healed  -WW      Row Name             [REMOVED] Wound 10/01/24 1058 Right proximal leg    Wound - Properties Group Placement Date: 10/01/24  -HT Placement Time: 1058  -HT Side: Right  -HT Orientation: proximal  -HT Location: leg  -HT Primary Wound Type: Puncture  -HT Wound Outcome: Healed  -WW Removal Date: 03/04/25  -WW Removal Time: 0741 -WW    Retired Wound - Properties Group  Placement Date: 10/01/24  -HT Placement Time: 1058  -HT Side: Right  -HT Orientation: proximal  -HT Location: leg  -HT Primary Wound Type: Puncture  -HT Wound Outcome: Healed  -WW Removal Date: 03/04/25  -WW Removal Time: 0741 -WW    Retired Wound - Properties Group Placement Date: 10/01/24  -HT Placement Time: 1058  -HT Side: Right  -HT Orientation: proximal  -HT Location: leg  -HT Primary Wound Type: Puncture  -HT Removal Date: 03/04/25  -WW Removal Time: 0741 -WW Wound Outcome: Healed  -WW    Retired Wound - Properties Group Date first assessed: 10/01/24  -HT Time first assessed: 1058  -HT Side: Right  -HT Location: leg  -HT Primary Wound Type: Puncture  -HT Resolution Date: 03/04/25  -WW Resolution Time: 0741 -WW Wound Outcome: Healed  -WW      Row Name             Wound 03/04/25 0945 Left anterior hip    Wound - Properties Group Placement Date: 03/04/25  -SC Placement Time: 0945  -SC Side: Left  -SC Orientation: anterior  -SC Location: hip  -SC Primary Wound Type: Incision  -SC    Retired Wound - Properties Group Placement Date: 03/04/25  -SC Placement Time: 0945  -SC Side: Left  -SC Orientation: anterior  -SC Location: hip  -SC Primary Wound Type: Incision  -SC    Retired Wound - Properties Group Placement Date: 03/04/25  -SC Placement Time: 0945  -SC Side: Left  -SC Orientation: anterior  -SC Location: hip  -SC Primary Wound Type: Incision  -SC    Retired Wound - Properties Group Date first assessed: 03/04/25  -SC Time first assessed: 0945  -SC Side: Left  -SC Location: hip  -SC Primary Wound Type: Incision  -SC      Row Name 03/04/25 1300          Plan of Care Review    Plan of Care Reviewed With patient (P)   -TM     Outcome Evaluation Pt presents with balance deficits and difficulty with ambulation. Pt requires skilled therapy services (P)   -TM       Row Name 03/04/25 1300          Therapy Assessment/Plan (PT)    Patient/Family Therapy Goals Statement (PT) walk without pain (P)   -TM     Rehab  Potential (PT) good (P)   -TM     Criteria for Skilled Interventions Met (PT) skilled treatment is necessary (P)   -TM     Therapy Frequency (PT) 2 times/day (P)   -TM     Predicted Duration of Therapy Intervention (PT) 10 days (P)   -TM     Problem List (PT) problems related to;balance;mobility;strength;range of motion (ROM) (P)   -TM     Activity Limitations Related to Problem List (PT) unable to ambulate safely (P)   -       Row Name 03/04/25 1300          PT Evaluation Complexity    History, PT Evaluation Complexity no personal factors and/or comorbidities (P)   -TM     Examination of Body Systems (PT Eval Complexity) total of 4 or more elements (P)   -TM     Clinical Presentation (PT Evaluation Complexity) stable (P)   -TM     Clinical Decision Making (PT Evaluation Complexity) low complexity (P)   -TM     Overall Complexity (PT Evaluation Complexity) low complexity (P)   -       Row Name 03/04/25 1300          Therapy Plan Review/Discharge Plan (PT)    Therapy Plan Review (PT) evaluation/treatment results reviewed;spouse/significant other;patient (P)   -       Row Name 03/04/25 1300          Physical Therapy Goals    Transfer Goal Selection (PT) transfer, PT goal 1 (P)   -TM     Gait Training Goal Selection (PT) gait training, PT goal 1 (P)   -TM     Balance Goal Selection (PT) balance, PT goal 1 (P)   -       Row Name 03/04/25 1300          Transfer Goal 1 (PT)    Activity/Assistive Device (Transfer Goal 1, PT) bed-to-chair/chair-to-bed (P)   -TM     [place] Level/Cues Needed (Transfer Goal 1, PT) independent (P)   -TM     Time Frame (Transfer Goal 1, PT) 10 days (P)   -       Row Name 03/04/25 1300          Gait Training Goal 1 (PT)    Activity/Assistive Device (Gait Training Goal 1, PT) gait (walking locomotion);assistive device use (P)   -TM     [place] Level (Gait Training Goal 1, PT) independent (P)   -TM     Distance (Gait Training Goal 1, PT) 300' (P)   -TM     Time Frame (Gait  Training Goal 1, PT) 10 days (P)   -TM       Row Name 03/04/25 1300          Balance Goal 1 (PT)    Activity/Assistive Device (Balance Goal) standing dynamic balance (P)   -TM     [place] Level/Cues Needed (Balance Goal 1, PT) independent (P)   -TM     Time Frame (Balance Goal 1, PT) 2 weeks (P)   -TM               User Key  (r) = Recorded By, (t) = Taken By, (c) = Cosigned By      Initials Name Provider Type    SC Nava Gutiérrez, RN Registered Nurse    Mattie Wilson, RN Registered Nurse    Lexii Crandall, RN Registered Nurse    Jessica Rendon, RN Registered Nurse    Elisa Ferrer, RN Registered Nurse    Shahriar Pompa, PT Student PT Student                    Physical Therapy Education       Title: PT OT SLP Therapies (Done)       Topic: Physical Therapy (Done)       Point: Mobility training (Done)       Learning Progress Summary            Patient Acceptance, E,TB, VU by TM at 3/4/2025 1312                      Point: Home exercise program (Done)       Learning Progress Summary            Patient Acceptance, E,TB, VU by TM at 3/4/2025 1312                      Point: Body mechanics (Done)       Learning Progress Summary            Patient Acceptance, E,TB, VU by TM at 3/4/2025 1312                      Point: Precautions (Done)       Learning Progress Summary            Patient Acceptance, E,TB, VU by TM at 3/4/2025 1312                                      User Key       Initials Effective Dates Name Provider Type Discipline     02/04/25 -  Shahriar Estrella, PT Student PT Student PT                  PT Recommendation and Plan  Anticipated Discharge Disposition (PT): (P) home with outpatient therapy services  Planned Therapy Interventions (PT): (P) balance training, gait training, home exercise program, ROM (range of motion), strengthening, transfer training  Therapy Frequency (PT): (P) 2 times/day  Plan of Care Reviewed With: (P) patient  Outcome Evaluation: (P) Pt presents with balance  deficits and difficulty with ambulation. Pt requires skilled therapy services   Outcome Measures       Row Name 03/04/25 1300             How much help from another person do you currently need...    Turning from your back to your side while in flat bed without using bedrails? 4 (P)   -TM      Moving from lying on back to sitting on the side of a flat bed without bedrails? 3 (P)   -TM      Moving to and from a bed to a chair (including a wheelchair)? 3 (P)   -TM      Standing up from a chair using your arms (e.g., wheelchair, bedside chair)? 3 (P)   -TM      Climbing 3-5 steps with a railing? 3 (P)   -TM      To walk in hospital room? 3 (P)   -TM      AM-PAC 6 Clicks Score (PT) 19 (P)   -TM         Functional Assessment    Outcome Measure Options AM-PAC 6 Clicks Basic Mobility (PT) (P)   -TM                User Key  (r) = Recorded By, (t) = Taken By, (c) = Cosigned By      Initials Name Provider Type    TM Shahriar Estrella PT Student PT Student                     Time Calculation:    PT Charges       Row Name 03/04/25 1310             Time Calculation    PT Received On 03/04/25 (P)   -TM      PT Goal Re-Cert Due Date 03/13/25 (P)   -TM         Untimed Charges    PT Eval/Re-eval Minutes 25 (P)   -TM         Total Minutes    Untimed Charges Total Minutes 25 (P)   -TM       Total Minutes 25 (P)   -TM                User Key  (r) = Recorded By, (t) = Taken By, (c) = Cosigned By      Initials Name Provider Type    TM Shahriar Estrella PT Student PT Student                  Therapy Charges for Today       Code Description Service Date Service Provider Modifiers Qty    [id number] HC PT EVAL LOW COMPLEXITY 2 3/4/2025 Shahriar Estrella PT Student GP 1            PT G-Codes  Outcome Measure Options: (P) AM-PAC 6 Clicks Basic Mobility (PT)  AM-PAC 6 Clicks Score (PT): (P) 19    ROBBIE Araujo  3/4/2025

## 2025-03-04 NOTE — H&P
"Chief Complaint  Initial Evaluation of the Left Hip        Subjective  Hector Coronel presents to [facility] for initial evaluation of the left hip. He has had pain in the hip for about 6 weeks. He has actually had pain for years in the left hip.  He was helping his wife scoot a couch and is having pain from the hip to the ankle.  The pain is on the lateral aspect of the hip.  He has some pain in the groin.  He had a right total knee arthroplasty on 10/1/24.  His left hip has had increase pain since then for the left hip. He notes the pain in his hip is affecting his daily tasks and ADL's.  He had discussed surgical intervention of the left hip prior but decided on a different body part first.       Allergies         Allergies   Allergen Reactions    Metal Rash       CHEAP METAL- BELT HAZEL BREAK HIM OUT             Social History   Social History            Socioeconomic History    Marital status:    Tobacco Use    Smoking status: Former       Current packs/day: 0.00       Types: Cigarettes       Quit date:        Years since quittin.0    Smokeless tobacco: Never    Tobacco comments:       quit 30 years ago   Vaping Use    Vaping status: Never Used   Substance and Sexual Activity    Alcohol use: Yes       Alcohol/week: 3.0 standard drinks of alcohol       Types: 3 Shots of liquor per week    Drug use: Never    Sexual activity: Defer            I reviewed the patient's chief complaint, history of present illness, review of systems, past medical history, surgical history, family history, social history, medications, and allergy list.      Review of Systems      Constitutional: Denies fevers, chills, weight loss  Cardiovascular: Denies chest pain, shortness of breath  Skin: Denies rashes, acute skin changes  Neurologic: Denies headache, loss of consciousness           Vital Signs:   /82   Pulse 94   Ht 175.3 cm (69\")   Wt 112 kg (246 lb)   SpO2 97%   BMI 36.33 " kg/m²           Physical Exam  General: Alert. No acute distress     Ortho Exam          LEFT HIP Decrease ROM of the left hip.   No skin discoloration, atrophy or swelling. Positive EHL, FHL, GS, and TA. Sensation intact to all 5 nerves of the foot. Pain straight leg raise. Leg lengths appear equal. Ambulates with Antalgic gait Negative Andres. Negative Domenic. Extensor Mechanism  intact          Procedures           Imaging Results (Most Recent)         Procedure Component Value Units Date/Time     XR Hip With or Without Pelvis 2 - 3 View Left [[id number]] Resulted: 01/22/25 0855       Updated: 01/22/25 0900                   Result Review  :      X-Ray Report:  Left hip X-Ray  Indication: Evaluation of the left hip   AP/Lateral view(s)  Findings: Moderately advanced degenerative changes.    Prior studies available for comparison: no         XR Knee 3 View Right     Result Date: 12/23/2024  Narrative: X-Ray Report: Study: X-rays ordered, taken in the office, and reviewed today. Site: Right knee xray Indication: Right total knee arthroplasty View: AP/Lateral, Standing, and [place] view(s) Findings: Intact right total knee arthroplasty. No evidence of hardware malfunction.  Patella centrally tracking. Prior studies available for comparison: yes              Assessment  Assessment and Plan      Diagnoses and all orders for this visit:     1. Left hip pain (Primary)  -     XR Hip With or Without Pelvis 2 - 3 View Left     2. Osteoarthritis of left hip, unspecified osteoarthritis type           Discussed the treatment plan with the patient. I reviewed the X-rays that were obtained today with the patient.      Discussed the treatment options with the patient, operative vs non-operative. The patient is a candidate for a left total hip arthroplasty whenever he is ready.       Discussed IM injection and intra articular hip injection.  He is not wanting surgical intervention at this time.       The patient expressed  understanding and wished to proceed with a left total hip arthroplasty.        Discussed surgery., Risks/benefits discussed with patient including, but not limited to: infection, bleeding, neurovascular damage, re-rupture, aesthetic deformity, need for further surgery, and death., Discussed with patient the implant type being used during surgery and patient understands., Surgery pamphlet given., Call or return if worsening symptoms., DME order for a 3 in 1 given today due to patient will be confined to one room/level of the home that does not offer a toilet during postop recovery. , and Patient does not have any metal allergies.      Follow Up      2 weeks postoperatively

## 2025-03-04 NOTE — PLAN OF CARE
Goal Outcome Evaluation:  Plan of Care Reviewed With: (P) patient           Outcome Evaluation: (P) Pt presents with balance deficits and difficulty with ambulation. Pt requires skilled therapy services    Anticipated Discharge Disposition (PT): (P) home with outpatient therapy services

## 2025-03-04 NOTE — PLAN OF CARE
Goal Outcome Evaluation:              Outcome Evaluation: Patient alert and oriented x 4, calm, and cooperative.  VS WNL.  LCTA.  No complaints of pain.  Patient participated in therapy services this shift.  Dsg to L hip clean, dry, and intact.  Family at chairside and supportive of patient.  Discharge instructions provided to and reviewed with patient and daughter.  Both state understanding.  Patient discharged home with all personal belongings to home with daughter in POV.

## 2025-03-04 NOTE — ANESTHESIA POSTPROCEDURE EVALUATION
Patient: Hector Coronel    Procedure Summary       Date: 03/04/25 Room / Location: [facility] OR 02 / [facility] MAIN OR    Anesthesia Start: 0913 Anesthesia Stop: 1052    Procedure: LEFT TOTAL HIP ARTHROPLASTY ANTERIOR (Left: Hip) Diagnosis:       Osteoarthritis of left hip, unspecified osteoarthritis type      (Osteoarthritis of left hip, unspecified osteoarthritis type [M16.12])    Surgeons: Jose Talavera MD Provider: Gilda Chamberlain MD    Anesthesia Type: ERAS Protocol, general ASA Status: 3            Anesthesia Type: ERAS Protocol, general    Vitals  Vitals Value Taken Time   /67 03/04/25 1152   Temp 36.4 °C (97.6 °F) 03/04/25 1140   Pulse 78 03/04/25 1155   Resp 16 03/04/25 1145   SpO2 96 % 03/04/25 1155   Vitals shown include unfiled device data.        Post Anesthesia Care and Evaluation    Patient location during evaluation: bedside  Patient participation: complete - patient participated  Level of consciousness: awake  Pain management: adequate    Airway patency: patent  PONV Status: none  Cardiovascular status: acceptable and stable  Respiratory status: acceptable  Hydration status: acceptable

## 2025-03-06 ENCOUNTER — TREATMENT (OUTPATIENT)
Dept: PHYSICAL THERAPY | Facility: CLINIC | Age: 70
End: 2025-03-06
Payer: MEDICARE

## 2025-03-06 DIAGNOSIS — Z96.642 S/P TOTAL LEFT HIP ARTHROPLASTY: Primary | ICD-10-CM

## 2025-03-06 DIAGNOSIS — M25.552 LEFT HIP PAIN: ICD-10-CM

## 2025-03-06 DIAGNOSIS — R26.2 DIFFICULTY WALKING: ICD-10-CM

## 2025-03-06 DIAGNOSIS — M25.652 HIP STIFFNESS, LEFT: ICD-10-CM

## 2025-03-06 NOTE — PROGRESS NOTES
Physical Therapy Initial Evaluation and Plan of Care                    [facility] [address] [place] [address]    Patient: Hector Coronel   : 1955  Diagnosis/ICD-10 Code:  S/P total left hip arthroplasty [Z96.642]  Referring practitioner: Jose Talavera MD  Date of Initial Visit: 3/6/2025  Today's Date: 3/6/2025  Patient seen for 1 sessions           Subjective Questionnaire: LEFS: 3/80 = 3.75% Function    Subjective Evaluation    History of Present Illness  Mechanism of injury: The patient presents to physical therapy s/p left total hip arthroplasty on 3/4/24. This was performed as an outpatient procedure. The pain today is located in the front/side of the proximal half of his left thigh. Pain is increased with leaning forward (to stand up) and if he shifts his weight the wrong way while standing on the left hip. He is taking prescribed medications and using ice for pain management. He denies n/t into his left leg. He is using a walker for ambulation. He has a previous history of right DIANE, left TKA, and right TKA.    Pain  Current pain ratin  At worst pain ratin         Past Medical History:   Diagnosis Date    Anxiety     Arthritis     BACK    Bunion     Coronary artery disease 2022    BLOCKAGE- 1 STENT PLACED 2022- SEE'S DILEEP, DENIES CP BUT GETS SOAE.    Depression     Diabetes mellitus     BORDERLINE- DOESN'T CHECK BG AT HOME    Enlarged prostate     GERD (gastroesophageal reflux disease)     h/o Edema     PRIOR TO STENT PLACEMENT    H/O Pneumonia     Hammer toe     History of transfusion     AS A CHILD STATES HE DOESNT THINK ANY ISSUE WITH IT    Hoarseness     Hyperlipidemia     Murmur, cardiac     ASYMPTOMATIC- SOAOE    Neck pain     VERTABRA PUSHING ON THROAT    Osteoarthritis     OG HIPS, BILATERL KNEES, L SHOULDER    PONV (postoperative nausea and vomiting)     Seasonal allergies     Sleep apnea     USES BIPAP    SOBOE (shortness of breath on exertion)      TIA (transient ischemic attack)     MULTI. YEARS AGO- NO RESIDUAL    Umbilical hernia       Past Surgical History:   Procedure Laterality Date    APPENDECTOMY      CARDIAC CATHETERIZATION Right 05/31/2022    Procedure: Left Heart Cath;  Surgeon: Eduardo Buck MD;  Location: [facility] CATH INVASIVE LOCATION;  Service: Cardiovascular;  Laterality: Right;    CARPAL TUNNEL RELEASE Bilateral     CHOLECYSTECTOMY      COLONOSCOPY      HERNIA REPAIR      UMBILICAL    INNER EAR SURGERY      BOTH SIDES    THROAT SURGERY      BIOPSY, RMOVED POLYPS    TOTAL HIP ARTHROPLASTY Right 11/22/2022    Procedure: RIGHT TOTAL HIP ARTHROPLASTY ANTERIOR;  Surgeon: Jose Talavera MD;  Location: [facility] MAIN OR;  Service: Orthopedics;  Laterality: Right;    TOTAL HIP ARTHROPLASTY Left 3/4/2025    Procedure: LEFT TOTAL HIP ARTHROPLASTY ANTERIOR;  Surgeon: Jose Talavera MD;  Location: [facility] MAIN OR;  Service: Orthopedics;  Laterality: Left;    TOTAL KNEE ARTHROPLASTY Left 05/05/2023    Procedure: TOTAL KNEE ARTHROPLASTY WITH RYLAND ROBOT;  Surgeon: Jose Talavera MD;  Location: [facility] MAIN OR;  Service: Robotics - Ortho;  Laterality: Left;    TOTAL KNEE ARTHROPLASTY Right 10/01/2024    Procedure: RIGHT TOTAL KNEE ARTHROPLASTY;  Surgeon: Jose Talavera MD;  Location: [facility] OR;  Service: Robotics - Ortho;  Laterality: Right;    VASECTOMY      1979       Objective          Tenderness     Additional Tenderness Details  Tenderness to palpation in left thigh over incision site.    Neurological Testing     Additional Neurological Details  Sensation to light touch intact and equal bilaterally from L2-S1 dermatomal pattern except for hyposensation in anterior/lateral thigh on the left.    Active Range of Motion   Left Hip   Flexion: 75 degrees   External rotation (90/90): 10 degrees   Internal rotation (90/90): Left hip active internal rotation 90/90: 5 lacking from neutral.     Right Hip   Flexion: 90 degrees   External rotation  (90/90): 20 degrees   Internal rotation (90/90): 20 degrees     Passive Range of Motion   Left Hip   Flexion: 85 degrees with pain  External rotation (90/90): 25 degrees with pain  Internal rotation (90/90): 0 degrees with pain    Strength/Myotome Testing     Left Hip   Planes of Motion   Flexion: 2+  Abduction: 3+  Adduction: 3+    Right Hip   Planes of Motion   Flexion: 4  Abduction: 4+  Adduction: 4+    Left Knee   Flexion: 4  Extension: 4-  Quadriceps contraction: fair    Right Knee   Flexion: 5  Extension: 5  Quadriceps contraction: good    Ambulation     Ambulation: Level Surfaces     Additional Level Surfaces Ambulation Details  The patient ambulates with a rolling walker with decreased stance time on the left lower extremity and short step length with the right lower extremity.      See Exercise, Manual, and Modality Logs for complete treatment.     Assessment & Plan       Assessment  Impairments: abnormal gait, abnormal muscle firing, abnormal or restricted ROM, activity intolerance, impaired balance, impaired physical strength, lacks appropriate home exercise program, pain with function, safety issue and weight-bearing intolerance   Functional limitations: walking, uncomfortable because of pain, moving in bed, standing and stooping   Assessment details: The patient presents to physical therapy with complaints of left hip pain s/p left total hip arthroplasty on 3/4/25. The patient presents with associated left hip weakness, stiffness, antalgic gait, and functional deficits (LEFS). The patient would benefit from skilled PT intervention to address the above mentioned functional limitations to allow the patient to return to his prior level of function.   Prognosis: good    Goals  Plan Goals: HIP PROBLEMS    1. The patient complains of left hip pain.   LTG 1: 12 weeks:  The patient will report a pain rating of 1/10 or better in order to improve  tolerance to activities of daily living and improve sleep  quality.    STATUS:  New   STG 1a: 6 weeks:  The patient will report a pain rating of 2/10 or better.    STATUS:  New    2. The patient demonstrates weakness of the left hip.   LTG 2: 12 weeks:  The patient will demonstrate 4+/5 strength for left hip flexion, abduction,  and adduction in order to improve hip stability.    STATUS:  New   STG 2a: 6 weeks:  The patient will demonstrate 4-/5 strength for left hip flexion, abduction,  and adduction.    STATUS:  New    3. The patient has gait dysfunction.   LTG 3: 12 weeks:  The patient will ambulate without assistive device, independently, for community distances with normal biomechanics in order to improve mobility and allow patient to perform activities such as grocery shopping with greater ease.    STATUS:  New   STG 3a: The patient will be independent in HEP.    STATUS:  New    4. Mobility: Walking/Moving Around Functional Limitation     LTG 4: 12 weeks:  The patient will demonstrate 25% limitation by achieving a score of 60/80 on the Lower Extremity Functional Scale.    STATUS:  New   STG 4a: 6 weeks:  The patient will demonstrate 50% limitation by achieving a score of 40/80 on the Lower Extremity Functional Scale.      STATUS:  New    5. The patient has limited ROM of the left hip.   LTG 5: 12 weeks:  The patient will demonstrate left hip AROM as follows: 95 degrees of hip flexion, 25 degrees of internal rotation, and 25 degrees of external rotation.    STATUS:  New   STG 5a: 6 weeks:  The patient will demonstrate left hip AROM as follows: 90 degrees of hip flexion, 20 degrees of internal rotation, and 20 degrees of external rotation.    STATUS:  New        Plan  Therapy options: will be seen for skilled therapy services  Planned modality interventions: cryotherapy, electrical stimulation/Russian stimulation and TENS  Planned therapy interventions: balance/weight-bearing training, ADL retraining, soft tissue mobilization, strengthening, stretching, therapeutic  activities, joint mobilization, home exercise program, gait training, functional ROM exercises, flexibility, body mechanics training, postural training, neuromuscular re-education and manual therapy  Frequency: 2x week  Duration in weeks: 12  Treatment plan discussed with: patient        Visit Diagnoses:    ICD-10-CM ICD-9-CM   1. S/P total left hip arthroplasty  Z96.642 V43.64   2. Left hip pain  M25.552 719.45   3. Hip stiffness, left  M25.652 719.55   4. Difficulty walking  R26.2 719.7       History # of Personal Factors and/or Comorbidities: MODERATE (1-2)  Examination of Body System(s): # of elements: LOW (1-2)  Clinical Presentation: STABLE   Clinical Decision Making: LOW       Timed:         Manual Therapy:    5     mins  73124;     Therapeutic Exercise:    5     mins  47642;     Neuromuscular Dilan:    0    mins  39202;    Therapeutic Activity:     0     mins  93832;     Gait Trainin     mins  88444;     Ultrasound:     0     mins  44037;    Ionto                               0    mins   24594  Self Care                       0     mins   32687  Canalith Repos    0     mins 94217      Un-Timed:  Electrical Stimulation:    0     mins  40181 ( );  Dry Needling     0     mins self-pay  Traction     0     mins 03878  Low Eval     20     Mins  24115  Mod Eval     0     Mins  95381  High Eval                       0     Mins  11899  Re-Eval                           0    mins  70369    Timed Treatment:   10   mins   Total Treatment:     30   mins    PT SIGNATURE: Raffy Chamberlain PT    Electronically signed 3/6/2025    [place] License: PT - [id number]      Initial Certification  Certification Period: 3/6/2025 thru 6/3/2025  I certify that the therapy services are furnished while this patient is under my care.  The services outlined above are required by this patient, and will be reviewed every 90 days.     PHYSICIAN: Jose Talavera MD  NPI: [id number]      DATE:     Please sign and return via fax to  [phone]. Thank you, [facility].

## 2025-03-11 ENCOUNTER — TREATMENT (OUTPATIENT)
Dept: PHYSICAL THERAPY | Facility: CLINIC | Age: 70
End: 2025-03-11
Payer: MEDICARE

## 2025-03-11 DIAGNOSIS — M25.552 LEFT HIP PAIN: ICD-10-CM

## 2025-03-11 DIAGNOSIS — M25.652 HIP STIFFNESS, LEFT: ICD-10-CM

## 2025-03-11 DIAGNOSIS — R26.2 DIFFICULTY WALKING: ICD-10-CM

## 2025-03-11 DIAGNOSIS — Z96.642 S/P TOTAL LEFT HIP ARTHROPLASTY: Primary | ICD-10-CM

## 2025-03-11 NOTE — PROGRESS NOTES
Physical Therapy Daily Treatment Note                      [facility] [address] [place] [address]    Patient: Hector Coronel   : 1955  Diagnosis/ICD-10 Code:  S/P total left hip arthroplasty [Z96.642]  Referring practitioner: Jose Talavera MD  Date of Initial Visit: Type: THERAPY  Noted: 3/6/2025  Today's Date: 3/11/2025  Patient seen for 2 sessions           Subjective   The patient reported that his pain level is a 5/10 today. He still has more pain and swelling than he would like in his hip. He did take a pain pill before PT today.    Objective   See Exercise, Manual, and Modality Logs for complete treatment.     Assessment/Plan    The patient demonstrated good tolerance to light strengthening exercise today. His left hip is still stiff, weak, and painful. Continue to progress with current PT plan of care per patient tolerance.         Timed:  Manual Therapy:    0     mins  35671;  Therapeutic Exercise:    20     mins  79089;     Neuromuscular Dilan:   0    mins  59960;    Therapeutic Activity:     10     mins  66613;     Gait Trainin     mins  93201;     Aquatics                         0      mins  64224    Un-timed:  Mechanical Traction      0     mins  76619  Dry Needling     0     mins self-pay  Electrical Stimulation:    0     mins  34083 ( );      Timed Treatment:   30   mins   Total Treatment:     30   mins    Raffy Chamberlain PT  Physical Therapist    Electronically signed 3/11/2025    [place] License: PT - [id number]

## 2025-03-14 ENCOUNTER — TREATMENT (OUTPATIENT)
Dept: PHYSICAL THERAPY | Facility: CLINIC | Age: 70
End: 2025-03-14
Payer: MEDICARE

## 2025-03-14 DIAGNOSIS — R26.2 DIFFICULTY WALKING: ICD-10-CM

## 2025-03-14 DIAGNOSIS — Z96.642 S/P TOTAL LEFT HIP ARTHROPLASTY: Primary | ICD-10-CM

## 2025-03-14 DIAGNOSIS — M25.652 HIP STIFFNESS, LEFT: ICD-10-CM

## 2025-03-14 DIAGNOSIS — M25.552 LEFT HIP PAIN: ICD-10-CM

## 2025-03-14 NOTE — PROGRESS NOTES
Physical Therapy Daily Treatment Note                      [facility] [address] [place] [address]    Patient: Hector Coronel   : 1955  Diagnosis/ICD-10 Code:  S/P total left hip arthroplasty [Z96.642]  Referring practitioner: Jose Talavera MD  Date of Initial Visit: Type: THERAPY  Noted: 3/6/2025  Today's Date: 3/14/2025  Patient seen for 3 sessions           Subjective   The patient reported that his hip feels about the same today as last visit. He has no new complaints.    Objective   See Exercise, Manual, and Modality Logs for complete treatment.     Assessment/Plan    The patient demonstrated good tolerance to all functional lower extremity strengthening exercise. He demonstrated some improvement in his ability to lift his left leg during ambulation.Continue to progress with current PT plan of care per patient tolerance.         Timed:  Manual Therapy:    0     mins  63071;  Therapeutic Exercise:    20     mins  01885;     Neuromuscular Dilan:   0    mins  36169;    Therapeutic Activity:     10     mins  13941;     Gait Trainin     mins  86305;     Aquatics                         0      mins  40333    Un-timed:  Mechanical Traction      0     mins  62594  Dry Needling     0     mins self-pay  Electrical Stimulation:    0     mins  63873 ( );      Timed Treatment:   30   mins   Total Treatment:     30   mins    Raffy Chamberlain PT  Physical Therapist    Electronically signed 3/14/2025    [place] License: PT - [id number]

## 2025-03-17 ENCOUNTER — OFFICE VISIT (OUTPATIENT)
Dept: ORTHOPEDIC SURGERY | Facility: CLINIC | Age: 70
End: 2025-03-17
Payer: MEDICARE

## 2025-03-17 VITALS
HEIGHT: 69 IN | OXYGEN SATURATION: 94 % | WEIGHT: 240.3 LBS | SYSTOLIC BLOOD PRESSURE: 132 MMHG | DIASTOLIC BLOOD PRESSURE: 69 MMHG | BODY MASS INDEX: 35.59 KG/M2 | HEART RATE: 100 BPM

## 2025-03-17 DIAGNOSIS — Z96.642 S/P TOTAL LEFT HIP ARTHROPLASTY: Primary | ICD-10-CM

## 2025-03-17 DIAGNOSIS — M25.552 LEFT HIP PAIN: ICD-10-CM

## 2025-03-17 PROCEDURE — 1159F MED LIST DOCD IN RCRD: CPT | Performed by: PHYSICIAN ASSISTANT

## 2025-03-17 PROCEDURE — 1160F RVW MEDS BY RX/DR IN RCRD: CPT | Performed by: PHYSICIAN ASSISTANT

## 2025-03-17 PROCEDURE — 99024 POSTOP FOLLOW-UP VISIT: CPT | Performed by: PHYSICIAN ASSISTANT

## 2025-03-17 NOTE — PATIENT INSTRUCTIONS
X-rays reviewed, showing intact hardware.   Sutures/staples removed in office, steri-strips applied. Allow steri-strips to fall off on their own in 7-10 days.  Patient may shower, but avoid submersion in water until incision is fully healed. Do not apply any lotions, creams, or ointments to the incision at this time    Continue PT and use of walker until recommended by PT. We discussed the importance of home exercises in addition to PT.   Continue icing of the hip for management of swelling for approximately 15-20 minutes, three times daily, and as needed.     Patient will continue aspirin for DVT prophylaxis until prescription completed.   We discussed the importance of weaning from narcotic medications.     Follow-up in 4 weeks. Repeat imaging not needed at this visit.     Call or return if symptoms worsen or patient has any concerns.

## 2025-03-17 NOTE — PROGRESS NOTES
Chief Complaint  Pain and Follow-up of the Left Hip and Suture / Staple Removal    Subjective          History of Present Illness     Hector Coronel is a 69 y.o. male presents today for a follow-up of left hip.  Patient is 2 weeks postop left total hip arthroplasty performed by Dr. Talavera on 3/4/2025.  Patient presents today with use of a walker.  He states that the pain is not as bad as it was before surgery.  The only things he still has a lot of discomfort with is external rotation of the hip and when he tries to lay with the entire left lower extremity flat on the bed.  He still has a lot of swelling and his wife inquires about that.  He is attending therapy at [facility].    Patient denies fever or chills.  Denies redness, drainage, or complications from incision site.  Nursing staff reported when staples were removed there was some mild gapping at the most proximal aspect of incision in the skin fold.    Allergies   Allergen Reactions    Metal Rash     CHEAP METAL- BELT HAZEL BREAK HIM OUT         Social History     Socioeconomic History    Marital status:    Tobacco Use    Smoking status: Former     Current packs/day: 0.00     Types: Cigarettes     Quit date:      Years since quittin.2    Smokeless tobacco: Never    Tobacco comments:     quit 30 years ago   Vaping Use    Vaping status: Never Used   Substance and Sexual Activity    Alcohol use: Yes     Alcohol/week: 3.0 standard drinks of alcohol     Types: 3 Shots of liquor per week    Drug use: Never    Sexual activity: Defer        I reviewed the patient's chief complaint, history of present illness, review of systems, past medical history, surgical history, family history, social history, medications, and allergy list.     REVIEW OF SYSTEMS    Constitutional: Denies fevers, chills, weight loss  Cardiovascular: Denies chest pain, shortness of breath  Skin: Denies rashes, acute skin changes  Neurologic: Denies headache, loss of consciousness  MSK:  "Left hip pain      Objective   Vital Signs:   /69   Pulse 100   Ht 175.3 cm (69\")   Wt 109 kg (240 lb 4.8 oz)   SpO2 94%   BMI 35.49 kg/m²     Body mass index is 35.49 kg/m².    Physical Exam    Tobacco Use: Medium Risk (3/18/2025)    Patient History     Smoking Tobacco Use: Former     Smokeless Tobacco Use: Never     Passive Exposure: Not on file     Patient reports they have a history of tobacco use; encouraged continued tobacco cessation for further health benefits.     General: Alert, no acute distress  Gait: Antalgic with use of walker  Left lower extremity: Staples removed today without complications.  Anterior hip incision is well approximated and healing appropriately.  There was some very mild gapping at the most proximal aspect of the incision, there is no obvious wound dehiscence noted.  Steri-Strips in place.  No redness or active drainage noted.  No concerning signs of infection.  Moderate to severe swelling. No pain with passive hip ROM.  Knee extensor mechanism intact.  Intact hip abduction.  Hip flexion intact, but weak/tight.  Leg lengths appear symmetric.  Calf soft, nontender.  Demonstrates active ankle plantarflexion dorsiflexion.  Sensation intact over the dorsal and plantar foot.  Palpable pedal pulses.        Imaging Results (Most Recent)       Procedure Component Value Units Date/Time    XR Hip With or Without Pelvis 2 - 3 View Left [[id number]] Resulted: 03/17/25 1607     Updated: 03/17/25 1607    Narrative:      X-Ray Report:  Study: X-rays ordered, taken in the office, and reviewed today.   Site: Left Hip Xray  Indication: Left total hip arthroplasty follow-up  View: AP, frog lateral left hip  Findings: Intact left total hip arthroplasty.  No evidence of hardware   complications or loosening.  No periprosthetic fractures are visualized.    Hip joint is reduced.  Prior studies available for comparison: yes                  Assessment and Plan    Diagnoses and all orders for this " visit:    1. S/P total left hip arthroplasty (Primary)    2. Left hip pain  -     XR Hip With or Without Pelvis 2 - 3 View Left            Follow Up   No follow-ups on file.  Patient Instructions   X-rays reviewed, showing intact hardware.   Sutures/staples removed in office, steri-strips applied. Allow steri-strips to fall off on their own in 7-10 days.  Patient may shower, but avoid submersion in water until incision is fully healed. Do not apply any lotions, creams, or ointments to the incision at this time    Continue PT and use of walker until recommended by PT. We discussed the importance of home exercises in addition to PT.   Continue icing of the hip for management of swelling for approximately 15-20 minutes, three times daily, and as needed.     Patient will continue aspirin for DVT prophylaxis until prescription completed.   We discussed the importance of weaning from narcotic medications.     Follow-up in 4 weeks. Repeat imaging not needed at this visit.     Call or return if symptoms worsen or patient has any concerns.     Patient was given instructions and counseling regarding his condition or for health maintenance advice. Please see specific information pulled into the AVS if appropriate.       Saima Ruth PA-C  03/18/25  12:21 EDT

## 2025-03-21 ENCOUNTER — TREATMENT (OUTPATIENT)
Dept: PHYSICAL THERAPY | Facility: CLINIC | Age: 70
End: 2025-03-21
Payer: MEDICARE

## 2025-03-21 DIAGNOSIS — R26.2 DIFFICULTY WALKING: ICD-10-CM

## 2025-03-21 DIAGNOSIS — M25.552 LEFT HIP PAIN: ICD-10-CM

## 2025-03-21 DIAGNOSIS — M25.652 HIP STIFFNESS, LEFT: ICD-10-CM

## 2025-03-21 DIAGNOSIS — Z96.642 S/P TOTAL LEFT HIP ARTHROPLASTY: Primary | ICD-10-CM

## 2025-03-21 NOTE — PROGRESS NOTES
Physical Therapy Daily Treatment Note                      [facility] [address] [place] [address]    Patient: Hector Coronel   : 1955  Diagnosis/ICD-10 Code:  S/P total left hip arthroplasty [Z96.642]  Referring practitioner: Jose Talavera MD  Date of Initial Visit: Type: THERAPY  Noted: 3/6/2025  Today's Date: 3/21/2025  Patient seen for 4 sessions           Subjective   The patient reported that his left hip is bothering him a little more than normal today. He may have overdone it with walking yesterday.     Objective   See Exercise, Manual, and Modality Logs for complete treatment.     Assessment/Plan    The patient demonstrated good tolerance to all functional lower extremity strengthening exercise. His increased left hip pain is likely secondary to overuse yesterday. I don't suspect a new injury. Continue to progress with current PT plan of care per patient tolerance.         Timed:  Manual Therapy:    0     mins  58481;  Therapeutic Exercise:    20     mins  90732;     Neuromuscular Dilan:   8    mins  19521;    Therapeutic Activity:     10     mins  62401;     Gait Trainin     mins  37389;     Aquatics                         0      mins  08661    Un-timed:  Mechanical Traction      0     mins  39199  Dry Needling     0     mins self-pay  Electrical Stimulation:    0     mins  74995 ( );      Timed Treatment:   38   mins   Total Treatment:     38   mins    Raffy Chamberlain PT  Physical Therapist    Electronically signed 3/21/2025    [place] License: PT - [id number]

## 2025-03-26 ENCOUNTER — TREATMENT (OUTPATIENT)
Dept: PHYSICAL THERAPY | Facility: CLINIC | Age: 70
End: 2025-03-26
Payer: MEDICARE

## 2025-03-26 DIAGNOSIS — R26.2 DIFFICULTY WALKING: ICD-10-CM

## 2025-03-26 DIAGNOSIS — M25.652 HIP STIFFNESS, LEFT: ICD-10-CM

## 2025-03-26 DIAGNOSIS — M25.552 LEFT HIP PAIN: ICD-10-CM

## 2025-03-26 DIAGNOSIS — Z96.642 S/P TOTAL LEFT HIP ARTHROPLASTY: Primary | ICD-10-CM

## 2025-03-26 NOTE — PROGRESS NOTES
Physical Therapy Daily Treatment Note      Patient: Hector Coronel   : 1955  Referring practitioner: Jose Talavera MD  Date of Initial Visit: Type: THERAPY  Noted: 3/6/2025  Today's Date: 3/26/2025  Patient seen for 5 sessions           Subjective Questionnaire:       Subjective Evaluation    History of Present Illness    Subjective comment: Pt reports he feels pretty good with a little hip with sit to stand and moving in bed.       Objective   See Exercise, Manual, and Modality Logs for complete treatment.       Assessment & Plan       Assessment  Assessment details: Pt reports progress reporting he can lift his leg enough to ted pants and is dressing himself now.  Pt reports he was able to sit on the couch this morning which he hadn't been able to do and was able to transfer sit to stand I.  Pt was short of air after ambulating 2 laps in the clinic.  Pt will benefit from continued PT.        Visit Diagnoses:    ICD-10-CM ICD-9-CM   1. S/P total left hip arthroplasty  Z96.642 V43.64   2. Left hip pain  M25.552 719.45   3. Hip stiffness, left  M25.652 719.55   4. Difficulty walking  R26.2 719.7       Progress per Plan of Care and Progress strengthening /stabilization /functional activity           Timed:  Manual Therapy:         mins  32425;  Therapeutic Exercise:    10     mins  64531;     Neuromuscular Dilan:    8    mins  78813;    Therapeutic Activity:     10     mins  79389;     Gait Training:           mins  26788;     Ultrasound:          mins  06760;    Electrical Stimulation:         mins  14522 ( );  Aquatic Therapy          mins  47645    Untimed:  Electrical Stimulation:         mins  49175 ( );  Mechanical Traction:         mins  36494;     Timed Treatment:   28   mins   Total Treatment:     28   mins    Electronically signed    Tania Valencia PTA  Physical Therapist Assistant    [place] license: [id number]

## 2025-03-28 ENCOUNTER — TREATMENT (OUTPATIENT)
Dept: PHYSICAL THERAPY | Facility: CLINIC | Age: 70
End: 2025-03-28
Payer: MEDICARE

## 2025-03-28 DIAGNOSIS — R26.2 DIFFICULTY WALKING: ICD-10-CM

## 2025-03-28 DIAGNOSIS — M25.552 LEFT HIP PAIN: ICD-10-CM

## 2025-03-28 DIAGNOSIS — Z96.642 S/P TOTAL LEFT HIP ARTHROPLASTY: Primary | ICD-10-CM

## 2025-03-28 DIAGNOSIS — M25.652 HIP STIFFNESS, LEFT: ICD-10-CM

## 2025-03-28 NOTE — PROGRESS NOTES
"Outpatient Physical Therapy                   Physical Therapy Daily Treatment Note    Patient: Hector Coronel   : 1955  Diagnosis/ICD-10 Code:  S/P total left hip arthroplasty [Z96.642]  Referring practitioner: Jose Talavera MD  Date of Initial Visit: Type: THERAPY  Noted: 3/6/2025  Today's Date: 3/28/2025  Patient seen for 6 sessions             Subjective   Hector Coronel reports: 0/10 pain, at time of arrival but with certain movements he has 4/10 sharp pain. \"Only if I step wrong.\"     Objective     See Exercise, Manual, and Modality Logs for complete treatment.     Assessment/Plan  Hector demonstrated good tolerance to all functional lower strengthening . Pt notes becoming SOA with exercises so rest breaks were given as needed and oxygen saturation level was checked and was WNL. Continue to progress with current PT plan of care per patient tolerance.     Progress per Plan of Care      Timed:  Manual Therapy:    0     mins  00011;  Therapeutic Exercise:    13     mins  27224;     Neuromuscular Dilan:    0    mins  94028;    Therapeutic Activity:     10     mins  25076;     Self care:                       0     mins  26695;  Gait Trainin     mins  03114;       Ultrasound:                    0     mins  64139;      Untimed:  Mechanical Traction:    0     mins  85089;   Electrical Stimulation:    0     mins  90422 ( );      Timed Treatment:   23   mins   Total Treatment:     23   mins      Electronically signed:   Jessica Mccollum PTA  Physical Therapist Assistant  [facility] License #: [id number]  "

## 2025-04-01 ENCOUNTER — TREATMENT (OUTPATIENT)
Dept: PHYSICAL THERAPY | Facility: CLINIC | Age: 70
End: 2025-04-01
Payer: MEDICARE

## 2025-04-01 DIAGNOSIS — Z96.642 S/P TOTAL LEFT HIP ARTHROPLASTY: Primary | ICD-10-CM

## 2025-04-01 DIAGNOSIS — M25.652 HIP STIFFNESS, LEFT: ICD-10-CM

## 2025-04-01 DIAGNOSIS — M25.552 LEFT HIP PAIN: ICD-10-CM

## 2025-04-01 DIAGNOSIS — R26.2 DIFFICULTY WALKING: ICD-10-CM

## 2025-04-01 PROCEDURE — 97110 THERAPEUTIC EXERCISES: CPT | Performed by: PHYSICAL THERAPIST

## 2025-04-01 PROCEDURE — 97530 THERAPEUTIC ACTIVITIES: CPT | Performed by: PHYSICAL THERAPIST

## 2025-04-01 NOTE — PROGRESS NOTES
"Outpatient Physical Therapy                   Physical Therapy Daily Treatment Note    Patient: Hector Coronel   : 1955  Diagnosis/ICD-10 Code:  S/P total left hip arthroplasty [Z96.642]  Referring practitioner: Jose Talavera MD  Date of Initial Visit: Type: THERAPY  Noted: 3/6/2025  Today's Date: 2025  Patient seen for 7 sessions             Subjective   Hector Coronel reports: he thinks he strained some muscles last time doing sit to stands. Pt states he is still sore.     Pain: 0/10 pain, \"just stiffness\"     Objective     See Exercise, Manual, and Modality Logs for complete treatment.     Assessment/Plan  Sit to stands and total gym exercises were held today due to pt being sore from those exercises last session. Pt was fatigued by exercises and would benefit from skilled PT to address Range of Motion  and Strength deficits, pain management and any concerns with ADLs.       Progress per Plan of Care      Timed:  Manual Therapy:    0     mins  49043;  Therapeutic Exercise:    17     mins  13926;     Neuromuscular Dilan:    0    mins  77486;    Therapeutic Activity:     10     mins  90988;     Self care:                       0     mins  96766;  Gait Trainin     mins  16706;       Ultrasound:                    0     mins  74035;      Untimed:  Mechanical Traction:    0     mins  03781;   Electrical Stimulation:    0     mins  63074 ( );      Timed Treatment:   27   mins   Total Treatment:     27   mins      Electronically signed:   Jessica Mccollum PTA  Physical Therapist Assistant  [facility] License #: [id number]  "

## 2025-04-04 ENCOUNTER — TREATMENT (OUTPATIENT)
Dept: PHYSICAL THERAPY | Facility: CLINIC | Age: 70
End: 2025-04-04
Payer: MEDICARE

## 2025-04-04 DIAGNOSIS — Z96.642 S/P TOTAL LEFT HIP ARTHROPLASTY: Primary | ICD-10-CM

## 2025-04-04 DIAGNOSIS — M25.552 LEFT HIP PAIN: ICD-10-CM

## 2025-04-04 DIAGNOSIS — R26.2 DIFFICULTY WALKING: ICD-10-CM

## 2025-04-04 DIAGNOSIS — M25.652 HIP STIFFNESS, LEFT: ICD-10-CM

## 2025-04-04 PROCEDURE — 97530 THERAPEUTIC ACTIVITIES: CPT

## 2025-04-04 PROCEDURE — 97110 THERAPEUTIC EXERCISES: CPT
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
## 2025-04-07 ENCOUNTER — TREATMENT (OUTPATIENT)
Dept: PHYSICAL THERAPY | Facility: CLINIC | Age: 70
End: 2025-04-07
Payer: MEDICARE

## 2025-04-07 DIAGNOSIS — M25.652 HIP STIFFNESS, LEFT: ICD-10-CM

## 2025-04-07 DIAGNOSIS — Z96.642 S/P TOTAL LEFT HIP ARTHROPLASTY: Primary | ICD-10-CM

## 2025-04-07 DIAGNOSIS — R26.2 DIFFICULTY WALKING: ICD-10-CM

## 2025-04-07 DIAGNOSIS — M25.552 LEFT HIP PAIN: ICD-10-CM

## 2025-04-07 PROCEDURE — 97164 PT RE-EVAL EST PLAN CARE: CPT | Performed by: PHYSICAL THERAPIST

## 2025-04-07 NOTE — PROGRESS NOTES
Progress Note  [facility] [address] [place] [address]      Patient: Hector Coronel   : 1955  Diagnosis/ICD-10 Code:  S/P total left hip arthroplasty [Z96.642]  Referring practitioner: Jose Talavera MD  Date of Initial Visit: Type: THERAPY  Noted: 3/6/2025  Today's Date: 2025  Patient seen for 9 sessions      Subjective:   Subjective Questionnaire: LEFS:  = 26.25% Function   Clinical Progress: improved  Home Program Compliance: Yes  Treatment has included: therapeutic exercise, neuromuscular re-education, manual therapy, and therapeutic activity    Subjective   The patient reported that his hip pain today is a 2/10. Over the past month, he has noticed improvements in pain, his ability to go up/down stairs, walking (now using a cane instead of a walker), and with strength. He is still unable to sleep on his left side due to pain and can tell that his left hip is still stiff. He does still have trouble getting socks on. He would like to continue with PT at this time.    Objective   Tenderness      Additional Tenderness Details  Tenderness to palpation in left thigh over incision site.     Neurological Testing      Additional Neurological Details  Sensation to light touch intact and equal bilaterally from L2-S1 dermatomal pattern except for hyposensation in anterior/lateral thigh on the left.     Active Range of Motion   Left Hip   Flexion: 85 degrees   External rotation (90/90): 25 degrees   Internal rotation (90/90): 0 degrees     Right Hip   Flexion: 90 degrees   External rotation (90/90): 20 degrees   Internal rotation (90/90): 20 degrees      Passive Range of Motion   Left Hip   Flexion: 85 degrees with pain  External rotation (90/90): 25 degrees with pain  Internal rotation (90/90): 0 degrees with pain     Strength/Myotome Testing      Left Hip (tested in seated position)  Planes of Motion   Flexion: 4-  Abduction: 4-  Adduction: 4-     Right Hip   Planes of Motion   Flexion:  4  Abduction: 4+  Adduction: 4+     Left Knee   Flexion: 4  Extension: 4-  Quadriceps contraction: fair     Right Knee   Flexion: 5  Extension: 5  Quadriceps contraction: good     Ambulation      Ambulation: Level Surfaces      Additional Level Surfaces Ambulation Details  The patient ambulates with a single tipped cane with decreased stance time on the left lower extremity and short step length with the right lower extremity.     See Exercise, Manual, and Modality Logs for complete treatment.     Assessment/Plan  The patient was re-evaluated today and presents with improvements in left hip pain, strength, ROM, gait, and function (LEFS) compared to his initial evaluation. Although improved, he continues to present with left hip stiffness, weakness, antalgic gait, and ongoing functional deficits. Hip stiffness is is primary complaint at this time. He would benefit from continued skilled physical therapy to address remaining functional deficits and to allow the patient to return to his prior level of function.     Goals  Plan Goals: HIP PROBLEMS     1. The patient complains of left hip pain.              LTG 1: 12 weeks:  The patient will report a pain rating of 1/10 or better in order to improve  tolerance to activities of daily living and improve sleep quality.                          STATUS:  Progressing              STG 1a: 6 weeks:  The patient will report a pain rating of 2/10 or better.                          STATUS:  Met     2. The patient demonstrates weakness of the left hip.              LTG 2: 12 weeks:  The patient will demonstrate 4+/5 strength for left hip flexion, abduction,  and adduction in order to improve hip stability.                          STATUS:  progressing              STG 2a: 6 weeks:  The patient will demonstrate 4-/5 strength for left hip flexion, abduction,  and adduction.                          STATUS:  Met     3. The patient has gait dysfunction.              LTG 3: 12 weeks:   The patient will ambulate without assistive device, independently, for community distances with normal biomechanics in order to improve mobility and allow patient to perform activities such as grocery shopping with greater ease.                          STATUS:  Progressing              STG 3a: The patient will be independent in HEP.                          STATUS:  Met     4. Mobility: Walking/Moving Around Functional Limitation                               LTG 4: 12 weeks:  The patient will demonstrate 25% limitation by achieving a score of 60/80 on the Lower Extremity Functional Scale.                          STATUS:  Progressing              STG 4a: 6 weeks:  The patient will demonstrate 50% limitation by achieving a score of 40/80 on the Lower Extremity Functional Scale.                            STATUS:  Progressing     5. The patient has limited ROM of the left hip.              LTG 5: 12 weeks:  The patient will demonstrate left hip AROM as follows: 95 degrees of hip flexion, 25 degrees of internal rotation, and 25 degrees of external rotation.                          STATUS:  Progressing              STG 5a: 6 weeks:  The patient will demonstrate left hip AROM as follows: 90 degrees of hip flexion, 20 degrees of internal rotation, and 20 degrees of external rotation.                          STATUS:  Progressing    6. The patient is at an increased risk for falls based on TUG time.               LTG 6: 12 weeks:  The patient will demonstrate decreased risk for falls by completing the TUG test in 14 seconds or less.                          STATUS:  New              STG 6a: 6 weeks:  The patient will demonstrate decreased risk for falls by completing the TUG test in 20 seconds or less.                          STATUS:  New     Progress toward previous goals: Partially Met      Recommendations: Continue as planned  Timeframe: 1 month  Prognosis to achieve goals: good    PT Signature: Raffy Chamberlain  PT    Electronically signed 2025    [place] License: [id number] - [id number]       Timed:  Manual Therapy:    0     mins  79499;  Therapeutic Exercise:    0     mins  70440;     Neuromuscular Dilan:    0    mins  91449;    Therapeutic Activity:     0     mins  68569;     Gait Trainin     mins  50370;     Aquatics                         0      mins  18795    Un-timed:  Mechanical Traction      0     mins  96363  Dry Needling     0     mins self-pay  Electrical Stimulation:    0     mins  43186 ( )  Re-evaluation:               10     mins 58881;    Timed Treatment:   0   mins   Total Treatment:     40   mins

## 2025-04-11 ENCOUNTER — TREATMENT (OUTPATIENT)
Dept: PHYSICAL THERAPY | Facility: CLINIC | Age: 70
End: 2025-04-11
Payer: MEDICARE

## 2025-04-11 DIAGNOSIS — R26.2 DIFFICULTY WALKING: ICD-10-CM

## 2025-04-11 DIAGNOSIS — M25.652 HIP STIFFNESS, LEFT: ICD-10-CM

## 2025-04-11 DIAGNOSIS — Z96.642 S/P TOTAL LEFT HIP ARTHROPLASTY: Primary | ICD-10-CM

## 2025-04-11 DIAGNOSIS — M25.552 LEFT HIP PAIN: ICD-10-CM

## 2025-04-11 NOTE — PROGRESS NOTES
Outpatient Physical Therapy                   Physical Therapy Daily Treatment Note    Patient: Hector Coronel   : 1955  Diagnosis/ICD-10 Code:  S/P total left hip arthroplasty [Z96.642]  Referring practitioner: Jose Talavera MD  Date of Initial Visit: Type: THERAPY  Noted: 3/6/2025  Today's Date: 2025  Patient seen for 10 sessions             Subjective   Hector Coronel reports: patient states that he has been working going up and down stairs at home for repetition and endurance. He also states that he has been mowing his lawn as well to get back to doing activities he has been doing before.     Pain: 0/10 pain, left hip     Objective     See Exercise, Manual, and Modality Logs for complete treatment.     Assessment/Plan  Patient tolerates functional strength training, therapeutic activities, and balance training with little issue this day. Added sled push and pull to simulate patients push mower at home. Patient tends to ER left hip due to pain; he says it feels better that way. Patient took many rest breaks in between exercises secondary to SOA. Will continue to work on strength, balance, and simulating ADLs to meet patients goals next treatment.     Progress per Plan of Care      Timed:  Manual Therapy:    0     mins  49086;  Therapeutic Exercise:    20     mins  47353;     Neuromuscular Dilan:    8   mins  12582;    Therapeutic Activity:     10     mins  91940;     Self care:                       0     mins  92344;  Gait Trainin     mins  08068;       Ultrasound:                    0     mins  69975;      Untimed:  Mechanical Traction:    0     mins  18946;   Electrical Stimulation:    0     mins  34578 ( );      Timed Treatment:   38   mins   Total Treatment:     38   mins      Electronically signed:   Jessica Mccollum PTA  Physical Therapist Assistant  [facility] License #: [id number]

## 2025-04-15 ENCOUNTER — TREATMENT (OUTPATIENT)
Dept: PHYSICAL THERAPY | Facility: CLINIC | Age: 70
End: 2025-04-15
Payer: MEDICARE

## 2025-04-15 DIAGNOSIS — M25.652 HIP STIFFNESS, LEFT: ICD-10-CM

## 2025-04-15 DIAGNOSIS — Z96.642 S/P TOTAL LEFT HIP ARTHROPLASTY: Primary | ICD-10-CM

## 2025-04-15 DIAGNOSIS — M25.552 LEFT HIP PAIN: ICD-10-CM

## 2025-04-15 DIAGNOSIS — R26.2 DIFFICULTY WALKING: ICD-10-CM

## 2025-04-15 PROCEDURE — 97530 THERAPEUTIC ACTIVITIES: CPT | Performed by: PHYSICAL THERAPIST

## 2025-04-15 PROCEDURE — 97110 THERAPEUTIC EXERCISES: CPT | Performed by: PHYSICAL THERAPIST

## 2025-04-15 NOTE — PROGRESS NOTES
Outpatient Physical Therapy                   Physical Therapy Daily Treatment Note    Patient: Hector Coronel   : 1955  Diagnosis/ICD-10 Code:  S/P total left hip arthroplasty [Z96.642]  Referring practitioner: Jose Talavera MD  Date of Initial Visit: Type: THERAPY  Noted: 3/6/2025  Today's Date: 4/15/2025  Patient seen for 11 sessions             Subjective   Hector Coronel reports: 1/10 pain, at time of arrival located in the left hip.     Objective     See Exercise, Manual, and Modality Logs for complete treatment.     Assessment/Plan  Hector's leg gave out 2x when attempting cone taps with left UE support but he was able to continue with R UE support with no issues. Pt continues to have weakness in the LLE as evident by his leg giving out with exercise today. Pt notes it hurts when it gives out but it has gotten a lot better in the last couple weeks.     Progress per Plan of Care      Timed:  Manual Therapy:    0     mins  03810;  Therapeutic Exercise:    16     mins  70376;     Neuromuscular Dilan:    0    mins  99681;    Therapeutic Activity:     11     mins  51636;     Self care:                       0     mins  46588;  Gait Trainin     mins  03633;       Ultrasound:                    0     mins  41070;      Untimed:  Mechanical Traction:    0     mins  58959;   Electrical Stimulation:    0     mins  72844 ( );      Timed Treatment:   27   mins   Total Treatment:     27   mins      Electronically signed:   Jessica Mccollum PTA  Physical Therapist Assistant  [facility] License #: [id number]

## 2025-04-18 ENCOUNTER — OFFICE VISIT (OUTPATIENT)
Dept: ORTHOPEDIC SURGERY | Facility: CLINIC | Age: 70
End: 2025-04-18
Payer: MEDICARE

## 2025-04-18 ENCOUNTER — TREATMENT (OUTPATIENT)
Dept: PHYSICAL THERAPY | Facility: CLINIC | Age: 70
End: 2025-04-18
Payer: MEDICARE

## 2025-04-18 VITALS — HEART RATE: 113 BPM | SYSTOLIC BLOOD PRESSURE: 128 MMHG | DIASTOLIC BLOOD PRESSURE: 77 MMHG | OXYGEN SATURATION: 93 %

## 2025-04-18 DIAGNOSIS — Z96.642 S/P TOTAL LEFT HIP ARTHROPLASTY: Primary | ICD-10-CM

## 2025-04-18 DIAGNOSIS — R26.2 DIFFICULTY WALKING: ICD-10-CM

## 2025-04-18 DIAGNOSIS — M25.552 LEFT HIP PAIN: ICD-10-CM

## 2025-04-18 DIAGNOSIS — M25.652 HIP STIFFNESS, LEFT: ICD-10-CM

## 2025-04-18 PROCEDURE — 99024 POSTOP FOLLOW-UP VISIT: CPT | Performed by: PHYSICIAN ASSISTANT

## 2025-04-18 RX ORDER — HYDROCODONE BITARTRATE AND ACETAMINOPHEN 7.5; 325 MG/1; MG/1
1 TABLET ORAL EVERY 8 HOURS PRN
Qty: 21 TABLET | Refills: 0 | Status: SHIPPED | OUTPATIENT
Start: 2025-04-18

## 2025-04-18 RX ORDER — ASPIRIN 81 MG/1
81 TABLET ORAL DAILY
COMMUNITY

## 2025-04-18 NOTE — PATIENT INSTRUCTIONS
Patient is doing very well. Advised to continue PT to completion to progress strength and ROM. Advised to continue home exercises outside of/in addition to PT. PT reordered today.    Pain medication refill sent through to the pharmacy.    Continue icing hip as needed up to 4 times daily for no longer than 15-20 mins at a time.     Follow-up in 6 weeks. We will obtain x-rays of the hip at that time. Call with changes or concerns.

## 2025-04-18 NOTE — PROGRESS NOTES
Chief Complaint  Follow-up of the Left Hip    Subjective          History of Present Illness     History of Present Illness  The patient is a 70-year-old male who presents for a follow-up on his left hip. He is 6 weeks postoperative from a left total hip arthroplasty performed by Dr. Behan on 2025. During his last visit, he reported that his pain had significantly improved compared to the preoperative period, and he was attending therapy at [facility].    Physical therapy notes indicate that his leg was giving out during cone taps, which caused some discomfort. However, he felt that his condition had improved considerably over the past couple of weeks. He continues to engage in physical therapy at [facility].    A small wound at the top of the incision site is reported. He has resumed driving and stair climbing activities. Severe pain in his entire leg, extending from the knee to the hip, is experienced particularly at night. However, no discomfort is reported during the day. Pain medication is taken at night and he does wish for refill.        Allergies   Allergen Reactions    Metal Rash     CHEAP METAL- BELT HAZEL BREAK HIM OUT         Social History     Socioeconomic History    Marital status:    Tobacco Use    Smoking status: Former     Current packs/day: 0.00     Types: Cigarettes     Quit date:      Years since quittin.3    Smokeless tobacco: Never    Tobacco comments:     quit 30 years ago   Vaping Use    Vaping status: Never Used   Substance and Sexual Activity    Alcohol use: Yes     Alcohol/week: 3.0 standard drinks of alcohol     Types: 3 Shots of liquor per week    Drug use: Never    Sexual activity: Defer        I reviewed the patient's chief complaint, history of present illness, review of systems, past medical history, surgical history, family history, social history, medications, and allergy list.     REVIEW OF SYSTEMS    Constitutional:  Denies fevers, chills, weight loss  Cardiovascular: Denies chest pain, shortness of breath  Skin: Denies rashes, acute skin changes  Neurologic: Denies headache, loss of consciousness  MSK: Left hip pain      Objective   Vital Signs:   /77   Pulse 113   SpO2 93%     There is no height or weight on file to calculate BMI.    Physical Exam    Tobacco Use: Medium Risk (4/18/2025)    Patient History     Smoking Tobacco Use: Former     Smokeless Tobacco Use: Never     Passive Exposure: Not on file     Patient reports they have a history of tobacco use; encouraged continued tobacco cessation for further health benefits.     General: Alert, no acute distress  Gait: Mildly antalgic with use of assistive devices  Left lower extremity: Healing surgical incision, there is a very superficial wound approximately 4 mm in diameter with no depth and mild overlying exudate.  No concerning signs of infection.  Mild swelling. No pain with passive hip ROM.  Knee extensor mechanism intact.  Intact hip abduction.  Hip flexion intact.  Range of motion near full, approximately 75% compared to the contralateral side.  Patient demonstrates strength 4/5 with abduction and flexion, abduction strength 3/5.  Leg lengths appear symmetric.  Calf soft, nontender.  Demonstrates active ankle plantarflexion dorsiflexion.  Sensation intact over the dorsal and plantar foot.  Palpable pedal pulses.          Assessment and Plan    Diagnoses and all orders for this visit:    1. S/P total left hip arthroplasty (Primary)  -     Ambulatory Referral to Physical Therapy for Evaluation & Treatment    2. Left hip pain  -     Ambulatory Referral to Physical Therapy for Evaluation & Treatment        Assessment & Plan  1. Postoperative status following left total hip arthroplasty:   Incision appears to be healing well with no signs of infection or complications.  Vies to monitor the very small residual wound, no need for specific wound care.  Advised her to  try to keep the area clean and dry and if they notice any changes or worsening to contact the office for follow-up.            Follow Up   No follow-ups on file.  Patient Instructions   Patient is doing very well. Advised to continue PT to completion to progress strength and ROM. Advised to continue home exercises outside of/in addition to PT. PT reordered today.    Pain medication refill sent through to the pharmacy.    Continue icing hip as needed up to 4 times daily for no longer than 15-20 mins at a time.     Follow-up in 6 weeks. We will obtain x-rays of the hip at that time. Call with changes or concerns.     Patient was given instructions and counseling regarding his condition or for health maintenance advice. Please see specific information pulled into the AVS if appropriate.       Saima Ruth PA-C  04/18/25  16:54 EDT    Patient or patient representative verbalized consent for the use of Ambient Listening during the visit with  Saima Ruth PA-C for chart documentation. 4/18/2025  16:54 EDT

## 2025-04-18 NOTE — PROGRESS NOTES
Physical Therapy Daily Treatment Note      Patient: Hector Coronel   : 1955  Referring practitioner: Jose Talavera MD  Date of Initial Visit: Type: THERAPY  Noted: 3/6/2025  Today's Date: 2025  Patient seen for 12 sessions       Visit Diagnoses:    ICD-10-CM ICD-9-CM   1. S/P total left hip arthroplasty  Z96.642 V43.64   2. Left hip pain  M25.552 719.45   3. Hip stiffness, left  M25.652 719.55   4. Difficulty walking  R26.2 719.7       Subjective   Patient reports 2/10 pain this day to his left hip that and also says it pulls when he brings his leg back. No other complaints this day.     Objective   See Exercise, Manual, and Modality Logs for complete treatment.       Assessment/Plan  Patient tolerates functional strength and endurance training as well as therapeutic activities adequately this day. He still demonstrates decreased ability to tolerate long periods of exercise and needs many breaks in between. Increased reps on exercises and activities to target more endurance compared to strength this treatment. Will continue with POC.      Timed:         Manual Therapy:    0     mins  67158;     Therapeutic Exercise:    10     mins  41913;     Neuromuscular Dilan:    0    mins  21322;    Therapeutic Activity:     28    mins  41314;     Gait Trainin     mins  05847;     Ultrasound:     0     mins  53087;    Ionto                               0    mins   54366  Self Care                       0     mins   62693      Un-Timed:  Electrical Stimulation:    0     mins  81320 ( );  Dry Needling     0     mins self-pay  Traction     0     mins 29929  Canalith Repos    0     mins 62318    Timed Treatment:   38   mins   Total Treatment:     38   mins    SOBEIDA Murray  Supervised by: ROBBIE Pyle License: [id number]

## 2025-04-18 NOTE — PROGRESS NOTES
I have reviewed the notes, assessments, and/or procedures performed by Nahid Tejeda, I concur with his documentation of Hector Coronel.     Raffy Chamberlain, PT

## 2025-04-22 ENCOUNTER — TREATMENT (OUTPATIENT)
Dept: PHYSICAL THERAPY | Facility: CLINIC | Age: 70
End: 2025-04-22
Payer: MEDICARE

## 2025-04-22 DIAGNOSIS — M25.552 LEFT HIP PAIN: ICD-10-CM

## 2025-04-22 DIAGNOSIS — M25.652 HIP STIFFNESS, LEFT: ICD-10-CM

## 2025-04-22 DIAGNOSIS — Z96.642 S/P TOTAL LEFT HIP ARTHROPLASTY: Primary | ICD-10-CM

## 2025-04-22 DIAGNOSIS — R26.2 DIFFICULTY WALKING: ICD-10-CM

## 2025-04-22 PROCEDURE — 97110 THERAPEUTIC EXERCISES: CPT | Performed by: PHYSICAL THERAPIST

## 2025-04-22 PROCEDURE — 97530 THERAPEUTIC ACTIVITIES: CPT | Performed by: PHYSICAL THERAPIST

## 2025-04-22 NOTE — PROGRESS NOTES
Outpatient Physical Therapy                   Physical Therapy Daily Treatment Note    Patient: Hector Coronel   : 1955  Diagnosis/ICD-10 Code:  S/P total left hip arthroplasty [Z96.642]  Referring practitioner: Jose Talavera MD  Date of Initial Visit: Type: THERAPY  Noted: 3/6/2025  Today's Date: 2025  Patient seen for 13 sessions             Subjective   Hector Coronel reports: no pain at time of arrival. Pt states he has had some pain in the anterior hip due to increased activity. Pt states he has been going up a down the steps 2-3x a day and doing yard work. Pt states the yard work required a lot of walking in the yard which is on a slope.     Objective     See Exercise, Manual, and Modality Logs for complete treatment.     Assessment/Plan  Hector demonstrated good tolerance to all functional lower strengthening  with some fatiuge and shortness of breath. Pt was especially fatigued by sled pulls. Continue to progress with current PT plan of care per patient tolerance.     Progress per Plan of Care      Timed:  Manual Therapy:    0     mins  07787;  Therapeutic Exercise:    18     mins  37441;     Neuromuscular Dilan:    0    mins  15528;    Therapeutic Activity:     10     mins  34003;     Self care:                       0     mins  43536;  Gait Trainin     mins  63116;       Ultrasound:                    0     mins  80002;      Untimed:  Mechanical Traction:    0     mins  99831;   Electrical Stimulation:    0     mins  68172 ( );      Timed Treatment:   28   mins   Total Treatment:     28   mins      Electronically signed:   Jessica Mccollum PTA  Physical Therapist Assistant  [facility] License #: [id number]

## 2025-04-25 ENCOUNTER — TREATMENT (OUTPATIENT)
Dept: PHYSICAL THERAPY | Facility: CLINIC | Age: 70
End: 2025-04-25
Payer: MEDICARE

## 2025-04-25 DIAGNOSIS — M25.552 LEFT HIP PAIN: ICD-10-CM

## 2025-04-25 DIAGNOSIS — Z96.642 S/P TOTAL LEFT HIP ARTHROPLASTY: Primary | ICD-10-CM

## 2025-04-25 DIAGNOSIS — M25.652 HIP STIFFNESS, LEFT: ICD-10-CM

## 2025-04-25 NOTE — PROGRESS NOTES
[facility] Outpatient Physical Therapy                              Physical Therapy Daily Treatment Note    Patient: Hector Coronel   : 1955  Diagnosis/ICD-10 Code:  S/P total left hip arthroplasty [Z96.642]  Referring practitioner: Jose Talavera MD  Date of Initial Visit: Type: THERAPY  Noted: 3/6/2025  Today's Date: 2025  Patient seen for 14 sessions           Subjective   Hector Coronel reports: no new complaints at time of therapy. Pt states minor soreness in his hip from push mowing his yard yesterday.      Objective     See Exercise, Manual, and Modality Logs for complete treatment.     Assessment/Plan  Hector progressing as evident by decreased overall left hip pain. Pt tolerated exercises well, with no complaints of increased pain or discomfort. Patient didn't fatigue as quickly this session and required less rest breaks. Patient will continue to benefit from skilled physical therapy services to further address pain management,  their deficits in strength, and range of motion in order to improve upon their functional mobility for any ADL concerns.      Progress per Plan of Care         Timed:  Manual Therapy:         mins  56891;  Therapeutic Exercise:    18     mins  90636;     Neuromuscular Dilan:        mins  61047;    Therapeutic Activity:     10     mins  13226;     Gait Training:           mins  51845;        Untimed:  Electrical Stimulation:         mins  19103 ( );  Mechanical Traction:         mins  97625;       Timed Treatment:   28   mins   Total Treatment:     28   mins      Electronically signed:     Namita Collins PTA  Physical Therapist Assistant  [facility] License #: [id number]

## 2025-04-29 ENCOUNTER — TREATMENT (OUTPATIENT)
Dept: PHYSICAL THERAPY | Facility: CLINIC | Age: 70
End: 2025-04-29
Payer: MEDICARE

## 2025-04-29 DIAGNOSIS — Z96.642 S/P TOTAL LEFT HIP ARTHROPLASTY: Primary | ICD-10-CM

## 2025-04-29 DIAGNOSIS — M25.652 HIP STIFFNESS, LEFT: ICD-10-CM

## 2025-04-29 DIAGNOSIS — M25.552 LEFT HIP PAIN: ICD-10-CM

## 2025-04-29 DIAGNOSIS — R26.2 DIFFICULTY WALKING: ICD-10-CM

## 2025-04-29 NOTE — PROGRESS NOTES
"Outpatient Physical Therapy                   Physical Therapy Daily Treatment Note    Patient: Hector Coronel   : 1955  Diagnosis/ICD-10 Code:  S/P total left hip arthroplasty [Z96.642]  Referring practitioner: Jose Talavera MD  Date of Initial Visit: Type: THERAPY  Noted: 3/6/2025  Today's Date: 2025  Patient seen for 15 sessions             Subjective   Hector Coronel reports: he over worked his hip this weekend. Pt states his son bought a new house so he helped mow the over grown yard with a push mower and scraped the picket fence to prep it for paint. Pt states he feels like his strength is pretty good and his endurance has improved from where it was but its still not as good as what he would like it to be. Pt states he gets short of breath so easily and that is the biggest challenge.     Pain: 3/10 pain, located in the left hip     Objective     See Exercise, Manual, and Modality Logs for complete treatment.     Assessment/Plan  Pt states he wasn't having much pain before but he has some today due to \"over doing it this weekend.\" Pt tolerated an extended treatment session with general fatigue and some shortness of breath. Pt was given rest breaks and water as needed.     Progress per Plan of Care      Timed:  Manual Therapy:    0     mins  79001;  Therapeutic Exercise:    18     mins  64593;     Neuromuscular Dilan:    8    mins  32714;    Therapeutic Activity:     12     mins  66516;     Self care:                       0     mins  06614;  Gait Trainin     mins  02683;       Ultrasound:                    0     mins  38984;      Untimed:  Mechanical Traction:    0     mins  15309;   Electrical Stimulation:    0     mins  12449 ( );      Timed Treatment:   38   mins   Total Treatment:     38   mins      Electronically signed:   Jessica Mccollum PTA  Physical Therapist Assistant  \[facility]\"" License #: [id number]  "

## 2025-05-02 ENCOUNTER — TREATMENT (OUTPATIENT)
Dept: PHYSICAL THERAPY | Facility: CLINIC | Age: 70
End: 2025-05-02
Payer: MEDICARE

## 2025-05-02 DIAGNOSIS — Z96.642 S/P TOTAL LEFT HIP ARTHROPLASTY: Primary | ICD-10-CM

## 2025-05-02 DIAGNOSIS — M25.652 HIP STIFFNESS, LEFT: ICD-10-CM

## 2025-05-02 DIAGNOSIS — M25.552 LEFT HIP PAIN: ICD-10-CM

## 2025-05-02 DIAGNOSIS — R26.2 DIFFICULTY WALKING: ICD-10-CM

## 2025-05-02 NOTE — PROGRESS NOTES
Physical Therapy Daily Treatment Note      Patient: Hector Coronel   : 1955  Referring practitioner: Jose Talavera MD  Date of Initial Visit: Type: THERAPY  Noted: 3/6/2025  Today's Date: 2025  Patient seen for 16 sessions       Visit Diagnoses:    ICD-10-CM ICD-9-CM   1. S/P total left hip arthroplasty  Z96.642 V43.64   2. Left hip pain  M25.552 719.45   3. Hip stiffness, left  M25.652 719.55   4. Difficulty walking  R26.2 719.7       Subjective   Patient reports a 2/10 pain in his left hip. He has no new complaints at this time.     Objective   See Exercise, Manual, and Modality Logs for complete treatment.       Assessment/Plan  Patient tolerates functional strength and endurance training as well as therapeutic activities with no issues this day. Patient has notable increase in endurance secondary to ambulating 350ft around the track after performing some resistance training for endurance. Patient would continue to benefit from skilled PT at this time to address strength and endurance deficits at this time. Continue with POC.     Timed:         Manual Therapy:    0     mins  91345;     Therapeutic Exercise:    15     mins  96504;     Neuromuscular Dilan:    0    mins  62061;    Therapeutic Activity:     15     mins  31719;     Gait Trainin     mins  05464;     Ultrasound:     0     mins  20868;    Ionto                               0    mins   88041  Self Care                       0     mins   61540      Un-Timed:  Electrical Stimulation:    0     mins  82721 ( );  Dry Needling     0     mins self-pay  Traction     0     mins 99854  Canalith Repos    0     mins 89958    Timed Treatment:   30   mins   Total Treatment:     35   mins    SOBEIDA Murray  Supervised by: Jessica Mccollum PTA  [place] License: [id number]

## 2025-05-06 ENCOUNTER — TREATMENT (OUTPATIENT)
Dept: PHYSICAL THERAPY | Facility: CLINIC | Age: 70
End: 2025-05-06
Payer: MEDICARE

## 2025-05-06 DIAGNOSIS — M25.552 LEFT HIP PAIN: ICD-10-CM

## 2025-05-06 DIAGNOSIS — M25.652 HIP STIFFNESS, LEFT: ICD-10-CM

## 2025-05-06 DIAGNOSIS — Z96.642 S/P TOTAL LEFT HIP ARTHROPLASTY: Primary | ICD-10-CM

## 2025-05-06 DIAGNOSIS — R26.2 DIFFICULTY WALKING: ICD-10-CM

## 2025-05-06 PROCEDURE — 97110 THERAPEUTIC EXERCISES: CPT | Performed by: PHYSICAL THERAPIST

## 2025-05-06 PROCEDURE — 97112 NEUROMUSCULAR REEDUCATION: CPT | Performed by: PHYSICAL THERAPIST

## 2025-05-06 PROCEDURE — 97164 PT RE-EVAL EST PLAN CARE: CPT | Performed by: PHYSICAL THERAPIST

## 2025-05-06 NOTE — PROGRESS NOTES
Needs refill for midodrine 5mg Progress Note  [facility] [address] [place] [address]      Patient: Hector Coronel   : 1955  Diagnosis/ICD-10 Code:  S/P total left hip arthroplasty [Z96.642]  Referring practitioner: Jose Talavera MD  Date of Initial Visit: Type: THERAPY  Noted: 3/6/2025  Today's Date: 2025  Patient seen for 17 sessions      Subjective:   Subjective Questionnaire: LEFS: 41/80 = 51.25% Function   Clinical Progress: improved  Home Program Compliance: Yes  Treatment has included: therapeutic exercise, neuromuscular re-education, manual therapy, therapeutic activity, and gait training    Subjective   The patient reported that his hip pain level today is a 1/10 today. He can tell that his hip strength and flexibility have each improved some. However, he still has difficulty walking without an AD. He needs to use a cane for balance and stability with walking. He would like to continue with PT until he is able to walk better without needing a cane.    Objective   Tenderness      Additional Tenderness Details  Tenderness to palpation in left thigh over incision site.     Neurological Testing      Additional Neurological Details  Sensation to light touch intact and equal bilaterally from L2-S1 dermatomal pattern except for hyposensation in anterior/lateral thigh on the left.     Active Range of Motion   Left Hip   Flexion: 90 degrees   External rotation (90/90): 28 degrees   Internal rotation (90/90): 0 degrees     Right Hip   Flexion: 90 degrees   External rotation (90/90): 20 degrees   Internal rotation (90/90): 20 degrees      Strength/Myotome Testing      Left Hip (tested in seated position)  Planes of Motion   Flexion: 4  Abduction: 4  Adduction: 4     Right Hip   Planes of Motion   Flexion: 4  Abduction: 4+  Adduction: 4+     Left Knee   Flexion: 4+  Extension: 4  Quadriceps contraction: good     Right Knee   Flexion: 5  Extension: 5  Quadriceps contraction: good     Ambulation      Ambulation: Level  Surfaces      Additional Level Surfaces Ambulation Details  The patient ambulates with a single tipped cane with slightly decreased stance time on the left lower extremity. He can ambulate short distances without an AD, but gait mechanics are worse with a noticeable trendelenburg gait pattern.    Functional Testing    TU.0 seconds with a single tipped cane.    See Exercise, Manual, and Modality Logs for complete treatment.     Assessment/Plan  The patient was re-evaluated today and presents with improvements in left hip pain, strength, flexibility, and function (LEFS) compared to his previous assessment. Although improved, he continues to present with left hip stiffness, weakness, and difficulty walking. His gait mechanics are sound with a STC, but deteriorate without the use of an AD. He would benefit from continued skilled physical therapy to address remaining functional deficits and to allow the patient to return to his prior level of function.     Goals  Plan Goals: HIP PROBLEMS     1. The patient complains of left hip pain.              LTG 1: 12 weeks:  The patient will report a pain rating of 1/10 or better in order to improve  tolerance to activities of daily living and improve sleep quality.                          STATUS:  Progressing              STG 1a: 6 weeks:  The patient will report a pain rating of 2/10 or better.                          STATUS:  Met     2. The patient demonstrates weakness of the left hip.              LTG 2: 12 weeks:  The patient will demonstrate 4+/5 strength for left hip flexion, abduction,  and adduction in order to improve hip stability.                          STATUS:  progressing              STG 2a: 6 weeks:  The patient will demonstrate 4-/5 strength for left hip flexion, abduction,  and adduction.                          STATUS:  Met     3. The patient has gait dysfunction.              LTG 3: 12 weeks:  The patient will ambulate without assistive device,  independently, for community distances with normal biomechanics in order to improve mobility and allow patient to perform activities such as grocery shopping with greater ease.                          STATUS:  Progressing              STG 3a: The patient will be independent in HEP.                          STATUS:  Met     4. Mobility: Walking/Moving Around Functional Limitation                               LTG 4: 12 weeks:  The patient will demonstrate 25% limitation by achieving a score of 60/80 on the Lower Extremity Functional Scale.                          STATUS:  Progressing              STG 4a: 6 weeks:  The patient will demonstrate 50% limitation by achieving a score of 40/80 on the Lower Extremity Functional Scale.                            STATUS:  Progressing     5. The patient has limited ROM of the left hip.              LTG 5: 12 weeks:  The patient will demonstrate left hip AROM as follows: 95 degrees of hip flexion, 25 degrees of internal rotation, and 25 degrees of external rotation.                          STATUS:  Progressing              STG 5a: 6 weeks:  The patient will demonstrate left hip AROM as follows: 90 degrees of hip flexion, 20 degrees of internal rotation, and 20 degrees of external rotation.                          STATUS:  Progressing     6. The patient is at an increased risk for falls based on TUG time.               LTG 6: 12 weeks:  The patient will demonstrate decreased risk for falls by completing the TUG test in 14 seconds or less.                          STATUS:  New              STG 6a: 6 weeks:  The patient will demonstrate decreased risk for falls by completing the TUG test in 20 seconds or less.                          STATUS:  New     Progress toward previous goals: Partially Met      Recommendations: Add 1 more month of PT.  Timeframe: 1 month  Prognosis to achieve goals: good    PT Signature: Raffy Chamberlain, PT    Electronically signed 5/6/2025    [place] License:  PT - [id number]       Timed:  Manual Therapy:    0     mins  73303;  Therapeutic Exercise:    12     mins  78292;     Neuromuscular Dilan:    8    mins  49001;    Therapeutic Activity:      6     mins  42075;     Gait Trainin     mins  93955;     Aquatics                         0      mins  32442    Un-timed:  Mechanical Traction      0     mins  25786  Dry Needling     0     mins self-pay  Electrical Stimulation:    0     mins  61025 ( )  Re-evaluation:               8     mins 49258;    Timed Treatment:   26   mins   Total Treatment:     34   mins

## 2025-05-09 ENCOUNTER — TELEPHONE (OUTPATIENT)
Dept: PHYSICAL THERAPY | Facility: CLINIC | Age: 70
End: 2025-05-09

## 2025-05-30 ENCOUNTER — OFFICE VISIT (OUTPATIENT)
Dept: ORTHOPEDIC SURGERY | Facility: CLINIC | Age: 70
End: 2025-05-30
Payer: MEDICARE

## 2025-05-30 VITALS
HEART RATE: 73 BPM | DIASTOLIC BLOOD PRESSURE: 70 MMHG | WEIGHT: 240 LBS | HEIGHT: 69 IN | OXYGEN SATURATION: 95 % | SYSTOLIC BLOOD PRESSURE: 139 MMHG | BODY MASS INDEX: 35.55 KG/M2

## 2025-05-30 DIAGNOSIS — M25.552 LEFT HIP PAIN: ICD-10-CM

## 2025-05-30 DIAGNOSIS — Z96.642 S/P TOTAL LEFT HIP ARTHROPLASTY: Primary | ICD-10-CM

## 2025-05-30 PROCEDURE — 99024 POSTOP FOLLOW-UP VISIT: CPT | Performed by: PHYSICIAN ASSISTANT

## 2025-05-30 NOTE — PATIENT INSTRUCTIONS
X-rays taken and reviewed with patient today in office. Patient is doing well.    Encouraged to continue home exercises for ROM and strengthening.   May continue icing as needed for no more than 20 minutes at a time for comfort and inflammation.   Encouraged to continue avoiding outward pivoting and avoid deep squatting.     Incision well healed. May apply Vitamin E, cocoa butter, etc. over incision to aid in scar appearance. Educated that numbness over the incision can still remain at this point, and should continue to improve for up to 1 year postop. However, at the year abigail if still experiencing numbness it may not return.    Commended he begin massaging over the incision.  Also printed home exercises for trochanteric bursitis.  If this becomes more tender over that site we could consider a steroid injection.  He like to try the exercises first.  Resuming physical therapy is also an option if necessary.    Discussed the need for antibiotic prophylaxis with dental procedures following total joint replacement for life. Patient instructed to contact office if dental office is reluctant to prescribe antibiotics prior to procedure and we will prescribe them.       Follow-up in 6-8 weeks.  No x-rays.  Call with questions or concerns.

## 2025-05-30 NOTE — PROGRESS NOTES
Chief Complaint  Follow-up of the Left Hip    Subjective          History of Present Illness     History of Present Illness  The patient is a 70-year-old male who presents for a 3-month follow-up on his left hip. He is 12 weeks postoperative from a left total hip arthroplasty performed by Dr. Talavera on 2025. He has been attending physical therapy with [facility].    He reports satisfactory progress with his hip but continues to rely on a cane for mobility. Without the cane, he experiences a significant limp, which is mitigated when using the cane. He perceives a lack of strength in his hip, preventing him from standing unaided. He has discontinued formal therapy and has been performing home exercises as instructed. It is reported that he experienced substantial strength loss prior to his surgery and he feels he is having a hard time catching up with that. He expresses concern about potential leg length discrepancy. He has ceased taking pain medication and reports no falls or injuries since his last visit. He notes a palpable hump on his lateral hip near the incision. He has not attempted to massage the area. It is observed that his recovery from this surgery differs from his previous hip replacement.      Patient denies fever or chills.  Denies redness, drainage, or complications from incision site.     Allergies   Allergen Reactions    Metal Rash     CHEAP METAL- BELT HAZEL BREAK HIM OUT         Social History     Socioeconomic History    Marital status:    Tobacco Use    Smoking status: Former     Current packs/day: 0.00     Types: Cigarettes     Quit date:      Years since quittin.4    Smokeless tobacco: Never    Tobacco comments:     quit 30 years ago   Vaping Use    Vaping status: Never Used   Substance and Sexual Activity    Alcohol use: Yes     Alcohol/week: 3.0 standard drinks of alcohol     Types: 3 Shots of liquor per week    Drug use: Never    Sexual activity: Defer        I reviewed  "the patient's chief complaint, history of present illness, review of systems, past medical history, surgical history, family history, social history, medications, and allergy list.     REVIEW OF SYSTEMS    Constitutional: Denies fevers, chills, weight loss  Cardiovascular: Denies chest pain, shortness of breath  Skin: Denies rashes, acute skin changes  Neurologic: Denies headache, loss of consciousness  MSK: Left hip pain      Objective   Vital Signs:   /70   Pulse 73   Ht 175.3 cm (69.02\")   Wt 109 kg (240 lb)   SpO2 95%   BMI 35.43 kg/m²     Body mass index is 35.43 kg/m².    Physical Exam    Tobacco Use: Medium Risk (5/30/2025)    Patient History     Smoking Tobacco Use: Former     Smokeless Tobacco Use: Never     Passive Exposure: Not on file     Patient reports they have a history of tobacco use; encouraged continued tobacco cessation for further health benefits.     General: Alert, no acute distress  Gait: Antalgic with use of cane  Left lower extremity: Incision well-healed.  No concerning signs of infection.  Moderate swelling over the well-healed surgical incision as well as over the greater trochanter.  No tenderness over greater trochanter, but there is what feels like a thick scar tissue beneath the incision. No pain with passive hip ROM.  Knee extensor mechanism intact.  Intact hip abduction.  Hip flexion intact.  Strength 3/5.  Leg lengths appear symmetric.  Calf soft, nontender.  Demonstrates active ankle plantarflexion dorsiflexion.  Sensation intact over the dorsal and plantar foot.  Palpable pedal pulses.    Imaging Results (Most Recent)       Procedure Component Value Units Date/Time    XR Hip With or Without Pelvis 2 - 3 View Left - Preliminary [[id number]] Resulted: 05/30/25 1425     Updated: 05/30/25 1425    This result has not been signed. Information might be incomplete.      Narrative:      X-Ray Report:  Study: X-rays ordered, taken in the office, and reviewed today.   Site: Left " Hip Xray  Indication: Left total hip arthroplasty follow-up  View: AP, frog lateral left hip  Findings: Intact left total hip arthroplasty.  No evidence of hardware   complications or loosening.  No periprosthetic fractures are visualized.    Hip joint is reduced.  Prior studies available for comparison: yes                      Assessment and Plan    Diagnoses and all orders for this visit:    1. S/P total left hip arthroplasty (Primary)    2. Left hip pain  -     XR Hip With or Without Pelvis 2 - 3 View Left        Assessment & Plan        Follow Up   No follow-ups on file.  Patient Instructions     X-rays taken and reviewed with patient today in office. Patient is doing well.    Encouraged to continue home exercises for ROM and strengthening.   May continue icing as needed for no more than 20 minutes at a time for comfort and inflammation.   Encouraged to continue avoiding outward pivoting and avoid deep squatting.     Incision well healed. May apply Vitamin E, cocoa butter, etc. over incision to aid in scar appearance. Educated that numbness over the incision can still remain at this point, and should continue to improve for up to 1 year postop. However, at the year abigail if still experiencing numbness it may not return.    Commended he begin massaging over the incision.  Also printed home exercises for trochanteric bursitis.  If this becomes more tender over that site we could consider a steroid injection.  He like to try the exercises first.  Resuming physical therapy is also an option if necessary.    Discussed the need for antibiotic prophylaxis with dental procedures following total joint replacement for life. Patient instructed to contact office if dental office is reluctant to prescribe antibiotics prior to procedure and we will prescribe them.       Follow-up in 6-8 weeks.  No x-rays.  Call with questions or concerns.     Patient was given instructions and counseling regarding his condition or for health  maintenance advice. Please see specific information pulled into the AVS if appropriate.       Saima Ruth PA-C  05/30/25  17:45 EDT    Patient or patient representative verbalized consent for the use of Ambient Listening during the visit with  Saima Ruth PA-C for chart documentation. 5/30/2025  17:45 EDT

## 2025-07-12 NOTE — PROGRESS NOTES
Chief Complaint  Follow-up of the Left Hip    Subjective          History of Present Illness     History of Present Illness  The patient is a 70-year-old male here to follow up on his left hip. He is a little over 4 months postoperative from a left total hip arthroplasty performed by Dr. Talavera on 2025.    He was last seen noting difficulties with weakness and strength loss prior to surgery, which he has struggled to overcome in the postoperative phase. Physical therapy was continued after the last visit.    Today, he reports that his hip condition remains unchanged.  He continues to have experience a tightness on the lateral aspect of the hip that does not really cause pain.  At most 2/10, and he is able to lay on that side without it causing pain. He experiences a pinching sensation in this area when taking long steps or descending stairs.  He still ambulates with antalgic gait with use of AD.  He has not been performing his home exercises stretches or strengthening    PAST SURGICAL HISTORY:  Left total hip arthroplasty on 2025    SOCIAL HISTORY  Occupations: Retired  Exercise: Exercises not regularly performed      Patient denies fever or chills.  Denies redness, drainage, or complications from incision site.     Allergies   Allergen Reactions    Metal Rash     CHEAP METAL- BELT HAZEL BREAK HIM OUT         Social History     Socioeconomic History    Marital status:    Tobacco Use    Smoking status: Former     Current packs/day: 0.00     Types: Cigarettes     Quit date:      Years since quittin.5    Smokeless tobacco: Never    Tobacco comments:     quit 30 years ago   Vaping Use    Vaping status: Never Used   Substance and Sexual Activity    Alcohol use: Yes     Alcohol/week: 3.0 standard drinks of alcohol     Types: 3 Shots of liquor per week    Drug use: Never    Sexual activity: Defer        I reviewed the patient's chief complaint, history of present illness, review of systems, past  "medical history, surgical history, family history, social history, medications, and allergy list.     REVIEW OF SYSTEMS    Constitutional: Denies fevers, chills, weight loss  Cardiovascular: Denies chest pain, shortness of breath  Skin: Denies rashes, acute skin changes  Neurologic: Denies headache, loss of consciousness  MSK: Left hip pain      Objective   Vital Signs:   /76   Pulse 79   Ht 175.3 cm (69.02\")   Wt 111 kg (245 lb)   SpO2 93%   BMI 36.16 kg/m²     Body mass index is 36.16 kg/m².    Physical Exam    Tobacco Use: Medium Risk (7/15/2025)    Patient History     Smoking Tobacco Use: Former     Smokeless Tobacco Use: Never     Passive Exposure: Not on file     Patient reports they have a history of tobacco use; encouraged continued tobacco cessation for further health benefits.     General: Alert, no acute distress  Gait: Antalgic with use of AD  Left lower extremity: Surgical scar.  No concerning signs of infection.  Mild swelling lateral aspect of hip.  Nontender over greater trochanter.  No pain with passive hip ROM.  Knee extensor mechanism intact.  Intact hip abduction.  Hip flexion intact.  Strength hip flexion 3/5, all other 4/5.  Leg lengths appear symmetric.  Calf soft, nontender.  Demonstrates active ankle plantarflexion dorsiflexion.  Sensation intact over the dorsal and plantar foot.  Palpable pedal pulses.       Assessment and Plan    Diagnoses and all orders for this visit:    1. S/P total left hip arthroplasty (Primary)        Assessment & Plan  1. Postoperative status of left total hip arthroplasty:  Patient exhibits tightness in the glutes and hip muscles.  He is not doing his home exercises stretches or strengthening.  This could potentially lead to bursitis if not addressed. The primary issue appears to be muscle weakness and tightness, rather than a need for steroid injections for bursitis.    Engage in stretching and strengthening exercises for the gluteal muscles. A printout " of trochanteric bursitis stretches was provided. If wishing to resume physical therapy, inform the office of the preferred location, and arrangements will be made accordingly.  For now he does not wish to return to PT.      Follow-up: 03/2026 for annual checkup/x-rays, sooner if concerns.          Follow Up   No follow-ups on file.  There are no Patient Instructions on file for this visit.  Patient was given instructions and counseling regarding his condition or for health maintenance advice. Please see specific information pulled into the AVS if appropriate.       Saima Ruth PA-C  07/15/25  15:23 EDT    Patient or patient representative verbalized consent for the use of Ambient Listening during the visit with  Saima Ruth PA-C for chart documentation. 7/15/2025  15:24 EDT

## 2025-07-14 ENCOUNTER — OFFICE VISIT (OUTPATIENT)
Dept: ORTHOPEDIC SURGERY | Facility: CLINIC | Age: 70
End: 2025-07-14
Payer: MEDICARE

## 2025-07-14 VITALS
WEIGHT: 245 LBS | OXYGEN SATURATION: 93 % | SYSTOLIC BLOOD PRESSURE: 143 MMHG | BODY MASS INDEX: 36.29 KG/M2 | DIASTOLIC BLOOD PRESSURE: 76 MMHG | HEIGHT: 69 IN | HEART RATE: 79 BPM

## 2025-07-14 DIAGNOSIS — Z96.642 S/P TOTAL LEFT HIP ARTHROPLASTY: Primary | ICD-10-CM

## 2025-07-14 PROCEDURE — 1160F RVW MEDS BY RX/DR IN RCRD: CPT | Performed by: PHYSICIAN ASSISTANT

## 2025-07-14 PROCEDURE — 1159F MED LIST DOCD IN RCRD: CPT | Performed by: PHYSICIAN ASSISTANT

## 2025-07-14 PROCEDURE — 99213 OFFICE O/P EST LOW 20 MIN: CPT | Performed by: PHYSICIAN ASSISTANT

## (undated) DEVICE — CATH F5INF TLPIGST145 110CM6SH: Brand: INFINITI

## (undated) DEVICE — ZIPPERED TOGA, PEEL-AWAY 2X LARGE: Brand: FLYTE, SURGICOOL

## (undated) DEVICE — CVR LEG BOOTLEG F/R NOSKID 33IN

## (undated) DEVICE — SLV SCD KN/LEN ADJ EXPRSS BLENDED MD 1P/U

## (undated) DEVICE — PCH INST SURG INVISISHIELD 2PCKT

## (undated) DEVICE — GAUZE,SPONGE,4"X4",16PLY,STRL,LF,10/TRAY: Brand: MEDLINE

## (undated) DEVICE — DRP ROBOTIC ROSA BX/20

## (undated) DEVICE — MAT FLR ABS W/BLU/LINER 56X72IN WHT

## (undated) DEVICE — STERILE POLYISOPRENE POWDER-FREE SURGICAL GLOVES WITH EMOLLIENT COATING: Brand: PROTEXIS

## (undated) DEVICE — SOL NACL 0.9PCT 100ML SGL

## (undated) DEVICE — KT PT POSITION SUPINE HANA/PROFX TABL

## (undated) DEVICE — GLV SURG SENSICARE PI PF LF 7 GRN STRL

## (undated) DEVICE — UNDYED BRAIDED (POLYGLACTIN 910), SYNTHETIC ABSORBABLE SUTURE: Brand: COATED VICRYL

## (undated) DEVICE — GW WWSS35145 WHOLEY WIRE V04: Brand: WHOLEY™

## (undated) DEVICE — ENCORE® LATEX ORTHO SIZE 8, STERILE LATEX POWDER-FREE SURGICAL GLOVE: Brand: ENCORE

## (undated) DEVICE — BNDG ELAS CO-FLEX SLF ADHR 6IN 5YD LF STRL

## (undated) DEVICE — DRSNG SURESITE WNDW 4X4.5

## (undated) DEVICE — NO-SCRATCH ™ SMALL WHITNEY CURETTE ™ IS A SINGLE-USE, PLASTIC CURETTE FOR QUICKLY APPLYING, MANIPULATING AND REMOVING BONE CEMENT DURING HIP AND KNEE REPLACEMENT SURGERY. THE PLASTIC IS SOFTER THAN STEEL INSTRUMENTS, REDUCING THE RISK OF DAMAGING THE PROSTHESIS WITH METAL INSTRUMENTS.  THE CURETTE’S 6MM TIP REMOVES EXCESS CEMENT FROM REPLACEMENT HIPS AND KNEES. EASY-TO-MANEUVER, THE SMALL BLUE CURETTE LETS YOU REMOVE CEMENT FROM ALL EDGES OF THE PROSTHESIS.NO-SCRATCH WHITNEY SMALL CURETTE FEATURES:SAFER THAN STEEL- MADE OF PLASTIC - STURDY YET SOFTER THAN SURGICAL STEEL.HANDIER- EACH TOOL HAS A MOLDED-IN THUMB INDENTATION INSTANTLY ORIENTING THE TOOL.- EASIER TO MANEUVER IN HARD TO SEE PLACES.- COLOR-CODED FOR EASY IDENTIFICATION.FASTER- COMES INDIVIDUALLY PACKAGED IN STERILE, PEEL OPEN POUCH, READY TO GO.- APPLIES, MANIPULATES, OR REMOVES CEMENT WITH FINGERTIP PRECISION.ECONOMICAL- THE COST OF A SINGLE REVISION DWARFS THE COST OF A SINGLE-USE CURETTE. - DISPOSABLE – THERE’S NO NEED TO WASTE TIME REMOVING HARDENED CEMENT OR RE-STERILIZING TOOLS.- LESS EXPENSIVE TO BUY AND INVENTORY - ORDER ONLY THE TOOL YOU USE.- PACKAGED 25 INDIVIDUALLY WRAPPED TOOLS TO A CARTON FOR CONVENIENT SHELF STORAGE.: Brand: WHITNEY NO-SCRATCH CURETTE (SMALL)

## (undated) DEVICE — STRYKER PERFORMANCE SERIES SAGITTAL BLADE: Brand: STRYKER PERFORMANCE SERIES

## (undated) DEVICE — BASIC SINGLE BASIN-LF: Brand: MEDLINE INDUSTRIES, INC.

## (undated) DEVICE — NEEDLE,18GX1.5",REG,BEVEL: Brand: MEDLINE

## (undated) DEVICE — PENCL E/S SMOKEEVAC W/TELESCP CANN

## (undated) DEVICE — RADIFOCUS OPTITORQUE ANGIOGRAPHIC CATHETER: Brand: OPTITORQUE

## (undated) DEVICE — SHEET,DRAPE,70X85,STERILE: Brand: MEDLINE

## (undated) DEVICE — PEEL-AWAY TOGA, 2X LARGE: Brand: FLYTE

## (undated) DEVICE — SUT VIC 0 CT1 36IN J946H

## (undated) DEVICE — DRP SURG U/DRP INVISISHIELD PA 48X52IN

## (undated) DEVICE — 3M™ STERI-DRAPE™ INSTRUMENT POUCH 1018: Brand: STERI-DRAPE™

## (undated) DEVICE — TOTAL ANTERIOR HIP-LF: Brand: MEDLINE INDUSTRIES, INC.

## (undated) DEVICE — TOTAL KNEE-LF: Brand: MEDLINE INDUSTRIES, INC.

## (undated) DEVICE — APPL CHLORAPREP HI/LITE 26ML ORNG

## (undated) DEVICE — PENCL ES MEGADINE EZ/CLEAN BUTN W/HOLSTR 10FT

## (undated) DEVICE — ELECTRD BLD EZ CLN STD 6.5IN

## (undated) DEVICE — GLV SURG SENSICARE SLT PF LF 8 STRL

## (undated) DEVICE — BIPOLAR SEALER 23-112-1 AQM 6.0: Brand: AQUAMANTYS™

## (undated) DEVICE — PENCL SMOKE/EVAC MEGADYNE TELESCP 10FT

## (undated) DEVICE — SCRW HEX PERSONA FML 2.5X25MM PK/2
Type: IMPLANTABLE DEVICE | Site: KNEE | Status: NON-FUNCTIONAL
Removed: 2023-05-05

## (undated) DEVICE — STERILE POLYISOPRENE POWDER-FREE SURGICAL GLOVES: Brand: PROTEXIS

## (undated) DEVICE — SUT VIC UD BR COAT 0 CP2 27IN

## (undated) DEVICE — HI-TORQUE BALANCE MIDDLEWEIGHT UNIVERSAL GUIDE WIRE .014 STRAIGHT TIP 3.0 CM X 190 CM: Brand: HI-TORQUE BALANCE MIDDLEWEIGHT UNIVERSAL

## (undated) DEVICE — NDL HYPO ECLPS SFTY 18G 1 1/2IN

## (undated) DEVICE — PROXIMATE RH ROTATING HEAD SKIN STAPLERS (35 WIDE) CONTAINS 35 STAINLESS STEEL STAPLES: Brand: PROXIMATE

## (undated) DEVICE — TR BAND RADIAL ARTERY COMPRESSION DEVICE: Brand: TR BAND

## (undated) DEVICE — SUT POLY FORCEFIBER W/CUT/NDL NO5 38IN

## (undated) DEVICE — THE STERILE LIGHT HANDLE COVER IS USED WITH STERIS SURGICAL LIGHTING AND VISUALIZATION SYSTEMS.

## (undated) DEVICE — DRSNG PAD ABD 8X10IN STRL

## (undated) DEVICE — TOWEL,OR,DSP,ST,BLUE,STD,4/PK,20PK/CS: Brand: MEDLINE

## (undated) DEVICE — 450 ML BOTTLE OF 0.05% CHLORHEXIDINE GLUCONATE IN 99.95% STERILE WATER FOR IRRIGATION, USP AND APPLICATOR.: Brand: IRRISEPT ANTIMICROBIAL WOUND LAVAGE

## (undated) DEVICE — FAN SPRAY KIT: Brand: PULSAVAC®

## (undated) DEVICE — INTENDED FOR TISSUE SEPARATION, AND OTHER PROCEDURES THAT REQUIRE A SHARP SURGICAL BLADE TO PUNCTURE OR CUT.: Brand: BARD-PARKER ® CARBON RIB-BACK BLADES

## (undated) DEVICE — ELECTRD BLD EXT EDGE 1P COAT 6.5IN STRL

## (undated) DEVICE — 6F .070 XB 3.5 100CM: Brand: VISTA BRITE TIP

## (undated) DEVICE — HANDPIECE SET WITH HIGH FLOW TIP AND SUCTION TUBE: Brand: INTERPULSE

## (undated) DEVICE — DRSNG SURESITE WNDW 2.38X2.75

## (undated) DEVICE — DISPOSABLE TOURNIQUET CUFF SINGLE BLADDER, SINGLE PORT AND QUICK CONNECT CONNECTOR: Brand: COLOR CUFF

## (undated) DEVICE — GLV SURG SENSICARE SLT PF LF 7 STRL

## (undated) DEVICE — PULLOVER TOGA, 2X LARGE: Brand: FLYTE, SURGICOOL

## (undated) DEVICE — NC TREK CORONARY DILATATION CATHETER 2.0 MM X 12 MM / RAPID-EXCHANGE: Brand: NC TREK

## (undated) DEVICE — 3 BONE CEMENT MIXER: Brand: MIXEVAC

## (undated) DEVICE — GLOVE,SURG,SENSICARE SLT,LF,PF,7: Brand: MEDLINE

## (undated) DEVICE — DRESSING,GAUZE,XEROFORM,CURAD,1"X8",ST: Brand: CURAD

## (undated) DEVICE — DRSNG GZ PETROLTM XEROFORM CURAD 1X8IN STRL

## (undated) DEVICE — ELECTRD BLD EDGE COAT 3IN

## (undated) DEVICE — GLIDESHEATH SLENDER STAINLESS STEEL KIT: Brand: GLIDESHEATH SLENDER

## (undated) DEVICE — GW FC FLOP/TP .035 260CM 3MM

## (undated) DEVICE — SOL IRR NACL 0.9PCT 3000ML

## (undated) DEVICE — Device

## (undated) DEVICE — SUT ETHIB 1 X538H ETX538H

## (undated) DEVICE — DRSNG SURG AQUACEL AG/ADVNTGE 9X25CM 3.5X10IN

## (undated) DEVICE — SYR LUERLOK 30CC

## (undated) DEVICE — SOL IRR NACL 0.9PCT BT 1000ML

## (undated) DEVICE — CATH F4 INF JR 4 100CM: Brand: INFINITI

## (undated) DEVICE — NC TREK CORONARY DILATATION CATHETER 3.0 MM X 15 MM / RAPID-EXCHANGE: Brand: NC TREK

## (undated) DEVICE — RUNTHROUGH NS EXTRA FLOPPY PTCA GUIDEWIRE: Brand: RUNTHROUGH

## (undated) DEVICE — NC TREK CORONARY DILATATION CATHETER 2.5 MM X 12 MM / RAPID-EXCHANGE: Brand: NC TREK

## (undated) DEVICE — GLV SURG ULTRAFREE MAX LTX PF 8

## (undated) DEVICE — BNDG COBAN S/ADHR WRP LF 4IN 5YD TN

## (undated) DEVICE — MINI TREK CORONARY DILATATION CATHETER 2.0 MM X 12 MM / RAPID-EXCHANGE: Brand: MINI TREK

## (undated) DEVICE — GLOVE,SURG,SENSICARE SLT,LF,PF,6.5: Brand: MEDLINE

## (undated) DEVICE — 3M™ STERI-DRAPE™ U-DRAPE 1015: Brand: STERI-DRAPE™

## (undated) DEVICE — DISPOSABLE TOURNIQUET CUFF 30"X4", 1-LINE, WHITE, STERILE, 1EA/PK, 10PK/CS: Brand: ASP MEDICAL

## (undated) DEVICE — MEDI-VAC NON-CONDUCTIVE SUCTION TUBING: Brand: CARDINAL HEALTH